# Patient Record
Sex: MALE | Race: BLACK OR AFRICAN AMERICAN | NOT HISPANIC OR LATINO | Employment: FULL TIME | URBAN - METROPOLITAN AREA
[De-identification: names, ages, dates, MRNs, and addresses within clinical notes are randomized per-mention and may not be internally consistent; named-entity substitution may affect disease eponyms.]

---

## 2022-04-14 ENCOUNTER — APPOINTMENT (EMERGENCY)
Dept: RADIOLOGY | Facility: HOSPITAL | Age: 62
End: 2022-04-14
Payer: MEDICAID

## 2022-04-14 ENCOUNTER — HOSPITAL ENCOUNTER (EMERGENCY)
Facility: HOSPITAL | Age: 62
Discharge: HOME | End: 2022-04-14
Attending: EMERGENCY MEDICINE | Admitting: EMERGENCY MEDICINE
Payer: MEDICAID

## 2022-04-14 VITALS
RESPIRATION RATE: 16 BRPM | HEART RATE: 75 BPM | DIASTOLIC BLOOD PRESSURE: 92 MMHG | WEIGHT: 185 LBS | SYSTOLIC BLOOD PRESSURE: 142 MMHG | OXYGEN SATURATION: 97 % | TEMPERATURE: 98.1 F

## 2022-04-14 DIAGNOSIS — J18.9 BILATERAL PNEUMONIA: Primary | ICD-10-CM

## 2022-04-14 DIAGNOSIS — I10 HYPERTENSION: ICD-10-CM

## 2022-04-14 PROCEDURE — 87636 SARSCOV2 & INF A&B AMP PRB: CPT | Performed by: PHYSICIAN ASSISTANT

## 2022-04-14 PROCEDURE — 99284 EMERGENCY DEPT VISIT MOD MDM: CPT | Performed by: PHYSICIAN ASSISTANT

## 2022-04-14 PROCEDURE — 71045 X-RAY EXAM CHEST 1 VIEW: CPT

## 2022-04-14 PROCEDURE — 99283 EMERGENCY DEPT VISIT LOW MDM: CPT

## 2022-04-14 RX ORDER — AMOXICILLIN 500 MG/1
1000 CAPSULE ORAL EVERY 8 HOURS SCHEDULED
Qty: 30 CAPSULE | Refills: 0 | Status: SHIPPED | OUTPATIENT
Start: 2022-04-15 | End: 2022-04-19 | Stop reason: HOSPADM

## 2022-04-14 RX ORDER — NIFEDIPINE 30 MG/1
60 TABLET, EXTENDED RELEASE ORAL DAILY
Status: DISCONTINUED | OUTPATIENT
Start: 2022-04-14 | End: 2022-04-15 | Stop reason: HOSPADM

## 2022-04-14 RX ORDER — BENZONATATE 100 MG/1
100 CAPSULE ORAL EVERY 8 HOURS
Qty: 21 CAPSULE | Refills: 0 | Status: SHIPPED | OUTPATIENT
Start: 2022-04-14 | End: 2022-04-19 | Stop reason: HOSPADM

## 2022-04-14 RX ORDER — NIFEDIPINE 60 MG/1
60 TABLET, EXTENDED RELEASE ORAL DAILY
COMMUNITY
End: 2022-04-19 | Stop reason: HOSPADM

## 2022-04-14 RX ORDER — NIFEDIPINE 60 MG/1
60 TABLET, EXTENDED RELEASE ORAL DAILY
Qty: 10 TABLET | Refills: 0 | Status: SHIPPED | OUTPATIENT
Start: 2022-04-14 | End: 2022-04-19 | Stop reason: HOSPADM

## 2022-04-14 RX ORDER — LABETALOL 200 MG/1
200 TABLET, FILM COATED ORAL 2 TIMES DAILY
COMMUNITY
End: 2022-04-19 | Stop reason: HOSPADM

## 2022-04-14 RX ORDER — AZITHROMYCIN 250 MG/1
TABLET, FILM COATED ORAL
Qty: 6 TABLET | Refills: 0 | Status: SHIPPED | OUTPATIENT
Start: 2022-04-14 | End: 2022-04-19 | Stop reason: HOSPADM

## 2022-04-14 RX ORDER — AMOXICILLIN 250 MG/1
1000 CAPSULE ORAL ONCE
Status: COMPLETED | OUTPATIENT
Start: 2022-04-14 | End: 2022-04-14

## 2022-04-14 RX ADMIN — NIFEDIPINE 60 MG: 30 TABLET, FILM COATED, EXTENDED RELEASE ORAL at 21:05

## 2022-04-14 RX ADMIN — AMOXICILLIN 1000 MG: 250 CAPSULE ORAL at 22:44

## 2022-04-14 NOTE — Clinical Note
Xander Vega was seen and treated in our emergency department on 4/14/2022  Diagnosis:     Janee Mccall    He may return on this date:     Patient must quarantine until informed of COVID-19 test results  If negative, patient must be without symptoms for 24 hours prior to returning to work  If you have any questions or concerns, please don't hesitate to call        Evelyn Wallace PA-C    ______________________________           _______________          _______________  Hospital Representative                              Date                                Time

## 2022-04-15 LAB
FLUAV RNA RESP QL NAA+PROBE: NEGATIVE
FLUBV RNA RESP QL NAA+PROBE: NEGATIVE
SARS-COV-2 RNA RESP QL NAA+PROBE: NEGATIVE

## 2022-04-15 NOTE — ED PROVIDER NOTES
History  Chief Complaint   Patient presents with    Cough     Cough and congestion for 2 days  Patient also notes he has been off of his BP meds for past 3 days  Is unable to get meds refilled until he gets back to [facility] on Monday  Patient is a 60-year-old male with a past medical history of hypertension who presents with congestion, cough, body aches for 3 days  Patient describes dry, nonproductive cough with nasal congestion, rhinorrhea  He also notes associated body aches  Patient thinks he has sick contacts at work  Patient denies any fevers, chills, diaphoresis, headache, dizziness, chest pain, shortness of breath, palpitations, abdominal pain, nausea, vomiting, diarrhea, urinary changes, recent travel  Patient has not tried anything to help alleviate his symptoms  Patient also reports being out of his blood pressure medication over the last 3 days and his PCP is out of the office all next week, so he is unable to get a refill  Patient is from [place]  Patient believes he was vaccinated for COVID, unsure regarding flu  Prior to Admission Medications   Prescriptions Last Dose Informant Patient Reported? Taking? NIFEdipine (PROCARDIA XL) 60 mg 24 hr tablet   Yes Yes   Sig: Take 60 mg by mouth daily   labetalol (NORMODYNE) 200 mg tablet   Yes Yes   Sig: Take 200 mg by mouth 2 (two) times a day      Facility-Administered Medications: None       Past Medical History:   Diagnosis Date    Hypertension        History reviewed  No pertinent surgical history  History reviewed  No pertinent family history  I have reviewed and agree with the history as documented  E-Cigarette/Vaping     E-Cigarette/Vaping Substances     Social History     Tobacco Use    Smoking status: Current Every Day Smoker    Smokeless tobacco: Never Used   Substance Use Topics    Alcohol use: Not on file    Drug use: Not on file       Review of Systems   Constitutional: Negative for chills, diaphoresis and fever  HENT: Positive for congestion and rhinorrhea  Negative for ear pain and sore throat  Eyes: Negative for visual disturbance  Respiratory: Positive for cough  Negative for shortness of breath, wheezing and stridor  Cardiovascular: Negative for chest pain, palpitations and leg swelling  Gastrointestinal: Negative for abdominal pain, diarrhea, nausea and vomiting  Genitourinary: Negative for difficulty urinating, dysuria, frequency, hematuria and urgency  Musculoskeletal: Positive for myalgias  Negative for neck pain and neck stiffness  Skin: Negative for color change, pallor and rash  Neurological: Negative for dizziness, weakness, light-headedness, numbness and headaches  All other systems reviewed and are negative  Physical Exam  Physical Exam  Vitals and nursing note reviewed  Constitutional:       General: He is awake  He is not in acute distress  Appearance: He is well-developed  He is not ill-appearing, toxic-appearing or diaphoretic  HENT:      Head: Normocephalic and atraumatic  Right Ear: External ear normal       Left Ear: External ear normal       Nose: Nose normal    Eyes:      General: No scleral icterus  Conjunctiva/sclera: Conjunctivae normal       Pupils: Pupils are equal, round, and reactive to light  Neck:      Vascular: No JVD  Trachea: No tracheal deviation  Cardiovascular:      Rate and Rhythm: Normal rate and regular rhythm  Pulses: Normal pulses  Heart sounds: Normal heart sounds  Pulmonary:      Effort: Pulmonary effort is normal  No respiratory distress  Breath sounds: Normal breath sounds  No decreased breath sounds, wheezing, rhonchi or rales  Abdominal:      General: There is no distension  Musculoskeletal:         General: No deformity  Cervical back: Normal range of motion  Right lower leg: No edema  Left lower leg: No edema        Comments: Moving all extremities freely, ambulating without issue Skin:     General: Skin is dry  Findings: No rash  Neurological:      Mental Status: He is alert and oriented to person, place, and time  GCS: GCS eye subscore is 4  GCS verbal subscore is 5  GCS motor subscore is 6  Psychiatric:         Behavior: Behavior normal  Behavior is cooperative  Vital Signs  ED Triage Vitals   Temperature Pulse Respirations Blood Pressure SpO2   04/14/22 2014 04/14/22 1954 04/14/22 1954 04/14/22 1954 04/14/22 1954   98 1 °F (36 7 °C) 75 16 (!) 173/96 97 %      Temp Source Heart Rate Source Patient Position - Orthostatic VS BP Location FiO2 (%)   04/14/22 2014 04/14/22 1954 04/14/22 2115 04/14/22 2115 --   Oral Monitor Lying Right arm       Pain Score       04/14/22 1954       No Pain           Vitals:    04/14/22 1954 04/14/22 2115 04/14/22 2145 04/14/22 2244   BP: (!) 173/96 (!) 171/100 (!) 175/102 142/92   Pulse: 75 72 70 75   Patient Position - Orthostatic VS:  Lying Lying Lying         Visual Acuity      ED Medications  Medications   NIFEdipine (PROCARDIA XL) 24 hr tablet 60 mg (60 mg Oral Given 4/14/22 2105)   amoxicillin (AMOXIL) capsule 1,000 mg (1,000 mg Oral Given 4/14/22 2244)       Diagnostic Studies  Results Reviewed     Procedure Component Value Units Date/Time    COVID/FLU - 24 hour TAT [[id number]] Collected: 04/14/22 2028    Lab Status: In process Specimen: Nares from Nose Updated: 04/14/22 2031                 XR chest 1 view portable   ED Interpretation by Jenn Acevedo PA-C (04/14 2224)   Bilateral pneumonia       by Jason Woods MD (04/14 2258)                 Procedures  Procedures         ED Course  ED Course as of 04/14/22 2310   Thu Apr 14, 2022 2209 Ambulatory pulse ox 98%   2227 Was awaiting formal Xray read, but patient wishes to leave  Will give coverage for pneumonia and inform that we will call if radiology notes any significant findings                                 SBIRT 22yo+      Most Recent Value   SBIRT (24 yo +)    In order to provide better care to our patients, we are screening all of our patients for alcohol and drug use  Would it be okay to ask you these screening questions? No Filed at: 04/14/2022 2015                    [facility]  Number of Diagnoses or Management Options  Bilateral pneumonia  Hypertension  Diagnosis management comments: Reviewed all results with patient, answered questions  Informed that we will call if radiology notes any significant findings  Reviewed medication education, treatment at home  Extensive discussion with patient regarding COVID19, flu testing, precautions, treatment at home, education including home self-quarantine instructions  Provided education should results be positive or negative  Recommended follow-up with PCP for further evaluation and monitoring of symptoms as well as regarding refill of medications  The management plan was discussed in detail with the patient at bedside and all questions were answered  Provided both verbal and written instructions  Reviewed red flag symptoms and strict return to ED instructions  Patient notes understanding and agrees to plan  Disposition  Final diagnoses:   Bilateral pneumonia   Hypertension     Time reflects when diagnosis was documented in both MDM as applicable and the Disposition within this note     Time User Action Codes Description Comment    4/14/2022 10:22 PM Velvet Elders Add [J18 9] Bilateral pneumonia     4/14/2022 10:26 PM Rashida, [address] [place] [address] Hypertension       ED Disposition     ED Disposition Condition Date/Time Comment    Discharge Stable Thu Apr 14, 2022 10:24 PM Helder Jackson discharge to home/self care              Follow-up Information     Follow up With Specialties Details Why Contact Info Additional [address] [facility] Emergency Department Emergency Medicine  If symptoms worsen [place] [phone]  [address] [facility], [place], operated by [facility], On license of [facility] [place], [place], [address]    Follow-up with PCP for monitoring of symptoms         [facility]   [address], [address] [facility] [address]  [address] [facility] [address], [address], [address] [place], [place], [address]          Discharge Medication List as of 4/14/2022 10:29 PM      START taking these medications    Details   amoxicillin (AMOXIL) 500 mg capsule Take 2 capsules (1,000 mg total) by mouth every 8 (eight) hours for 5 days, Starting Fri 4/15/2022, Until Wed 4/20/2022, Normal      azithromycin (ZITHROMAX) 250 mg tablet Take 2 tablets today then 1 tablet daily x 4 days, Normal      benzonatate (TESSALON PERLES) 100 mg capsule Take 1 capsule (100 mg total) by mouth every 8 (eight) hours, Starting Thu 4/14/2022, Normal      !! NIFEdipine (PROCARDIA XL) 60 mg 24 hr tablet Take 1 tablet (60 mg total) by mouth daily, Starting Thu 4/14/2022, Normal       !! - Potential duplicate medications found  Please discuss with provider  CONTINUE these medications which have NOT CHANGED    Details   labetalol (NORMODYNE) 200 mg tablet Take 200 mg by mouth 2 (two) times a day, Historical Med      !! NIFEdipine (PROCARDIA XL) 60 mg 24 hr tablet Take 60 mg by mouth daily, Historical Med       !! - Potential duplicate medications found  Please discuss with provider  No discharge procedures on file      PDMP Review     None          ED Provider  Electronically Signed by           Ivone Ventura PA-C  04/14/22 7599

## 2022-04-15 NOTE — DISCHARGE INSTRUCTIONS
Take amoxicillin and azithromycin as prescribed until complete  Continue all previously prescribed medications  Take Tessalon Perles as prescribed as needed for cough  Stay hydrated, drink plenty of fluids  Continue Tylenol at home as needed for pain, fevers  Follow-up with PCP for further evaluation and monitoring of symptoms  Return to ED if symptoms worsen including chest pain, difficulty breathing, dizziness, lightheadedness, fevers that do not resolve with medication, inability to tolerate food or fluid

## 2022-04-18 ENCOUNTER — HOSPITAL ENCOUNTER (OUTPATIENT)
Facility: HOSPITAL | Age: 62
Setting detail: OBSERVATION
Discharge: HOME | End: 2022-04-19
Attending: EMERGENCY MEDICINE | Admitting: INTERNAL MEDICINE
Payer: MEDICAID

## 2022-04-18 ENCOUNTER — APPOINTMENT (EMERGENCY)
Dept: RADIOLOGY | Facility: HOSPITAL | Age: 62
End: 2022-04-18
Payer: MEDICAID

## 2022-04-18 DIAGNOSIS — I50.9 ACUTE CHF (CONGESTIVE HEART FAILURE) (HCC): Primary | ICD-10-CM

## 2022-04-18 DIAGNOSIS — R77.8 ELEVATED TROPONIN: ICD-10-CM

## 2022-04-18 DIAGNOSIS — R94.31 ABNORMAL EKG: ICD-10-CM

## 2022-04-18 DIAGNOSIS — I16.0 HYPERTENSIVE URGENCY: ICD-10-CM

## 2022-04-18 LAB
ALBUMIN SERPL BCP-MCNC: 3 G/DL (ref 3.5–5)
ALP SERPL-CCNC: 71 U/L (ref 46–116)
ALT SERPL W P-5'-P-CCNC: 29 U/L (ref 12–78)
ANION GAP SERPL CALCULATED.3IONS-SCNC: 10 MMOL/L (ref 4–13)
AST SERPL W P-5'-P-CCNC: 20 U/L (ref 5–45)
BASE EX.OXY STD BLDV CALC-SCNC: 83.3 % (ref 60–80)
BASE EXCESS BLDV CALC-SCNC: 1.2 MMOL/L
BASOPHILS # BLD AUTO: 0.03 THOUSANDS/ΜL (ref 0–0.1)
BASOPHILS NFR BLD AUTO: 0 % (ref 0–1)
BILIRUB SERPL-MCNC: 0.89 MG/DL (ref 0.2–1)
BUN SERPL-MCNC: 17 MG/DL (ref 5–25)
CALCIUM ALBUM COR SERPL-MCNC: 9.3 MG/DL (ref 8.3–10.1)
CALCIUM SERPL-MCNC: 8.5 MG/DL (ref 8.3–10.1)
CARDIAC TROPONIN I PNL SERPL HS: 202 NG/L
CHLORIDE SERPL-SCNC: 108 MMOL/L (ref 100–108)
CO2 SERPL-SCNC: 28 MMOL/L (ref 21–32)
CREAT SERPL-MCNC: 1.22 MG/DL (ref 0.6–1.3)
EOSINOPHIL # BLD AUTO: 0.21 THOUSAND/ΜL (ref 0–0.61)
EOSINOPHIL NFR BLD AUTO: 3 % (ref 0–6)
ERYTHROCYTE [DISTWIDTH] IN BLOOD BY AUTOMATED COUNT: 13.3 % (ref 11.6–15.1)
GFR SERPL CREATININE-BSD FRML MDRD: 64 ML/MIN/1.73SQ M
GLUCOSE SERPL-MCNC: 98 MG/DL (ref 65–140)
HCO3 BLDV-SCNC: 26 MMOL/L (ref 24–30)
HCT VFR BLD AUTO: 35.1 % (ref 36.5–49.3)
HGB BLD-MCNC: 11.1 G/DL (ref 12–17)
IMM GRANULOCYTES # BLD AUTO: 0.02 THOUSAND/UL (ref 0–0.2)
IMM GRANULOCYTES NFR BLD AUTO: 0 % (ref 0–2)
LYMPHOCYTES # BLD AUTO: 1.42 THOUSANDS/ΜL (ref 0.6–4.47)
LYMPHOCYTES NFR BLD AUTO: 19 % (ref 14–44)
MCH RBC QN AUTO: 24.2 PG (ref 26.8–34.3)
MCHC RBC AUTO-ENTMCNC: 31.6 G/DL (ref 31.4–37.4)
MCV RBC AUTO: 77 FL (ref 82–98)
MONOCYTES # BLD AUTO: 0.76 THOUSAND/ΜL (ref 0.17–1.22)
MONOCYTES NFR BLD AUTO: 10 % (ref 4–12)
NEUTROPHILS # BLD AUTO: 4.98 THOUSANDS/ΜL (ref 1.85–7.62)
NEUTS SEG NFR BLD AUTO: 68 % (ref 43–75)
NRBC BLD AUTO-RTO: 0 /100 WBCS
NT-PROBNP SERPL-MCNC: 1632 PG/ML
O2 CT BLDV-SCNC: 13.9 ML/DL
PCO2 BLDV: 41.5 MM HG (ref 42–50)
PH BLDV: 7.41 [PH] (ref 7.3–7.4)
PLATELET # BLD AUTO: 210 THOUSANDS/UL (ref 149–390)
PMV BLD AUTO: 11.5 FL (ref 8.9–12.7)
PO2 BLDV: 51.9 MM HG (ref 35–45)
POTASSIUM SERPL-SCNC: 3.4 MMOL/L (ref 3.5–5.3)
PROT SERPL-MCNC: 6.9 G/DL (ref 6.4–8.2)
RBC # BLD AUTO: 4.58 MILLION/UL (ref 3.88–5.62)
SODIUM SERPL-SCNC: 146 MMOL/L (ref 136–145)
WBC # BLD AUTO: 7.42 THOUSAND/UL (ref 4.31–10.16)

## 2022-04-18 PROCEDURE — 85025 COMPLETE CBC W/AUTO DIFF WBC: CPT | Performed by: EMERGENCY MEDICINE

## 2022-04-18 PROCEDURE — 93005 ELECTROCARDIOGRAM TRACING: CPT

## 2022-04-18 PROCEDURE — 82805 BLOOD GASES W/O2 SATURATION: CPT | Performed by: EMERGENCY MEDICINE

## 2022-04-18 PROCEDURE — 80053 COMPREHEN METABOLIC PANEL: CPT | Performed by: EMERGENCY MEDICINE

## 2022-04-18 PROCEDURE — 83880 ASSAY OF NATRIURETIC PEPTIDE: CPT | Performed by: EMERGENCY MEDICINE

## 2022-04-18 PROCEDURE — 99285 EMERGENCY DEPT VISIT HI MDM: CPT

## 2022-04-18 PROCEDURE — 99291 CRITICAL CARE FIRST HOUR: CPT | Performed by: EMERGENCY MEDICINE

## 2022-04-18 PROCEDURE — 36415 COLL VENOUS BLD VENIPUNCTURE: CPT | Performed by: EMERGENCY MEDICINE

## 2022-04-18 PROCEDURE — 84484 ASSAY OF TROPONIN QUANT: CPT | Performed by: EMERGENCY MEDICINE

## 2022-04-18 PROCEDURE — 71045 X-RAY EXAM CHEST 1 VIEW: CPT

## 2022-04-18 RX ORDER — ASPIRIN 81 MG/1
324 TABLET, CHEWABLE ORAL ONCE
Status: COMPLETED | OUTPATIENT
Start: 2022-04-18 | End: 2022-04-18

## 2022-04-18 RX ADMIN — ASPIRIN 81 MG CHEWABLE TABLET 324 MG: 81 TABLET CHEWABLE at 22:11

## 2022-04-19 ENCOUNTER — APPOINTMENT (OUTPATIENT)
Dept: NON INVASIVE DIAGNOSTICS | Facility: HOSPITAL | Age: 62
End: 2022-04-19
Payer: MEDICAID

## 2022-04-19 VITALS
SYSTOLIC BLOOD PRESSURE: 143 MMHG | HEIGHT: 76 IN | HEART RATE: 82 BPM | BODY MASS INDEX: 23.5 KG/M2 | RESPIRATION RATE: 18 BRPM | DIASTOLIC BLOOD PRESSURE: 93 MMHG | TEMPERATURE: 98 F | OXYGEN SATURATION: 99 % | WEIGHT: 193 LBS

## 2022-04-19 PROBLEM — I10 PRIMARY HYPERTENSION: Status: ACTIVE | Noted: 2022-04-19

## 2022-04-19 PROBLEM — R06.09 DYSPNEA ON EXERTION: Status: ACTIVE | Noted: 2022-04-19

## 2022-04-19 PROBLEM — R79.89 ELEVATED TROPONIN: Status: ACTIVE | Noted: 2022-04-19

## 2022-04-19 PROBLEM — I50.31 ACUTE DIASTOLIC HEART FAILURE (HCC): Status: ACTIVE | Noted: 2022-04-19

## 2022-04-19 PROBLEM — R77.8 ELEVATED TROPONIN: Status: ACTIVE | Noted: 2022-04-19

## 2022-04-19 PROBLEM — R06.00 DYSPNEA ON EXERTION: Status: ACTIVE | Noted: 2022-04-19

## 2022-04-19 PROBLEM — E87.6 HYPOKALEMIA: Status: ACTIVE | Noted: 2022-04-19

## 2022-04-19 LAB
2HR DELTA HS TROPONIN: -6 NG/L
4HR DELTA HS TROPONIN: 4 NG/L
ANION GAP SERPL CALCULATED.3IONS-SCNC: 11 MMOL/L (ref 4–13)
AORTIC ROOT: 3.9 CM
APICAL FOUR CHAMBER EJECTION FRACTION: 65 %
ASCENDING AORTA: 3.6 CM (ref 2.11–3.16)
ATRIAL RATE: 78 BPM
ATRIAL RATE: 90 BPM
BUN SERPL-MCNC: 18 MG/DL (ref 5–25)
CALCIUM SERPL-MCNC: 8.5 MG/DL (ref 8.3–10.1)
CARDIAC TROPONIN I PNL SERPL HS: 196 NG/L
CARDIAC TROPONIN I PNL SERPL HS: 206 NG/L
CHLORIDE SERPL-SCNC: 106 MMOL/L (ref 100–108)
CHOLEST SERPL-MCNC: 123 MG/DL
CO2 SERPL-SCNC: 27 MMOL/L (ref 21–32)
CREAT SERPL-MCNC: 1.12 MG/DL (ref 0.6–1.3)
E WAVE DECELERATION TIME: 184 MS
ERYTHROCYTE [DISTWIDTH] IN BLOOD BY AUTOMATED COUNT: 13.2 % (ref 11.6–15.1)
FRACTIONAL SHORTENING: 32 % (ref 28–44)
GFR SERPL CREATININE-BSD FRML MDRD: 71 ML/MIN/1.73SQ M
GLUCOSE P FAST SERPL-MCNC: 95 MG/DL (ref 65–99)
GLUCOSE SERPL-MCNC: 95 MG/DL (ref 65–140)
HCT VFR BLD AUTO: 34.6 % (ref 36.5–49.3)
HDLC SERPL-MCNC: 28 MG/DL
HGB BLD-MCNC: 11 G/DL (ref 12–17)
INTERVENTRICULAR SEPTUM IN DIASTOLE (PARASTERNAL SHORT AXIS VIEW): 1.4 CM
INTERVENTRICULAR SEPTUM: 1.4 CM (ref 0.55–1.03)
LAAS-AP2: 31.6 CM2
LAAS-AP4: 25.4 CM2
LDLC SERPL CALC-MCNC: 83 MG/DL (ref 0–100)
LEFT ATRIUM SIZE: 3.9 CM
LEFT INTERNAL DIMENSION IN SYSTOLE: 3.2 CM (ref 3.55–5.38)
LEFT VENTRICULAR INTERNAL DIMENSION IN DIASTOLE: 4.7 CM (ref 5.88–8.77)
LEFT VENTRICULAR POSTERIOR WALL IN END DIASTOLE: 1.9 CM (ref 0.54–1.02)
LEFT VENTRICULAR STROKE VOLUME: 61 ML
LVSV (TEICH): 61 ML
MAGNESIUM SERPL-MCNC: 1.8 MG/DL (ref 1.6–2.6)
MCH RBC QN AUTO: 24.3 PG (ref 26.8–34.3)
MCHC RBC AUTO-ENTMCNC: 31.8 G/DL (ref 31.4–37.4)
MCV RBC AUTO: 77 FL (ref 82–98)
MV PEAK A VEL: 0.42 M/S
MV PEAK E VEL: 123 CM/S
MV STENOSIS PRESSURE HALF TIME: 53 MS
MV VALVE AREA P 1/2 METHOD: 4.15 CM2
P AXIS: 52 DEGREES
P AXIS: 62 DEGREES
PLATELET # BLD AUTO: 213 THOUSANDS/UL (ref 149–390)
PMV BLD AUTO: 12 FL (ref 8.9–12.7)
POTASSIUM SERPL-SCNC: 3.2 MMOL/L (ref 3.5–5.3)
PR INTERVAL: 342 MS
PROCALCITONIN SERPL-MCNC: 0.08 NG/ML
QRS AXIS: 118 DEGREES
QRS AXIS: 127 DEGREES
QRSD INTERVAL: 96 MS
QRSD INTERVAL: 96 MS
QT INTERVAL: 356 MS
QT INTERVAL: 422 MS
QTC INTERVAL: 364 MS
QTC INTERVAL: 458 MS
RBC # BLD AUTO: 4.52 MILLION/UL (ref 3.88–5.62)
RIGHT ATRIAL 2D VOLUME: 53 ML
RIGHT ATRIUM AREA SYSTOLE A4C: 19.3 CM2
RIGHT VENTRICLE ID DIMENSION: 4.4 CM
SL CV LEFT ATRIUM LENGTH A2C: 6 CM
SL CV PED ECHO LEFT VENTRICLE DIASTOLIC VOLUME (MOD BIPLANE) 2D: 101 ML
SL CV PED ECHO LEFT VENTRICLE SYSTOLIC VOLUME (MOD BIPLANE) 2D: 40 ML
SODIUM SERPL-SCNC: 144 MMOL/L (ref 136–145)
T WAVE AXIS: 74 DEGREES
T WAVE AXIS: 89 DEGREES
TR MAX PG: 34 MMHG
TR PEAK VELOCITY: 2.9 M/S
TRICUSPID VALVE PEAK REGURGITATION VELOCITY: 2.93 M/S
TRIGL SERPL-MCNC: 59 MG/DL
VENTRICULAR RATE: 63 BPM
VENTRICULAR RATE: 71 BPM
WBC # BLD AUTO: 6.7 THOUSAND/UL (ref 4.31–10.16)
Z-SCORE OF ASCENDING AORTA: 3.63
Z-SCORE OF INTERVENTRICULAR SEPTUM IN END DIASTOLE: 4.97
Z-SCORE OF LEFT VENTRICULAR DIMENSION IN END DIASTOLE: -4.24
Z-SCORE OF LEFT VENTRICULAR DIMENSION IN END SYSTOLE: -2.46
Z-SCORE OF LEFT VENTRICULAR POSTERIOR WALL IN END DIASTOLE: 9.15

## 2022-04-19 PROCEDURE — 84145 PROCALCITONIN (PCT): CPT | Performed by: PHYSICIAN ASSISTANT

## 2022-04-19 PROCEDURE — 99245 OFF/OP CONSLTJ NEW/EST HI 55: CPT | Performed by: INTERNAL MEDICINE

## 2022-04-19 PROCEDURE — 93005 ELECTROCARDIOGRAM TRACING: CPT

## 2022-04-19 PROCEDURE — 85027 COMPLETE CBC AUTOMATED: CPT | Performed by: PHYSICIAN ASSISTANT

## 2022-04-19 PROCEDURE — 80061 LIPID PANEL: CPT | Performed by: NURSE PRACTITIONER

## 2022-04-19 PROCEDURE — 84484 ASSAY OF TROPONIN QUANT: CPT | Performed by: EMERGENCY MEDICINE

## 2022-04-19 PROCEDURE — 93010 ELECTROCARDIOGRAM REPORT: CPT | Performed by: INTERNAL MEDICINE

## 2022-04-19 PROCEDURE — 93306 TTE W/DOPPLER COMPLETE: CPT

## 2022-04-19 PROCEDURE — 99219 PR INITIAL OBSERVATION CARE/DAY 50 MINUTES: CPT | Performed by: INTERNAL MEDICINE

## 2022-04-19 PROCEDURE — 80048 BASIC METABOLIC PNL TOTAL CA: CPT | Performed by: PHYSICIAN ASSISTANT

## 2022-04-19 PROCEDURE — 93306 TTE W/DOPPLER COMPLETE: CPT | Performed by: INTERNAL MEDICINE

## 2022-04-19 PROCEDURE — 36415 COLL VENOUS BLD VENIPUNCTURE: CPT | Performed by: EMERGENCY MEDICINE

## 2022-04-19 PROCEDURE — 99217 PR OBSERVATION CARE DISCHARGE MANAGEMENT: CPT | Performed by: INTERNAL MEDICINE

## 2022-04-19 PROCEDURE — 83735 ASSAY OF MAGNESIUM: CPT | Performed by: NURSE PRACTITIONER

## 2022-04-19 PROCEDURE — 96374 THER/PROPH/DIAG INJ IV PUSH: CPT

## 2022-04-19 RX ORDER — NIFEDIPINE 60 MG/1
60 TABLET, EXTENDED RELEASE ORAL DAILY
Status: DISCONTINUED | OUTPATIENT
Start: 2022-04-20 | End: 2022-04-19

## 2022-04-19 RX ORDER — POTASSIUM CHLORIDE 20 MEQ/1
40 TABLET, EXTENDED RELEASE ORAL ONCE
Status: COMPLETED | OUTPATIENT
Start: 2022-04-19 | End: 2022-04-19

## 2022-04-19 RX ORDER — FUROSEMIDE 10 MG/ML
20 INJECTION INTRAMUSCULAR; INTRAVENOUS
Status: DISCONTINUED | OUTPATIENT
Start: 2022-04-19 | End: 2022-04-19 | Stop reason: HOSPADM

## 2022-04-19 RX ORDER — SPIRONOLACTONE 25 MG/1
25 TABLET ORAL DAILY
Status: DISCONTINUED | OUTPATIENT
Start: 2022-04-19 | End: 2022-04-19

## 2022-04-19 RX ORDER — FUROSEMIDE 10 MG/ML
40 INJECTION INTRAMUSCULAR; INTRAVENOUS ONCE
Status: COMPLETED | OUTPATIENT
Start: 2022-04-19 | End: 2022-04-19

## 2022-04-19 RX ORDER — SPIRONOLACTONE 50 MG/1
50 TABLET, FILM COATED ORAL DAILY
Status: DISCONTINUED | OUTPATIENT
Start: 2022-04-19 | End: 2022-04-19

## 2022-04-19 RX ORDER — SPIRONOLACTONE 50 MG/1
50 TABLET, FILM COATED ORAL DAILY
Qty: 30 TABLET | Refills: 0 | Status: SHIPPED | OUTPATIENT
Start: 2022-04-20

## 2022-04-19 RX ORDER — NIFEDIPINE 30 MG/1
90 TABLET, EXTENDED RELEASE ORAL DAILY
Status: DISCONTINUED | OUTPATIENT
Start: 2022-04-19 | End: 2022-04-19 | Stop reason: HOSPADM

## 2022-04-19 RX ORDER — NIFEDIPINE 30 MG/1
60 TABLET, EXTENDED RELEASE ORAL ONCE
Status: COMPLETED | OUTPATIENT
Start: 2022-04-19 | End: 2022-04-19

## 2022-04-19 RX ORDER — NICOTINE 21 MG/24HR
1 PATCH, TRANSDERMAL 24 HOURS TRANSDERMAL DAILY
Status: DISCONTINUED | OUTPATIENT
Start: 2022-04-19 | End: 2022-04-19 | Stop reason: HOSPADM

## 2022-04-19 RX ORDER — SPIRONOLACTONE 50 MG/1
50 TABLET, FILM COATED ORAL DAILY
Status: DISCONTINUED | OUTPATIENT
Start: 2022-04-19 | End: 2022-04-19 | Stop reason: HOSPADM

## 2022-04-19 RX ORDER — MAGNESIUM SULFATE 1 G/100ML
1 INJECTION INTRAVENOUS ONCE
Status: COMPLETED | OUTPATIENT
Start: 2022-04-19 | End: 2022-04-19

## 2022-04-19 RX ORDER — NIFEDIPINE 30 MG/1
90 TABLET, EXTENDED RELEASE ORAL DAILY
Qty: 30 TABLET | Refills: 0 | Status: SHIPPED | OUTPATIENT
Start: 2022-04-20

## 2022-04-19 RX ORDER — CALCIUM CARBONATE 200(500)MG
1000 TABLET,CHEWABLE ORAL DAILY PRN
Status: DISCONTINUED | OUTPATIENT
Start: 2022-04-19 | End: 2022-04-19 | Stop reason: HOSPADM

## 2022-04-19 RX ORDER — ACETAMINOPHEN 325 MG/1
650 TABLET ORAL EVERY 6 HOURS PRN
Status: DISCONTINUED | OUTPATIENT
Start: 2022-04-19 | End: 2022-04-19 | Stop reason: HOSPADM

## 2022-04-19 RX ADMIN — POTASSIUM CHLORIDE 40 MEQ: 20 TABLET, EXTENDED RELEASE ORAL at 08:32

## 2022-04-19 RX ADMIN — FUROSEMIDE 40 MG: 10 INJECTION, SOLUTION INTRAVENOUS at 00:21

## 2022-04-19 RX ADMIN — NIFEDIPINE 90 MG: 30 TABLET, FILM COATED, EXTENDED RELEASE ORAL at 15:28

## 2022-04-19 RX ADMIN — POTASSIUM CHLORIDE 40 MEQ: 20 TABLET, EXTENDED RELEASE ORAL at 11:10

## 2022-04-19 RX ADMIN — FUROSEMIDE 20 MG: 10 INJECTION, SOLUTION INTRAVENOUS at 15:28

## 2022-04-19 RX ADMIN — NIFEDIPINE 60 MG: 30 TABLET, FILM COATED, EXTENDED RELEASE ORAL at 00:33

## 2022-04-19 RX ADMIN — ENOXAPARIN SODIUM 40 MG: 40 INJECTION SUBCUTANEOUS at 08:33

## 2022-04-19 RX ADMIN — POTASSIUM CHLORIDE 40 MEQ: 20 TABLET, EXTENDED RELEASE ORAL at 03:17

## 2022-04-19 RX ADMIN — FUROSEMIDE 20 MG: 10 INJECTION, SOLUTION INTRAVENOUS at 08:32

## 2022-04-19 RX ADMIN — MAGNESIUM SULFATE HEPTAHYDRATE 1 G: 1 INJECTION, SOLUTION INTRAVENOUS at 11:06

## 2022-04-19 RX ADMIN — PERFLUTREN 0.2 ML/MIN: 6.52 INJECTION, SUSPENSION INTRAVENOUS at 10:40

## 2022-04-19 NOTE — UTILIZATION REVIEW
Initial Clinical Review    Admission: Date/Time/Statement:   Admission Orders (From admission, onward)     Ordered        04/19/22 0242  Place in Observation  Once                      Orders Placed This Encounter   Procedures    Place in Observation     Standing Status:   Standing     Number of Occurrences:   1     Order Specific Question:   Level of Care     Answer:   Med Surg [16]     ED Arrival Information     Expected Arrival Acuity    - 4/18/2022 21:24 Urgent         Means of arrival Escorted by Service Admission type    Walk-In Self Hospitalist Urgent         Arrival complaint    cough        Chief Complaint   Patient presents with    Cough     Pt reports cough since tuesday of last week and diagnosed with pneumonia  Pt states he took antibiotics without relief   Shortness of Breath     Initial Presentation: 63 y o  male presents to the ED from home with c/o worsening HERBERT, cough, unable to lie flat, abd bloating, med noncompliance until recently  Pt was seen in ED on 4/14 and d/c on 2 antibiotics for possible CAP  He has not improved  PMH:  HTN  In the ED he was treated with IV Lasix - and note improvement post Lasix, Nifedipine, ASA  Imaging shows bilat infiltrates and increased vascular congestion  He has elevated troponins, no c/o CP, ECG is nonischemic, proBNP 1632  He is admitted to OBSERVATION status with HERBERT - Lasix 20 mg in AM, cardio consult, Echo, resume home Procardia  Hypokalemia - 3 4 - replete with 40 mEq  Elevated troponins - trend  HTN - noncompliant with Labetalol, ran out, monitor with diuresis  4/19 Cardio Consult - CHF, HERBERT, elevated troponin, abnormal ECG with intermittent 2:1 block, HTN, hypokalemia, smoker - continue on IV Lasix 20 mg IV BID for now  Elevated trop d/t FH, uncontrolled HTN, will need Echo, may need ischemic eval, will add Aldactone 50 mg daily  Will give more KCl today for K 3 2 and will replete Mag today with 1 Gm IV        ED Triage Vitals Temperature Pulse Respirations Blood Pressure SpO2   04/18/22 2130 04/18/22 2130 04/18/22 2130 04/18/22 2130 04/18/22 2130   98 9 °F (37 2 °C) 100 20 (!) 189/108 97 %      Temp Source Heart Rate Source Patient Position - Orthostatic VS BP Location FiO2 (%)   04/18/22 2130 04/18/22 2130 04/18/22 2215 04/18/22 2130 --   Oral Monitor Lying Right arm       Pain Score       04/18/22 2215       No Pain          Wt Readings from Last 1 Encounters:   04/19/22 87 5 kg (193 lb)     Additional Vital Signs:   04/19/22 1100 98 °F (36 7 °C) 63 18 152/106 Abnormal  111 98 % None (Room air) Lying   04/19/22 1032 -- 63 -- 152/106 Abnormal  -- -- -- --   04/19/22 0748 98 3 °F (36 8 °C) 81 18 156/92 117 99 % None (Room air) Lying     04/19/22 0311 98 7 °F (37 1 °C) 79 18 165/102 Abnormal  128 96 % -- Lying   04/19/22 0118 97 6 °F (36 4 °C) 68 18 159/110 Abnormal  121 98 % None (Room air) Lying   04/19/22 0015 -- 76 20 184/118 Abnormal   145 99 % None (Room air) Lying   04/18/22 2330 -- 70 20 177/110 Abnormal  138 99 % None (Room air) Lying   04/18/22 2300 -- 72 16 171/101 Abnormal  131 98 % None (Room air) Lying   04/18/22 2230 -- 82 20 168/103 Abnormal  130 98 % None (Room air) Lying   04/18/22 2215 -- 84 16 157/109 Abnormal  128 99 % None (Room air) Lying     Pertinent Labs/Diagnostic Test Results:     4/18 ECG - Sinus rhythm with 2nd degree A-V block (Mobitz I)  Right axis deviation  Poor anterior R wave progression is noted  T wave abnormality, consider lateral ischemia  Abnormal ECG    4/19 ECG - SR with 1st deg block    4/19 Echo - Technically adequate transthoracic echocardiogram study  Definity contrast was used for endocardial border enhancement    1  Severe concentric left ventricular hypertrophy, normal left ventricular systolic function, grade 3 diastolic dysfunction  Ejection fraction is estimated as around 58%  2  Normal right ventricular size and systolic function    3  Moderate left atrial and mild right atrial cavity enlargement  4  Aortic valve sclerosis, no aortic valve stenosis or regurgitation  5  Mild mitral valve leaflet thickening and sclerosis with some focal calcification  Mild approaching moderate mitral valve regurgitation  6  Mild tricuspid valve regurgitation  7  Mild pulmonary hypertension  Estimated RVSP/PASP is 38 mmHg  8  No pericardial effusion  No previous echocardiogram is available for comparison    X-ray chest 1 view portable   ED Interpretation by Shashi Romero DO (04/18 2348)   Bilateral infiltrates again noted  Increased vascular congestion compared to most recent x-ray done 4 days ago  Final Result by Bhavik Hutchison MD (04/19 1601)      Bilateral infiltrates slightly worse on the left  Findings suggest pneumonia although asymmetric edema is possible  Bilateral infiltrates is noted on the ER preliminary result              Workstation performed: [id number]           Results from last 7 days   Lab Units 04/14/22 2028   SARS-COV-2  Negative     Results from last 7 days   Lab Units 04/19/22  0548 04/18/22 2207   WBC Thousand/uL 6 70 7 42   HEMOGLOBIN g/dL 11 0* 11 1*   HEMATOCRIT % 34 6* 35 1*   PLATELETS Thousands/uL 213 210   NEUTROS ABS Thousands/µL  --  4 98         Results from last 7 days   Lab Units 04/19/22  0548 04/18/22 2207   SODIUM mmol/L 144 146*   POTASSIUM mmol/L 3 2* 3 4*   CHLORIDE mmol/L 106 108   CO2 mmol/L 27 28   ANION GAP mmol/L 11 10   BUN mg/dL 18 17   CREATININE mg/dL 1 12 1 22   EGFR ml/min/1 73sq m 71 64   CALCIUM mg/dL 8 5 8 5   MAGNESIUM mg/dL 1 8  --      Results from last 7 days   Lab Units 04/18/22 2207   AST U/L 20   ALT U/L 29   ALK PHOS U/L 71   TOTAL PROTEIN g/dL 6 9   ALBUMIN g/dL 3 0*   TOTAL BILIRUBIN mg/dL 0 89         Results from last 7 days   Lab Units 04/19/22  0548 04/18/22 2207   GLUCOSE RANDOM mg/dL 95 98     Results from last 7 days   Lab Units 04/18/22 2207   PH KIRAN  7 414*   PCO2 KIRAN mm Hg 41 5*   PO2 KIRAN mm Hg 51 9*   HCO3 KIRAN mmol/L 26 0   BASE EXC KIRAN mmol/L 1 2   O2 CONTENT KIRAN ml/dL 13 9   O2 HGB, VENOUS % 83 3*             Results from last 7 days   Lab Units 04/19/22  0223 04/19/22  0012 04/18/22 2207   HS TNI 0HR ng/L  --   --  202*   HS TNI 2HR ng/L  --  196*  --    HSTNI D2 ng/L  --  -6  --    HS TNI 4HR ng/L 206*  --   --    HSTNI D4 ng/L 4  --   --      Results from last 7 days   Lab Units 04/19/22  0548   PROCALCITONIN ng/ml 0 08     Results from last 7 days   Lab Units 04/18/22 2207   NT-PRO BNP pg/mL 1,632*     Results from last 7 days   Lab Units 04/14/22 2028   INFLUENZA A PCR  Negative   INFLUENZA B PCR  Negative         ED Treatment:   Medication Administration from 04/18/2022 2124 to 04/19/2022 0103    Date/Time Order Dose Route Action   04/18/2022 2211 aspirin chewable tablet 324 mg 324 mg Oral Given   04/19/2022 0021 furosemide (LASIX) injection 40 mg 40 mg Intravenous Given   04/19/2022 0033 NIFEdipine (PROCARDIA XL) 24 hr tablet 60 mg 60 mg Oral Given        Past Medical History:   Diagnosis Date    Hypertension        Admitting Diagnosis: Cough [R05 9]  [facility] [I50 9]  Age/Sex: 63 y o  male  Admission Orders:  Scheduled Medications:  enoxaparin, 40 mg, Subcutaneous, Q24H [facility]  furosemide, 20 mg, Intravenous, BID (diuretic)  nicotine, 1 patch, Transdermal, Daily  [START ON 4/20/2022] NIFEdipine, 60 mg, Oral, Daily  spironolactone, 50 mg, Oral, Daily      Continuous IV Infusions:     PRN Meds:  acetaminophen, 650 mg, Oral, Q6H PRN  calcium carbonate, 1,000 mg, Oral, Daily PRN    Tele  Echo  IP CONSULT TO CARDIOLOGY  IP CONSULT TO CASE MANAGEMENT    Network Utilization Review Department  ATTENTION: Please call with any questions or concerns to [phone] and carefully listen to the prompts so that you are directed to the right person   All voicemails are confidential   Chevy [place] all requests for admission clinical reviews, approved or denied determinations and any other requests to dedicated fax number below belonging to the campus where the patient is receiving treatment   List of dedicated fax numbers for the Facilities:  [address] [place] [address] DENIALS (Administrative/Medical Necessity) [phone]   [address]  [facility] (Maternity/NICU/Pediatrics) [phone]   [address]  [address] [facility] [place] [address] [id number] [place] [address] Kayla Ochoa Rebeca [address] [place] [address] [phone]

## 2022-04-19 NOTE — ED PROVIDER NOTES
History  Chief Complaint   Patient presents with    Cough     Pt reports cough since tuesday of last week and diagnosed with pneumonia  Pt states he took antibiotics without relief   Shortness of Breath     Mr Mitul Lovelace is a  64 yo male who presents with a cough and sporadic shortness of breath for 6 days  He was seen in the [facility] ED on April 14th and was diagnosed with bacterial pneumonia and potential CHF  He did start taking azithromycin and amoxicillin that was given at his visit on April 14th  He does not feel his cough has reduced since starting antibiotic treatment  He describes his cough as productive, producing white mucus  The cough wakes him up from sleeping several times per night  He has not required oxygen supplementation for this bout of pneumonia  He currently is prescribed  nifedipine and labetalol for hypertension  He has been having a hard time acquiring these medications recently due to him being from [facility] and that is where his doctor is located  On April 14th he was sent home with a refill of these hypertension medications  He did not take his dose of nifedipine this morning  The potential CHF exacerbation suggestion at the visit on April 14th was due to findings of cardiomegaly on chest X ray  He left before cardiac assessment could take place  He has a history of cardiac evaluation 5-6 years ago at a hospital in [place]  He states that the cardiologist did not believe he was having a heart attack at that time and the catheterization did not show obstruction of coronary arteries  The shortness of breath occurs periodically with activity  The shortness of breath appeared at the same time as the cough 6 days ago  Denies chest pain, current difficulty with breathing, numbness/tingling of extremities, change in vision, edema of extremities  Denies history of asthma, TB, sarcoidosis, myocardial infarctions, stroke, COPD, diabetes, liver disease, kidney disease  History provided by:  Patient   used: No    Cough  Cough characteristics:  Productive  Sputum characteristics:  White  Severity:  Moderate  Onset quality:  Sudden  Duration:  6 days  Timing:  Sporadic  Progression:  Unchanged  Chronicity:  New  Smoker: no    Relieved by:  Nothing  Worsened by: Activity  Ineffective treatments:  Decongestant  Associated symptoms: myalgias and shortness of breath    Associated symptoms: no chest pain, no chills, no diaphoresis, no ear fullness, no ear pain, no eye discharge, no fever, no headaches, no rash, no rhinorrhea, no sinus congestion, no sore throat, no weight loss and no wheezing    Risk factors: recent infection    Shortness of Breath  Associated symptoms: cough    Associated symptoms: no abdominal pain, no chest pain, no diaphoresis, no ear pain, no fever, no headaches, no neck pain, no rash, no sore throat, no vomiting and no wheezing        Prior to Admission Medications   Prescriptions Last Dose Informant Patient Reported? Taking?    NIFEdipine (PROCARDIA XL) 60 mg 24 hr tablet 4/18/2022 at Unknown time  Yes Yes   Sig: Take 60 mg by mouth daily   NIFEdipine (PROCARDIA XL) 60 mg 24 hr tablet   No No   Sig: Take 1 tablet (60 mg total) by mouth daily   amoxicillin (AMOXIL) 500 mg capsule 4/18/2022 at Unknown time  No Yes   Sig: Take 2 capsules (1,000 mg total) by mouth every 8 (eight) hours for 5 days   azithromycin (ZITHROMAX) 250 mg tablet   No No   Sig: Take 2 tablets today then 1 tablet daily x 4 days   benzonatate (TESSALON PERLES) 100 mg capsule Not Taking at Unknown time  No No   Sig: Take 1 capsule (100 mg total) by mouth every 8 (eight) hours   Patient not taking: Reported on 4/19/2022    labetalol (NORMODYNE) 200 mg tablet Not Taking at Unknown time  Yes No   Sig: Take 200 mg by mouth 2 (two) times a day   Patient not taking: Reported on 4/19/2022       Facility-Administered Medications: None       Past Medical History:   Diagnosis Date  Hypertension        History reviewed  No pertinent surgical history  History reviewed  No pertinent family history  I have reviewed and agree with the history as documented  E-Cigarette/Vaping     E-Cigarette/Vaping Substances     Social History     Tobacco Use    Smoking status: Current Every Day Smoker     Packs/day: 0 50     Types: Cigarettes    Smokeless tobacco: Never Used   Substance Use Topics    Alcohol use: Never    Drug use: Never       Review of Systems   Constitutional: Negative for chills, diaphoresis, fever and weight loss  HENT: Negative for ear pain, rhinorrhea and sore throat  Eyes: Negative for discharge and visual disturbance  Respiratory: Positive for cough and shortness of breath  Negative for chest tightness and wheezing  Cardiovascular: Negative for chest pain and leg swelling  Gastrointestinal: Negative for abdominal pain, nausea and vomiting  Musculoskeletal: Positive for myalgias  Negative for back pain and neck pain  Skin: Negative for color change, pallor, rash and wound  Allergic/Immunologic: Negative for immunocompromised state  Neurological: Negative for dizziness, syncope, light-headedness and headaches  All other systems reviewed and are negative  Physical Exam  Physical Exam  Vitals and nursing note reviewed  Constitutional:       General: He is not in acute distress  Appearance: He is well-developed  He is not ill-appearing, toxic-appearing or diaphoretic  HENT:      Head: Normocephalic and atraumatic  Right Ear: External ear normal       Left Ear: External ear normal       Nose: No congestion  Mouth/Throat:      Pharynx: No oropharyngeal exudate or posterior oropharyngeal erythema  Eyes:      General: No scleral icterus  Right eye: No discharge  Left eye: No discharge  Conjunctiva/sclera: Conjunctivae normal       Right eye: Right conjunctiva is not injected        Left eye: Left conjunctiva is not injected  Neck:      Vascular: JVD present  No carotid bruit  Trachea: No tracheal deviation  Cardiovascular:      Rate and Rhythm: Normal rate  Rhythm irregular  Chest Wall: PMI is displaced  Thrill present  Pulses: Normal pulses  Heart sounds: No friction rub  Comments: Regularly irregular rhythm  Displaced PMI  Palpable lift at tricuspid spot  Pulmonary:      Effort: Pulmonary effort is normal  No respiratory distress  Breath sounds: Normal breath sounds  No stridor  No decreased breath sounds, wheezing or rhonchi  Chest:      Chest wall: No tenderness  Abdominal:      General: Bowel sounds are normal  There is distension  There is no abdominal bruit  There are no signs of injury  Palpations: Abdomen is soft  There is no shifting dullness, fluid wave, hepatomegaly, splenomegaly, mass or pulsatile mass  Tenderness: There is no abdominal tenderness  There is no right CVA tenderness, left CVA tenderness, guarding or rebound  Negative signs include Panda's sign, Rovsing's sign and McBurney's sign  Hernia: No hernia is present  Musculoskeletal:         General: No swelling, tenderness, deformity or signs of injury  Cervical back: Normal range of motion  No rigidity or tenderness  Right lower leg: No edema  Left lower leg: No edema  Lymphadenopathy:      Cervical: No cervical adenopathy  Skin:     General: Skin is warm and dry  Capillary Refill: Capillary refill takes less than 2 seconds  Coloration: Skin is not jaundiced or pale  Findings: No bruising, erythema, lesion or rash  Neurological:      Mental Status: He is alert and oriented to person, place, and time  Cranial Nerves: No cranial nerve deficit  Sensory: No sensory deficit  Motor: No weakness or abnormal muscle tone        Coordination: Coordination normal    Psychiatric:         Mood and Affect: Mood normal          Behavior: Behavior normal  Vital Signs  ED Triage Vitals   Temperature Pulse Respirations Blood Pressure SpO2   04/18/22 2130 04/18/22 2130 04/18/22 2130 04/18/22 2130 04/18/22 2130   98 9 °F (37 2 °C) 100 20 (!) 189/108 97 %      Temp Source Heart Rate Source Patient Position - Orthostatic VS BP Location FiO2 (%)   04/18/22 2130 04/18/22 2130 04/18/22 2215 04/18/22 2130 --   Oral Monitor Lying Right arm       Pain Score       04/18/22 2215       No Pain           Vitals:    04/18/22 2330 04/19/22 0015 04/19/22 0118 04/19/22 0311   BP: (!) 177/110 (!) 184/118 (!) 159/110 (!) 165/102   Pulse: 70 76 68 79   Patient Position - Orthostatic VS: Lying Lying Lying Lying         Visual Acuity      ED Medications  Medications   NIFEdipine (PROCARDIA XL) 24 hr tablet 60 mg (has no administration in time range)   furosemide (LASIX) injection 20 mg (has no administration in time range)   acetaminophen (TYLENOL) tablet 650 mg (has no administration in time range)   calcium carbonate (TUMS) chewable tablet 1,000 mg (has no administration in time range)   nicotine (NICODERM CQ) 14 mg/24hr TD 24 hr patch 1 patch (has no administration in time range)   enoxaparin (LOVENOX) subcutaneous injection 40 mg (has no administration in time range)   aspirin chewable tablet 324 mg (324 mg Oral Given 4/18/22 2211)   furosemide (LASIX) injection 40 mg (40 mg Intravenous Given 4/19/22 0021)   NIFEdipine (PROCARDIA XL) 24 hr tablet 60 mg (60 mg Oral Given 4/19/22 0033)   potassium chloride (K-DUR,KLOR-CON) CR tablet 40 mEq (40 mEq Oral Given 4/19/22 0317)       Diagnostic Studies  Results Reviewed     Procedure Component Value Units Date/Time    HS Troponin I 4hr [[id number]]  (Abnormal) Collected: 04/19/22 0223    Lab Status: Final result Specimen: Blood from Arm, Right Updated: 04/19/22 0311     hs TnI 4hr 206 ng/L      Delta 4hr hsTnI 4 ng/L     HS Troponin I 2hr [[id number]]  (Abnormal) Collected: 04/19/22 0012    Lab Status: Final result Specimen: Blood from Arm, Left Updated: 04/19/22 0042     hs TnI 2hr 196 ng/L      Delta 2hr hsTnI -6 ng/L     HS Troponin 0hr (reflex protocol) [[id number]]  (Abnormal) Collected: 04/18/22 2207    Lab Status: Final result Specimen: Blood from Arm, Left Updated: 04/18/22 2237     hs TnI 0hr 202 ng/L     NT-BNP PRO [[id number]]  (Abnormal) Collected: 04/18/22 2207    Lab Status: Final result Specimen: Blood from Arm, Left Updated: 04/18/22 2237     NT-proBNP 1,632 pg/mL     Comprehensive metabolic panel [[id number]]  (Abnormal) Collected: 04/18/22 2207    Lab Status: Final result Specimen: Blood from Arm, Left Updated: 04/18/22 2229     Sodium 146 mmol/L      Potassium 3 4 mmol/L      Chloride 108 mmol/L      CO2 28 mmol/L      ANION GAP 10 mmol/L      BUN 17 mg/dL      Creatinine 1 22 mg/dL      Glucose 98 mg/dL      Calcium 8 5 mg/dL      Corrected Calcium 9 3 mg/dL      AST 20 U/L      ALT 29 U/L      Alkaline Phosphatase 71 U/L      Total Protein 6 9 g/dL      Albumin 3 0 g/dL      Total Bilirubin 0 89 mg/dL      eGFR 64 ml/min/1 73sq m     Narrative:      [place] guidelines for Chronic Kidney Disease (CKD):     Stage 1 with normal or high GFR (GFR > 90 mL/min/1 73 square meters)    Stage 2 Mild CKD (GFR = 60-89 mL/min/1 73 square meters)    Stage 3A Moderate CKD (GFR = 45-59 mL/min/1 73 square meters)    Stage 3B Moderate CKD (GFR = 30-44 mL/min/1 73 square meters)    Stage 4 Severe CKD (GFR = 15-29 mL/min/1 73 square meters)    Stage 5 End Stage CKD (GFR <15 mL/min/1 73 square meters)  Note: GFR calculation is accurate only with a steady state creatinine    Blood gas, venous [[id number]]  (Abnormal) Collected: 04/18/22 2207    Lab Status: Final result Specimen: Blood from Arm, Left Updated: 04/18/22 2216     pH, Miguel 7 414     pCO2, Miguel 41 5 mm Hg      pO2, Miguel 51 9 mm Hg      HCO3, Miguel 26 0 mmol/L      Base Excess, Miguel 1 2 mmol/L      O2 Content, Miguel 13 9 ml/dL      O2 HGB, VENOUS 83 3 %     CBC and differential [[id number]]  (Abnormal) Collected: 04/18/22 2207    Lab Status: Final result Specimen: Blood from Arm, Left Updated: 04/18/22 2213     WBC 7 42 Thousand/uL      RBC 4 58 Million/uL      Hemoglobin 11 1 g/dL      Hematocrit 35 1 %      MCV 77 fL      MCH 24 2 pg      MCHC 31 6 g/dL      RDW 13 3 %      MPV 11 5 fL      Platelets 217 Thousands/uL      nRBC 0 /100 WBCs      Neutrophils Relative 68 %      Immat GRANS % 0 %      Lymphocytes Relative 19 %      Monocytes Relative 10 %      Eosinophils Relative 3 %      Basophils Relative 0 %      Neutrophils Absolute 4 98 Thousands/µL      Immature Grans Absolute 0 02 Thousand/uL      Lymphocytes Absolute 1 42 Thousands/µL      Monocytes Absolute 0 76 Thousand/µL      Eosinophils Absolute 0 21 Thousand/µL      Basophils Absolute 0 03 Thousands/µL                  X-ray chest 1 view portable   ED Interpretation by Rj Walsh DO (04/18 2348)   Bilateral infiltrates again noted  Increased vascular congestion compared to most recent x-ray done 4 days ago                   Procedures  ECG 12 Lead Documentation Only    Date/Time: 4/19/2022 12:14 AM  Performed by: Rj Walsh DO  Authorized by: Rj Walsh DO     Indications / Diagnosis:  Chf  ECG reviewed by me, the ED Provider: yes    Patient location:  ED  Previous ECG:     Previous ECG:  Compared to current    Similarity:  No change  Interpretation:     Interpretation: normal    Rate:     ECG rate:  71    ECG rate assessment: normal    Rhythm:     Rhythm: sinus rhythm and A-V block      Rhythm comment:  1st degree  Ectopy:     Ectopy: none    QRS:     QRS intervals:  Normal  Conduction:     Conduction: normal    ST segments:     ST segments:  Non-specific  T waves:     T waves: non-specific    ECG 12 Lead Documentation Only    Date/Time: 4/19/2022 12:16 AM  Performed by: Rj Walsh DO  Authorized by: Rj Walsh DO     Indications / Diagnosis:  Sob  ECG reviewed by me, the ED Provider: yes    Patient location:  ED  Previous ECG:     Previous ECG:  Unavailable  Interpretation:     Interpretation: abnormal    Rate:     ECG rate:  63    ECG rate assessment: normal    Rhythm:     Rhythm: sinus rhythm and A-V block      Rhythm comment:  2nd degree mobitz 1  Ectopy:     Ectopy: none    QRS:     QRS intervals:  Normal  Conduction:     Conduction: normal    ST segments:     ST segments:  Non-specific  T waves:     T waves: non-specific    CriticalCare Time  Performed by: Emiliano Rodrigez DO  Authorized by: Emiliano Rodrigez DO     Critical care provider statement:     Critical care time (minutes):  35    Critical care time was exclusive of:  Separately billable procedures and treating other patients and teaching time    Critical care was necessary to treat or prevent imminent or life-threatening deterioration of the following conditions:  Cardiac failure    Critical care was time spent personally by me on the following activities:  Blood draw for specimens, obtaining history from patient or surrogate, development of treatment plan with patient or surrogate, discussions with consultants, evaluation of patient's response to treatment, examination of patient, interpretation of cardiac output measurements, ordering and performing treatments and interventions, ordering and review of laboratory studies, ordering and review of radiographic studies, review of old charts and re-evaluation of patient's condition    I assumed direction of critical care for this patient from another provider in my specialty: no               ED Course                               SBIRT 20yo+      Most Recent Value   SBIRT (25 yo +)    In order to provide better care to our patients, we are screening all of our patients for alcohol and drug use  Would it be okay to ask you these screening questions?  No Filed at: 04/18/2022 2149                    [facility]  Number of Diagnoses or Management Options  Abnormal EKG: new and requires workup  Acute CHF (congestive heart failure) ([facility] [address] ): new and requires workup  Elevated troponin: new and requires workup  Hypertensive urgency: new and requires workup  Diagnosis management comments: Patient presents with persistent shortness of breath  Was started on antibiotics for possible pneumonia but also had superimposed CHF the other day  Patient did not stay for further cardiac evaluation but does return today for persistent symptoms  Will obtain this cardiac workup today, repeat his chest x-ray and treatment depending on results  Patient is hypertensive here and did miss his medications this morning  Initial EKG does show a Mobitz 1 heart block  No prior EKG to compare to  Patient states that he had a cardiac catheterization that was negative about 6 years ago  He is unsure what his baseline EKG is     11:58 PM  Rhythm strip shows a 1st degree block  There is no sign of a Mobitz 1 or Mobitz 2 block on this  Will repeat EKG and troponin shortly  Patient showing signs of CHF on lab values and on repeat chest x-ray today  Will continue with CHF treatment  I have a lower suspicion for pneumonia given failure to improve with antibiotics and overall presentation today  This patient shows signs of potential CHF exacerbation  He has shortness of breath with exertion, increased JVD, 1st degree heart block on EKG, abnormal PMI, lift over the tricuspid spot, hypertension, and abdominal distension  His serial troponin and BNP were also elevated  His pneumonia symptoms did not improve with antibiotic treatment  His lung infiltrates also did not improve on x ray with antibiotics  These two findings lower my suspicion that pneumonia is causing his current pulmonary symptoms  He will be admitted to have his heart assessed by cardiology  He agrees with this plan and is willing to be admitted   He was given his regular dose of nifedipine in the ED and also given IV Lasix  These medications should help improve his current condition until he is assessed further by cardiology  Amount and/or Complexity of Data Reviewed  Clinical lab tests: reviewed and ordered  Tests in the radiology section of CPT®: reviewed and ordered  Tests in the medicine section of CPT®: reviewed and ordered  Review and summarize past medical records: yes  Independent visualization of images, tracings, or specimens: yes    Patient Progress  Patient progress: stable      Disposition  Final diagnoses:   Acute CHF (congestive heart failure) (HCC)   Elevated troponin   Abnormal EKG   Hypertensive urgency     Time reflects when diagnosis was documented in both MDM as applicable and the Disposition within this note     Time User Action Codes Description Comment    4/19/2022 12:32 AM Smeriglio, [place] Manna Add [I50 9] Acute CHF (congestive heart failure) ([address] )     4/19/2022  6:23 AM Smeriglio, Jay Manna Add [R77 8] Elevated troponin     4/19/2022  6:23 AM Smeriglio, [place] Manna Add [R94 31] Abnormal EKG     4/19/2022  6:23 AM Smeriglio, Jay Manna Add [I16 0] Hypertensive urgency       ED Disposition     ED Disposition Condition Date/Time Comment    Admit Stable Tue Apr 19, 2022 12:32 AM Case was discussed with ROLO and the patient's admission status was agreed to be Admission Status: inpatient status to the service of Dr Mile Timmons   Follow-up Information    None         Current Discharge Medication List      CONTINUE these medications which have NOT CHANGED    Details   amoxicillin (AMOXIL) 500 mg capsule Take 2 capsules (1,000 mg total) by mouth every 8 (eight) hours for 5 days  Qty: 30 capsule, Refills: 0    Associated Diagnoses: Bilateral pneumonia      !!  NIFEdipine (PROCARDIA XL) 60 mg 24 hr tablet Take 60 mg by mouth daily      benzonatate (TESSALON PERLES) 100 mg capsule Take 1 capsule (100 mg total) by mouth every 8 (eight) hours  Qty: 21 capsule, Refills: 0    Associated Diagnoses: Bilateral pneumonia labetalol (NORMODYNE) 200 mg tablet Take 200 mg by mouth 2 (two) times a day      !! NIFEdipine (PROCARDIA XL) 60 mg 24 hr tablet Take 1 tablet (60 mg total) by mouth daily  Qty: 10 tablet, Refills: 0    Associated Diagnoses: Hypertension       !! - Potential duplicate medications found  Please discuss with provider  STOP taking these medications       azithromycin (ZITHROMAX) 250 mg tablet Comments:   Reason for Stopping:               No discharge procedures on file      PDMP Review     None          ED Provider  Electronically Signed by           Virgie Stevenson DO  04/19/22 7500

## 2022-04-19 NOTE — ASSESSMENT & PLAN NOTE
Patient with hypertensive urgency on admission with SBP greater than 185  Patient was evaluated by Cardiology  Procardia dose has been increased  Spironolactone added  Patient also noted to be in heart failure which improved with IV Lasix    Patient is adamant about leaving this evening and per Cardiology patient is stable for discharge however will need close follow-up with his primary cardiologist   Patient follows with doctors in [place], advised to follow-up in 1 week

## 2022-04-19 NOTE — PLAN OF CARE
Problem: PAIN - ADULT  Goal: Verbalizes/displays adequate comfort level or baseline comfort level  Description: Interventions:  - Encourage patient to monitor pain and request assistance  - Assess pain using appropriate pain scale  - Administer analgesics based on type and severity of pain and evaluate response  - Implement non-pharmacological measures as appropriate and evaluate response  - Consider cultural and social influences on pain and pain management  - Notify physician/advanced practitioner if interventions unsuccessful or patient reports new pain  Outcome: Progressing     Problem: INFECTION - ADULT  Goal: Absence or prevention of progression during hospitalization  Description: INTERVENTIONS:  - Assess and monitor for signs and symptoms of infection  - Monitor lab/diagnostic results  - Monitor all insertion sites, i e  indwelling lines, tubes, and drains  - Monitor endotracheal if appropriate and nasal secretions for changes in amount and color  - [place] appropriate cooling/warming therapies per order  - Administer medications as ordered  - Instruct and encourage patient and family to use good hand hygiene technique  - Identify and instruct in appropriate isolation precautions for identified infection/condition  Outcome: Progressing     Problem: SAFETY ADULT  Goal: Patient will remain free of falls  Description: INTERVENTIONS:  - Educate patient/family on patient safety including physical limitations  - Instruct patient to call for assistance with activity   - Consult OT/PT to assist with strengthening/mobility   - Keep Call bell within reach  - Keep bed low and locked with side rails adjusted as appropriate  - Keep care items and personal belongings within reach  - Initiate and maintain comfort rounds  - Make Fall Risk Sign visible to staff    Outcome: Progressing  Goal: Maintain or return to baseline ADL function  Description: INTERVENTIONS:  -  Assess patient's ability to carry out ADLs; assess patient's baseline for ADL function and identify physical deficits which impact ability to perform ADLs (bathing, care of mouth/teeth, toileting, grooming, dressing, etc )  - Assess/evaluate cause of self-care deficits   - Assess range of motion  - Assess patient's mobility; develop plan if impaired  - Assess patient's need for assistive devices and provide as appropriate  - Encourage maximum independence but intervene and supervise when necessary  - Involve family in performance of ADLs  - Assess for home care needs following discharge   - Consider OT consult to assist with ADL evaluation and planning for discharge  - Provide patient education as appropriate  Outcome: Progressing     Problem: DISCHARGE PLANNING  Goal: Discharge to home or other facility with appropriate resources  Description: INTERVENTIONS:  - Identify barriers to discharge w/patient and caregiver  - Arrange for needed discharge resources and transportation as appropriate  - Identify discharge learning needs (meds, wound care, etc )  - Arrange for interpretive services to assist at discharge as needed  - Refer to Case Management Department for coordinating discharge planning if the patient needs post-hospital services based on physician/advanced practitioner order or complex needs related to functional status, cognitive ability, or social support system  Outcome: Progressing     Problem: Knowledge Deficit  Goal: Patient/family/caregiver demonstrates understanding of disease process, treatment plan, medications, and discharge instructions  Description: Complete learning assessment and assess knowledge base    Interventions:  - Provide teaching at level of understanding  - Provide teaching via preferred learning methods  Outcome: Progressing     Problem: CARDIOVASCULAR - ADULT  Goal: Maintains optimal cardiac output and hemodynamic stability  Description: INTERVENTIONS:  - Monitor I/O, vital signs and rhythm  - Monitor for S/S and trends of decreased cardiac output  - Administer and titrate ordered vasoactive medications to optimize hemodynamic stability  - Assess quality of pulses, skin color and temperature  - Assess for signs of decreased coronary artery perfusion  - Instruct patient to report change in severity of symptoms  Outcome: Progressing  Goal: Absence of cardiac dysrhythmias or at baseline rhythm  Description: INTERVENTIONS:  - Continuous cardiac monitoring, vital signs, obtain 12 lead EKG if ordered  - Administer antiarrhythmic and heart rate control medications as ordered  - Monitor electrolytes and administer replacement therapy as ordered  Outcome: Progressing

## 2022-04-19 NOTE — H&P
[address] [facility]  H&P- Samantha Watts 1960, 63 y o  male MRN: [id number]  Unit/Bed#: E4 -01 Encounter: [id number]  Primary Care Provider: No primary care provider on file  Date and time admitted to hospital: 4/18/2022  9:31 PM    * Dyspnea on exertion  Assessment & Plan  Presents with 1 wk of HERBERT  Seen in ER recently and tx for CAP with abx   proBNP 1632, given IV lasix 40mg in ER - reports sx improvement  Suspect mild vol overload, maybe diastolic dysfunction in the setting of uncontrolled HTN/noncompliance with meds   Check PCT  Check echo   Consult cards   redose lasix 20mg in AM   Resume home procardia     Hypokalemia  Assessment & Plan  Will give 40meq since he received IV lasix and K 3 4    Elevated troponin  Assessment & Plan  Trop 202/197/trend x 3  Denies CP   EKG nonspecific changes     Primary hypertension  Assessment & Plan  Repots only taking procardia 60mg/d, not taking labetalol  Recently ran out of bed, given refill in ER last week  Monitor with diuresis     VTE Pharmacologic Prophylaxis: VTE Score: 2 Moderate Risk (Score 3-4) - Pharmacological DVT Prophylaxis Ordered: enoxaparin (Lovenox)  Code Status: Level 1 - Full Code   Discussion with family: Patient declined call to   Anticipated Length of Stay: Patient will be admitted on an observation basis with an anticipated length of stay of less than 2 midnights secondary to mild CHF   Total Time for Visit, including Counseling / Coordination of Care: 45 minutes Greater than 50% of this total time spent on direct patient counseling and coordination of care  Chief Complaint: SOB w exertion     History of Present Illness:  Samantha Watts is a 63 y o  male with a PMH of HTN who presents with dyspnea upon exertion  Patient was seen in the ER on 04/14 and discharged home with 2 different antibiotics for possible community-acquired pneumonia    Patient reports minimal improvement of symptoms and still with worsening dyspnea  Reports cough at times that wakes him up from his sleep  He reports being able to lay flat without difficulty  He does not weigh himself on a daily basis  Denies lower extremity edema but does report abdominal bloating  He reports prior cardiac workup in [place] maybe 6 years ago, said that he did not have any stents placed  At rest he feels fine without shortness of breath and is currently laying on his side  States that he has had increased urination since Lasix was given  Denies chest pain  Was apparently not taking his blood pressure medications up until recently when he was offered a refill in the ER  Review of Systems:  Review of Systems   Constitutional: Negative for chills and fever  HENT: Negative for congestion  Respiratory: Positive for cough and shortness of breath  Cardiovascular: Negative for chest pain and leg swelling  Gastrointestinal: Positive for abdominal distention  Negative for abdominal pain  Genitourinary: Negative for difficulty urinating  Musculoskeletal: Negative for back pain  Skin: Negative for rash  Neurological: Negative for dizziness and light-headedness  Psychiatric/Behavioral: Negative for confusion  Past Medical and Surgical History:   Past Medical History:   Diagnosis Date    Hypertension        History reviewed  No pertinent surgical history  Meds/Allergies:  Prior to Admission medications    Medication Sig Start Date End Date Taking?  Authorizing Provider   amoxicillin (AMOXIL) 500 mg capsule Take 2 capsules (1,000 mg total) by mouth every 8 (eight) hours for 5 days 4/15/22 4/20/22 Yes Ab Field PA-C   NIFEdipine (PROCARDIA XL) 60 mg 24 hr tablet Take 60 mg by mouth daily   Yes Historical Provider, MD   azithromycin (ZITHROMAX) 250 mg tablet Take 2 tablets today then 1 tablet daily x 4 days 4/14/22 4/18/22  Ab Field PA-C   benzonatate (TESSALON PERLES) 100 mg capsule Take 1 capsule (100 mg total) by mouth every 8 (eight) hours  Patient not taking: Reported on 4/19/2022 4/14/22   Janeth Field PA-C   labetalol (NORMODYNE) 200 mg tablet Take 200 mg by mouth 2 (two) times a day  Patient not taking: Reported on 4/19/2022     Historical Provider, MD   NIFEdipine (PROCARDIA XL) 60 mg 24 hr tablet Take 1 tablet (60 mg total) by mouth daily 4/14/22   Betito Carolina PA-C     I have reviewed home medications with patient personally  Allergies: No Known Allergies    Social History:  Marital Status: Single   Occupation:   Patient Pre-hospital Living Situation: Home  Patient Pre-hospital Level of Mobility: walks  Patient Pre-hospital Diet Restrictions:   Substance Use History:   Social History     Substance and Sexual Activity   Alcohol Use Never     Social History     Tobacco Use   Smoking Status Current Every Day Smoker    Packs/day: 0 50    Types: Cigarettes   Smokeless Tobacco Never Used     Social History     Substance and Sexual Activity   Drug Use Never       Family History:  History reviewed  No pertinent family history  Physical Exam:     Vitals:   Blood Pressure: (!) 159/110 (04/19/22 0118)  Pulse: 68 (04/19/22 0118)  Temperature: 97 6 °F (36 4 °C) (04/19/22 0118)  Temp Source: Temporal (04/19/22 0118)  Respirations: 18 (04/19/22 0118)  Height: 6' 4" (193 cm) (04/19/22 0106)  Weight - Scale: 87 6 kg (193 lb 2 oz) (04/19/22 0106)  SpO2: 98 % (04/19/22 0118)    Physical Exam  Constitutional:       Appearance: Normal appearance  He is not ill-appearing  HENT:      Head: Normocephalic and atraumatic  Mouth/Throat:      Mouth: Mucous membranes are moist    Eyes:      Extraocular Movements: Extraocular movements intact  Neck:      Vascular: JVD present  Cardiovascular:      Rate and Rhythm: Normal rate  Rhythm irregular  Heart sounds: Murmur heard          Comments: Possible murmur  Pulmonary:      Effort: Pulmonary effort is normal       Comments: Fine rales, on room air  Abdominal:      General: Abdomen is flat  There is distension  Musculoskeletal:         General: No swelling  Normal range of motion  Skin:     General: Skin is warm  Neurological:      General: No focal deficit present  Mental Status: He is alert  Psychiatric:         Mood and Affect: Mood normal        Additional Data:     Lab Results:  Results from last 7 days   Lab Units 04/18/22  2207   WBC Thousand/uL 7 42   HEMOGLOBIN g/dL 11 1*   HEMATOCRIT % 35 1*   PLATELETS Thousands/uL 210   NEUTROS PCT % 68   LYMPHS PCT % 19   MONOS PCT % 10   EOS PCT % 3     Results from last 7 days   Lab Units 04/18/22  2207   SODIUM mmol/L 146*   POTASSIUM mmol/L 3 4*   CHLORIDE mmol/L 108   CO2 mmol/L 28   BUN mg/dL 17   CREATININE mg/dL 1 22   ANION GAP mmol/L 10   CALCIUM mg/dL 8 5   ALBUMIN g/dL 3 0*   TOTAL BILIRUBIN mg/dL 0 89   ALK PHOS U/L 71   ALT U/L 29   AST U/L 20   GLUCOSE RANDOM mg/dL 98                       Imaging: Reviewed radiology reports from this admission including: chest xray  X-ray chest 1 view portable   ED Interpretation by Huang Rudolph DO (04/18 2348)   Bilateral infiltrates again noted  Increased vascular congestion compared to most recent x-ray done 4 days ago  EKG and Other Studies Reviewed on Admission:   · EKG: NSR  HR 71     ** Please Note: This note has been constructed using a voice recognition system   **

## 2022-04-19 NOTE — ASSESSMENT & PLAN NOTE
Repots only taking procardia 60mg/d, not taking labetalol  Recently ran out of bed, given refill in ER last week  Monitor with diuresis

## 2022-04-19 NOTE — ASSESSMENT & PLAN NOTE
Likely secondary to hypertensive urgency as well as congestive heart failure  Cardiology input appreciated    She will need outpatient follow-up, no acute intervention needed at this time aside from the medication changes mentioned above

## 2022-04-19 NOTE — CASE MANAGEMENT
Case Management Progress Note    Patient name Xander Jauregui [facility] [address] [place] [address] 229-* MRN [id number]  : 1960 Date 2022       LOS (days): 1  Geometric Mean LOS (GMLOS) (days):   Days to GMLOS:        OBJECTIVE:        Current admission status: Observation  [facility] Pharmacy:   [id number] Yobani Beard, [address] [place] [id number]  Phone: [phone] Fax: [phone]    Primary Care Provider: No primary care provider on file  Primary Insurance:   Secondary Insurance:     PROGRESS NOTE:    Email sent to Hospital financial counselors to make them aware that patient is uninsured

## 2022-04-19 NOTE — ASSESSMENT & PLAN NOTE
· Echo with grade 3 diastolic dysfunction, chest x-ray with bilateral infiltrates, BNP 1600 on admission  · Patient started on IV Lasix, significantly improved  · Cardiology has optimized patient's medications  · Home labetalol discontinued due to first-degree AV block  · Procardia dose increased to 90 mg daily  · Spironolactone added  · Will need outpatient cardiology follow-up  · Blood pressure has improved

## 2022-04-19 NOTE — DISCHARGE SUMMARY
[address]  Discharge- Jesika Oliva 1960, 63 y o  male MRN: [id number]  Unit/Bed#: E4 -01 Encounter: [id number]  Primary Care Provider: No primary care provider on file  Date and time admitted to hospital: 4/18/2022  9:31 PM    * Acute diastolic heart failure (HCC)  Assessment & Plan  · Echo with grade 3 diastolic dysfunction, chest x-ray with bilateral infiltrates, BNP 1600 on admission  · Patient started on IV Lasix, significantly improved  · Cardiology has optimized patient's medications  · Home labetalol discontinued due to first-degree AV block  · Procardia dose increased to 90 mg daily  · Spironolactone added  · Will need outpatient cardiology follow-up  · Blood pressure has improved    Hypokalemia  Assessment & Plan  Replaced, follow-up routine labs with PCP as outpatient    Elevated troponin  Assessment & Plan  Likely secondary to hypertensive urgency as well as congestive heart failure  Cardiology input appreciated  She will need outpatient follow-up, no acute intervention needed at this time aside from the medication changes mentioned above    Primary hypertension  Assessment & Plan  Patient with hypertensive urgency on admission with SBP greater than 185  Patient was evaluated by Cardiology  Procardia dose has been increased  Spironolactone added  Patient also noted to be in heart failure which improved with IV Lasix  Patient is adamant about leaving this evening and per Cardiology patient is stable for discharge however will need close follow-up with his primary cardiologist   Patient follows with doctors in [place], advised to follow-up in 1 week      Discharging Physician / Practitioner: Min Bai MD  PCP: No primary care provider on file    Admission Date:   Admission Orders (From admission, onward)     Ordered        04/19/22 0242  Place in Observation  Once            04/19/22 0035  INPATIENT ADMISSION  Once Discharge Date: 04/19/22    Medical Problems             Resolved Problems  Date Reviewed: 4/19/2022    None                Consultations During Hospital Stay:  · Cardiology    Procedures Performed:   · None    Significant Findings / Test Results:   · Diastolic heart failure    Incidental Findings:   · None     Test Results Pending at Discharge (will require follow up): · None     Outpatient Tests Requested:  · Routine labs with PCP as outpatient    Complications:     None    Reason for Admission:  Congestive heart failure    Hospital Course:     Deepali Cortez is a 63 y o  male patient who originally presented to the hospital on 4/18/2022 due to shortness of breath  Patient found have elevated blood pressure with SBP greater than 185  Chest x-ray with congestion and patient was started on Lasix IV  Cardiology was consulted and echo was ordered  Patient with significant diastolic dysfunction noted on echo  Troponins were elevated, Cardiology optimized patient's medications  No need for acute intervention, continue with medical management  Patient will need outpatient follow-up for further evaluation/treatment  Procardia dose increased, labetalol discontinued  Patient started on Aldactone  Patient follows with doctors in [place] and states that he would like to go home today  Cardiology stated that as long as blood pressure has improved and patient can be discharged home with outpatient follow-up  Blood pressure improved this afternoon  Patient ambulating well with no exertional symptoms  Patient advised return to the ER if he had any worsening symptoms  Patient states that he will follow-up with his primary care provider and cardiologist within 1 week  Please see above list of diagnoses and related plan for additional information  Condition at Discharge: stable     Discharge Day Visit / Exam:     Subjective:  Patient states he is feeling better today  Shortness of breath improved  Denies any chest pain    Vitals: Blood Pressure: 143/93 (04/19/22 1538)  Pulse: 82 (04/19/22 1538)  Temperature: 98 °F (36 7 °C) (04/19/22 1100)  Temp Source: Temporal (04/19/22 0748)  Respirations: 18 (04/19/22 1100)  Height: 6' 4" (193 cm) (04/19/22 1032)  Weight - Scale: 87 5 kg (193 lb) (04/19/22 1032)  SpO2: 99 % (04/19/22 1538)     Exam:   Physical Exam  Constitutional:       General: He is not in acute distress  HENT:      Head: Normocephalic and atraumatic  Nose: Nose normal       Mouth/Throat:      Mouth: Mucous membranes are moist    Eyes:      Extraocular Movements: Extraocular movements intact  Conjunctiva/sclera: Conjunctivae normal    Cardiovascular:      Rate and Rhythm: Normal rate and regular rhythm  Pulmonary:      Effort: Pulmonary effort is normal  No respiratory distress  Abdominal:      Palpations: Abdomen is soft  Tenderness: There is no abdominal tenderness  Musculoskeletal:         General: Normal range of motion  Cervical back: Normal range of motion and neck supple  Skin:     General: Skin is warm and dry  Neurological:      General: No focal deficit present  Mental Status: He is alert  Cranial Nerves: No cranial nerve deficit  Psychiatric:         Mood and Affect: Mood normal          Behavior: Behavior normal          Discharge instructions/Information to patient and family:   See after visit summary for information provided to patient and family  Provisions for Follow-Up Care:  See after visit summary for information related to follow-up care and any pertinent home health orders  Disposition:     Home      Planned Readmission:    no     Discharge Statement:  I spent 35 minutes discharging the patient  This time was spent on the day of discharge  I had direct contact with the patient on the day of discharge   Greater than 50% of the total time was spent examining patient, answering all patient questions, arranging and discussing plan of care with patient as well as directly providing post-discharge instructions  Additional time then spent on discharge activities  Discharge Medications:  See after visit summary for reconciled discharge medications provided to patient and family        ** Please Note: This note has been constructed using a voice recognition system **

## 2022-04-19 NOTE — PLAN OF CARE
Problem: PAIN - ADULT  Goal: Verbalizes/displays adequate comfort level or baseline comfort level  Description: Interventions:  - Encourage patient to monitor pain and request assistance  - Assess pain using appropriate pain scale  - Administer analgesics based on type and severity of pain and evaluate response  - Implement non-pharmacological measures as appropriate and evaluate response  - Consider cultural and social influences on pain and pain management  - Notify physician/advanced practitioner if interventions unsuccessful or patient reports new pain  Outcome: Progressing     Problem: INFECTION - ADULT  Goal: Absence or prevention of progression during hospitalization  Description: INTERVENTIONS:  - Assess and monitor for signs and symptoms of infection  - Monitor lab/diagnostic results  - Monitor all insertion sites, i e  indwelling lines, tubes, and drains  - Monitor endotracheal if appropriate and nasal secretions for changes in amount and color  - [place] appropriate cooling/warming therapies per order  - Administer medications as ordered  - Instruct and encourage patient and family to use good hand hygiene technique  - Identify and instruct in appropriate isolation precautions for identified infection/condition  Outcome: Progressing  Goal: Absence of fever/infection during neutropenic period  Description: INTERVENTIONS:  - Monitor WBC    Outcome: Progressing     Problem: SAFETY ADULT  Goal: Patient will remain free of falls  Description: INTERVENTIONS:  - Educate patient/family on patient safety including physical limitations  - Instruct patient to call for assistance with activity   - Consult OT/PT to assist with strengthening/mobility   - Keep Call bell within reach  - Keep bed low and locked with side rails adjusted as appropriate  - Keep care items and personal belongings within reach  - Initiate and maintain comfort rounds  - Make Fall Risk Sign visible to staff  - Apply yellow socks and bracelet for high fall risk patients  - Consider moving patient to room near nurses station  Outcome: Progressing  Goal: Maintain or return to baseline ADL function  Description: INTERVENTIONS:  -  Assess patient's ability to carry out ADLs; assess patient's baseline for ADL function and identify physical deficits which impact ability to perform ADLs (bathing, care of mouth/teeth, toileting, grooming, dressing, etc )  - Assess/evaluate cause of self-care deficits   - Assess range of motion  - Assess patient's mobility; develop plan if impaired  - Assess patient's need for assistive devices and provide as appropriate  - Encourage maximum independence but intervene and supervise when necessary  - Involve family in performance of ADLs  - Assess for home care needs following discharge   - Consider OT consult to assist with ADL evaluation and planning for discharge  - Provide patient education as appropriate  Outcome: Progressing  Goal: Maintains/Returns to pre admission functional level  Description: INTERVENTIONS:  - Perform BMAT or MOVE assessment daily    - Set and communicate daily mobility goal to care team and patient/family/caregiver  - Collaborate with rehabilitation services on mobility goals if consulted  - Perform Range of Motion 3 times a day  - Reposition patient every 2 hours    - Dangle patient 3 times a day  - Stand patient 3 times a day  - Ambulate patient 3 times a day  - Out of bed to chair 3 times a day   - Out of bed for meals 3 times a day  - Out of bed for toileting  - Record patient progress and toleration of activity level   Outcome: Progressing     Problem: DISCHARGE PLANNING  Goal: Discharge to home or other facility with appropriate resources  Description: INTERVENTIONS:  - Identify barriers to discharge w/patient and caregiver  - Arrange for needed discharge resources and transportation as appropriate  - Identify discharge learning needs (meds, wound care, etc )  - Arrange for interpretive services to assist at discharge as needed  - Refer to Case Management Department for coordinating discharge planning if the patient needs post-hospital services based on physician/advanced practitioner order or complex needs related to functional status, cognitive ability, or social support system  Outcome: Progressing     Problem: Knowledge Deficit  Goal: Patient/family/caregiver demonstrates understanding of disease process, treatment plan, medications, and discharge instructions  Description: Complete learning assessment and assess knowledge base    Interventions:  - Provide teaching at level of understanding  - Provide teaching via preferred learning methods  Outcome: Progressing     Problem: CARDIOVASCULAR - ADULT  Goal: Maintains optimal cardiac output and hemodynamic stability  Description: INTERVENTIONS:  - Monitor I/O, vital signs and rhythm  - Monitor for S/S and trends of decreased cardiac output  - Administer and titrate ordered vasoactive medications to optimize hemodynamic stability  - Assess quality of pulses, skin color and temperature  - Assess for signs of decreased coronary artery perfusion  - Instruct patient to report change in severity of symptoms  Outcome: Progressing  Goal: Absence of cardiac dysrhythmias or at baseline rhythm  Description: INTERVENTIONS:  - Continuous cardiac monitoring, vital signs, obtain 12 lead EKG if ordered  - Administer antiarrhythmic and heart rate control medications as ordered  - Monitor electrolytes and administer replacement therapy as ordered  Outcome: Progressing

## 2022-04-19 NOTE — ASSESSMENT & PLAN NOTE
Presents with 1 wk of HERBERT  Seen in ER recently and tx for CAP with abx   proBNP 1632, given IV lasix 40mg in ER - reports sx improvement  Suspect mild vol overload, maybe diastolic dysfunction in the setting of uncontrolled HTN/noncompliance with meds   Check PCT  Check echo   Consult cards   redose lasix 20mg in AM   Resume home procardia

## 2022-04-19 NOTE — CONSULTS
Consult - Cardiology   Minesh Schulte 63 y o  male MRN: [id number]  Unit/Bed#: E4 -01 Encounter: [id number]        Reason For Consult:  Dyspnea on exertion, acute heart failure             ASSESSMENT:  Acute congestive heart failure  Dyspnea on exertion   Elevated troponin   Abnormal ECG with intermittent 2:1 AV block   Hypertension    - outpatient Rx, Procardia XL 60 mg daily, labetalol 200 mg twice daily (only taking one per patient)  Hypokalemia   Tobacco use     PLAN/ DISCUSSION:     · Chest x-ray revealed bilateral infiltrates slightly worse on the left, suggestive of pneumonia although asymmetric edema possible  Cardiomegaly noted  NT pro BNP 1632  Currently on furosemide 20 mg IV twice daily, will continue same for today and monitor response  Will re-evaluate in the morning to determine if further IV diuresis is warranted  · Elevated troponin with high sensitivity troponin trend of 202, 196, 206; suspect elevated troponin due to mild acute heart failure, uncontrolled hypertension  ECG with ST-T wave abnormalities  · Additionally, please see mobile image with 2:1 AV block; Telemetry reveals intermittent 2:1 AV block, also with first degree AV block  · Echocardiogram ordered to assess cardiac structure and function  Pending echocardiogram, may consider ischemic evaluation, device placement  · Currently on Procardia XL 60 mg daily, IV diuresis as above  Blood pressure improving more recently  Pending echocardiogram, will adjust antihypertensives  Will initiate aldactone 50 mg daily  · Monitor volume status with strict intake/output, daily weight  · Monitor lytes closely; maintain potassium > 4, magnesium > 2  Most recent potassium 3 2, post K-Dur 40 mEq x 1, will provide additional K-Dur 40 mEq now  Magnesium 1 8, will provide magnesium sulfate 1 g IVPB now  · Will attempt to obtain [facility] from [facility]  Patient's PCP: Dr Felipe Catalan  · Will check fasting lipid panel  History Of Present Illness:  71-year-old male presented to [facility] with complaints of dyspnea on exertion  Patient presented to the emergency department on 4/15/22 with complaints of cough, congestion, and body aches for 3 days  Patient had reported a dry, nonproductive cough with nasal congestion  He felt as though he had sick contacts at work  Additionally, patient reported that he had been out of his blood pressure medications for 3 days and his PCP was out of the office all week so he was unable to obtain a refill  Per ED documentation, patient wished to leave thus was provided with coverage for pneumonia  Upon assessment and evaluation, patient resting comfortably in bed  Per patient, he states that his dyspnea on exertion began last week, last Monday thus prompting him to seek evaluation in the emergency department on 4/15/22  He denied chest pain, dizziness, lightheadedness, syncope, pre-syncope, palpitations  He notes that the dyspnea on exertion was a sudden onset for him compared to a progressive onset  Patient states that he returned back to the hospital as his xray revealed findings of heart failure  Patient moved from [place] approximately 1 5 years ago  Per patient, he has seen a cardiologist in the past several years ago  He had two prior cardiac catheterizations, one via radial approach, one via femoral approach without stent placement  He states that his troponin has been chronically elevated and he has been told that his ECG is abnormal  Patient has long-standing uncontrolled hypertension and is a current smoker  He denies alcohol use  Please see significant cardiac history and problems enumerated above  Past Medical History:        Past Medical History:   Diagnosis Date    Hypertension     History reviewed  No pertinent surgical history       Allergy:        No Known Allergies    Medications:       Prior to Admission medications    Medication Sig Start Date End Date Taking? Authorizing Provider   amoxicillin (AMOXIL) 500 mg capsule Take 2 capsules (1,000 mg total) by mouth every 8 (eight) hours for 5 days 4/15/22 4/20/22 Yes Louann Field PA-C   NIFEdipine (PROCARDIA XL) 60 mg 24 hr tablet Take 60 mg by mouth daily   Yes Historical Provider, MD   azithromycin (ZITHROMAX) 250 mg tablet Take 2 tablets today then 1 tablet daily x 4 days 4/14/22 4/18/22  Louann Field PA-C   benzonatate (TESSALON PERLES) 100 mg capsule Take 1 capsule (100 mg total) by mouth every 8 (eight) hours  Patient not taking: Reported on 4/19/2022 4/14/22   Louann Field PA-C   labetalol (NORMODYNE) 200 mg tablet Take 200 mg by mouth 2 (two) times a day  Patient not taking: Reported on 4/19/2022     Historical Provider, MD   NIFEdipine (PROCARDIA XL) 60 mg 24 hr tablet Take 1 tablet (60 mg total) by mouth daily 4/14/22   Iliana Henderson PA-C       Family History:     History reviewed  No pertinent family history  Social History:       Social History     Socioeconomic History    Marital status: Single     Spouse name: None    Number of children: None    Years of education: None    Highest education level: None   Occupational History    None   Tobacco Use    Smoking status: Current Every Day Smoker     Packs/day: 0 50     Types: Cigarettes    Smokeless tobacco: Never Used   Substance and Sexual Activity    Alcohol use: Never    Drug use: Never    Sexual activity: None   Other Topics Concern    None   Social History Narrative    None     Social Determinants of Health     Financial Resource Strain: Not on file   Food Insecurity: Not on file   Transportation Needs: Not on file   Physical Activity: Not on file   Stress: Not on file   Social Connections: Not on file   Intimate Partner Violence: Not on file   Housing Stability: Not on file       ROS:  Negative except otherwise aforementioned above    Remainder review of systems is negative    Exam:  General:  alert, oriented and in no distress, cooperative  Head: Normocephalic, atraumatic  Eyes:  EOMI  Pupils - equal, round, reactive to accomodation  No icterus  Normal Conjunctiva  Oropharynx: moist and normal-appearing mucosa  Neck: supple, symmetrical, trachea midline   Heart: regular rate, regular rhythm, murmur appreciated   Respiratory effort / Chest Inspection: unlabored  Lungs: normal air entry, lungs clear to auscultation and no rales, rhonchi or wheezing   Abdomen: flat, normal findings: bowel sounds normal and soft, non-tender  Lower Limbs:  no pitting edema  Musculoskeletal: ROM grossly normal    DATA:      ECG:         Sinus rhythm with 2nd degree A-V block ([place] I)  Nonspecific ST-t wave changes  Abnormal ECG  When compared with ECG of 18-APR-2022 22:00, (unconfirmed)  QT has lengthened  Confirmed by Steven Tarango ([id number]) on 4/19/2022 5:32:36 AM              Telemetry: NSR with first degree AV block, PVCs              Weights: Wt Readings from Last 3 Encounters:   04/19/22 87 6 kg (193 lb 2 oz)   04/14/22 83 9 kg (185 lb)   , Body mass index is 23 51 kg/m²           Lab Studies:             Results from last 7 days   Lab Units 04/19/22  0548 04/18/22  2207   WBC Thousand/uL 6 70 7 42   HEMOGLOBIN g/dL 11 0* 11 1*   HEMATOCRIT % 34 6* 35 1*   PLATELETS Thousands/uL 213 210   ,   Results from last 7 days   Lab Units 04/19/22  0548 04/18/22  2207   POTASSIUM mmol/L 3 2* 3 4*   CHLORIDE mmol/L 106 108   CO2 mmol/L 27 28   BUN mg/dL 18 17   CREATININE mg/dL 1 12 1 22   CALCIUM mg/dL 8 5 8 5   ALK PHOS U/L  --  71   ALT U/L  --  29   AST U/L  --  20

## 2022-11-29 NOTE — ASSESSMENT & PLAN NOTE
Trop 202/197/trend x 3  Denies CP   EKG nonspecific changes Was A Bandage Applied: Yes Biopsy Type: H and E Electrodesiccation Text: The wound bed was treated with electrodesiccation after the biopsy was performed. X Size Of Lesion In Cm: 0 Bill For Surgical Tray: no Cryotherapy Text: The wound bed was treated with cryotherapy after the biopsy was performed. Wound Care: Petrolatum Type Of Destruction Used: Curettage Curettage Text: The wound bed was treated with curettage after the biopsy was performed. Depth Of Biopsy: dermis Notification Instructions: Patient will be notified of biopsy results. However, patient instructed to call the office if not contacted within 2 weeks. Anesthesia Volume In Cc (Will Not Render If 0): 0.5 Hemostasis: Drysol Detail Level: Detailed Size Of Lesion In Cm: 0.7 Billing Type: Third-Party Bill Biopsy Method: Dermablade Anesthesia Type: 1% lidocaine with epinephrine and a 1:10 solution of 8.4% sodium bicarbonate Silver Nitrate Text: The wound bed was treated with silver nitrate after the biopsy was performed. Consent: Written consent was obtained and risks were reviewed including but not limited to scarring, infection, bleeding, scabbing, incomplete removal, nerve damage and allergy to anesthesia. Information: Selecting Yes will display possible errors in your note based on the variables you have selected. This validation is only offered as a suggestion for you. PLEASE NOTE THAT THE VALIDATION TEXT WILL BE REMOVED WHEN YOU FINALIZE YOUR NOTE. IF YOU WANT TO FAX A PRELIMINARY NOTE YOU WILL NEED TO TOGGLE THIS TO 'NO' IF YOU DO NOT WANT IT IN YOUR FAXED NOTE. Dressing: bandage Electrodesiccation And Curettage Text: The wound bed was treated with electrodesiccation and curettage after the biopsy was performed. Post-Care Instructions: I reviewed with the patient in detail post-care instructions. Patient is to keep the biopsy site dry overnight, and then apply vaseline or aquaphor healing ointment twice daily until healed. Patient may apply hydrogen peroxide soaks to remove any crusting.

## 2025-02-27 ENCOUNTER — APPOINTMENT (EMERGENCY)
Dept: RADIOLOGY | Facility: HOSPITAL | Age: 65
End: 2025-02-27
Payer: COMMERCIAL

## 2025-02-27 ENCOUNTER — HOSPITAL ENCOUNTER (INPATIENT)
Facility: HOSPITAL | Age: 65
LOS: 3 days | Discharge: HOME | End: 2025-03-02
Attending: EMERGENCY MEDICINE | Admitting: INTERNAL MEDICINE
Payer: COMMERCIAL

## 2025-02-27 DIAGNOSIS — E85.81 AL AMYLOIDOSIS (HCC): ICD-10-CM

## 2025-02-27 DIAGNOSIS — D64.9 ANEMIA: ICD-10-CM

## 2025-02-27 DIAGNOSIS — I50.9 CHF EXACERBATION (HCC): Primary | ICD-10-CM

## 2025-02-27 DIAGNOSIS — I10 PRIMARY HYPERTENSION: ICD-10-CM

## 2025-02-27 DIAGNOSIS — M54.32 LEFT SIDED SCIATICA: ICD-10-CM

## 2025-02-27 DIAGNOSIS — R79.89 ELEVATED TROPONIN: ICD-10-CM

## 2025-02-27 PROBLEM — I48.20 CHRONIC ATRIAL FIBRILLATION (HCC): Status: ACTIVE | Noted: 2025-02-27

## 2025-02-27 PROBLEM — N28.0 RENAL INFARCTION (HCC): Chronic | Status: ACTIVE | Noted: 2025-02-27

## 2025-02-27 PROBLEM — I50.33 ACUTE ON CHRONIC HEART FAILURE WITH PRESERVED EJECTION FRACTION (HCC): Status: ACTIVE | Noted: 2022-04-19

## 2025-02-27 PROBLEM — I48.11 LONGSTANDING PERSISTENT ATRIAL FIBRILLATION (HCC): Status: ACTIVE | Noted: 2025-02-27

## 2025-02-27 PROBLEM — I50.32 CHRONIC HEART FAILURE WITH PRESERVED EJECTION FRACTION (HCC): Status: ACTIVE | Noted: 2022-04-19

## 2025-02-27 PROBLEM — N18.32 STAGE 3B CHRONIC KIDNEY DISEASE (HCC): Status: ACTIVE | Noted: 2025-02-27

## 2025-02-27 PROBLEM — N18.4 CKD (CHRONIC KIDNEY DISEASE) STAGE 4, GFR 15-29 ML/MIN (HCC): Status: ACTIVE | Noted: 2025-02-27

## 2025-02-27 PROBLEM — I48.91 ATRIAL FIBRILLATION (HCC): Chronic | Status: ACTIVE | Noted: 2025-02-27

## 2025-02-27 PROBLEM — C90.00 MULTIPLE MYELOMA NOT HAVING ACHIEVED REMISSION (HCC): Chronic | Status: ACTIVE | Noted: 2025-02-27

## 2025-02-27 PROBLEM — I48.91 ATRIAL FIBRILLATION (HCC): Status: ACTIVE | Noted: 2025-02-27

## 2025-02-27 LAB
2HR DELTA HS TROPONIN: 24 NG/L
4HR DELTA HS TROPONIN: 9 NG/L
ALBUMIN SERPL BCG-MCNC: 3.9 G/DL (ref 3.5–5)
ALP SERPL-CCNC: 74 U/L (ref 34–104)
ALT SERPL W P-5'-P-CCNC: 16 U/L (ref 7–52)
ANION GAP SERPL CALCULATED.3IONS-SCNC: 10 MMOL/L (ref 4–13)
APTT PPP: 39 SECONDS (ref 23–34)
AST SERPL W P-5'-P-CCNC: 17 U/L (ref 13–39)
ATRIAL RATE: 441 BPM
BASOPHILS # BLD AUTO: 0.03 THOUSANDS/ÂΜL (ref 0–0.1)
BASOPHILS NFR BLD AUTO: 1 % (ref 0–1)
BILIRUB SERPL-MCNC: 0.85 MG/DL (ref 0.2–1)
BNP SERPL-MCNC: 1136 PG/ML (ref 0–100)
BUN SERPL-MCNC: 60 MG/DL (ref 5–25)
CALCIUM SERPL-MCNC: 9 MG/DL (ref 8.4–10.2)
CARDIAC TROPONIN I PNL SERPL HS: 176 NG/L (ref ?–50)
CARDIAC TROPONIN I PNL SERPL HS: 185 NG/L (ref ?–50)
CARDIAC TROPONIN I PNL SERPL HS: 200 NG/L (ref ?–50)
CHLORIDE SERPL-SCNC: 105 MMOL/L (ref 96–108)
CO2 SERPL-SCNC: 29 MMOL/L (ref 21–32)
CREAT SERPL-MCNC: 2.72 MG/DL (ref 0.6–1.3)
D DIMER PPP FEU-MCNC: <0.27 UG/ML FEU
EOSINOPHIL # BLD AUTO: 0.17 THOUSAND/ÂΜL (ref 0–0.61)
EOSINOPHIL NFR BLD AUTO: 4 % (ref 0–6)
ERYTHROCYTE [DISTWIDTH] IN BLOOD BY AUTOMATED COUNT: 15.5 % (ref 11.6–15.1)
GFR SERPL CREATININE-BSD FRML MDRD: 23 ML/MIN/1.73SQ M
GLUCOSE SERPL-MCNC: 92 MG/DL (ref 65–140)
HCT VFR BLD AUTO: 29.2 % (ref 36.5–49.3)
HGB BLD-MCNC: 9 G/DL (ref 12–17)
IMM GRANULOCYTES # BLD AUTO: 0.01 THOUSAND/UL (ref 0–0.2)
IMM GRANULOCYTES NFR BLD AUTO: 0 % (ref 0–2)
INR PPP: 2.25 (ref 0.85–1.19)
LYMPHOCYTES # BLD AUTO: 1.38 THOUSANDS/ÂΜL (ref 0.6–4.47)
LYMPHOCYTES NFR BLD AUTO: 28 % (ref 14–44)
MAGNESIUM SERPL-MCNC: 1.8 MG/DL (ref 1.9–2.7)
MCH RBC QN AUTO: 22 PG (ref 26.8–34.3)
MCHC RBC AUTO-ENTMCNC: 30.8 G/DL (ref 31.4–37.4)
MCV RBC AUTO: 71 FL (ref 82–98)
MONOCYTES # BLD AUTO: 0.43 THOUSAND/ÂΜL (ref 0.17–1.22)
MONOCYTES NFR BLD AUTO: 9 % (ref 4–12)
NEUTROPHILS # BLD AUTO: 2.9 THOUSANDS/ÂΜL (ref 1.85–7.62)
NEUTS SEG NFR BLD AUTO: 58 % (ref 43–75)
NRBC BLD AUTO-RTO: 0 /100 WBCS
PLATELET # BLD AUTO: 189 THOUSANDS/UL (ref 149–390)
PMV BLD AUTO: 11.3 FL (ref 8.9–12.7)
POTASSIUM SERPL-SCNC: 3.2 MMOL/L (ref 3.5–5.3)
PROT SERPL-MCNC: 6.3 G/DL (ref 6.4–8.4)
PROTHROMBIN TIME: 24.6 SECONDS (ref 12.3–15)
QRS AXIS: 264 DEGREES
QRSD INTERVAL: 144 MS
QT INTERVAL: 556 MS
QTC INTERVAL: 486 MS
RBC # BLD AUTO: 4.1 MILLION/UL (ref 3.88–5.62)
SODIUM SERPL-SCNC: 144 MMOL/L (ref 135–147)
T WAVE AXIS: 77 DEGREES
VENTRICULAR RATE: 46 BPM
WBC # BLD AUTO: 4.92 THOUSAND/UL (ref 4.31–10.16)

## 2025-02-27 PROCEDURE — 85730 THROMBOPLASTIN TIME PARTIAL: CPT

## 2025-02-27 PROCEDURE — 99255 IP/OBS CONSLTJ NEW/EST HI 80: CPT | Performed by: STUDENT IN AN ORGANIZED HEALTH CARE EDUCATION/TRAINING PROGRAM

## 2025-02-27 PROCEDURE — 83735 ASSAY OF MAGNESIUM: CPT | Performed by: NURSE PRACTITIONER

## 2025-02-27 PROCEDURE — 80053 COMPREHEN METABOLIC PANEL: CPT

## 2025-02-27 PROCEDURE — 93010 ELECTROCARDIOGRAM REPORT: CPT

## 2025-02-27 PROCEDURE — 99223 1ST HOSP IP/OBS HIGH 75: CPT | Performed by: INTERNAL MEDICINE

## 2025-02-27 PROCEDURE — 99285 EMERGENCY DEPT VISIT HI MDM: CPT

## 2025-02-27 PROCEDURE — 36415 COLL VENOUS BLD VENIPUNCTURE: CPT

## 2025-02-27 PROCEDURE — 71046 X-RAY EXAM CHEST 2 VIEWS: CPT

## 2025-02-27 PROCEDURE — 83880 ASSAY OF NATRIURETIC PEPTIDE: CPT

## 2025-02-27 PROCEDURE — 85379 FIBRIN DEGRADATION QUANT: CPT

## 2025-02-27 PROCEDURE — 85610 PROTHROMBIN TIME: CPT

## 2025-02-27 PROCEDURE — 85025 COMPLETE CBC W/AUTO DIFF WBC: CPT

## 2025-02-27 PROCEDURE — 93005 ELECTROCARDIOGRAM TRACING: CPT

## 2025-02-27 PROCEDURE — 84484 ASSAY OF TROPONIN QUANT: CPT

## 2025-02-27 RX ORDER — LIDOCAINE 50 MG/G
2 PATCH TOPICAL ONCE
Status: COMPLETED | OUTPATIENT
Start: 2025-02-27 | End: 2025-02-27

## 2025-02-27 RX ORDER — ONDANSETRON 2 MG/ML
4 INJECTION INTRAMUSCULAR; INTRAVENOUS EVERY 6 HOURS PRN
Status: DISCONTINUED | OUTPATIENT
Start: 2025-02-27 | End: 2025-03-02 | Stop reason: HOSPADM

## 2025-02-27 RX ORDER — METHOCARBAMOL 500 MG/1
500 TABLET, FILM COATED ORAL ONCE
Status: COMPLETED | OUTPATIENT
Start: 2025-02-27 | End: 2025-02-27

## 2025-02-27 RX ORDER — TORSEMIDE 20 MG/1
20 TABLET ORAL DAILY
Status: DISCONTINUED | OUTPATIENT
Start: 2025-02-27 | End: 2025-02-27

## 2025-02-27 RX ORDER — MAGNESIUM HYDROXIDE/ALUMINUM HYDROXICE/SIMETHICONE 120; 1200; 1200 MG/30ML; MG/30ML; MG/30ML
30 SUSPENSION ORAL EVERY 6 HOURS PRN
Status: DISCONTINUED | OUTPATIENT
Start: 2025-02-27 | End: 2025-03-02 | Stop reason: HOSPADM

## 2025-02-27 RX ORDER — LOSARTAN POTASSIUM 50 MG/1
50 TABLET ORAL DAILY
Status: DISCONTINUED | OUTPATIENT
Start: 2025-02-27 | End: 2025-03-02 | Stop reason: HOSPADM

## 2025-02-27 RX ORDER — MAGNESIUM SULFATE HEPTAHYDRATE 40 MG/ML
2 INJECTION, SOLUTION INTRAVENOUS ONCE
Status: COMPLETED | OUTPATIENT
Start: 2025-02-27 | End: 2025-02-27

## 2025-02-27 RX ORDER — WARFARIN SODIUM 5 MG/1
5 TABLET ORAL
Status: DISCONTINUED | OUTPATIENT
Start: 2025-02-27 | End: 2025-03-02 | Stop reason: HOSPADM

## 2025-02-27 RX ORDER — LOSARTAN POTASSIUM 50 MG/1
50 TABLET ORAL DAILY
COMMUNITY

## 2025-02-27 RX ORDER — WARFARIN SODIUM 5 MG/1
5 TABLET ORAL
COMMUNITY

## 2025-02-27 RX ORDER — FUROSEMIDE 10 MG/ML
60 INJECTION INTRAMUSCULAR; INTRAVENOUS ONCE
Status: COMPLETED | OUTPATIENT
Start: 2025-02-27 | End: 2025-02-27

## 2025-02-27 RX ORDER — ACETAMINOPHEN 325 MG/1
650 TABLET ORAL ONCE
Status: COMPLETED | OUTPATIENT
Start: 2025-02-27 | End: 2025-02-27

## 2025-02-27 RX ORDER — HYDRALAZINE HYDROCHLORIDE 10 MG/1
10 TABLET, FILM COATED ORAL EVERY 8 HOURS SCHEDULED
Status: DISCONTINUED | OUTPATIENT
Start: 2025-02-27 | End: 2025-02-28

## 2025-02-27 RX ORDER — ACETAMINOPHEN 325 MG/1
975 TABLET ORAL EVERY 6 HOURS PRN
Status: DISCONTINUED | OUTPATIENT
Start: 2025-02-27 | End: 2025-03-02 | Stop reason: HOSPADM

## 2025-02-27 RX ORDER — LIDOCAINE 50 MG/G
2 PATCH TOPICAL ONCE
Status: COMPLETED | OUTPATIENT
Start: 2025-02-27 | End: 2025-02-28

## 2025-02-27 RX ORDER — ISOSORBIDE DINITRATE 10 MG/1
10 TABLET ORAL
Status: DISCONTINUED | OUTPATIENT
Start: 2025-02-27 | End: 2025-02-28

## 2025-02-27 RX ORDER — POTASSIUM CHLORIDE 1500 MG/1
40 TABLET, EXTENDED RELEASE ORAL ONCE
Status: COMPLETED | OUTPATIENT
Start: 2025-02-27 | End: 2025-02-27

## 2025-02-27 RX ORDER — TORSEMIDE 20 MG/1
20 TABLET ORAL DAILY
Status: ON HOLD | COMMUNITY
End: 2025-03-20

## 2025-02-27 RX ORDER — TORSEMIDE 20 MG/1
40 TABLET ORAL DAILY
Status: DISCONTINUED | OUTPATIENT
Start: 2025-02-27 | End: 2025-02-27

## 2025-02-27 RX ADMIN — TORSEMIDE 40 MG: 20 TABLET ORAL at 08:24

## 2025-02-27 RX ADMIN — MELATONIN 3 MG: 3 TAB ORAL at 22:07

## 2025-02-27 RX ADMIN — HYDRALAZINE HYDROCHLORIDE 10 MG: 50 TABLET ORAL at 16:16

## 2025-02-27 RX ADMIN — LOSARTAN POTASSIUM 50 MG: 50 TABLET, FILM COATED ORAL at 08:24

## 2025-02-27 RX ADMIN — ACETAMINOPHEN 975 MG: 325 TABLET, FILM COATED ORAL at 20:34

## 2025-02-27 RX ADMIN — MAGNESIUM SULFATE HEPTAHYDRATE 2 G: 40 INJECTION, SOLUTION INTRAVENOUS at 20:35

## 2025-02-27 RX ADMIN — HYDRALAZINE HYDROCHLORIDE 10 MG: 50 TABLET ORAL at 22:07

## 2025-02-27 RX ADMIN — METHOCARBAMOL 500 MG: 500 TABLET ORAL at 03:56

## 2025-02-27 RX ADMIN — POTASSIUM CHLORIDE 40 MEQ: 1500 TABLET, EXTENDED RELEASE ORAL at 05:13

## 2025-02-27 RX ADMIN — ISOSORBIDE DINITRATE 10 MG: 10 TABLET ORAL at 17:00

## 2025-02-27 RX ADMIN — FUROSEMIDE 60 MG: 10 INJECTION, SOLUTION INTRAVENOUS at 05:14

## 2025-02-27 RX ADMIN — ACETAMINOPHEN 650 MG: 325 TABLET, FILM COATED ORAL at 03:56

## 2025-02-27 RX ADMIN — WARFARIN SODIUM 5 MG: 5 TABLET ORAL at 17:00

## 2025-02-27 RX ADMIN — LIDOCAINE 2 PATCH: 50 PATCH CUTANEOUS at 04:03

## 2025-02-27 RX ADMIN — LIDOCAINE 2 PATCH: 700 PATCH TOPICAL at 22:30

## 2025-02-27 RX ADMIN — FUROSEMIDE 60 MG: 10 INJECTION, SOLUTION INTRAVENOUS at 17:00

## 2025-02-27 NOTE — ASSESSMENT & PLAN NOTE
-BP: 155/105  -Outpatient Rx losartan 50 mg daily   - Blood pressure remains elevated  - Patient on Isordil 10 mg 3 times daily and hydralazine 10 mg 3 times daily

## 2025-02-27 NOTE — ASSESSMENT & PLAN NOTE
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

## 2025-02-27 NOTE — ASSESSMENT & PLAN NOTE
Wt Readings from Last 3 Encounters:   02/27/25 71.7 kg (158 lb)   04/19/22 87.5 kg (193 lb)   04/14/22 83.9 kg (185 lb)      -TTE 10/21/2024 at [facility] showed EF 55 to 60%, severe LA, moderate RA, mild MR, mild AI, mild TR  -BNP: 1,136   -CXR: pulmonary vascular congestion and small B/L pleural effusions   -Troponin: Flat, trending downwards (206, 200, 185)  -Outpatient diuretic Rx torsemide 40 mg daily    -Inpatient diuretic Rx received IV lasix 60 mg x 1    -Continue telemetry   -Monitor I/O's  -Monitor BMP  -Continue cardiac diet with 1800 mL fluid restriction

## 2025-02-27 NOTE — ASSESSMENT & PLAN NOTE
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 On chronic warfarin anticoagulation.

## 2025-02-27 NOTE — H&P
H&P - Hospitalist   Name: Domingo Trevino 65 y.o. male I MRN: [id number]  Unit/Bed#: ED-05 I Date of Admission: 2/27/2025   Date of Service: 2/27/2025 I Hospital Day: 0     Assessment & Plan  Acute on chronic heart failure with preserved ejection fraction (HCC)  Wt Readings from Last 3 Encounters:   02/27/25 73.9 kg (162 lb 14.7 oz)   04/19/22 87.5 kg (193 lb)   04/14/22 83.9 kg (185 lb)     Presents reporting shortness of breath and HERBERT increased from baseline  Recent ED visit at [facility] Feb 22 for CHF.  Given IV Lasix 40mg in ED and Discharged home with increased dose of torsemide from 20mg to 40 mg daily  History of cardiac AL amyloidosis, A-fib SVR and CHF with preserved EF 55 to 60%  Chronically elevated BNP above baseline  Will give 1 dose of IV Lasix 60 mg and monitor response  Continue PTA torsemide 40 mg daily  Monitor BMP   Cardiac diet with 1800 mL fluid restriction   Monitor daily weight I/O    Elevated troponin  Chronically elevated troponin in setting of cardiac amyloidosis.  Denies chest pain  Remote history of myocardial infarction in 2013, 2015, and 2019   EKG A-fib slow ventricular response rate 46  Cycle troponin/ EKG x 3  Monitor on telemetry  Cardiology consult  Primary hypertension  Losartan 50mg qd  Longstanding persistent atrial fibrillation (HCC)  EKG showing A-fib with slow RVR rate 46.  History of A-fib with slow ventricular rate. Previously on carvedilol which was discontinued   Anticoagulated with warfarin 7.5 mg 6 days/week, 5mg on Thursdays  INR therapeutic  Follows with cardiology at [facility]. Seen by EPS and does not wish to proceed with any invasive approach for management of his atrial fibrillation.   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 On chronic warfarin anticoagulation.   Stage 3b chronic kidney disease (HCC)  Lab Results   Component Value Date    EGFR 23 02/27/2025     "EGFR 25 (L) 02/25/2025    EGFR 28 (L) 02/24/2025    CREATININE 2.72 (H) 02/27/2025    CREATININE 2.78 (H) 02/25/2025    CREATININE 2.52 (H) 02/24/2025     CKD at baseline.  CKD thought to be secondary to amyloidosis.  Continue outpatient f/u with nephrology at [facility]  Hypokalemia  K3.2.  Replete.  Repeat a.m. BMP  Check serum Mg  AL amyloidosis (HCC)  Amyloidosis seen on echo.  Followed by hematology at [facility]. S/p biopsy-proven multiple myeloma. High clinical suspicion that he has cardiac AL amyloidosis, patient declined endomyocardial biopsy   Multiple myeloma not having achieved remission (HCC)  Follows with hematology at [facility].  Was seen in October and undecided about treatment for MM.  Was supposed to follow-up after 2 weeks although has not seen them since as he states \"diagnosis not confirmed\"      VTE Pharmacologic Prophylaxis: VTE Score: 6 High Risk (Score >/= 5) - Pharmacological DVT Prophylaxis Ordered: warfarin (Coumadin). Sequential Compression Devices Ordered.  Code Status: Level 1 - Full Code per pt  Discussion with family: Patient declined call to .     Anticipated Length of Stay: Patient will be admitted on an observation basis with an anticipated length of stay of less than 2 midnights secondary to CHF exacerbation, elevated troponin in patient with cardiac amyloidosis.    History of Present Illness   Chief Complaint: \" Difficulty breathing\"    Domingo Trevino is a 65 y.o. male with a PMH of above who presents with c/o shortness of breath.  States he has chronic shortness of breath and HERBERT although symptoms increased from baseline.  Usually dyspnea improves when he takes torsemide and voids.  Compliant with torsemide.  Reports normal urine output.  Was seen in ED at [facility] 2/22 and given IV Lasix, torsemide dose increased from 20 mg to 40 mg.  Denies edema, does not weigh self daily.     Review of Systems   Constitutional: Negative.    Respiratory:  Positive for shortness of breath.  "   Cardiovascular: Negative.         HERBERT   Gastrointestinal: Negative.    Genitourinary: Negative.    Musculoskeletal: Negative.    Neurological: Negative.    Psychiatric/Behavioral:  Negative for confusion.        Historical Information   Past Medical History:   Diagnosis Date    Hypertension      No past surgical history on file.  Social History     Tobacco Use    Smoking status: Every Day     Current packs/day: 0.50     Types: Cigarettes    Smokeless tobacco: Never   Substance and Sexual Activity    Alcohol use: Never    Drug use: Never    Sexual activity: Not on file     E-Cigarette/Vaping     E-Cigarette/Vaping Substances     No family history on file.  Social History:  Marital Status: Single   Occupation: retired  Patient Pre-hospital Living Situation: Resides at home with spouse  Patient Pre-hospital Level of Mobility: walks  Patient Pre-hospital Diet Restrictions:     Meds/Allergies   I have reviewed home medications with patient personally.  Prior to Admission medications    Medication Sig Start Date End Date Taking? Authorizing Provider   losartan (COZAAR) 50 mg tablet Take 50 mg by mouth daily   Yes Historical Provider, MD   melatonin 3 mg Take 3 mg by mouth daily at bedtime as needed (Insomnia)   Yes Historical Provider, MD   torsemide (DEMADEX) 20 mg tablet Take 20 mg by mouth daily   Yes Historical Provider, MD   warfarin (COUMADIN) 5 mg tablet Take 5 mg by mouth daily Alternating 7.5 mg 6 times a week, 5 mg on Thursdays   Yes Historical Provider, MD   NIFEdipine (PROCARDIA XL) 30 mg 24 hr tablet Take 3 tablets (90 mg total) by mouth daily 4/20/22 2/27/25  Agustin Quinonez MD   spironolactone (ALDACTONE) 50 mg tablet Take 1 tablet (50 mg total) by mouth daily 4/20/22 2/27/25  Agustin Quinonez MD     No Known Allergies    Objective :  Temp:  [98 °F (36.7 °C)] 98 °F (36.7 °C)  HR:  [48-63] 55  BP: (141-186)/() 141/105  Resp:  [12-19] 12  SpO2:  [93 %-98 %] 93 %  O2 Device: None (Room  "air)    Physical Exam  Constitutional:       General: He is not in acute distress.     Appearance: Normal appearance. He is not ill-appearing, toxic-appearing or diaphoretic.      Comments: Underweight   HENT:      Head: Normocephalic and atraumatic.      Mouth/Throat:      Mouth: Mucous membranes are moist.   Eyes:      General: No scleral icterus.  Cardiovascular:      Rate and Rhythm: Normal rate. Rhythm irregular.      Heart sounds: Murmur heard.   Pulmonary:      Effort: Pulmonary effort is normal.      Breath sounds: Normal breath sounds.   Abdominal:      General: Bowel sounds are normal.      Palpations: Abdomen is soft.   Musculoskeletal:      Right lower leg: No edema.      Left lower leg: No edema.   Skin:     General: Skin is warm.   Neurological:      Mental Status: He is alert and oriented to person, place, and time.   Psychiatric:         Thought Content: Thought content normal.         Judgment: Judgment normal.      Comments: Flat affect      Lines/Drains:            Lab Results: I have reviewed the following results:  Results from last 7 days   Lab Units 02/27/25  0237   WBC Thousand/uL 4.92   HEMOGLOBIN g/dL 9.0*   HEMATOCRIT % 29.2*   PLATELETS Thousands/uL 189   SEGS PCT % 58   LYMPHO PCT % 28   MONO PCT % 9   EOS PCT % 4     Results from last 7 days   Lab Units 02/27/25  0237   SODIUM mmol/L 144   POTASSIUM mmol/L 3.2*   CHLORIDE mmol/L 105   CO2 mmol/L 29   BUN mg/dL 60*   CREATININE mg/dL 2.72*   ANION GAP mmol/L 10   CALCIUM mg/dL 9.0   ALBUMIN g/dL 3.9   TOTAL BILIRUBIN mg/dL 0.85   ALK PHOS U/L 74   ALT U/L 16   AST U/L 17   GLUCOSE RANDOM mg/dL 92     Results from last 7 days   Lab Units 02/27/25  0237   INR  2.25*         No results found for: \"HGBA1C\"        Imaging Results Review: I reviewed radiology reports from this admission including: chest xray and Echocardiogram.  Other Study Results Review: EKG was reviewed.     Administrative Statements       ** Please Note: This note has been " constructed using a voice recognition system. **

## 2025-02-27 NOTE — ASSESSMENT & PLAN NOTE
-TTE 10/21/24 ([facility]): LVEF 55-60%,  moderate concentric hypertrophy, sparing of apex suggestive of cardiac amyloid  - [facility] scan 10/22/24: negative for TTR amyloid   - cardiac MRI 10/24/24: incomplete study due to claustrophobia, low normal LV function, LVEF 50-55%, RVEF 45%, mild biatrial enlargement, mild MR   - [facility] PYP scan 10/22/24: negative for TTR amyloid  - declined further workup including cardiac biopsy for AL amyloid

## 2025-02-27 NOTE — PLAN OF CARE
Problem: PAIN - ADULT  Goal: Verbalizes/displays adequate comfort level or baseline comfort level  Description: Interventions:  - Encourage patient to monitor pain and request assistance  - Assess pain using appropriate pain scale  - Administer analgesics based on type and severity of pain and evaluate response  - Implement non-pharmacological measures as appropriate and evaluate response  - Consider cultural and social influences on pain and pain management  - Notify physician/advanced practitioner if interventions unsuccessful or patient reports new pain  Outcome: Progressing     Problem: INFECTION - ADULT  Goal: Absence or prevention of progression during hospitalization  Description: INTERVENTIONS:  - Assess and monitor for signs and symptoms of infection  - Monitor lab/diagnostic results  - Monitor all insertion sites, i.e. indwelling lines, tubes, and drains  - Monitor endotracheal if appropriate and nasal secretions for changes in amount and color  - [place] appropriate cooling/warming therapies per order  - Administer medications as ordered  - Instruct and encourage patient and family to use good hand hygiene technique  - Identify and instruct in appropriate isolation precautions for identified infection/condition  Outcome: Progressing     Problem: SAFETY ADULT  Goal: Patient will remain free of falls  Description: INTERVENTIONS:  - Educate patient/family on patient safety including physical limitations  - Instruct patient to call for assistance with activity   - Consult OT/PT to assist with strengthening/mobility   - Keep Call bell within reach  - Keep bed low and locked with side rails adjusted as appropriate  - Keep care items and personal belongings within reach  - Initiate and maintain comfort rounds  - Make Fall Risk Sign visible to staff  - Offer Toileting every  Hours, in advance of need  - Initiate/Maintain alarm  - Obtain necessary fall risk management equipment:   - Apply yellow socks and  bracelet for high fall risk patients  - Consider moving patient to room near nurses station  Outcome: Progressing  Goal: Maintain or return to baseline ADL function  Description: INTERVENTIONS:  -  Assess patient's ability to carry out ADLs; assess patient's baseline for ADL function and identify physical deficits which impact ability to perform ADLs (bathing, care of mouth/teeth, toileting, grooming, dressing, etc.)  - Assess/evaluate cause of self-care deficits   - Assess range of motion  - Assess patient's mobility; develop plan if impaired  - Assess patient's need for assistive devices and provide as appropriate  - Encourage maximum independence but intervene and supervise when necessary  - Involve family in performance of ADLs  - Assess for home care needs following discharge   - Consider OT consult to assist with ADL evaluation and planning for discharge  - Provide patient education as appropriate  Outcome: Progressing  Goal: Maintains/Returns to pre admission functional level  Description: INTERVENTIONS:  - Perform AM-PAC 6 Click Basic Mobility/ Daily Activity assessment daily.  - Set and communicate daily mobility goal to care team and patient/family/caregiver.   - Collaborate with rehabilitation services on mobility goals if consulted  - Perform Range of Motion times a day.  - Reposition patient every  hours.  - Dangle patient  times a day  - Stand patient  times a day  - Ambulate patient  times a day  - Out of bed to chair times a day   - Out of bed for meals times a day  - Out of bed for toileting  - Record patient progress and toleration of activity level   Outcome: Progressing     Problem: DISCHARGE PLANNING  Goal: Discharge to home or other facility with appropriate resources  Description: INTERVENTIONS:  - Identify barriers to discharge w/patient and caregiver  - Arrange for needed discharge resources and transportation as appropriate  - Identify discharge learning needs (meds, wound care, etc.)  -  Arrange for interpretive services to assist at discharge as needed  - Refer to Case Management Department for coordinating discharge planning if the patient needs post-hospital services based on physician/advanced practitioner order or complex needs related to functional status, cognitive ability, or social support system  Outcome: Progressing     Problem: Knowledge Deficit  Goal: Patient/family/caregiver demonstrates understanding of disease process, treatment plan, medications, and discharge instructions  Description: Complete learning assessment and assess knowledge base.  Interventions:  - Provide teaching at level of understanding  - Provide teaching via preferred learning methods  Outcome: Progressing

## 2025-02-27 NOTE — ED PROVIDER NOTES
Time reflects when diagnosis was documented in both MDM as applicable and the Disposition within this note       Time User Action Codes Description Comment    2/27/2025  4:45 AM Yanni Tsang Add [R79.89] Elevated troponin     2/27/2025  5:04 AM Melanie Strickland Add [E85.81] AL amyloidosis (HCC)           ED Disposition       ED Disposition   Admit    Condition   Stable    Date/Time   Thu Feb 27, 2025  4:45 AM    Comment   Case was discussed with ROLO and the patient's admission status was agreed to be OBS to the service of Dr. Montiel  .               Assessment & Plan       Medical Decision Making  Will obtain EKG, troponin to evaluate for ACS.  Will obtain chest x-ray to evaluate for cardiopulmonary abnormality including pneumonia, pneumothorax.  Will obtain CBC to evaluate for leukocytosis, anemia.  Will obtain CMP to evaluate kidney function, for electrolyte disturbance.  Will obtain coags.  Will obtain COVID/flu testing. Will obtain D Dimer    Creatinine elevated, though comparable to 2/25.  Hemoglobin 9, patient's baseline appears to be between 9-10.  BNP elevated.  Troponin elevated.  D-dimer within normal limits.  Patient's leg pain did improve with symptomatic treatment in ED. EKG reveals atrial flutter with slow ventricular response.  Will admit    At the time of admission, the patient is in no acute distress. I discussed with the patient the diagnosis, treatment plan, and plan for admission; they were given the opportunity to ask questions and verbalized understanding. They agree with plan.    Amount and/or Complexity of Data Reviewed  Labs: ordered. Decision-making details documented in ED Course.  Radiology: ordered and independent interpretation performed.    Risk  OTC drugs.  Prescription drug management.  Decision regarding hospitalization.      EKG: A flutter with slow ventricular response at 46 BPM, Qtc 486, no ST elevation or depression as interpreted by me     ED Course as of 02/27/25 0647    Thu Feb 27, 2025 0254 Hemoglobin(!): 9.0  Was 9.7 on 2/25. Baseline 9-10   0259 Creatinine(!): 2.72  2.78 on 2/25, 2.52 on 2/24 0318 D-Dimer, Quant: <0.27   0318 BNP(!): 1,136   0433 hs TnI 0hr(!): 176       Medications   lidocaine (LIDODERM) 5 % patch 2 patch (2 patches Topical Medication Applied 2/27/25 0403)   losartan (COZAAR) tablet 50 mg (has no administration in time range)   melatonin tablet 3 mg (has no administration in time range)   warfarin (COUMADIN) tablet 7.5 mg (has no administration in time range)     And   warfarin (COUMADIN) tablet 5 mg (has no administration in time range)   aluminum-magnesium hydroxide-simethicone (MAALOX) oral suspension 30 mL (has no administration in time range)   ondansetron (ZOFRAN) injection 4 mg (has no administration in time range)   acetaminophen (TYLENOL) tablet 975 mg (has no administration in time range)   torsemide (DEMADEX) tablet 40 mg (has no administration in time range)   methocarbamol (ROBAXIN) tablet 500 mg (500 mg Oral Given 2/27/25 0356)   acetaminophen (TYLENOL) tablet 650 mg (650 mg Oral Given 2/27/25 0356)   potassium chloride (Klor-Con M20) CR tablet 40 mEq (40 mEq Oral Given 2/27/25 0513)   furosemide (LASIX) injection 60 mg (60 mg Intravenous Given 2/27/25 0514)       ED Risk Strat Scores                    History of Present Illness       Chief Complaint   Patient presents with    Shortness of Breath     Shortness of breath for the past month, and pain in left leg       Past Medical History:   Diagnosis Date    CHF (congestive heart failure) (HCC)     Hypertension       History reviewed. No pertinent surgical history.   History reviewed. No pertinent family history.   Social History     Tobacco Use    Smoking status: Former     Current packs/day: 0.50     Types: Cigarettes    Smokeless tobacco: Never   Vaping Use    Vaping status: Never Used   Substance Use Topics    Alcohol use: Never    Drug use: Never      E-Cigarette/Vaping    E-Cigarette  Use Never User       E-Cigarette/Vaping Substances      I have reviewed and agree with the history as documented.     The patient is a 65 y.o. male with a PMH of CHF, HTN, cardiac amyloidosis, CAD, renal occlusion, A-fib on Coumadin, CKD who presents to the ED for evaluation of shortness of breath.  He reports difficulty lying flat tonight, which prompted him to come to the ED.  Of note, he was seen in ED at [facility] 2/22 and given IV Lasix, torsemide dose increased from 20 mg to 40 mg. He reports this has not helped. He also notes dyspnea with exertion.  Lastly, he reports left leg pain that radiates from his buttock to his lateral leg, stopping at his knee.  This began in the beginning of the month, improved with treatment however he has not been taking anything at home.  He otherwise denies fever, chills, cough, hemoptysis, leg swelling, calf pain, chest pain, syncope, recent travel, recent surgery, history of DVT/PE,  saddle anesthesia, urinary retention, urinary incontinence, constipation, fecal incontinence, trauma, dysuria, hematuria.          Review of Systems   Constitutional:  Negative for chills and fever.   HENT:  Negative for congestion and rhinorrhea.    Respiratory:  Positive for shortness of breath. Negative for cough.    Cardiovascular:  Negative for chest pain and leg swelling.   Gastrointestinal:  Negative for abdominal pain, constipation, diarrhea, nausea and vomiting.   Genitourinary:  Negative for dysuria and flank pain.   Musculoskeletal:  Positive for arthralgias. Negative for myalgias.   Skin:  Negative for rash and wound.   Neurological:  Negative for syncope and weakness.           Objective       ED Triage Vitals   Temperature Pulse Blood Pressure Respirations SpO2 Patient Position - Orthostatic VS   02/27/25 0141 02/27/25 0141 02/27/25 0141 02/27/25 0141 02/27/25 0141 02/27/25 0141   98 °F (36.7 °C) 63 (!) 180/84 19 95 % Sitting      Temp Source Heart Rate Source BP Location FiO2 (%) Pain  Score    02/27/25 0543 02/27/25 0141 02/27/25 0141 -- 02/27/25 0356    Oral Monitor Left arm  8      Vitals      Date and Time Temp Pulse SpO2 Resp BP Pain Score FACES Pain Rating User   02/27/25 0614 -- -- -- -- -- 2 -- TP   02/27/25 0543 -- -- -- 18 -- -- -- TS   02/27/25 0543 -- -- -- -- -- 2 -- TP   02/27/25 0543 97.6 °F (36.4 °C) 67 92 % -- 168/118 -- -- DII   02/27/25 0445 -- 55 93 % -- 141/105 -- -- MLR   02/27/25 0356 -- -- -- -- -- 8 -- MLR   02/27/25 0330 -- 48 96 % 12 186/101 -- -- MLR   02/27/25 0300 -- 59 98 % 16 150/100 -- -- [facility]   02/27/25 0141 98 °F (36.7 °C) 63 95 % 19 180/84 -- -- [facility]            Physical Exam  Vitals and nursing note reviewed.   Constitutional:       General: He is not in acute distress.     Appearance: He is well-developed. He is ill-appearing (chronically).   HENT:      Head: Normocephalic and atraumatic.   Eyes:      Conjunctiva/sclera: Conjunctivae normal.   Cardiovascular:      Rate and Rhythm: Bradycardia present. Rhythm irregular.      Heart sounds: No murmur heard.  Pulmonary:      Effort: Pulmonary effort is normal. No respiratory distress.      Breath sounds: No wheezing, rhonchi or rales.   Abdominal:      Palpations: Abdomen is soft.      Tenderness: There is no abdominal tenderness.   Musculoskeletal:         General: No swelling.      Cervical back: Neck supple.      Lumbar back: No bony tenderness. Negative right straight leg raise test and negative left straight leg raise test.      Right upper leg: Tenderness present.      Right lower leg: No tenderness. No edema.      Left lower leg: No tenderness. No edema.        Legs:       Comments: TTP to left buttock and lateral leg. No overlying erythema, crepitus, warmth, rash. 5 out of 5 strength with bilateral hip flexion/extension, abduction/adduction, knee flexion/extension, dorsiflexion, and plantarflexion.  Distal pulses 2+ bilaterally.  Sensation grossly intact.  Gait intact.     Skin:     General: Skin is warm and  dry.      Capillary Refill: Capillary refill takes less than 2 seconds.   Neurological:      Mental Status: He is alert.   Psychiatric:         Mood and Affect: Mood normal.         Results Reviewed       Procedure Component Value Units Date/Time    HS Troponin I 2hr [[id number]]  (Abnormal) Collected: 02/27/25 0555    Lab Status: Final result Specimen: Blood from Arm, Right Updated: 02/27/25 0627     hs TnI 2hr 200 ng/L      Delta 2hr hsTnI 24 ng/L     Magnesium [[id number]]  (Abnormal) Collected: 02/27/25 0402    Lab Status: Final result Specimen: Blood from Arm, Right Updated: 02/27/25 0524     Magnesium 1.8 mg/dL     HS Troponin 0hr (reflex protocol) [[id number]]  (Abnormal) Collected: 02/27/25 0402    Lab Status: Final result Specimen: Blood from Arm, Right Updated: 02/27/25 0430     hs TnI 0hr 176 ng/L     HS Troponin I 4hr [[id number]]     Lab Status: No result Specimen: Blood     D-Dimer [[id number]]  (Normal) Collected: 02/27/25 0237    Lab Status: Final result Specimen: Blood from Arm, Right Updated: 02/27/25 0311     D-Dimer, Quant <0.27 ug/ml FEU     Narrative:      In the evaluation for possible pulmonary embolism, in the appropriate (Well's Score of 4 or less) patient, the age adjusted d-dimer cutoff for this patient can be calculated as:    Age x 0.01 (in ug/mL) for Age-adjusted D-dimer exclusion threshold for a patient over 50 years.    B-Type Natriuretic Peptide(BNP) [[id number]]  (Abnormal) Collected: 02/27/25 0237    Lab Status: Final result Specimen: Blood from Arm, Right Updated: 02/27/25 0303     BNP 1,136 pg/mL     Protime-INR [[id number]]  (Abnormal) Collected: 02/27/25 0237    Lab Status: Final result Specimen: Blood from Arm, Right Updated: 02/27/25 0258     Protime 24.6 seconds      INR 2.25    Narrative:      INR Therapeutic Range    Indication                                             INR Range      Atrial Fibrillation                                               2.0-3.0  Hypercoagulable  State                                    2.0.2.3  Left Ventricular Asist Device                            2.0-3.0  Mechanical Heart Valve                                  -    Aortic(with afib, MI, embolism, HF, LA enlargement,    and/or coagulopathy)                                     2.0-3.0 (2.5-3.5)     Mitral                                                             2.5-3.5  Prosthetic/Bioprosthetic Heart Valve               2.0-3.0  Venous thromboembolism (VTE: VT, PE        2.0-3.0    APTT [[id number]]  (Abnormal) Collected: 02/27/25 0237    Lab Status: Final result Specimen: Blood from Arm, Right Updated: 02/27/25 0258     PTT 39 seconds     Comprehensive metabolic panel [[id number]]  (Abnormal) Collected: 02/27/25 0237    Lab Status: Final result Specimen: Blood from Arm, Right Updated: 02/27/25 0257     Sodium 144 mmol/L      Potassium 3.2 mmol/L      Chloride 105 mmol/L      CO2 29 mmol/L      ANION GAP 10 mmol/L      BUN 60 mg/dL      Creatinine 2.72 mg/dL      Glucose 92 mg/dL      Calcium 9.0 mg/dL      AST 17 U/L      ALT 16 U/L      Alkaline Phosphatase 74 U/L      Total Protein 6.3 g/dL      Albumin 3.9 g/dL      Total Bilirubin 0.85 mg/dL      eGFR 23 ml/min/1.73sq m     Narrative:      [employer] Kid[employer] guidelines for Chronic Kidney Disease (CKD):     Stage 1 with normal or high GFR (GFR > 90 mL/min/1.73 square meters)    Stage 2 Mild CKD (GFR = 60-89 mL/min/1.73 square meters)    Stage 3A Moderate CKD (GFR = 45-59 mL/min/1.73 square meters)    Stage 3B Moderate CKD (GFR = 30-44 mL/min/1.73 square meters)    Stage 4 Severe CKD (GFR = 15-29 mL/min/1.73 square meters)    Stage 5 End Stage CKD (GFR <15 mL/min/1.73 square meters)  Note: GFR calculation is accurate only with a steady state creatinine    CBC and differential [[id number]]  (Abnormal) Collected: 02/27/25 0237    Lab Status: Final result Specimen: Blood from Arm, Right Updated: 02/27/25 0244     WBC 4.92 Thousand/uL       RBC 4.10 Million/uL      Hemoglobin 9.0 g/dL      Hematocrit 29.2 %      MCV 71 fL      MCH 22.0 pg      MCHC 30.8 g/dL      RDW 15.5 %      MPV 11.3 fL      Platelets 189 Thousands/uL      nRBC 0 /100 WBCs      Segmented % 58 %      Immature Grans % 0 %      Lymphocytes % 28 %      Monocytes % 9 %      Eosinophils Relative 4 %      Basophils Relative 1 %      Absolute Neutrophils 2.90 Thousands/µL      Absolute Immature Grans 0.01 Thousand/uL      Absolute Lymphocytes 1.38 Thousands/µL      Absolute Monocytes 0.43 Thousand/µL      Eosinophils Absolute 0.17 Thousand/µL      Basophils Absolute 0.03 Thousands/µL     FLU/RSV/COVID - if FLU/RSV clinically relevant (2hr TAT) [[id number]]     Lab Status: No result Specimen: Nares from Nose             XR chest 2 views   ED Interpretation by Yanni Tsang PA-C (02/27 0650)   Pulmonary edema, No evidence of infiltrate, pneumothorax, or other acute cardiopulmonary disease as interpreted by me          Procedures    ED Medication and Procedure Management   Prior to Admission Medications   Prescriptions Last Dose Informant Patient Reported? Taking?   losartan (COZAAR) 50 mg tablet 2/26/2025  Yes Yes   Sig: Take 50 mg by mouth daily   melatonin 3 mg Past Week  Yes Yes   Sig: Take 3 mg by mouth daily at bedtime as needed (Insomnia)   torsemide (DEMADEX) 20 mg tablet 2/26/2025  Yes Yes   Sig: Take 20 mg by mouth daily   warfarin (COUMADIN) 5 mg tablet 2/26/2025  Yes Yes   Sig: Take 5 mg by mouth daily Alternating 7.5 mg 6 times a week, 5 mg on Thursdays      Facility-Administered Medications: None     Current Discharge Medication List        CONTINUE these medications which have NOT CHANGED    Details   losartan (COZAAR) 50 mg tablet Take 50 mg by mouth daily      melatonin 3 mg Take 3 mg by mouth daily at bedtime as needed (Insomnia)      torsemide (DEMADEX) 20 mg tablet Take 20 mg by mouth daily      warfarin (COUMADIN) 5 mg tablet Take 5 mg by mouth daily  Alternating 7.5 mg 6 times a week, 5 mg on Thursdays           No discharge procedures on file.  ED SEPSIS DOCUMENTATION   Time reflects when diagnosis was documented in both MDM as applicable and the Disposition within this note       Time User Action Codes Description Comment    2/27/2025  4:45 AM Yanni Tsang Add [R79.89] Elevated troponin     2/27/2025  5:04 AM Melanie Strickland Add [E85.81] AL amyloidosis (HCC)                  Yanni Tsang PA-C  02/27/25 0653

## 2025-02-27 NOTE — ASSESSMENT & PLAN NOTE
Lab Results   Component Value Date    EGFR 23 02/27/2025    EGFR 25 (L) 02/25/2025    EGFR 28 (L) 02/24/2025    CREATININE 2.72 (H) 02/27/2025    CREATININE 2.78 (H) 02/25/2025    CREATININE 2.52 (H) 02/24/2025

## 2025-02-27 NOTE — Clinical Note
Case was discussed with ROLO and the patient's admission status was agreed to be  to the service of Dr. Helms

## 2025-02-27 NOTE — ASSESSMENT & PLAN NOTE
Chronically elevated troponin in setting of cardiac amyloidosis.  Denies chest pain  Remote history of myocardial infarction in 2013, 2015, and 2019   EKG A-fib slow ventricular response rate 46  Cycle troponin/ EKG x 3  Monitor on telemetry  Cardiology consult

## 2025-02-27 NOTE — ASSESSMENT & PLAN NOTE
Amyloidosis seen on echo.  Followed by hematology at [facility]. S/p biopsy-proven multiple myeloma. 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

## 2025-02-27 NOTE — ASSESSMENT & PLAN NOTE
"Follows with hematology at [facility].  Was seen in October and undecided about treatment for MM.  Was supposed to follow-up after 2 weeks although has not seen them since as he states \"diagnosis not confirmed\"  "

## 2025-02-27 NOTE — CONSULTS
Consultation - Cardiology   Name: Domingo Trevino 65 y.o. male I MRN: [id number]  Unit/Bed#: [place] [address] -01 I Date of Admission: 2/27/2025   Date of Service: 2/27/2025 I Hospital Day: 0   Inpatient consult to Cardiology  Consult performed by: Gelacio Mckeon MD  Consult ordered by: VEENA Kim          Physician Requesting Evaluation: Chelsi Jain MD   Reason for Evaluation / Principal Problem: chf     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  Wt Readings from Last 3 Encounters:   02/27/25 71.7 kg (158 lb)   04/19/22 87.5 kg (193 lb)   04/14/22 83.9 kg (185 lb)      -TTE 10/21/2024 at [facility] showed EF 55 to 60%, severe LA, moderate RA, mild MR, mild AI, mild TR  -BNP: 1,136   -CXR: pulmonary vascular congestion and small B/L pleural effusions   -Troponin: Flat, trending downwards (206, 200, 185)  -Outpatient diuretic Rx torsemide 40 mg daily    -Inpatient diuretic Rx received IV lasix 60 mg x 1    -Continue telemetry   -Monitor I/O's  -Monitor BMP  -Continue cardiac diet with 1800 mL fluid restriction     AL amyloidosis (HCC)  -TTE 10/21/24 ([facility]): LVEF 55-60%,  moderate concentric hypertrophy, sparing of apex suggestive of cardiac amyloid  - NM PYP scan 10/22/24: negative for TTR amyloid   - cardiac MRI 10/24/24: incomplete study due to claustrophobia, low normal LV function, LVEF 50-55%, RVEF 45%, mild biatrial enlargement, mild MR   - NM PYP scan 10/22/24: negative for TTR amyloid  - declined further workup including cardiac biopsy for AL amyloid  Multiple myeloma not having achieved remission (HCC)  - elevated kappa and lamda free light chains noted on 10/22/4  - [facility] hematology 10/30/24: confirmed diagnosis of multiple myeloma with presumed AL cardiac amyloid, declined cardiac biopsy and further treatment for his MM   Chronic atrial fibrillation (HCC)  -EKG showing A-fib with slow RVR rate 46  -Previously on carvedilol which was discontinued due to bradycardia   -Anticoagulated  with warfarin 7.5 mg 6 days/week, 5mg on Thursdays  -PT/INR: 24.6, 2.25 (around baseline)   -EP discussed cardioversion with patient OP, but patient declined (November 2024)    -Continue anticoagulation therapy  -Monitor PT/INR     Primary hypertension  -BP: 155/105  -Outpatient Rx losartan 50 mg daily   - Blood pressure remains elevated  - Patient on Isordil 10 mg 3 times daily and hydralazine 10 mg 3 times daily    Hypokalemia  -K: 3.2  -Given 50 mg Kcl    -Continue to trend lytes  -Replete as needed     Renal infarction (HCC)  -Hx of thromboembolism to left renal artery in June 2023  - Diagnosed with atrial fibrillation after thromboembolism  -On chronic warfarin anticoagulation     Stage 3b chronic kidney disease (HCC)  Lab Results   Component Value Date    EGFR 23 02/27/2025    EGFR 25 (L) 02/25/2025    EGFR 28 (L) 02/24/2025    CREATININE 2.72 (H) 02/27/2025    CREATININE 2.78 (H) 02/25/2025    CREATININE 2.52 (H) 02/24/2025       Summary:  -Patient given furosemide 60 mg IV x 1 in the ED at about 5:30 AM and then he received his torsemide 40 mg p.o. at around 8 AM  - He still some crackles and JVD as well as other signs of volume overloaded on exam in addition to feeling short of breath at this time  - Will give an additional furosemide 60 mg IV x 1 this afternoon and reassess symptoms and labs tomorrow morning before giving further diuretics  - Blood pressure remains elevated which may also be contributing to her shortness of breath, so we will start hydralazine 10 mg 3 times daily and Isordil 10 mg 3 times daily for afterload reduction  - Check daily standing weights  - Monitor creatinine and electrolytes closely  - Hopefully be able to resume his oral diuretics in 24 to 48 hours  - Try to have goals of care conversation explain his underlying diagnosis, but still appears to be some poor medical literacy contributing to his hesitancy and receiving further treatment    History Of Present Illness:  Domingo  Uriel is a 65 year old with PMH of CHF, HTN, 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, remote history of MI in 2013, 2015, and 2019  with unknown intervention details, renal occlusion secondary to thromboembolism from atrial fibrillation in June 2023, atrial fibrillation on coumadin (diagnosed 2023), and CKD stage 3b who presented to [facility] on 2/27 complaining of shortness of breath and dyspnea on exertion increased from baseline. Notably, he was seen on 2/22 at [facility] for SOB, which he said began about 2 months ago but had been worsening for up to 3 days prior to his admit. They found small b/l pleural effusions on CXR. They changed his torsemide from 20 mg to 40 mg daily, to which he states he has been compliant with.  Denies any improvement in his breathing on the higher dose of torsemide.     He was seen at bedside today. He reports that, even with the IV dose of lasix and his torsemide today, he is still feeling SOB. He tells us that he was seen by cardiology and EP outpatient for his presumed amyloid.  EP was discussing possible cardioversion for his atrial fibrillation, but he declined that.  His PYP scan was negative for TTR amyloid, but given his positive SPEP and UPEP there is concern for AL amyloid.  He was referred to hematology for treatment of MGUS which was confirmed by bone marrow biopsy as a cause of his AL amyloid, but patient declined treatment.  There was also discussion of doing a cardiac biopsy to confirm AL amyloid, but patient declined this as well.    We discussed how his amyloid can be contributing to his SOB and how it would be necessary to treat this underlying cause so his symptoms do not persist. He did not appear to want to pursue any immediate interventions at this time outside of the diuresing. We suspect there is some level of poor medical literacy causing some of his hesitancy receiving treatment of his underlying conditions.      Rhythm History: Atrial fibrillation with slow ventricular  response    Primary Cardiologist: Dr. Delvis Yancey MD ([facility]); Dr. Juni Martinez MD ([facility] EP)    ROS:  Review of Systems   Constitutional:  Positive for unexpected weight change (States he lost 20 lbs since last year). Negative for chills, diaphoresis, fatigue and fever.   HENT:  Negative for congestion, ear pain and sore throat.    Eyes:  Negative for photophobia, pain and visual disturbance.   Respiratory:  Positive for shortness of breath (Ongoing for the last 3 months, worsened in the last week). Negative for cough and chest tightness.    Cardiovascular:  Negative for chest pain, palpitations and leg swelling.   Gastrointestinal:  Negative for abdominal distention, abdominal pain, diarrhea, nausea and vomiting.   Genitourinary:  Negative for difficulty urinating, dysuria and hematuria.   Musculoskeletal:  Negative for arthralgias, back pain, gait problem and joint swelling.   Skin:  Negative for color change, pallor and rash.   Neurological:  Positive for weakness (Ocassionally with exertion). Negative for dizziness, seizures, syncope, numbness and headaches.   Psychiatric/Behavioral:  Negative for agitation, behavioral problems and confusion.    All other systems reviewed and are negative.       Past Medical History:        Past Medical History:   Diagnosis Date    CHF (congestive heart failure) (HCC)     Hypertension     History reviewed. No pertinent surgical history.    Family History:     History reviewed. No pertinent family history.     Social History:       Social History     Socioeconomic History    Marital status: Single     Spouse name: None    Number of children: None    Years of education: None    Highest education level: None   Occupational History    None   Tobacco Use    Smoking status: Former     Current packs/day: 0.50     Types: Cigarettes    Smokeless tobacco: Never   Vaping Use    Vaping status: Never Used   Substance and Sexual Activity    Alcohol use: Never    Drug use: Never     Sexual activity: None   Other Topics Concern    None   Social History Narrative    None     Social Drivers of Health     Financial Resource Strain: Not At Risk (2025)    Received from [facility]    Financial Insecurity     In the last 12 months did you skip medications to save money?: No     In the last 12 months was there a time when you needed to see a doctor but could not because of cost?: No   Food Insecurity: Patient Declined (2025)    Nursing - Inadequate Food Risk Classification     Worried About Running Out of Food in the Last Year: Not on file     Ran Out of Food in the Last Year: Not on file     Ran Out of Food in the Last Year: Patient declined   Transportation Needs: No Transportation Needs (2025)    Nursing - Transportation Risk Classification     Lack of Transportation: Not on file     Lack of Transportation: No   Physical Activity: Not on file   Stress: Not on file   Social Connections: Patient Unable To Answer (2025)    Received from [facility]    Social Connection     Do you often feel lonely?: Unable to Assess   Intimate Partner Violence: Patient Declined (10/20/2024)    Received from [facility]    Humiliation, Afraid, Rape, and Kick questionnaire     Fear of Current or Ex-Partner: Patient declined     Emotionally Abused: Patient declined     Physically Abused: Patient declined     Sexually Abused: Patient declined   Housing Stability: Unknown (2025)    Nursing: Inadequate Housing Risk Classification     Has Housing: Not on file     Worried About Losing Housing: Not on file     Unable to Get Utilities: Not on file     Unable to Pay for Housing in the Last Year: No     Has Housin        Allergy:        No Known Allergies    Medications:       Prior to Admission medications    Medication Sig Start Date End Date Taking? Authorizing Provider   losartan (COZAAR) 50 mg tablet Take 50 mg by mouth daily   Yes Historical  Provider, MD   melatonin 3 mg Take 3 mg by mouth daily at bedtime as needed (Insomnia)   Yes Historical Provider, MD   torsemide (DEMADEX) 20 mg tablet Take 20 mg by mouth daily   Yes Historical Provider, MD   warfarin (COUMADIN) 5 mg tablet Take 5 mg by mouth daily Alternating 7.5 mg 6 times a week, 5 mg on Thursdays   Yes Historical Provider, MD   NIFEdipine (PROCARDIA XL) 30 mg 24 hr tablet Take 3 tablets (90 mg total) by mouth daily 4/20/22 2/27/25  Agustin Quinonez MD   spironolactone (ALDACTONE) 50 mg tablet Take 1 tablet (50 mg total) by mouth daily 4/20/22 2/27/25  Agustin Quinonez MD       Vitals:    02/27/25 0824 02/27/25 0826 02/27/25 1322 02/27/25 1448   BP: (!) 157/107 (!) 155/105 (!) 154/104 154/99   BP Location:       Pulse: (!) 53 56 71 60   Resp:    18   Temp: 97.7 °F (36.5 °C) 97.7 °F (36.5 °C)  98.2 °F (36.8 °C)   TempSrc:       SpO2: 91% 97% 98% 94%   Weight:       Height:           Exam:  Physical Exam  Constitutional:       General: He is not in acute distress.     Appearance: He is well-developed and normal weight. He is not ill-appearing or diaphoretic.   HENT:      Head: Normocephalic and atraumatic.      Mouth/Throat:      Mouth: Mucous membranes are moist.      Pharynx: Oropharynx is clear.   Eyes:      Extraocular Movements: Extraocular movements intact.      Conjunctiva/sclera: Conjunctivae normal.      Pupils: Pupils are equal, round, and reactive to light.   Neck:      Vascular: JVD present.   Cardiovascular:      Rate and Rhythm: Bradycardia present. Rhythm irregularly irregular.      Pulses: Normal pulses.      Heart sounds: Normal heart sounds. No murmur heard.  Pulmonary:      Breath sounds: No wheezing.      Comments: Bilateral crackles at bases bilaterally  Chest:      Chest wall: No tenderness or edema. There is no dullness to percussion.   Abdominal:      General: Abdomen is flat. Bowel sounds are normal. There is no distension.      Palpations: Abdomen is soft.       Tenderness: There is no abdominal tenderness. There is no guarding.   Musculoskeletal:         General: Normal range of motion.      Cervical back: Normal range of motion and neck supple.      Right lower leg: No edema.      Left lower leg: No edema.   Skin:     General: Skin is warm and dry.      Capillary Refill: Capillary refill takes less than 2 seconds.   Neurological:      General: No focal deficit present.      Mental Status: He is alert and oriented to person, place, and time.   Psychiatric:         Mood and Affect: Mood normal.         Behavior: Behavior normal.          DATA:        EC/27 revealed:  Atrial fibrillation with slow ventricular response  Anterior infarct , age undetermined  Inferior infarct , age undetermined  Non-specific intra-ventricular conduction block    Holter:    Telemetry:   Atrial fibrillation, Atrial flutter. HR 71          Echocardiogram:   Performed in 2024, revelead EF: 55-60% and mild regurgitation in mitral, tricuspid, and aortic valves with minor age-related calcification of the aorta       Weights:    Wt Readings from Last 10 Encounters:   25 71.7 kg (158 lb)   22 87.5 kg (193 lb)   22 83.9 kg (185 lb)            Lab Studies:               Results from last 7 days   Lab Units 25  0237   WBC Thousand/uL 4.92   HEMOGLOBIN g/dL 9.0*   HEMATOCRIT % 29.2*   PLATELETS Thousands/uL 189     Results from last 7 days   Lab Units 25  0237 25  0623 25  2040 25  0235   POTASSIUM mmol/L 3.2* 3.5 3.4* 3.7   CHLORIDE mmol/L 105 109 105 106   CO2 mmol/L 29 26 29 27   BUN mg/dL 60* 55* 55* 48*   CREATININE mg/dL 2.72* 2.78* 2.52* 2.64*   CALCIUM mg/dL 9.0 9 9.4 9.3   ALK PHOS U/L 74  --  62 65   ALT U/L 16  --  14 14   AST U/L 17  --  13 18       Prepared with the assistance of Tracie Das MS 3.

## 2025-02-27 NOTE — ASSESSMENT & PLAN NOTE
- elevated kappa and lamda free light chains noted on 10/22/4  - [facility] hematology 10/30/24: confirmed diagnosis of multiple myeloma with presumed AL cardiac amyloid, declined cardiac biopsy and further treatment for his MM

## 2025-02-27 NOTE — ASSESSMENT & PLAN NOTE
Wt Readings from Last 3 Encounters:   02/27/25 73.9 kg (162 lb 14.7 oz)   04/19/22 87.5 kg (193 lb)   04/14/22 83.9 kg (185 lb)     Presents reporting shortness of breath and HERBERT increased from baseline  Recent ED visit at [facility] Feb 22 for CHF.  Given IV Lasix 40mg in ED and Discharged home with increased dose of torsemide from 20mg to 40 mg daily  History of cardiac AL amyloidosis, A-fib SVR and CHF with preserved EF 55 to 60%  Chronically elevated BNP above baseline  Will give 1 dose of IV Lasix 60 mg and monitor response  Continue PTA torsemide 40 mg daily  Monitor BMP   Cardiac diet with 1800 mL fluid restriction   Monitor daily weight I/O

## 2025-02-27 NOTE — ASSESSMENT & PLAN NOTE
EKG showing A-fib with slow RVR rate 46.  History of A-fib with slow ventricular rate. Previously on carvedilol which was discontinued   Anticoagulated with warfarin 7.5 mg 6 days/week, 5mg on Thursdays  INR therapeutic  Follows with cardiology at [facility]. Seen by EPS and does not wish to proceed with any invasive approach for management of his atrial fibrillation.

## 2025-02-27 NOTE — ASSESSMENT & PLAN NOTE
Lab Results   Component Value Date    EGFR 23 02/27/2025    EGFR 25 (L) 02/25/2025    EGFR 28 (L) 02/24/2025    CREATININE 2.72 (H) 02/27/2025    CREATININE 2.78 (H) 02/25/2025    CREATININE 2.52 (H) 02/24/2025     CKD at baseline.  CKD thought to be secondary to amyloidosis.  Continue outpatient f/u with nephrology at [facility]

## 2025-02-27 NOTE — ASSESSMENT & PLAN NOTE
-EKG showing A-fib with slow RVR rate 46  -Previously on carvedilol which was discontinued due to bradycardia   -Anticoagulated with warfarin 7.5 mg 6 days/week, 5mg on Thursdays  -PT/INR: 24.6, 2.25 (around baseline)   -EP discussed cardioversion with patient OP, but patient declined (November 2024)    -Continue anticoagulation therapy  -Monitor PT/INR

## 2025-02-28 LAB
ANION GAP SERPL CALCULATED.3IONS-SCNC: 12 MMOL/L (ref 4–13)
BASOPHILS # BLD AUTO: 0.01 THOUSANDS/ÂΜL (ref 0–0.1)
BASOPHILS NFR BLD AUTO: 0 % (ref 0–1)
BUN SERPL-MCNC: 55 MG/DL (ref 5–25)
CALCIUM SERPL-MCNC: 9.3 MG/DL (ref 8.4–10.2)
CHLORIDE SERPL-SCNC: 102 MMOL/L (ref 96–108)
CO2 SERPL-SCNC: 29 MMOL/L (ref 21–32)
CREAT SERPL-MCNC: 2.21 MG/DL (ref 0.6–1.3)
EOSINOPHIL # BLD AUTO: 0.15 THOUSAND/ÂΜL (ref 0–0.61)
EOSINOPHIL NFR BLD AUTO: 3 % (ref 0–6)
ERYTHROCYTE [DISTWIDTH] IN BLOOD BY AUTOMATED COUNT: 15.5 % (ref 11.6–15.1)
GFR SERPL CREATININE-BSD FRML MDRD: 30 ML/MIN/1.73SQ M
GLUCOSE SERPL-MCNC: 98 MG/DL (ref 65–140)
HCT VFR BLD AUTO: 29.8 % (ref 36.5–49.3)
HGB BLD-MCNC: 9.3 G/DL (ref 12–17)
IMM GRANULOCYTES # BLD AUTO: 0.01 THOUSAND/UL (ref 0–0.2)
IMM GRANULOCYTES NFR BLD AUTO: 0 % (ref 0–2)
INR PPP: 2.08 (ref 0.85–1.19)
LYMPHOCYTES # BLD AUTO: 1.28 THOUSANDS/ÂΜL (ref 0.6–4.47)
LYMPHOCYTES NFR BLD AUTO: 21 % (ref 14–44)
MAGNESIUM SERPL-MCNC: 2.1 MG/DL (ref 1.9–2.7)
MCH RBC QN AUTO: 22.1 PG (ref 26.8–34.3)
MCHC RBC AUTO-ENTMCNC: 31.2 G/DL (ref 31.4–37.4)
MCV RBC AUTO: 71 FL (ref 82–98)
MONOCYTES # BLD AUTO: 0.32 THOUSAND/ÂΜL (ref 0.17–1.22)
MONOCYTES NFR BLD AUTO: 5 % (ref 4–12)
NEUTROPHILS # BLD AUTO: 4.3 THOUSANDS/ÂΜL (ref 1.85–7.62)
NEUTS SEG NFR BLD AUTO: 71 % (ref 43–75)
NRBC BLD AUTO-RTO: 0 /100 WBCS
PLATELET # BLD AUTO: 191 THOUSANDS/UL (ref 149–390)
POTASSIUM SERPL-SCNC: 3.2 MMOL/L (ref 3.5–5.3)
PROTHROMBIN TIME: 23.2 SECONDS (ref 12.3–15)
RBC # BLD AUTO: 4.21 MILLION/UL (ref 3.88–5.62)
SODIUM SERPL-SCNC: 143 MMOL/L (ref 135–147)
WBC # BLD AUTO: 6.07 THOUSAND/UL (ref 4.31–10.16)

## 2025-02-28 PROCEDURE — 85610 PROTHROMBIN TIME: CPT | Performed by: NURSE PRACTITIONER

## 2025-02-28 PROCEDURE — 99232 SBSQ HOSP IP/OBS MODERATE 35: CPT | Performed by: STUDENT IN AN ORGANIZED HEALTH CARE EDUCATION/TRAINING PROGRAM

## 2025-02-28 PROCEDURE — 99232 SBSQ HOSP IP/OBS MODERATE 35: CPT | Performed by: INTERNAL MEDICINE

## 2025-02-28 PROCEDURE — 85025 COMPLETE CBC W/AUTO DIFF WBC: CPT | Performed by: NURSE PRACTITIONER

## 2025-02-28 PROCEDURE — 80048 BASIC METABOLIC PNL TOTAL CA: CPT | Performed by: NURSE PRACTITIONER

## 2025-02-28 PROCEDURE — 83735 ASSAY OF MAGNESIUM: CPT | Performed by: INTERNAL MEDICINE

## 2025-02-28 RX ORDER — HYDRALAZINE HYDROCHLORIDE 25 MG/1
25 TABLET, FILM COATED ORAL EVERY 8 HOURS SCHEDULED
Status: DISCONTINUED | OUTPATIENT
Start: 2025-02-28 | End: 2025-03-02 | Stop reason: HOSPADM

## 2025-02-28 RX ORDER — POTASSIUM CHLORIDE 1500 MG/1
40 TABLET, EXTENDED RELEASE ORAL ONCE
Status: COMPLETED | OUTPATIENT
Start: 2025-02-28 | End: 2025-02-28

## 2025-02-28 RX ORDER — ISOSORBIDE DINITRATE 10 MG/1
20 TABLET ORAL
Status: DISCONTINUED | OUTPATIENT
Start: 2025-02-28 | End: 2025-03-01

## 2025-02-28 RX ORDER — FUROSEMIDE 10 MG/ML
80 INJECTION INTRAMUSCULAR; INTRAVENOUS ONCE
Status: COMPLETED | OUTPATIENT
Start: 2025-02-28 | End: 2025-02-28

## 2025-02-28 RX ORDER — LORAZEPAM 0.5 MG/1
0.5 TABLET ORAL
Status: DISCONTINUED | OUTPATIENT
Start: 2025-02-28 | End: 2025-03-02 | Stop reason: HOSPADM

## 2025-02-28 RX ORDER — FUROSEMIDE 10 MG/ML
80 INJECTION INTRAMUSCULAR; INTRAVENOUS
Status: DISPENSED | OUTPATIENT
Start: 2025-02-28 | End: 2025-03-01

## 2025-02-28 RX ADMIN — HYDRALAZINE HYDROCHLORIDE 25 MG: 25 TABLET ORAL at 13:35

## 2025-02-28 RX ADMIN — FUROSEMIDE 80 MG: 10 INJECTION, SOLUTION INTRAVENOUS at 10:57

## 2025-02-28 RX ADMIN — LORAZEPAM 0.5 MG: 0.5 TABLET ORAL at 00:42

## 2025-02-28 RX ADMIN — FUROSEMIDE 80 MG: 10 INJECTION, SOLUTION INTRAVENOUS at 16:26

## 2025-02-28 RX ADMIN — WARFARIN SODIUM 7.5 MG: 5 TABLET ORAL at 17:51

## 2025-02-28 RX ADMIN — LORAZEPAM 0.5 MG: 0.5 TABLET ORAL at 22:45

## 2025-02-28 RX ADMIN — HYDRALAZINE HYDROCHLORIDE 10 MG: 50 TABLET ORAL at 06:25

## 2025-02-28 RX ADMIN — ISOSORBIDE DINITRATE 20 MG: 10 TABLET ORAL at 17:51

## 2025-02-28 RX ADMIN — POTASSIUM CHLORIDE 40 MEQ: 1500 TABLET, EXTENDED RELEASE ORAL at 11:56

## 2025-02-28 RX ADMIN — MELATONIN 3 MG: 3 TAB ORAL at 22:45

## 2025-02-28 RX ADMIN — ISOSORBIDE DINITRATE 20 MG: 10 TABLET ORAL at 13:35

## 2025-02-28 RX ADMIN — HYDRALAZINE HYDROCHLORIDE 25 MG: 25 TABLET ORAL at 22:45

## 2025-02-28 NOTE — PROGRESS NOTES
Progress Note - Cardiology   Name: Domingo Trevino 65 y.o. male I MRN: [id number]  Unit/Bed#: [place] [address] -01 I Date of Admission: 2/27/2025   Date of Service: 2/28/2025 I Hospital Day: 1          TODAY (02/28/25):   Patient still feels short of breath today and has some volume on exam  Renal function improved on a.m. labs  Will continue with furosemide 80 mg IV twice daily  Hopefully transition back to oral diuretics in 24 to 48 hours  Patient declined Isordil and losartan this morning, discussed importance of antihypertensives to improve his breathing as well, he appears agreeable to take his blood pressure medicines now  Increase Isordil to 20 mg 3 times daily and hydralazine to 25 mg 3 times daily  Check daily standing weights  Monitor creatinine and electrolytes closely  Will need close follow-up with [facility] cardiology for further management         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  Wt Readings from Last 3 Encounters:   02/28/25 71.4 kg (157 lb 6.5 oz)   04/19/22 87.5 kg (193 lb)   04/14/22 83.9 kg (185 lb)      -TTE 10/21/2024 at [facility] showed EF 55 to 60%, severe LA, moderate RA, mild MR, mild AI, mild TR  -BNP: 1,136   -CXR: pulmonary vascular congestion and small B/L pleural effusions   -Troponin: Flat, downwards trend (206, 200, 185), no longer trending   -Outpatient diuretic Rx torsemide 40 mg daily    -Inpatient diuretic Rx received IV lasix 60 mg x 1 (2/27/25) then received 80 mg x 1 (2/28/25), plan to continue with Lasix 80 mg IV twice daily  -I/O's daily check (2/27): -2000    -Monitor I/O's  -Monitor BMP  -Continue cardiac diet with 1800 mL fluid restriction     AL amyloidosis (HCC)  -TTE 10/21/24 ([facility]): LVEF 55-60%,  moderate concentric hypertrophy, sparing of apex suggestive of cardiac amyloid  - NM PYP scan 10/22/24: negative for TTR amyloid   - cardiac MRI 10/24/24: incomplete study due to claustrophobia, low normal LV function, LVEF 50-55%, RVEF 45%, mild biatrial  enlargement, mild MR   - NM PYP scan 10/22/24: negative for TTR amyloid  - previously declined further workup including cardiac biopsy for amyloid workup  Multiple myeloma not having achieved remission (HCC)  - elevated kappa and lamda free light chains noted on 10/22/4  - [facility] hematology 10/30/24: confirmed diagnosis of multiple myeloma with presumed AL cardiac amyloid, declined cardiac biopsy and further treatment for his MM   Chronic atrial fibrillation (HCC)  -EKG showing A-fib with slow RVR rate 46  -Previously on carvedilol which was discontinued due to bradycardia   -Anticoagulated with warfarin 7.5 mg 6 days/week, 5mg on Thursdays  -PT/INR: 23.2, 2.08 (2/28/25) < 24.6, 2.25 (2/27/25) (around baseline)   -EP discussed cardioversion with patient OP, but patient declined (November 2024)    -Continue anticoagulation therapy  -Monitor PT/INR     Primary hypertension  -BP: 167/104 (2/28/25) < 155/105 (2/27/25)  -Outpatient Rx losartan 50 mg daily   - Blood pressure remains elevated  - Patient on Isordil 10 mg 3 times daily and hydralazine 10 mg 3 times daily  - Increase Isordil to 20 mg 3 times daily and hydralazine to 25 mg 3 times daily today    Hypokalemia  -K: 3.2  -Given 50 mg Kcl    -Continue to trend lytes  -Replete as needed     Renal infarction (HCC)  -Hx of thromboembolism to left renal artery in June 2023  - Diagnosed with atrial fibrillation after thromboembolism  -On chronic warfarin anticoagulation     Stage 3b chronic kidney disease (HCC)  Lab Results   Component Value Date    EGFR 30 02/28/2025    EGFR 23 02/27/2025    EGFR 25 (L) 02/25/2025    CREATININE 2.21 (H) 02/28/2025    CREATININE 2.72 (H) 02/27/2025    CREATININE 2.78 (H) 02/25/2025           Subjective   Chief Complaint: Shortness of breath     Overnight, the patient reported to the nursing staff he was having increased difficulty with his breathing. They placed him on 2L of O2, even though his O2 saturations never dropped below 95%. Upon  "presentation this morning, he was still wearing the O2 but said he has intermittently been taking it on and off, claiming \"it isn't doing anything to help.\" The nurse also reported that he refused his morning meds, which may be contributing to the recent spike in BP (167/104). He said he is unclear with his plan and doesn't understand why he needs more medications. However, he did say he is more amenable to getting a cardiac biopsy done, as last night's experience was very scary for him and he does not want to feel this way any longer.   Still reports some shortness of breath.  Denies any chest pain, palpitations, lower extreme edema, fever, chills, abdominal pain, nausea, vomiting, or other concerning cardiac symptoms at this time.    Objective :  Temp:  [97.3 °F (36.3 °C)-98.2 °F (36.8 °C)] 98.2 °F (36.8 °C)  HR:  [53-81] 76  BP: (154-171)/() 167/104  Resp:  [18-20] 19  SpO2:  [92 %-98 %] 97 %  O2 Device: Nasal cannula  Nasal Cannula O2 Flow Rate (L/min):  [1.5 L/min] 1.5 L/min  Orthostatic Blood Pressures      Flowsheet Row Most Recent Value   Blood Pressure 167/104 filed at 02/28/2025 0746   Patient Position - Orthostatic VS Lying filed at 02/28/2025 0545          First Weight: Weight - Scale: 73.9 kg (162 lb 14.7 oz) (02/27/25 0141)  Vitals:    02/27/25 0543 02/28/25 0531   Weight: 71.7 kg (158 lb) 71.4 kg (157 lb 6.5 oz)     Physical Exam  Vitals and nursing note reviewed.   Constitutional:       General: He is not in acute distress.     Appearance: He is well-developed.   HENT:      Head: Normocephalic and atraumatic.   Eyes:      Conjunctiva/sclera: Conjunctivae normal.   Cardiovascular:      Rate and Rhythm: Bradycardia present. Rhythm irregularly irregular.      Heart sounds: No murmur heard.  Pulmonary:      Effort: Pulmonary effort is normal.      Breath sounds: Examination of the right-lower field reveals rales. Examination of the left-lower field reveals rales. Rales present.   Chest:      Chest " "wall: No tenderness or edema. There is no dullness to percussion.   Abdominal:      Palpations: Abdomen is soft.      Tenderness: There is no abdominal tenderness.   Musculoskeletal:         General: No swelling.      Cervical back: Neck supple.   Skin:     General: Skin is warm and dry.      Capillary Refill: Capillary refill takes less than 2 seconds.   Neurological:      General: No focal deficit present.      Mental Status: He is alert and oriented to person, place, and time.   Psychiatric:         Behavior: Behavior normal.           Lab Results: I have reviewed the following results:CBC/BMP:   .     02/28/25  0430   WBC 6.07   HGB 9.3*   HCT 29.8*      SODIUM 143   K 3.2*      CO2 29   BUN 55*   CREATININE 2.21*   GLUC 98   MG 2.1    , Creatinine Clearance: Estimated Creatinine Clearance: 34.6 mL/min (A) (by C-G formula based on SCr of 2.21 mg/dL (H)).  Results from last 7 days   Lab Units 02/28/25 0430 02/27/25  0237   WBC Thousand/uL 6.07 4.92   HEMOGLOBIN g/dL 9.3* 9.0*   HEMATOCRIT % 29.8* 29.2*   PLATELETS Thousands/uL 191 189     Results from last 7 days   Lab Units 02/28/25  0430 02/27/25  0237 02/25/25  0623   POTASSIUM mmol/L 3.2* 3.2* 3.5   CHLORIDE mmol/L 102 105 109   CO2 mmol/L 29 29 26   BUN mg/dL 55* 60* 55*   CREATININE mg/dL 2.21* 2.72* 2.78*   CALCIUM mg/dL 9.3 9.0 9     Results from last 7 days   Lab Units 02/28/25  0430 02/27/25  0237 02/25/25  0623   INR  2.08* 2.25* 3.8   PTT seconds  --  39*  --      No results found for: \"HGBA1C\"  Lab Results   Component Value Date    TROPONINI 1.01 () 06/17/2021       Imaging Results Review: No pertinent imaging studies reviewed.  Other Study Results Review: EKG was reviewed.     VTE Pharmacologic Prophylaxis: Warfarin (Coumadin)  VTE Mechanical Prophylaxis: sequential compression device    "

## 2025-02-28 NOTE — UTILIZATION REVIEW
Initial Clinical Review    Admission: Date/Time/Statement:   Admission Orders (From admission, onward)       Ordered        02/27/25 1335  INPATIENT ADMISSION  Once            02/27/25 0446  Place in Observation  Once                          Orders Placed This Encounter   Procedures    Place in Observation     Standing Status:   Standing     Number of Occurrences:   1     Level of Care:   Med Surg [16]    INPATIENT ADMISSION     Standing Status:   Standing     Number of Occurrences:   1     Level of Care:   Med Surg [16]     Estimated length of stay:   More than 2 Midnights     Certification:   I certify that inpatient services are medically necessary for this patient for a duration of greater than two midnights. See H&P and MD Progress Notes for additional information about the patient's course of treatment.     ED Arrival Information       Expected   -    Arrival   2/27/2025 01:35    Acuity   Urgent              Means of arrival   Walk-In    Escorted by   Self    Service   Hospitalist    Admission type   Emergency              Arrival complaint   sob             Chief Complaint   Patient presents with    Shortness of Breath     Shortness of breath for the past month, and pain in left leg       Initial Presentation: 65 y.o. male to ED presents for Shortness of breath. States he has chronic shortness of breath and dyspnea on exertion although symptoms increased from baseline. Usually dyspnea improves when he takes torsemide and voids. Compliant with torsemide. Seen in [facility] ED on 2/22 and given IV Lasix, torsemide dose increased from 20 mg to 40 mg.  PMH for HTN, Atrial Fib, Renal infarction, CKD Stage 3b, AL amyloidosis, MS not having achieved remission, Remote history of MI in 2013, 2015, 2019  Admit to Observation to Inpatient Dx; Acute on chronic heart failure with preserved ejection fraction, Elevated troponin, Hypokalemia K 3.2  Plan; Give Iv Lasix 60 mg x1. Repeat BMP in am. PTA torsemide 40 mg daily. Fluid  restriction 1800 ml.   Trend Troponin/ EKG x3. Tele monitoring. Cardiology consult. Replete potassium. Check serum Mg.     2/27  Cardiology cons; HFpEF, BNP: 1,136   Troponin: Flat, trending downwards (206, 200, 185). Given Iv Lasix 60 mg x1.  Continue tele monitoring. Monitor BMP. 1800 ml fluid restriction. Given 50 mg Kcl.   On exam; JVD. Bilateral crackles at bases bilaterally. Bradycardia  CXR: pulmonary vascular congestion and small B/L pleural effusions     Date: 2/28   Day 2:   Progress notes; Elevated BP. Give additional Iv Lasix 80 mg with close monitoring of his electrolytes and creat.  Repeat BMP in am. Isosorbide and hydralazine doses increased. Continue losartan for now. If creat worsens on next BMP, hold losartan.  INR is therapeutic on warfarin   Pt continues to have shortness of breath on minimal exertion with bilateral rales  Pt feels short of breath even with minimal exertion such as easing up from bed and sitting and walking to the bathroom.  Feeling better today compared to yesterday  Certification Statement: The patient will continue to require additional inpatient hospital stay due to shortness of breath     ED Treatment-Medication Administration from 02/27/2025 0135 to 02/27/2025 0532         Date/Time Order Dose Route Action     02/27/2025 0356 methocarbamol (ROBAXIN) tablet 500 mg 500 mg Oral Given     02/27/2025 0403 lidocaine (LIDODERM) 5 % patch 2 patch 2 patch Topical Medication Applied     02/27/2025 0356 acetaminophen (TYLENOL) tablet 650 mg 650 mg Oral Given     02/27/2025 0513 potassium chloride (Klor-Con M20) CR tablet 40 mEq 40 mEq Oral Given     02/27/2025 0514 furosemide (LASIX) injection 60 mg 60 mg Intravenous Given            Scheduled Medications:  hydrALAZINE, 25 mg, Oral, Q8H LOTTIE  isosorbide dinitrate, 20 mg, Oral, TID after meals  losartan, 50 mg, Oral, Daily  warfarin, 7.5 mg, Oral, Once per day on Sunday Monday Tuesday Wednesday Friday Saturday   And  warfarin, 5 mg,  Oral, Every Thursday    furosemide (LASIX) injection 60 mg  Dose: 60 mg  Freq: Once Route: IV x1 given  Start: 02/27/25 1645 End: 02/27/25 1700    furosemide (LASIX) injection 80 mg  Dose: 80 mg  Freq: Once Route: IV x1 given  Start: 02/28/25 1030 End: 02/28/25 1057    Continuous IV Infusions: None     PRN Meds:  acetaminophen, 975 mg, Oral, Q6H PRN  aluminum-magnesium hydroxide-simethicone, 30 mL, Oral, Q6H PRN  LORazepam, 0.5 mg, Oral, HS PRN  melatonin, 3 mg, Oral, HS PRN  ondansetron, 4 mg, Intravenous, Q6H PRN      ED Triage Vitals   Temperature Pulse Respirations Blood Pressure SpO2 Pain Score   02/27/25 0141 02/27/25 0141 02/27/25 0141 02/27/25 0141 02/27/25 0141 02/27/25 0356   98 °F (36.7 °C) 63 19 (!) 180/84 95 % 8     Weight (last 2 days)       Date/Time Weight    02/28/25 0531 71.4 (157.41)    02/27/25 05:43:18 71.7 (158)    02/27/25 0507 71.6 (157.85)    02/27/25 0141 73.9 (162.92)            Vital Signs (last 3 days)       Date/Time Temp Pulse Resp BP MAP (mmHg) SpO2 Calculated FIO2 (%) - Nasal Cannula Nasal Cannula O2 Flow Rate (L/min) O2 Device Patient Position - Orthostatic VS Pain    02/28/25 11:00:38 98.1 °F (36.7 °C) 62 -- 155/102 120 97 % -- -- -- -- --    02/28/25 10:57:02 -- 77 -- 155/102 120 95 % -- -- -- -- --    02/28/25 07:46:07 98.2 °F (36.8 °C) 76 19 167/104 125 97 % -- -- -- -- --    02/28/25 0657 -- -- -- -- -- -- 26 1.5 L/min Nasal cannula -- --    02/28/25 06:28:55 -- 53 -- 154/102 119 94 % -- -- -- -- --    02/28/25 0545 -- 81 -- 161/100 -- 94 % -- -- -- Lying --    02/28/25 0400 -- 77 -- -- -- 93 % -- -- -- -- --    02/28/25 02:25:04 97.3 °F (36.3 °C) 66 18 171/100 124 95 % -- -- -- -- --    02/27/25 22:06:34 -- 57 -- 169/108 128 92 % -- -- -- -- --    02/27/25 2100 97.7 °F (36.5 °C) -- 20 155/82 -- -- -- -- -- -- --    02/27/25 2034 -- -- -- -- -- -- -- -- -- -- 5    02/27/25 19:22:26 98.2 °F (36.8 °C) 56 18 157/86 110 93 % -- -- -- -- --    02/27/25 14:48:37 98.2 °F (36.8 °C) 60  18 154/99 117 94 % -- -- -- -- --    02/27/25 13:22:50 -- 71 -- 154/104 121 98 % -- -- -- -- --    02/27/25 08:26:05 97.7 °F (36.5 °C) 56 -- 155/105 122 97 % -- -- -- -- --    02/27/25 08:24:51 97.7 °F (36.5 °C) 53 -- 157/107 124 91 % -- -- -- -- --    02/27/25 0731 -- -- -- -- -- -- -- -- -- -- 2    02/27/25 0614 -- -- -- -- -- -- -- -- -- -- 2    02/27/25 05:43:18 97.6 °F (36.4 °C) 67 18 168/118 135 92 % -- -- None (Room air) -- 2    02/27/25 0445 -- 55 -- 141/105 120 93 % -- -- None (Room air) Lying --    02/27/25 0356 -- -- -- -- -- -- -- -- -- -- 8    02/27/25 0330 -- 48 12 186/101 135 96 % -- -- None (Room air) Lying --    02/27/25 0300 -- 59 16 150/100 120 98 % -- -- None (Room air) Lying --    02/27/25 0254 -- -- -- -- -- -- -- -- None (Room air) -- --    02/27/25 0141 98 °F (36.7 °C) 63 19 180/84 -- 95 % -- -- None (Room air) Sitting --            Pertinent Labs/Diagnostic Test Results:   Radiology:  XR chest 2 views   ED Interpretation by Yanni Tsang PA-C (02/27 0650)   Pulmonary edema, No evidence of infiltrate, pneumothorax, or other acute cardiopulmonary disease as interpreted by me      Final Interpretation by Hugo Pate MD (02/27 0906)      Pulmonary vascular congestion and small bilateral pleural effusions.            Resident: Frank Hoffman I, the attending radiologist, have reviewed the images and agree with the final report above.      Workstation performed: [id number]           Cardiology:  ECG 12 lead   Final Result by Néstor Wright DO (02/27 0647)   Atrial fibrillation with slow ventricular response   Anterior infarct , age undetermined   Inferior infarct , age undetermined   Non-specific intra-ventricular conduction block   Abnormal ECG   Confirmed by Néstor Wright ([id number]) on 2/27/2025 6:46:58 AM        GI:  No orders to display           Results from last 7 days   Lab Units 02/28/25  0430 02/27/25  0237   WBC Thousand/uL 6.07 4.92   HEMOGLOBIN g/dL 9.3* 9.0*    HEMATOCRIT % 29.8* 29.2*   PLATELETS Thousands/uL 191 189   TOTAL NEUT ABS Thousands/µL 4.30 2.90         Results from last 7 days   Lab Units 02/28/25  0430 02/27/25  0402 02/27/25  0237   SODIUM mmol/L 143  --  144   POTASSIUM mmol/L 3.2*  --  3.2*   CHLORIDE mmol/L 102  --  105   CO2 mmol/L 29  --  29   ANION GAP mmol/L 12  --  10   BUN mg/dL 55*  --  60*   CREATININE mg/dL 2.21*  --  2.72*   EGFR ml/min/1.73sq m 30  --  23   CALCIUM mg/dL 9.3  --  9.0   MAGNESIUM mg/dL 2.1 1.8*  --      Results from last 7 days   Lab Units 02/27/25  0237   AST U/L 17   ALT U/L 16   ALK PHOS U/L 74   TOTAL PROTEIN g/dL 6.3*   ALBUMIN g/dL 3.9   TOTAL BILIRUBIN mg/dL 0.85         Results from last 7 days   Lab Units 02/28/25  0430 02/27/25  0237   GLUCOSE RANDOM mg/dL 98 92       Results from last 7 days   Lab Units 02/27/25  0920 02/27/25  0555 02/27/25  0402   HS TNI 0HR ng/L  --   --  176*   HS TNI 2HR ng/L  --  200*  --    HSTNI D2 ng/L  --  24*  --    HS TNI 4HR ng/L 185*  --   --    HSTNI D4 ng/L 9  --   --      Results from last 7 days   Lab Units 02/27/25  0237   D-DIMER QUANTITATIVE ug/ml FEU <0.27     Results from last 7 days   Lab Units 02/28/25  0430 02/27/25  0237   PROTIME seconds 23.2* 24.6*   INR  2.08* 2.25*   PTT seconds  --  39*       Results from last 7 days   Lab Units 02/27/25  0237   BNP pg/mL 1,136*         Past Medical History:   Diagnosis Date    CHF (congestive heart failure) (HCC)     Hypertension      Present on Admission:   Multiple myeloma not having achieved remission (HCC)   AL amyloidosis (HCC)   HFpEF, 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   Elevated troponin   Longstanding persistent atrial fibrillation (HCC)   Primary hypertension   Renal infarction (HCC)   Hypokalemia   Stage 3b chronic kidney disease (HCC)   Chronic atrial fibrillation (HCC)      Admitting Diagnosis: Shortness of breath [R06.02]  AL amyloidosis (HCC) [E85.81]  Elevated troponin [R79.89]  Age/Sex: 65 y.o. male    Network Utilization  Review Department  ATTENTION: Please call with any questions or concerns to [phone] and carefully listen to the prompts so that you are directed to the right person. All voicemails are confidential.   For Discharge needs, contact Care Management DC Support Team at [phone] opt. 2  Send all requests for admission clinical reviews, approved or denied determinations and any other requests to dedicated fax number below belonging to the campus where the patient is receiving treatment. List of dedicated fax numbers for the Facilities:  FACILITY NAME UR FAX NUMBER   ADMISSION DENIALS (Administrative/Medical Necessity) [phone]   DISCHARGE SUPPORT TEAM (NETWORK) [phone]   PARENT CHILD HEALTH (Maternity/NICU/Pediatrics) [phone]   [facility] [phone]   [facility] [phone]   [facility] [phone]   [facility] [phone]   [facility] [phone]   [facility] [phone]   [facility] [phone]   [facility] [phone]   [facility] [phone]   [facility] [phone]   [facility] [phone]   [facility] [phone]

## 2025-02-28 NOTE — PLAN OF CARE
Problem: PAIN - ADULT  Goal: Verbalizes/displays adequate comfort level or baseline comfort level  Description: Interventions:  - Encourage patient to monitor pain and request assistance  - Assess pain using appropriate pain scale  - Administer analgesics based on type and severity of pain and evaluate response  - Implement non-pharmacological measures as appropriate and evaluate response  - Consider cultural and social influences on pain and pain management  - Notify physician/advanced practitioner if interventions unsuccessful or patient reports new pain  Outcome: Progressing     Problem: INFECTION - ADULT  Goal: Absence or prevention of progression during hospitalization  Description: INTERVENTIONS:  - Assess and monitor for signs and symptoms of infection  - Monitor lab/diagnostic results  - Monitor all insertion sites, i.e. indwelling lines, tubes, and drains  - Monitor endotracheal if appropriate and nasal secretions for changes in amount and color  - [place] appropriate cooling/warming therapies per order  - Administer medications as ordered  - Instruct and encourage patient and family to use good hand hygiene technique  - Identify and instruct in appropriate isolation precautions for identified infection/condition  Outcome: Progressing     Problem: SAFETY ADULT  Goal: Patient will remain free of falls  Description: INTERVENTIONS:  - Educate patient/family on patient safety including physical limitations  - Instruct patient to call for assistance with activity   - Consult OT/PT to assist with strengthening/mobility   - Keep Call bell within reach  - Keep bed low and locked with side rails adjusted as appropriate  - Keep care items and personal belongings within reach  - Initiate and maintain comfort rounds  - Make Fall Risk Sign visible to staff  - Offer Toileting every  Hours, in advance of need  - Initiate/Maintain alarm  - Obtain necessary fall risk management equipment:   - Apply yellow socks and  bracelet for high fall risk patients  - Consider moving patient to room near nurses station  Outcome: Progressing  Goal: Maintain or return to baseline ADL function  Description: INTERVENTIONS:  -  Assess patient's ability to carry out ADLs; assess patient's baseline for ADL function and identify physical deficits which impact ability to perform ADLs (bathing, care of mouth/teeth, toileting, grooming, dressing, etc.)  - Assess/evaluate cause of self-care deficits   - Assess range of motion  - Assess patient's mobility; develop plan if impaired  - Assess patient's need for assistive devices and provide as appropriate  - Encourage maximum independence but intervene and supervise when necessary  - Involve family in performance of ADLs  - Assess for home care needs following discharge   - Consider OT consult to assist with ADL evaluation and planning for discharge  - Provide patient education as appropriate  Outcome: Progressing  Goal: Maintains/Returns to pre admission functional level  Description: INTERVENTIONS:  - Perform AM-PAC 6 Click Basic Mobility/ Daily Activity assessment daily.  - Set and communicate daily mobility goal to care team and patient/family/caregiver.   - Collaborate with rehabilitation services on mobility goals if consulted  - Perform Range of Motion  times a day.  - Reposition patient every  hours.  - Dangle patient  times a day  - Stand patient  times a day  - Ambulate patient  times a day  - Out of bed to chair  times a day   - Out of bed for meal times a day  - Out of bed for toileting  - Record patient progress and toleration of activity level   Outcome: Progressing     Problem: DISCHARGE PLANNING  Goal: Discharge to home or other facility with appropriate resources  Description: INTERVENTIONS:  - Identify barriers to discharge w/patient and caregiver  - Arrange for needed discharge resources and transportation as appropriate  - Identify discharge learning needs (meds, wound care, etc.)  -  Arrange for interpretive services to assist at discharge as needed  - Refer to Case Management Department for coordinating discharge planning if the patient needs post-hospital services based on physician/advanced practitioner order or complex needs related to functional status, cognitive ability, or social support system  Outcome: Progressing     Problem: Knowledge Deficit  Goal: Patient/family/caregiver demonstrates understanding of disease process, treatment plan, medications, and discharge instructions  Description: Complete learning assessment and assess knowledge base.  Interventions:  - Provide teaching at level of understanding  - Provide teaching via preferred learning methods  Outcome: Progressing

## 2025-02-28 NOTE — PLAN OF CARE
Problem: PAIN - ADULT  Goal: Verbalizes/displays adequate comfort level or baseline comfort level  Description: Interventions:  - Encourage patient to monitor pain and request assistance  - Assess pain using appropriate pain scale  - Administer analgesics based on type and severity of pain and evaluate response  - Implement non-pharmacological measures as appropriate and evaluate response  - Consider cultural and social influences on pain and pain management  - Notify physician/advanced practitioner if interventions unsuccessful or patient reports new pain  Outcome: Progressing     Problem: INFECTION - ADULT  Goal: Absence or prevention of progression during hospitalization  Description: INTERVENTIONS:  - Assess and monitor for signs and symptoms of infection  - Monitor lab/diagnostic results  - Monitor all insertion sites, i.e. indwelling lines, tubes, and drains  - Monitor endotracheal if appropriate and nasal secretions for changes in amount and color  - [place] appropriate cooling/warming therapies per order  - Administer medications as ordered  - Instruct and encourage patient and family to use good hand hygiene technique  - Identify and instruct in appropriate isolation precautions for identified infection/condition  Outcome: Progressing     Problem: SAFETY ADULT  Goal: Patient will remain free of falls  Description: INTERVENTIONS:  - Educate patient/family on patient safety including physical limitations  - Instruct patient to call for assistance with activity   - Consult OT/PT to assist with strengthening/mobility   - Keep Call bell within reach  - Keep bed low and locked with side rails adjusted as appropriate  - Keep care items and personal belongings within reach  - Initiate and maintain comfort rounds  - Make Fall Risk Sign visible to staff  - Offer Toileting every 2 Hours in advance of need  - Initiate/Maintain BED alarm  - Obtain necessary fall risk management equipment: YELLOW SOCKS  - Apply  yellow socks and bracelet for high fall risk patients  - Consider moving patient to room near nurses station  Outcome: Progressing  Goal: Maintain or return to baseline ADL function  Description: INTERVENTIONS:  -  Assess patient's ability to carry out ADLs; assess patient's baseline for ADL function and identify physical deficits which impact ability to perform ADLs (bathing, care of mouth/teeth, toileting, grooming, dressing, etc.)  - Assess/evaluate cause of self-care deficits   - Assess range of motion  - Assess patient's mobility; develop plan if impaired  - Assess patient's need for assistive devices and provide as appropriate  - Encourage maximum independence but intervene and supervise when necessary  - Involve family in performance of ADLs  - Assess for home care needs following discharge   - Consider OT consult to assist with ADL evaluation and planning for discharge  - Provide patient education as appropriate  Outcome: Progressing  Goal: Maintains/Returns to pre admission functional level  Description: INTERVENTIONS:  - Perform AM-PAC 6 Click Basic Mobility/ Daily Activity assessment daily.  - Set and communicate daily mobility goal to care team and patient/family/caregiver.   - Collaborate with rehabilitation services on mobility goals if consulted  - Perform Range of Motion 3 times a day.  - Reposition patient every 2 hours.  - Dangle patient 3 times a day  - Stand patient 3 times a day  - Ambulate patient 3 times a day  - Out of bed to chair 3 times a day   - Out of bed for meals 3 times a day  - Out of bed for toileting  - Record patient progress and toleration of activity level   Outcome: Progressing     Problem: DISCHARGE PLANNING  Goal: Discharge to home or other facility with appropriate resources  Description: INTERVENTIONS:  - Identify barriers to discharge w/patient and caregiver  - Arrange for needed discharge resources and transportation as appropriate  - Identify discharge learning needs  (meds, wound care, etc.)  - Arrange for interpretive services to assist at discharge as needed  - Refer to Case Management Department for coordinating discharge planning if the patient needs post-hospital services based on physician/advanced practitioner order or complex needs related to functional status, cognitive ability, or social support system  Outcome: Progressing     Problem: Knowledge Deficit  Goal: Patient/family/caregiver demonstrates understanding of disease process, treatment plan, medications, and discharge instructions  Description: Complete learning assessment and assess knowledge base.  Interventions:  - Provide teaching at level of understanding  - Provide teaching via preferred learning methods  Outcome: Progressing

## 2025-02-28 NOTE — ASSESSMENT & PLAN NOTE
"Follows with hematology at [facility].  Was seen in October and undecided about treatment for MM.  Was supposed to follow-up after 2 weeks although has not seen them since as he states \"diagnosis not confirmed\"   follow-up at [facility] hematology for further treatment  "

## 2025-02-28 NOTE — ASSESSMENT & PLAN NOTE
Lab Results   Component Value Date    EGFR 30 02/28/2025    EGFR 23 02/27/2025    EGFR 25 (L) 02/25/2025    CREATININE 2.21 (H) 02/28/2025    CREATININE 2.72 (H) 02/27/2025    CREATININE 2.78 (H) 02/25/2025

## 2025-02-28 NOTE — ASSESSMENT & PLAN NOTE
-BP: 167/104 (2/28/25) < 155/105 (2/27/25)  -Outpatient Rx losartan 50 mg daily   - Blood pressure remains elevated  - Patient on Isordil 10 mg 3 times daily and hydralazine 10 mg 3 times daily  - Increase Isordil to 20 mg 3 times daily and hydralazine to 25 mg 3 times daily today

## 2025-02-28 NOTE — PROGRESS NOTES
Progress Note - Hospitalist   Name: Domingo Trevino 65 y.o. male I MRN: [id number]  Unit/Bed#: [place] [address] -01 I Date of Admission: 2/27/2025   Date of Service: 2/28/2025 I Hospital Day: 1    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  Wt Readings from Last 3 Encounters:   02/28/25 71.4 kg (157 lb 6.5 oz)   04/19/22 87.5 kg (193 lb)   04/14/22 83.9 kg (185 lb)   Acute on chronic  He continues to have shortness of breath on minimal exertion with bilateral rales on exam.  Discussed with Dr. Mckeon  Will give additional Lasix 80 mg IV today with close monitoring of his electrolytes and creatinine  Repeat BMP in AM.  Presents reporting shortness of breath and HERBERT increased from baseline  Elevated troponin  Secondary to acute on chronic diastolic congestive heart failure  Treat primary problem  Primary hypertension   Blood pressure is elevated.  Isosorbide and hydralazine doses increased  Longstanding persistent atrial fibrillation (HCC)  History of A-fib with slow ventricular rate. Previously on carvedilol which was discontinued   INR is therapeutic on warfarin  Anticoagulated with warfarin 7.5 mg 6 days/week, 5mg on Thursdays  Follows with cardiology at [facility]. Seen by EPS and does not wish to proceed with any invasive approach for management of his atrial fibrillation.   Stage 3b chronic kidney disease (HCC)  Lab Results   Component Value Date    EGFR 30 02/28/2025    EGFR 23 02/27/2025    EGFR 25 (L) 02/25/2025    CREATININE 2.21 (H) 02/28/2025    CREATININE 2.72 (H) 02/27/2025    CREATININE 2.78 (H) 02/25/2025     CKD at baseline.  CKD thought to be secondary to amyloidosis.  Continue outpatient f/u with nephrology at [facility]  Improved with IV Lasix.  Monitor  Continue losartan for now.  If creatinine worsensvon next BMP, hold losartan  Repeat BMP in a.m.  Hypokalemia  Replete  AL amyloidosis (HCC)  Amyloidosis seen on echo.  Followed by hematology at [facility]. S/p biopsy-proven multiple myeloma. High clinical  "suspicion that he has cardiac AL amyloidosis, patient declined endomyocardial biopsy   Multiple myeloma not having achieved remission (HCC)  Follows with hematology at [facility].  Was seen in October and undecided about treatment for MM.  Was supposed to follow-up after 2 weeks although has not seen them since as he states \"diagnosis not confirmed\"   follow-up at [facility] hematology for further treatment    VTE Pharmacologic Prophylaxis: VTE Score: 6 High Risk (Score >/= 5) - Pharmacological DVT Prophylaxis Ordered: warfarin (Coumadin). Sequential Compression Devices Ordered.       Discussions with Specialists or Other Care Team Provider: Discussed with Dr. Mckeon  Current Length of Stay: 1 day(s)  Current Patient Status: Inpatient   Certification Statement: The patient will continue to require additional inpatient hospital stay due to shortness of breath  Discharge Plan: Anticipate discharge in 24-48 hrs to home.    Code Status: Level 1 - Full Code    Subjective   Reports feeling better today compared to yesterday  However he feels short of breath even with minimal exertion such as easing up from bed and sitting and walking to the bathroom  Also denies using oxygen at home    Objective :  Temp:  [97.3 °F (36.3 °C)-98.2 °F (36.8 °C)] 98.1 °F (36.7 °C)  HR:  [53-81] 62  BP: (154-171)/() 155/102  Resp:  [18-20] 19  SpO2:  [92 %-98 %] 97 %  O2 Device: Nasal cannula  Nasal Cannula O2 Flow Rate (L/min):  [1.5 L/min] 1.5 L/min    Body mass index is 20.21 kg/m².     Input and Output Summary (last 24 hours):     Intake/Output Summary (Last 24 hours) at 2/28/2025 1111  Last data filed at 2/28/2025 1105  Gross per 24 hour   Intake 1750 ml   Output 3550 ml   Net -1800 ml       Physical Exam  Vitals reviewed.   Constitutional:       Appearance: He is not ill-appearing.   HENT:      Head: Normocephalic and atraumatic.      Nose: No congestion or rhinorrhea.   Eyes:      General: No scleral icterus.  Cardiovascular:      Rate and " Rhythm: Normal rate. Rhythm irregular.   Pulmonary:      Comments: + Bilateral crackles at the bases  Abdominal:      Palpations: Abdomen is soft.      Tenderness: There is no abdominal tenderness. There is no guarding.   Musculoskeletal:      Right lower leg: No edema.      Left lower leg: No edema.   Skin:     General: Skin is warm and dry.   Neurological:      Mental Status: He is oriented to person, place, and time.   Psychiatric:         Mood and Affect: Mood normal.         Behavior: Behavior normal.                  Lab Results: I have reviewed the following results:   Results from last 7 days   Lab Units 02/28/25  0430   WBC Thousand/uL 6.07   HEMOGLOBIN g/dL 9.3*   HEMATOCRIT % 29.8*   PLATELETS Thousands/uL 191   SEGS PCT % 71   LYMPHO PCT % 21   MONO PCT % 5   EOS PCT % 3     Results from last 7 days   Lab Units 02/28/25  0430 02/27/25  0237   SODIUM mmol/L 143 144   POTASSIUM mmol/L 3.2* 3.2*   CHLORIDE mmol/L 102 105   CO2 mmol/L 29 29   BUN mg/dL 55* 60*   CREATININE mg/dL 2.21* 2.72*   ANION GAP mmol/L 12 10   CALCIUM mg/dL 9.3 9.0   ALBUMIN g/dL  --  3.9   TOTAL BILIRUBIN mg/dL  --  0.85   ALK PHOS U/L  --  74   ALT U/L  --  16   AST U/L  --  17   GLUCOSE RANDOM mg/dL 98 92     Results from last 7 days   Lab Units 02/28/25  0430   INR  2.08*                   Recent Cultures (last 7 days):         Imaging Results Review: I reviewed radiology reports from this admission including: chest xray.      Last 24 Hours Medication List:     Current Facility-Administered Medications:     acetaminophen (TYLENOL) tablet 975 mg, Q6H PRN    aluminum-magnesium hydroxide-simethicone (MAALOX) oral suspension 30 mL, Q6H PRN    hydrALAZINE (APRESOLINE) tablet 25 mg, Q8H LOTTIE    isosorbide dinitrate (ISORDIL) tablet 20 mg, TID after meals    LORazepam (ATIVAN) tablet 0.5 mg, HS PRN    losartan (COZAAR) tablet 50 mg, Daily    melatonin tablet 3 mg, HS PRN    ondansetron (ZOFRAN) injection 4 mg, Q6H PRN    warfarin  (COUMADIN) tablet 7.5 mg, Once per day on Sunday Monday Tuesday Wednesday Friday Saturday **AND** warfarin (COUMADIN) tablet 5 mg, Every Thursday    Administrative Statements   Today, Patient Was Seen By: Jean Marie Schmitz MD      **Please Note: This note may have been constructed using a voice recognition system.**

## 2025-02-28 NOTE — ASSESSMENT & PLAN NOTE
Wt Readings from Last 3 Encounters:   02/28/25 71.4 kg (157 lb 6.5 oz)   04/19/22 87.5 kg (193 lb)   04/14/22 83.9 kg (185 lb)   Acute on chronic  He continues to have shortness of breath on minimal exertion with bilateral rales on exam.  Discussed with Dr. Mckeon  Will give additional Lasix 80 mg IV today with close monitoring of his electrolytes and creatinine  Repeat BMP in AM.  Presents reporting shortness of breath and HERBERT increased from baseline

## 2025-02-28 NOTE — ASSESSMENT & PLAN NOTE
-EKG showing A-fib with slow RVR rate 46  -Previously on carvedilol which was discontinued due to bradycardia   -Anticoagulated with warfarin 7.5 mg 6 days/week, 5mg on Thursdays  -PT/INR: 23.2, 2.08 (2/28/25) < 24.6, 2.25 (2/27/25) (around baseline)   -EP discussed cardioversion with patient OP, but patient declined (November 2024)    -Continue anticoagulation therapy  -Monitor PT/INR

## 2025-02-28 NOTE — CASE MANAGEMENT
Case Management Assessment & Discharge Planning Note    Patient name Domingo Trevino  Location South 2 /South 2 M* MRN [id number]  : 1960 Date 2025       Current Admission Date: 2025  Current Admission Diagnosis:HFpEF, 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   Patient Active Problem List    Diagnosis Date Noted Date Diagnosed    Longstanding persistent atrial fibrillation (HCC) 2025     Renal infarction (HCC) 2025     Stage 3b chronic kidney disease (HCC) 2025     AL amyloidosis (HCC) 2025     Multiple myeloma not having achieved remission (HCC) 2025     Chronic atrial fibrillation (HCC) 2025     HFpEF, 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 2022     Primary hypertension 2022     Elevated troponin 2022     Hypokalemia 2022       LOS (days): 1  Geometric Mean LOS (GMLOS) (days): 3.9  Days to GMLOS:2.8     OBJECTIVE:    Risk of Unplanned Readmission Score: 19.18         Current admission status: Inpatient       [facility] Pharmacy:   [id number] [place] - [address] [address]  Phone: [phone] Fax: [phone]    Primary Care Provider: Maricruz Peres    Primary Insurance: [place] [employer]LEX[employer] PENDING  Secondary Insurance:     ASSESSMENT:  Active Health Care Proxies    There are no active Health Care Proxies on file.       Advance Directives  Does patient have a Health Care POA?: No  Was patient offered paperwork?: No  Does patient currently have a Health Care decision maker?: No  Does patient have Advance Directives?: No  Was patient offered paperwork?: No  Primary Contact: Pt's Daughter- phone number not provided         Readmission Root Cause  30 Day Readmission: No    Patient Information  Admitted from:: Home  Mental Status: Alert  During Assessment patient was accompanied by: Daughter  Assessment information provided by:: Patient  Primary Caregiver: Self  Support Systems: Children, Family members,  Spouse/significant other  County of Residence: [place]  What city do you live in?: [place]  Home entry access options. Select all that apply.: Stairs  Number of steps to enter home.: 2  Do the steps have railings?: Yes  Type of Current Residence: [facility]  Living Arrangements: Lives w/ Spouse/significant other  Is patient a ?: No    Activities of Daily Living Prior to Admission  Functional Status: Independent  Completes ADLs independently?: Yes  Ambulates independently?: Yes  Does patient use assisted devices?: No  Does patient currently own DME?: No  Does patient have a history of Outpatient Therapy (PT/OT)?: No  Does the patient have a history of Short-Term Rehab?: No  Does patient have a history of HHC?: No  Does patient currently have HHC?: No         Patient Information Continued  Income Source: SSI/SSD (Pt currently appealing his Medical Assistance status)  Does patient have prescription coverage?: No  Does patient have a history of substance abuse?: No  Does patient have a history of Mental Health Diagnosis?: No         Means of Transportation  Means of Transport to Appts:: Drives Self          DISCHARGE DETAILS:    Discharge planning discussed with:: pt        CM contacted family/caregiver?: No- see comments (Daughter in room during assessment)  Were Treatment Team discharge recommendations reviewed with patient/caregiver?: Yes  Did patient/caregiver verbalize understanding of patient care needs?: Yes       Contacts  Patient Contacts: None provided    Requested Home Health Care         Is the patient interested in HHC at discharge?: No    DME Referral Provided  Referral made for DME?: No         Would you like to participate in our [facility] service program?  : Yes (CM to assist with DC Meds)    Treatment Team Recommendation: Home  Discharge Destination Plan:: Home  Transport at Discharge : Auto with designated , Family        Transported by (Company and Unit #): Family

## 2025-02-28 NOTE — ASSESSMENT & PLAN NOTE
-TTE 10/21/24 ([facility]): LVEF 55-60%,  moderate concentric hypertrophy, sparing of apex suggestive of cardiac amyloid  - [facility] PYP scan 10/22/24: negative for TTR amyloid   - cardiac MRI 10/24/24: incomplete study due to claustrophobia, low normal LV function, LVEF 50-55%, RVEF 45%, mild biatrial enlargement, mild MR   - NM PYP scan 10/22/24: negative for TTR amyloid  - previously declined further workup including cardiac biopsy for amyloid workup

## 2025-02-28 NOTE — ASSESSMENT & PLAN NOTE
History of A-fib with slow ventricular rate. Previously on carvedilol which was discontinued   INR is therapeutic on warfarin  Anticoagulated with warfarin 7.5 mg 6 days/week, 5mg on Thursdays  Follows with cardiology at [facility]. Seen by EPS and does not wish to proceed with any invasive approach for management of his atrial fibrillation.

## 2025-02-28 NOTE — ASSESSMENT & PLAN NOTE
Lab Results   Component Value Date    EGFR 30 02/28/2025    EGFR 23 02/27/2025    EGFR 25 (L) 02/25/2025    CREATININE 2.21 (H) 02/28/2025    CREATININE 2.72 (H) 02/27/2025    CREATININE 2.78 (H) 02/25/2025     CKD at baseline.  CKD thought to be secondary to amyloidosis.  Continue outpatient f/u with nephrology at [facility]  Improved with IV Lasix.  Monitor  Continue losartan for now.  If creatinine worsensvon next BMP, hold losartan  Repeat BMP in a.m.

## 2025-02-28 NOTE — CASE MANAGEMENT
Case Management Discharge Planning Note    Patient name Domingo Trevino  Location South 2 /South 2 M* MRN [id number]  : 1960 Date 2025       Current Admission Date: 2025  Current Admission Diagnosis:HFpEF, 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   Patient Active Problem List    Diagnosis Date Noted Date Diagnosed    Longstanding persistent atrial fibrillation (HCC) 2025     Renal infarction (HCC) 2025     Stage 3b chronic kidney disease (HCC) 2025     AL amyloidosis (HCC) 2025     Multiple myeloma not having achieved remission (HCC) 2025     Chronic atrial fibrillation (HCC) 2025     HFpEF, 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 2022     Primary hypertension 2022     Elevated troponin 2022     Hypokalemia 2022       LOS (days): 1  Geometric Mean LOS (GMLOS) (days):   Days to GMLOS:     OBJECTIVE:  Risk of Unplanned Readmission Score: 19.24         Current admission status: Inpatient   [facility] Pharmacy:   [id number] [place] [facility] [address] [facility]  [address] [facility] [address]  Phone: [phone] Fax: [phone]    Primary Care Provider: Maricruz Peres    Primary Insurance:   Secondary Insurance:     DISCHARGE DETAILS:                                          Other Referral/Resources/Interventions Provided:  Financial Resources Provided: Financial Counselor, Medicaid (referred for PATHS and Search Josefina should pt need asst w/ meds on d/c)

## 2025-02-28 NOTE — ASSESSMENT & PLAN NOTE
Wt Readings from Last 3 Encounters:   02/28/25 71.4 kg (157 lb 6.5 oz)   04/19/22 87.5 kg (193 lb)   04/14/22 83.9 kg (185 lb)      -TTE 10/21/2024 at [facility] showed EF 55 to 60%, severe LA, moderate RA, mild MR, mild AI, mild TR  -BNP: 1,136   -CXR: pulmonary vascular congestion and small B/L pleural effusions   -Troponin: Flat, downwards trend (206, 200, 185), no longer trending   -Outpatient diuretic Rx torsemide 40 mg daily    -Inpatient diuretic Rx received IV lasix 60 mg x 1 (2/27/25) then received 80 mg x 1 (2/28/25), plan to continue with Lasix 80 mg IV twice daily  -I/O's daily check (2/27): -2000    -Monitor I/O's  -Monitor BMP  -Continue cardiac diet with 1800 mL fluid restriction

## 2025-02-28 NOTE — CASE MANAGEMENT
Case Management Discharge Planning Note    Patient name Domingo Trevino  Location South 2 /South 2 M* MRN [id number]  : 1960 Date 2025       Current Admission Date: 2025  Current Admission Diagnosis:HFpEF, 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   Patient Active Problem List    Diagnosis Date Noted Date Diagnosed    Longstanding persistent atrial fibrillation (HCC) 2025     Renal infarction (HCC) 2025     Stage 3b chronic kidney disease (HCC) 2025     AL amyloidosis (HCC) 2025     Multiple myeloma not having achieved remission (HCC) 2025     Chronic atrial fibrillation (HCC) 2025     HFpEF, 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 2022     Primary hypertension 2022     Elevated troponin 2022     Hypokalemia 2022       LOS (days): 1  Geometric Mean LOS (GMLOS) (days): 3.9  Days to GMLOS:3     OBJECTIVE:  Risk of Unplanned Readmission Score: 18.9         Current admission status: Inpatient   [facility] Pharmacy:   [id number] [place] - [address] [facility]  [address] [facility]  [place] [address]  Phone: [phone] Fax: [phone]    Primary Care Provider: Maricruz Peres    Primary Insurance: LEON[employer] ALEXIS PENDING  Secondary Insurance:     DISCHARGE DETAILS:                                          Other Referral/Resources/Interventions Provided:  Financial Resources Provided: Financial Counselor, Indigent Medication (Per Search Josefina, pt is 83% eligible for financial asst if needed on dc)

## 2025-02-28 NOTE — ASSESSMENT & PLAN NOTE
Amyloidosis seen on echo.  Followed by hematology at [facility]. S/p biopsy-proven multiple myeloma. 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

## 2025-03-01 LAB
ANION GAP SERPL CALCULATED.3IONS-SCNC: 8 MMOL/L (ref 4–13)
BASOPHILS # BLD AUTO: 0.02 THOUSANDS/ÂΜL (ref 0–0.1)
BASOPHILS NFR BLD AUTO: 0 % (ref 0–1)
BUN SERPL-MCNC: 50 MG/DL (ref 5–25)
CALCIUM SERPL-MCNC: 9 MG/DL (ref 8.4–10.2)
CHLORIDE SERPL-SCNC: 103 MMOL/L (ref 96–108)
CO2 SERPL-SCNC: 31 MMOL/L (ref 21–32)
CREAT SERPL-MCNC: 2.27 MG/DL (ref 0.6–1.3)
EOSINOPHIL # BLD AUTO: 0.18 THOUSAND/ÂΜL (ref 0–0.61)
EOSINOPHIL NFR BLD AUTO: 3 % (ref 0–6)
ERYTHROCYTE [DISTWIDTH] IN BLOOD BY AUTOMATED COUNT: 15.9 % (ref 11.6–15.1)
GFR SERPL CREATININE-BSD FRML MDRD: 29 ML/MIN/1.73SQ M
GLUCOSE SERPL-MCNC: 119 MG/DL (ref 65–140)
HCT VFR BLD AUTO: 30.4 % (ref 36.5–49.3)
HGB BLD-MCNC: 9.5 G/DL (ref 12–17)
IMM GRANULOCYTES # BLD AUTO: 0.01 THOUSAND/UL (ref 0–0.2)
IMM GRANULOCYTES NFR BLD AUTO: 0 % (ref 0–2)
INR PPP: 2.41 (ref 0.85–1.19)
LYMPHOCYTES # BLD AUTO: 1.05 THOUSANDS/ÂΜL (ref 0.6–4.47)
LYMPHOCYTES NFR BLD AUTO: 19 % (ref 14–44)
MCH RBC QN AUTO: 21.9 PG (ref 26.8–34.3)
MCHC RBC AUTO-ENTMCNC: 31.3 G/DL (ref 31.4–37.4)
MCV RBC AUTO: 70 FL (ref 82–98)
MONOCYTES # BLD AUTO: 0.44 THOUSAND/ÂΜL (ref 0.17–1.22)
MONOCYTES NFR BLD AUTO: 8 % (ref 4–12)
NEUTROPHILS # BLD AUTO: 3.82 THOUSANDS/ÂΜL (ref 1.85–7.62)
NEUTS SEG NFR BLD AUTO: 70 % (ref 43–75)
NRBC BLD AUTO-RTO: 0 /100 WBCS
PLATELET # BLD AUTO: 206 THOUSANDS/UL (ref 149–390)
PMV BLD AUTO: 10.9 FL (ref 8.9–12.7)
POTASSIUM SERPL-SCNC: 3.4 MMOL/L (ref 3.5–5.3)
PROTHROMBIN TIME: 26 SECONDS (ref 12.3–15)
RBC # BLD AUTO: 4.33 MILLION/UL (ref 3.88–5.62)
SODIUM SERPL-SCNC: 142 MMOL/L (ref 135–147)
WBC # BLD AUTO: 5.52 THOUSAND/UL (ref 4.31–10.16)

## 2025-03-01 PROCEDURE — 99232 SBSQ HOSP IP/OBS MODERATE 35: CPT

## 2025-03-01 PROCEDURE — 85025 COMPLETE CBC W/AUTO DIFF WBC: CPT | Performed by: INTERNAL MEDICINE

## 2025-03-01 PROCEDURE — 99232 SBSQ HOSP IP/OBS MODERATE 35: CPT | Performed by: INTERNAL MEDICINE

## 2025-03-01 PROCEDURE — 85610 PROTHROMBIN TIME: CPT | Performed by: INTERNAL MEDICINE

## 2025-03-01 PROCEDURE — 80048 BASIC METABOLIC PNL TOTAL CA: CPT | Performed by: INTERNAL MEDICINE

## 2025-03-01 RX ORDER — POTASSIUM CHLORIDE 1500 MG/1
60 TABLET, EXTENDED RELEASE ORAL ONCE
Status: COMPLETED | OUTPATIENT
Start: 2025-03-01 | End: 2025-03-01

## 2025-03-01 RX ORDER — TORSEMIDE 20 MG/1
20 TABLET ORAL DAILY
Status: DISCONTINUED | OUTPATIENT
Start: 2025-03-02 | End: 2025-03-02 | Stop reason: HOSPADM

## 2025-03-01 RX ORDER — ISOSORBIDE DINITRATE 10 MG/1
30 TABLET ORAL
Status: DISCONTINUED | OUTPATIENT
Start: 2025-03-01 | End: 2025-03-02 | Stop reason: HOSPADM

## 2025-03-01 RX ADMIN — ISOSORBIDE DINITRATE 30 MG: 10 TABLET ORAL at 17:51

## 2025-03-01 RX ADMIN — HYDRALAZINE HYDROCHLORIDE 25 MG: 25 TABLET ORAL at 22:33

## 2025-03-01 RX ADMIN — LORAZEPAM 0.5 MG: 0.5 TABLET ORAL at 22:29

## 2025-03-01 RX ADMIN — HYDRALAZINE HYDROCHLORIDE 25 MG: 25 TABLET ORAL at 13:21

## 2025-03-01 RX ADMIN — ISOSORBIDE DINITRATE 30 MG: 10 TABLET ORAL at 13:21

## 2025-03-01 RX ADMIN — MELATONIN 3 MG: 3 TAB ORAL at 22:29

## 2025-03-01 RX ADMIN — POTASSIUM CHLORIDE 60 MEQ: 1500 TABLET, EXTENDED RELEASE ORAL at 13:20

## 2025-03-01 RX ADMIN — HYDRALAZINE HYDROCHLORIDE 25 MG: 25 TABLET ORAL at 05:59

## 2025-03-01 RX ADMIN — FUROSEMIDE 80 MG: 10 INJECTION, SOLUTION INTRAVENOUS at 07:44

## 2025-03-01 RX ADMIN — WARFARIN SODIUM 7.5 MG: 5 TABLET ORAL at 17:51

## 2025-03-01 NOTE — ASSESSMENT & PLAN NOTE
-EKG showing A-fib with slow RVR rate 46  -Previously on carvedilol which was discontinued due to bradycardia   -Anticoagulated with warfarin 7.5 mg 6 days/week, 5mg on Thursdays  -PT/INR: 23.2, 2.08 (2/28/25) < 24.6, 2.25 (2/27/25) (around baseline)   -EP discussed cardioversion with patient OP, but patient declined (November 2024)

## 2025-03-01 NOTE — ASSESSMENT & PLAN NOTE
Wt Readings from Last 3 Encounters:   03/01/25 67.7 kg (149 lb 4 oz)   04/19/22 87.5 kg (193 lb)   04/14/22 83.9 kg (185 lb)   Acute on chronic  He continues to have shortness of breath on minimal exertion with bilateral rales on exam.  Cardiology following -Will continue additional Lasix 80 mg IV today and transition to p.o. diuretics tomorrow with close monitoring of his electrolytes and creatinine  Repeat BMP in AM.  Net output approximately -3400 mL  Presents reporting shortness of breath and HERBERT increased from baseline  Will ultimately need close follow-up with [facility] cardiac provider

## 2025-03-01 NOTE — ASSESSMENT & PLAN NOTE
Blood pressure is elevated.  Isosorbide and hydralazine doses increased, Isordil increased to 30 mg 3 times daily and hydralazine 25 mg 3 times daily

## 2025-03-01 NOTE — PROGRESS NOTES
Progress Note - Cardiology   Name: Domingo Trevino 65 y.o. male I MRN: [id number]  Unit/Bed#: [place] [address] -01 I Date of Admission: 2/27/2025   Date of Service: 3/1/2025 I Hospital Day: 2        TODAY (03/01/25):   BP improving today. Will increase his isordil to 30 mg TID. Continue losartan 50 mg daily and hydralazine 25 mg TID.  Will finish out his IV diuretics today and  transition him to torsemide 20 mg daily tomorrow.   K low at 3.4 this AM. Will give 60 mEq of K now. Monitor morning labs tomorrow for electrolytes and kidney function.   Continue warfarin fro stroke prevention. INR 2.41 this AM.  Needs close follow up with [facility] hematology and cardiology at discharge.      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  -TTE 10/21/2024 at [facility] showed EF 55 to 60%, severe LA, moderate RA, mild MR, mild AI, mild TR  -BNP: 1,136   -CXR: pulmonary vascular congestion and small B/L pleural effusions   -Troponin: Flat, downwards trend (206, 200, 185), no longer trending   -Outpatient diuretic Rx torsemide 40 mg daily    -Inpatient diuretic Rx received IV lasix 60 mg x 1 (2/27/25) then received 80 mg x 1 (2/28/25), plan to continue with Lasix 80 mg IV twice daily  -I/O's daily check (2/27): -2000  AL amyloidosis (HCC)  -TTE 10/21/24 ([facility]): LVEF 55-60%,  moderate concentric hypertrophy, sparing of apex suggestive of cardiac amyloid  - NM PYP scan 10/22/24: negative for TTR amyloid   - cardiac MRI 10/24/24: incomplete study due to claustrophobia, low normal LV function, LVEF 50-55%, RVEF 45%, mild biatrial enlargement, mild MR   - NM PYP scan 10/22/24: negative for TTR amyloid  - previously declined further workup including cardiac biopsy for amyloid workup  Multiple myeloma not having achieved remission ([facility], [facility])  - elevated kappa and lamda free light chains noted on 10/22/4  - [facility] hematology 10/30/24: confirmed diagnosis of multiple myeloma with presumed AL cardiac amyloid, declined cardiac biopsy and further  treatment for his MM   Chronic atrial fibrillation (HCC)  -EKG showing A-fib with slow RVR rate 46  -Previously on carvedilol which was discontinued due to bradycardia   -Anticoagulated with warfarin 7.5 mg 6 days/week, 5mg on Thursdays  -PT/INR: 23.2, 2.08 (2/28/25) < 24.6, 2.25 (2/27/25) (around baseline)   -EP discussed cardioversion with patient OP, but patient declined (November 2024)  Primary hypertension  -BP: 167/104 (2/28/25) < 155/105 (2/27/25)  -Outpatient Rx losartan 50 mg daily   - Blood pressure remains elevated  - Patient on Isordil 10 mg 3 times daily and hydralazine 10 mg 3 times daily  - Increase Isordil to 20 mg 3 times daily and hydralazine to 25 mg 3 times daily today    Hypokalemia  -K: 3.2    Renal infarction (HCC)  -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 Does not keep eye contact during conversation. Complains mainly of muscle cramps today likely from his hypokalemia. SOB much improved. Denies chest pain, palpitations, flutters in his chest.     Vitals:  Vitals:    02/28/25 0531 03/01/25 0600   Weight: 71.4 kg (157 lb 6.5 oz) 67.7 kg (149 lb 4 oz)   ,  Vitals:    03/01/25 0558 03/01/25 0600 03/01/25 0744 03/01/25 1111   BP: 145/98  137/97 140/75   BP Location:    Left arm   Pulse: 70  79 67   Resp:   18 19   Temp:   98.3 °F (36.8 °C) 98.4 °F (36.9 °C)   TempSrc:    Oral   SpO2: 91%  93% 90%   Weight:  67.7 kg (149 lb 4 oz)     Height:           Exam:  Physical Exam  Vitals and nursing note reviewed.   Constitutional:       General: He is not in acute distress.     Appearance: Normal  appearance. He is not ill-appearing.   HENT:      Head: Normocephalic and atraumatic.      Nose: Nose normal.      Mouth/Throat:      Mouth: Mucous membranes are moist.   Eyes:      General: No scleral icterus.  Neck:      Vascular: No JVD.   Cardiovascular:      Rate and Rhythm: Normal rate. Rhythm irregularly irregular.      Pulses:           Radial pulses are 2+ on the right side and 2+ on the left side.      Heart sounds: No murmur heard.     Gallop present.   Pulmonary:      Effort: Pulmonary effort is normal. No respiratory distress.      Breath sounds: No stridor. Rales (trace rales at bases) present. No wheezing or rhonchi.   Abdominal:      General: Abdomen is flat.   Musculoskeletal:         General: No swelling. Normal range of motion.      Cervical back: Normal range of motion.      Right lower leg: No edema.      Left lower leg: No edema.   Skin:     General: Skin is warm and dry.      Capillary Refill: Capillary refill takes less than 2 seconds.   Neurological:      Mental Status: He is alert. Mental status is at baseline.   Psychiatric:         Thought Content: Thought content normal.        Telemetry:       Atrial fibrillation with occasional ventricular ectopy, Heart Rate 68    Medications:    Current Facility-Administered Medications:     acetaminophen (TYLENOL) tablet 975 mg, 975 mg, Oral, Q6H PRN, VEENA Kim, 975 mg at 02/27/25 2034    aluminum-magnesium hydroxide-simethicone (MAALOX) oral suspension 30 mL, 30 mL, Oral, Q6H PRN, VEENA Kim    furosemide (LASIX) injection 80 mg, 80 mg, Intravenous, BID (diuretic), Gelacio Mckeon MD, 80 mg at 03/01/25 0744    hydrALAZINE (APRESOLINE) tablet 25 mg, 25 mg, Oral, Q8H LOTTIE, Gelacio Mckeon MD, 25 mg at 03/01/25 0559    isosorbide dinitrate (ISORDIL) tablet 20 mg, 20 mg, Oral, TID after meals, Gelacio Mckeon MD, 20 mg at 02/28/25 1751    LORazepam (ATIVAN) tablet 0.5 mg, 0.5 mg, Oral, HS PRN, Melanie  VEENA Strickland, 0.5 mg at 02/28/25 2245    losartan (COZAAR) tablet 50 mg, 50 mg, Oral, Daily, VEENA Kim, 50 mg at 02/27/25 0824    melatonin tablet 3 mg, 3 mg, Oral, HS PRN, VEENA Kim, 3 mg at 02/28/25 2245    ondansetron (ZOFRAN) injection 4 mg, 4 mg, Intravenous, Q6H PRN, VEENA Kim    warfarin (COUMADIN) tablet 7.5 mg, 7.5 mg, Oral, Once per day on Sunday Monday Tuesday Wednesday Friday Saturday, 7.5 mg at 02/28/25 1751 **AND** warfarin (COUMADIN) tablet 5 mg, 5 mg, Oral, Every Thursday, VEENA Kim, 5 mg at 02/27/25 1700    Labs/Data:        Results from last 7 days   Lab Units 03/01/25  0637 02/28/25  0430 02/27/25  0237   WBC Thousand/uL 5.52 6.07 4.92   HEMOGLOBIN g/dL 9.5* 9.3* 9.0*   HEMATOCRIT % 30.4* 29.8* 29.2*   PLATELETS Thousands/uL 206 191 189     Results from last 7 days   Lab Units 03/01/25  0637 02/28/25  0430 02/27/25  0237   POTASSIUM mmol/L 3.4* 3.2* 3.2*   CHLORIDE mmol/L 103 102 105   CO2 mmol/L 31 29 29   BUN mg/dL 50* 55* 60*   CREATININE mg/dL 2.27* 2.21* 2.72*     Niurka Gonzáles PA-C

## 2025-03-01 NOTE — ASSESSMENT & PLAN NOTE
History of A-fib with slow ventricular rate. Previously on carvedilol which was discontinued   INR is therapeutic on warfarin  Anticoagulated with warfarin 7.5 mg 6 days/week, 5mg on Thursdays  Follows with cardiology at [facility]. Seen by EPS and does not wish to proceed with any invasive approach for management of his atrial fibrillation.   INR today 2.41 and therapeutic

## 2025-03-01 NOTE — ASSESSMENT & PLAN NOTE
-TTE 10/21/2024 at [facility] showed EF 55 to 60%, severe LA, moderate RA, mild MR, mild AI, mild TR  -BNP: 1,136   -CXR: pulmonary vascular congestion and small B/L pleural effusions   -Troponin: Flat, downwards trend (206, 200, 185), no longer trending   -Outpatient diuretic Rx torsemide 40 mg daily    -Inpatient diuretic Rx received IV lasix 60 mg x 1 (2/27/25) then received 80 mg x 1 (2/28/25), plan to continue with Lasix 80 mg IV twice daily  -I/O's daily check (2/27): -2000

## 2025-03-01 NOTE — ASSESSMENT & PLAN NOTE
Amyloidosis seen on echo.  Followed by hematology at [facility]. S/p biopsy-proven multiple myeloma. 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

## 2025-03-01 NOTE — ASSESSMENT & PLAN NOTE
Lab Results   Component Value Date    EGFR 29 03/01/2025    EGFR 30 02/28/2025    EGFR 23 02/27/2025    CREATININE 2.27 (H) 03/01/2025    CREATININE 2.21 (H) 02/28/2025    CREATININE 2.72 (H) 02/27/2025     CKD at baseline.  CKD thought to be secondary to amyloidosis.  Continue outpatient f/u with nephrology at [facility]  Improved with IV Lasix.  Monitor  Continue losartan for now.  If creatinine worsensvon next BMP, hold losartan  Repeat BMP in a.m.

## 2025-03-01 NOTE — ASSESSMENT & PLAN NOTE
"Follows with hematology at [facility].  Was seen in October and undecided about treatment for MM.  Was supposed to follow-up after 2 weeks although has not seen them since as he states \"diagnosis not confirmed\"   follow-up at [facility] hematology for further treatment  "

## 2025-03-01 NOTE — PLAN OF CARE
Problem: PAIN - ADULT  Goal: Verbalizes/displays adequate comfort level or baseline comfort level  Description: Interventions:  - Encourage patient to monitor pain and request assistance  - Assess pain using appropriate pain scale  - Administer analgesics based on type and severity of pain and evaluate response  - Implement non-pharmacological measures as appropriate and evaluate response  - Consider cultural and social influences on pain and pain management  - Notify physician/advanced practitioner if interventions unsuccessful or patient reports new pain  Outcome: Progressing     Problem: INFECTION - ADULT  Goal: Absence or prevention of progression during hospitalization  Description: INTERVENTIONS:  - Assess and monitor for signs and symptoms of infection  - Monitor lab/diagnostic results  - Monitor all insertion sites, i.e. indwelling lines, tubes, and drains  - Monitor endotracheal if appropriate and nasal secretions for changes in amount and color  - [place] appropriate cooling/warming therapies per order  - Administer medications as ordered  - Instruct and encourage patient and family to use good hand hygiene technique  - Identify and instruct in appropriate isolation precautions for identified infection/condition  Outcome: Progressing     Problem: SAFETY ADULT  Goal: Patient will remain free of falls  Description: INTERVENTIONS:  - Educate patient/family on patient safety including physical limitations  - Instruct patient to call for assistance with activity   - Consult OT/PT to assist with strengthening/mobility   - Keep Call bell within reach  - Keep bed low and locked with side rails adjusted as appropriate  - Keep care items and personal belongings within reach  - Initiate and maintain comfort rounds  - Make Fall Risk Sign visible to staff  - Offer Toileting every 2 Hours, in advance of need  - Initiate/Maintain bed alarm  - Obtain necessary fall risk management equipment: yellow socks  - Apply  yellow socks and bracelet for high fall risk patients  - Consider moving patient to room near nurses station  Outcome: Progressing  Goal: Maintain or return to baseline ADL function  Description: INTERVENTIONS:  -  Assess patient's ability to carry out ADLs; assess patient's baseline for ADL function and identify physical deficits which impact ability to perform ADLs (bathing, care of mouth/teeth, toileting, grooming, dressing, etc.)  - Assess/evaluate cause of self-care deficits   - Assess range of motion  - Assess patient's mobility; develop plan if impaired  - Assess patient's need for assistive devices and provide as appropriate  - Encourage maximum independence but intervene and supervise when necessary  - Involve family in performance of ADLs  - Assess for home care needs following discharge   - Consider OT consult to assist with ADL evaluation and planning for discharge  - Provide patient education as appropriate  Outcome: Progressing  Goal: Maintains/Returns to pre admission functional level  Description: INTERVENTIONS:  - Perform AM-PAC 6 Click Basic Mobility/ Daily Activity assessment daily.  - Set and communicate daily mobility goal to care team and patient/family/caregiver.   - Collaborate with rehabilitation services on mobility goals if consulted  - Perform Range of Motion 6 times a day.  - Reposition patient every 2 hours.  - Dangle patient 6 times a day  - Stand patient 3 times a day  - Ambulate patient 2 times a day  - Out of bed to chair 3 times a day   - Out of bed for meals 3 times a day  - Out of bed for toileting  - Record patient progress and toleration of activity level   Outcome: Progressing     Problem: DISCHARGE PLANNING  Goal: Discharge to home or other facility with appropriate resources  Description: INTERVENTIONS:  - Identify barriers to discharge w/patient and caregiver  - Arrange for needed discharge resources and transportation as appropriate  - Identify discharge learning needs  (meds, wound care, etc.)  - Arrange for interpretive services to assist at discharge as needed  - Refer to Case Management Department for coordinating discharge planning if the patient needs post-hospital services based on physician/advanced practitioner order or complex needs related to functional status, cognitive ability, or social support system  Outcome: Progressing     Problem: Knowledge Deficit  Goal: Patient/family/caregiver demonstrates understanding of disease process, treatment plan, medications, and discharge instructions  Description: Complete learning assessment and assess knowledge base.  Interventions:  - Provide teaching at level of understanding  - Provide teaching via preferred learning methods  Outcome: Progressing

## 2025-03-01 NOTE — PLAN OF CARE
Problem: PAIN - ADULT  Goal: Verbalizes/displays adequate comfort level or baseline comfort level  Description: Interventions:  - Encourage patient to monitor pain and request assistance  - Assess pain using appropriate pain scale  - Administer analgesics based on type and severity of pain and evaluate response  - Implement non-pharmacological measures as appropriate and evaluate response  - Consider cultural and social influences on pain and pain management  - Notify physician/advanced practitioner if interventions unsuccessful or patient reports new pain  Outcome: Progressing     Problem: INFECTION - ADULT  Goal: Absence or prevention of progression during hospitalization  Description: INTERVENTIONS:  - Assess and monitor for signs and symptoms of infection  - Monitor lab/diagnostic results  - Monitor all insertion sites, i.e. indwelling lines, tubes, and drains  - Monitor endotracheal if appropriate and nasal secretions for changes in amount and color  - [place] appropriate cooling/warming therapies per order  - Administer medications as ordered  - Instruct and encourage patient and family to use good hand hygiene technique  - Identify and instruct in appropriate isolation precautions for identified infection/condition  Outcome: Progressing     Problem: SAFETY ADULT  Goal: Patient will remain free of falls  Description: INTERVENTIONS:  - Educate patient/family on patient safety including physical limitations  - Instruct patient to call for assistance with activity   - Consult OT/PT to assist with strengthening/mobility   - Keep Call bell within reach  - Keep bed low and locked with side rails adjusted as appropriate  - Keep care items and personal belongings within reach  - Initiate and maintain comfort rounds  - Make Fall Risk Sign visible to staff  - Apply yellow socks and bracelet for high fall risk patients  - Consider moving patient to room near nurses station  Outcome: Progressing  Goal: Maintain or  return to baseline ADL function  Description: INTERVENTIONS:  -  Assess patient's ability to carry out ADLs; assess patient's baseline for ADL function and identify physical deficits which impact ability to perform ADLs (bathing, care of mouth/teeth, toileting, grooming, dressing, etc.)  - Assess/evaluate cause of self-care deficits   - Assess range of motion  - Assess patient's mobility; develop plan if impaired  - Assess patient's need for assistive devices and provide as appropriate  - Encourage maximum independence but intervene and supervise when necessary  - Involve family in performance of ADLs  - Assess for home care needs following discharge   - Consider OT consult to assist with ADL evaluation and planning for discharge  - Provide patient education as appropriate  Outcome: Progressing  Goal: Maintains/Returns to pre admission functional level  Description: INTERVENTIONS:  - Perform AM-PAC 6 Click Basic Mobility/ Daily Activity assessment daily.  - Set and communicate daily mobility goal to care team and patient/family/caregiver.   - Collaborate with rehabilitation services on mobility goals if consulted  - Perform Range of Motion 3 times a day.  - Reposition patient every 2 hours.  - Dangle patient 3 times a day  - Stand patient 3 times a day  - Ambulate patient 3 times a day  - Out of bed to chair 3 times a day   - Out of bed for meals 3 times a day  - Out of bed for toileting  - Record patient progress and toleration of activity level   Outcome: Progressing     Problem: DISCHARGE PLANNING  Goal: Discharge to home or other facility with appropriate resources  Description: INTERVENTIONS:  - Identify barriers to discharge w/patient and caregiver  - Arrange for needed discharge resources and transportation as appropriate  - Identify discharge learning needs (meds, wound care, etc.)  - Arrange for interpretive services to assist at discharge as needed  - Refer to Case Management Department for coordinating  discharge planning if the patient needs post-hospital services based on physician/advanced practitioner order or complex needs related to functional status, cognitive ability, or social support system  Outcome: Progressing     Problem: Knowledge Deficit  Goal: Patient/family/caregiver demonstrates understanding of disease process, treatment plan, medications, and discharge instructions  Description: Complete learning assessment and assess knowledge base.  Interventions:  - Provide teaching at level of understanding  - Provide teaching via preferred learning methods  Outcome: Progressing

## 2025-03-01 NOTE — PROGRESS NOTES
Progress Note - Hospitalist   Name: Domingo Trevino 65 y.o. male I MRN: [id number]  Unit/Bed#: [place] [address] -01 I Date of Admission: 2/27/2025   Date of Service: 3/1/2025 I Hospital Day: 2    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  Wt Readings from Last 3 Encounters:   03/01/25 67.7 kg (149 lb 4 oz)   04/19/22 87.5 kg (193 lb)   04/14/22 83.9 kg (185 lb)   Acute on chronic  He continues to have shortness of breath on minimal exertion with bilateral rales on exam.  Cardiology following -Will continue additional Lasix 80 mg IV today and transition to p.o. diuretics tomorrow with close monitoring of his electrolytes and creatinine  Repeat BMP in AM.  Net output approximately -3400 mL  Presents reporting shortness of breath and HERBERT increased from baseline  Will ultimately need close follow-up with [facility] cardiac provider  Elevated troponin  Secondary to acute on chronic diastolic congestive heart failure  Treat primary problem  Primary hypertension   Blood pressure is elevated.  Isosorbide and hydralazine doses increased, Isordil increased to 30 mg 3 times daily and hydralazine 25 mg 3 times daily  Longstanding persistent atrial fibrillation (HCC)  History of A-fib with slow ventricular rate. Previously on carvedilol which was discontinued   INR is therapeutic on warfarin  Anticoagulated with warfarin 7.5 mg 6 days/week, 5mg on Thursdays  Follows with cardiology at [facility]. Seen by EPS and does not wish to proceed with any invasive approach for management of his atrial fibrillation.   INR today 2.41 and therapeutic  Stage 3b chronic kidney disease (HCC)  Lab Results   Component Value Date    EGFR 29 03/01/2025    EGFR 30 02/28/2025    EGFR 23 02/27/2025    CREATININE 2.27 (H) 03/01/2025    CREATININE 2.21 (H) 02/28/2025    CREATININE 2.72 (H) 02/27/2025     CKD at baseline.  CKD thought to be secondary to amyloidosis.  Continue outpatient f/u with nephrology at [facility]  Improved with IV Lasix.  Monitor  Continue  "losartan for now.  If creatinine worsensvon next BMP, hold losartan  Repeat BMP in a.m.  Hypokalemia  Replete  AL amyloidosis (HCC)  Amyloidosis seen on echo.  Followed by hematology at [facility]. S/p biopsy-proven multiple myeloma. High clinical suspicion that he has cardiac AL amyloidosis, patient declined endomyocardial biopsy   Multiple myeloma not having achieved remission (HCC)  Follows with hematology at [facility].  Was seen in October and undecided about treatment for MM.  Was supposed to follow-up after 2 weeks although has not seen them since as he states \"diagnosis not confirmed\"   follow-up at [facility] hematology for further treatment    VTE Pharmacologic Prophylaxis: VTE Score: 6 High Risk (Score >/= 5) - Pharmacological DVT Prophylaxis Ordered: warfarin (Coumadin). Sequential Compression Devices Ordered.    Mobility:   Basic Mobility Inpatient Raw Score: 24  JH-HLM Goal: 8: Walk 250 feet or more  JH-HLM Achieved: 7: Walk 25 feet or more  JH-HLM Goal achieved. Continue to encourage appropriate mobility.    Patient Centered Rounds: I performed bedside rounds with nursing staff today.   Discussions with Specialists or Other Care Team Provider: CM, cardiology    Education and Discussions with Family / Patient: Patient declined call to .     Current Length of Stay: 2 day(s)  Current Patient Status: Inpatient   Certification Statement: The patient will continue to require additional inpatient hospital stay due to additional day of IV diuretics in setting of CHF exacerbation  Discharge Plan: Anticipate discharge tomorrow to home.    Code Status: Level 1 - Full Code    Subjective   Patient reports to be feeling significantly improved and like to be discharged home today.  He understands need for additional diuretics.  He currently denies any chest pain/pressure, palpitations, lightheadedness, nausea, shortness of breath, or chills.    Objective :  Temp:  [97.8 °F (36.6 °C)-98.7 °F (37.1 °C)] 98.4 °F (36.9 " °C)  HR:  [62-79] 67  BP: (133-154)/(75-98) 140/75  Resp:  [16-19] 19  SpO2:  [90 %-95 %] 90 %    Body mass index is 19.16 kg/m².     Input and Output Summary (last 24 hours):     Intake/Output Summary (Last 24 hours) at 3/1/2025 1229  Last data filed at 3/1/2025 1031  Gross per 24 hour   Intake 1088 ml   Output 3450 ml   Net -2362 ml       Physical Exam  Vitals and nursing note reviewed.   Constitutional:       General: He is not in acute distress.     Appearance: He is normal weight. He is not ill-appearing, toxic-appearing or diaphoretic.   HENT:      Head: Normocephalic.      Nose: Nose normal.      Mouth/Throat:      Mouth: Mucous membranes are moist.      Pharynx: Oropharynx is clear.   Eyes:      General: No scleral icterus.     Conjunctiva/sclera: Conjunctivae normal.      Pupils: Pupils are equal, round, and reactive to light.   Cardiovascular:      Rate and Rhythm: Normal rate. Rhythm irregular.      Heart sounds: No murmur heard.     No friction rub. No gallop.   Pulmonary:      Effort: Pulmonary effort is normal. No respiratory distress.      Breath sounds: Normal breath sounds. No stridor. No wheezing, rhonchi or rales.   Abdominal:      General: Abdomen is flat.      Palpations: Abdomen is soft.   Musculoskeletal:         General: Normal range of motion.      Cervical back: Normal range of motion and neck supple.      Right lower leg: No edema.      Left lower leg: No edema.   Lymphadenopathy:      Cervical: No cervical adenopathy.   Skin:     General: Skin is warm.      Coloration: Skin is not jaundiced or pale.      Findings: No bruising, erythema or lesion.   Neurological:      General: No focal deficit present.      Mental Status: He is alert and oriented to person, place, and time. Mental status is at baseline.      Cranial Nerves: No cranial nerve deficit.      Motor: No weakness.   Psychiatric:         Mood and Affect: Mood normal.         Behavior: Behavior normal.         Thought Content:  Thought content normal.           Lines/Drains:        Telemetry:  Telemetry Orders (From admission, onward)               24 Hour Telemetry Monitoring  Continuous x 24 Hours (Telem)        Comments: Elevated troponin   Expiring   Question:  Reason for 24 Hour Telemetry  Answer:  PCI/EP study (including pacer and ICD implementation), Cardiac surgery, MI, abnormal cardiac cath, and chest pain- rule out MI                     Telemetry Reviewed: Atrial fibrillation. HR averaging 60s to 80s  Indication for Continued Telemetry Use: Acute CHF on >200 mg lasix/day or equivalent dose or with new reduced EF.                Lab Results: I have reviewed the following results:   Results from last 7 days   Lab Units 03/01/25  0637   WBC Thousand/uL 5.52   HEMOGLOBIN g/dL 9.5*   HEMATOCRIT % 30.4*   PLATELETS Thousands/uL 206   SEGS PCT % 70   LYMPHO PCT % 19   MONO PCT % 8   EOS PCT % 3     Results from last 7 days   Lab Units 03/01/25  0637 02/28/25  0430 02/27/25  0237   SODIUM mmol/L 142   < > 144   POTASSIUM mmol/L 3.4*   < > 3.2*   CHLORIDE mmol/L 103   < > 105   CO2 mmol/L 31   < > 29   BUN mg/dL 50*   < > 60*   CREATININE mg/dL 2.27*   < > 2.72*   ANION GAP mmol/L 8   < > 10   CALCIUM mg/dL 9.0   < > 9.0   ALBUMIN g/dL  --   --  3.9   TOTAL BILIRUBIN mg/dL  --   --  0.85   ALK PHOS U/L  --   --  74   ALT U/L  --   --  16   AST U/L  --   --  17   GLUCOSE RANDOM mg/dL 119   < > 92    < > = values in this interval not displayed.     Results from last 7 days   Lab Units 03/01/25  0637   INR  2.41*                   Recent Cultures (last 7 days):         Imaging Results Review: No pertinent imaging studies reviewed.  Other Study Results Review: No additional pertinent studies reviewed.    Last 24 Hours Medication List:     Current Facility-Administered Medications:     acetaminophen (TYLENOL) tablet 975 mg, Q6H PRN    aluminum-magnesium hydroxide-simethicone (MAALOX) oral suspension 30 mL, Q6H PRN    furosemide (LASIX)  injection 80 mg, BID (diuretic)    hydrALAZINE (APRESOLINE) tablet 25 mg, Q8H LOTTIE    isosorbide dinitrate (ISORDIL) tablet 20 mg, TID after meals    LORazepam (ATIVAN) tablet 0.5 mg, HS PRN    losartan (COZAAR) tablet 50 mg, Daily    melatonin tablet 3 mg, HS PRN    ondansetron (ZOFRAN) injection 4 mg, Q6H PRN    potassium chloride (Klor-Con M20) CR tablet 60 mEq, Once    warfarin (COUMADIN) tablet 7.5 mg, Once per day on Sunday Monday Tuesday Wednesday Friday Saturday **AND** warfarin (COUMADIN) tablet 5 mg, Every Thursday    Administrative Statements   Today, Patient Was Seen By: Mello Tsang PA-C  I have spent a total time of 35 minutes in caring for this patient on the day of the visit/encounter including Documenting in the medical record, Reviewing/placing orders in the medical record (including tests, medications, and/or procedures), Obtaining or reviewing history  , and Communicating with other healthcare professionals .    **Please Note: This note may have been constructed using a voice recognition system.**

## 2025-03-01 NOTE — ASSESSMENT & PLAN NOTE
Lab Results   Component Value Date    EGFR 29 03/01/2025    EGFR 30 02/28/2025    EGFR 23 02/27/2025    CREATININE 2.27 (H) 03/01/2025    CREATININE 2.21 (H) 02/28/2025    CREATININE 2.72 (H) 02/27/2025

## 2025-03-02 VITALS
RESPIRATION RATE: 16 BRPM | SYSTOLIC BLOOD PRESSURE: 134 MMHG | OXYGEN SATURATION: 96 % | BODY MASS INDEX: 19.1 KG/M2 | HEART RATE: 70 BPM | TEMPERATURE: 97.1 F | DIASTOLIC BLOOD PRESSURE: 86 MMHG | HEIGHT: 74 IN | WEIGHT: 148.81 LBS

## 2025-03-02 LAB
ANION GAP SERPL CALCULATED.3IONS-SCNC: 7 MMOL/L (ref 4–13)
BUN SERPL-MCNC: 46 MG/DL (ref 5–25)
CALCIUM SERPL-MCNC: 8.9 MG/DL (ref 8.4–10.2)
CHLORIDE SERPL-SCNC: 105 MMOL/L (ref 96–108)
CO2 SERPL-SCNC: 29 MMOL/L (ref 21–32)
CREAT SERPL-MCNC: 2.25 MG/DL (ref 0.6–1.3)
ERYTHROCYTE [DISTWIDTH] IN BLOOD BY AUTOMATED COUNT: 15.6 % (ref 11.6–15.1)
GFR SERPL CREATININE-BSD FRML MDRD: 29 ML/MIN/1.73SQ M
GLUCOSE SERPL-MCNC: 89 MG/DL (ref 65–140)
HCT VFR BLD AUTO: 31 % (ref 36.5–49.3)
HCV AB SER QL: NORMAL
HGB BLD-MCNC: 9.8 G/DL (ref 12–17)
HIV 1+2 AB+HIV1 P24 AG SERPL QL IA: NORMAL
MAGNESIUM SERPL-MCNC: 2 MG/DL (ref 1.9–2.7)
MCH RBC QN AUTO: 22.1 PG (ref 26.8–34.3)
MCHC RBC AUTO-ENTMCNC: 31.6 G/DL (ref 31.4–37.4)
MCV RBC AUTO: 70 FL (ref 82–98)
PLATELET # BLD AUTO: 230 THOUSANDS/UL (ref 149–390)
PMV BLD AUTO: 11.5 FL (ref 8.9–12.7)
POTASSIUM SERPL-SCNC: 3.5 MMOL/L (ref 3.5–5.3)
RBC # BLD AUTO: 4.43 MILLION/UL (ref 3.88–5.62)
SODIUM SERPL-SCNC: 141 MMOL/L (ref 135–147)
WBC # BLD AUTO: 5.81 THOUSAND/UL (ref 4.31–10.16)

## 2025-03-02 PROCEDURE — 99239 HOSP IP/OBS DSCHRG MGMT >30: CPT | Performed by: PHYSICIAN ASSISTANT

## 2025-03-02 PROCEDURE — 80048 BASIC METABOLIC PNL TOTAL CA: CPT | Performed by: INTERNAL MEDICINE

## 2025-03-02 PROCEDURE — 87389 HIV-1 AG W/HIV-1&-2 AB AG IA: CPT | Performed by: INTERNAL MEDICINE

## 2025-03-02 PROCEDURE — 83735 ASSAY OF MAGNESIUM: CPT

## 2025-03-02 PROCEDURE — 85027 COMPLETE CBC AUTOMATED: CPT

## 2025-03-02 PROCEDURE — 86803 HEPATITIS C AB TEST: CPT | Performed by: INTERNAL MEDICINE

## 2025-03-02 RX ORDER — ISOSORBIDE DINITRATE 30 MG/1
30 TABLET ORAL
Qty: 90 TABLET | Refills: 0 | Status: SHIPPED | OUTPATIENT
Start: 2025-03-02 | End: 2025-03-20

## 2025-03-02 RX ORDER — HYDRALAZINE HYDROCHLORIDE 25 MG/1
25 TABLET, FILM COATED ORAL EVERY 8 HOURS SCHEDULED
Qty: 90 TABLET | Refills: 0 | Status: SHIPPED | OUTPATIENT
Start: 2025-03-02

## 2025-03-02 RX ORDER — POTASSIUM CHLORIDE 1500 MG/1
40 TABLET, EXTENDED RELEASE ORAL DAILY
Qty: 30 TABLET | Refills: 0 | Status: ON HOLD | OUTPATIENT
Start: 2025-03-02 | End: 2025-03-20

## 2025-03-02 RX ADMIN — ISOSORBIDE DINITRATE 30 MG: 10 TABLET ORAL at 08:16

## 2025-03-02 RX ADMIN — ISOSORBIDE DINITRATE 30 MG: 10 TABLET ORAL at 12:20

## 2025-03-02 RX ADMIN — HYDRALAZINE HYDROCHLORIDE 25 MG: 25 TABLET ORAL at 05:59

## 2025-03-02 NOTE — ASSESSMENT & PLAN NOTE
Wt Readings from Last 3 Encounters:   03/02/25 67.5 kg (148 lb 13 oz)   04/19/22 87.5 kg (193 lb)   04/14/22 83.9 kg (185 lb)   Acute on chronic  He was admitted with  shortness of breath on minimal exertion with bilateral rales on exam.  Cardiology following -Was on Lasix 80 mg IV, transitioned to p.o. diuretics  Creat down to 2.25 today  Patient feeling much better today. Denies SOB. He is requesting a shower and he wants to be discharged  He was cleared by cardiology for discharge  Will need close follow-up with [facility] cardiac provider

## 2025-03-02 NOTE — ASSESSMENT & PLAN NOTE
Blood pressure had been elevated.  Isosorbide and hydralazine doses increased, Isordil increased to 30 mg 3 times daily and hydralazine 25 mg 3 times daily  Scripts for meds with increased doses sent to his pharmacy

## 2025-03-02 NOTE — ASSESSMENT & PLAN NOTE
Lab Results   Component Value Date    EGFR 29 03/02/2025    EGFR 29 03/01/2025    EGFR 30 02/28/2025    CREATININE 2.25 (H) 03/02/2025    CREATININE 2.27 (H) 03/01/2025    CREATININE 2.21 (H) 02/28/2025     CKD at baseline.  CKD thought to be secondary to amyloidosis.  Continue outpatient f/u with nephrology at [facility]  Improved with IV Lasix.  Continue losartan, torsemide  Follow up outpatient with his nephrology at [facility]

## 2025-03-02 NOTE — NURSING NOTE
Patient left and agreeable to  his meds. Pt agreeable to f/u with [facility] docs. CHF zone sheet given.

## 2025-03-02 NOTE — ASSESSMENT & PLAN NOTE
History of A-fib with slow ventricular rate. Previously on carvedilol which was discontinued   INR is therapeutic on warfarin  Anticoagulated with warfarin 7.5 mg 6 days/week, 5mg on Thursdays  Follows with cardiology at [facility]. Seen by EPS and does not wish to proceed with any invasive approach for management of his atrial fibrillation.   INR yesterday 2.41 and therapeutic

## 2025-03-02 NOTE — CASE MANAGEMENT
Case Management Discharge Planning Note    Patient name Domingo Trevino  Location [facility] 2 /South 2 M* MRN [id number]  : 1960 Date 3/2/2025       Current Admission Date: 2025  Current Admission Diagnosis:HFpEF, 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   Patient Active Problem List    Diagnosis Date Noted Date Diagnosed    Longstanding persistent atrial fibrillation (HCC) 2025     Renal infarction (HCC) 2025     Stage 3b chronic kidney disease (HCC) 2025     AL amyloidosis (HCC) 2025     Multiple myeloma not having achieved remission (HCC) 2025     Chronic atrial fibrillation (HCC) 2025     HFpEF, 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 2022     Primary hypertension 2022     Elevated troponin 2022     Hypokalemia 2022       LOS (days): 3  Geometric Mean LOS (GMLOS) (days): 3.9  Days to GMLOS:0.9     OBJECTIVE:  Risk of Unplanned Readmission Score: 19.72         Current admission status: Inpatient   [facility] Pharmacy:   [id number] - [place] - [address]  [address] [address]  Phone: [phone] Fax: [phone]    Primary Care Provider: Maricruz Peres    Primary Insurance: [place] PENDING  Secondary Insurance:     DISCHARGE DETAILS:                                                                                                 Additional Comments: Pt discharged with medications being sent to [facility] pharmacy. CM will continue to provide support until d/c.

## 2025-03-02 NOTE — DISCHARGE SUMMARY
Discharge Summary - Hospitalist   Name: Domingo Trevino 65 y.o. male I MRN: [id number]  Unit/Bed#: [place] [address] -01 I Date of Admission: 2/27/2025   Date of Service: 3/2/2025 I Hospital Day: 3     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  Wt Readings from Last 3 Encounters:   03/02/25 67.5 kg (148 lb 13 oz)   04/19/22 87.5 kg (193 lb)   04/14/22 83.9 kg (185 lb)   Acute on chronic  He was admitted with  shortness of breath on minimal exertion with bilateral rales on exam.  Cardiology following -Was on Lasix 80 mg IV, transitioned to p.o. diuretics  Creat down to 2.25 today  Patient feeling much better today. Denies SOB. He is requesting a shower and he wants to be discharged  He was cleared by cardiology for discharge  Will need close follow-up with [facility] cardiac provider  Elevated troponin  Secondary to acute on chronic diastolic congestive heart failure  Denies chest pain  Treat primary problem  Primary hypertension   Blood pressure had been elevated.  Isosorbide and hydralazine doses increased, Isordil increased to 30 mg 3 times daily and hydralazine 25 mg 3 times daily  Scripts for meds with increased doses sent to his pharmacy  Longstanding persistent atrial fibrillation (HCC)  History of A-fib with slow ventricular rate. Previously on carvedilol which was discontinued   INR is therapeutic on warfarin  Anticoagulated with warfarin 7.5 mg 6 days/week, 5mg on Thursdays  Follows with cardiology at [facility]. Seen by EPS and does not wish to proceed with any invasive approach for management of his atrial fibrillation.   INR yesterday 2.41 and therapeutic  Stage 3b chronic kidney disease (HCC)  Lab Results   Component Value Date    EGFR 29 03/02/2025    EGFR 29 03/01/2025    EGFR 30 02/28/2025    CREATININE 2.25 (H) 03/02/2025    CREATININE 2.27 (H) 03/01/2025    CREATININE 2.21 (H) 02/28/2025     CKD at baseline.  CKD thought to be secondary to amyloidosis.  Continue outpatient f/u with nephrology at  "[facility]  Improved with IV Lasix.  Continue losartan, torsemide  Follow up outpatient with his nephrology at [facility]  Hypokalemia  Continue PO supplementation  AL amyloidosis (HCC)  Amyloidosis seen on echo.  Followed by hematology at [facility]. S/p biopsy-proven multiple myeloma. High clinical suspicion that he has cardiac AL amyloidosis, patient declined endomyocardial biopsy   Multiple myeloma not having achieved remission (HCC)  Follows with hematology at [facility].  Was seen in October and undecided about treatment for MM.  Was supposed to follow-up after 2 weeks although has not seen them since as he states \"diagnosis not confirmed\"   follow-up at [facility] hematology for further treatment     Medical Problems       Resolved Problems  Date Reviewed: 2/27/2025   None       Discharging Physician / Practitioner: Raegan Lara PA-C  PCP: Maricruz Peres  Admission Date:   Admission Orders (From admission, onward)       Ordered        02/27/25 1335  INPATIENT ADMISSION  Once            02/27/25 0446  Place in Observation  Once                          Discharge Date: 03/02/25    Consultations During Hospital Stay:  cardiology    Procedures Performed:   none    Significant Findings / Test Results:   CXR- Pulmonary vascular congestion and small bilateral pleural effusions     Incidental Findings:   none     Test Results Pending at Discharge (will require follow up):   HIV and hep C antibody done but pending       Complications:  none    Reason for Admission: SOB    Hospital Course:   Domingo Trevino is a 65 y.o. male patient who originally presented to the hospital on 2/27/2025 due to SOB. Has known hx of CHF, amyloidosis and CKD. Follows with cardiology, nephrology and hematology at [facility].   CXR on admit showed vascular congestion. He was seen by cardiology and was given IV diuretics. He diuresed well and is down 9 lbs. On 3/2 he denies SOB. He is asking for a shower and to be discharged. He was cleared by cardiology for discharge. He " should follow up closely with his specialists at [facility]          Please see above list of diagnoses and related plan for additional information.     Condition at Discharge: stable    Discharge Day Visit / Exam:   Physical Exam  Vitals reviewed.   Constitutional:       General: He is not in acute distress.     Appearance: He is not ill-appearing or diaphoretic.   HENT:      Head: Normocephalic and atraumatic.      Nose: Nose normal.      Mouth/Throat:      Pharynx: Oropharynx is clear.   Cardiovascular:      Rate and Rhythm: Normal rate.   Pulmonary:      Effort: Pulmonary effort is normal. No respiratory distress.   Abdominal:      General: There is no distension.      Palpations: Abdomen is soft.      Tenderness: There is no abdominal tenderness.   Musculoskeletal:         General: No deformity or signs of injury.      Right lower leg: No edema.      Left lower leg: No edema.   Skin:     General: Skin is warm and dry.   Neurological:      Mental Status: He is alert and oriented to person, place, and time.          Discussion with Family: Patient declined call to .     Discharge instructions/Information to patient and family:   See after visit summary for information provided to patient and family.      Provisions for Follow-Up Care:  See after visit summary for information related to follow-up care and any pertinent home health orders.      Mobility at time of Discharge:   Basic Mobility Inpatient Raw Score: 24  JH-HLM Goal: 8: Walk 250 feet or more  JH-HLM Achieved: 7: Walk 25 feet or more       Disposition:   Home    Planned Readmission: no    Discharge Medications:  See after visit summary for reconciled discharge medications provided to patient and/or family.      Administrative Statements   Discharge Statement:  I have spent a total time of 45 minutes in caring for this patient on the day of the visit/encounter. >30 minutes of time was spent on: Diagnostic results, Impressions, Counseling /  Coordination of care, Documenting in the medical record, Reviewing / ordering tests, medicine, procedures  , and Communicating with other healthcare professionals .    **Please Note: This note may have been constructed using a voice recognition system**

## 2025-03-02 NOTE — PLAN OF CARE
Problem: PAIN - ADULT  Goal: Verbalizes/displays adequate comfort level or baseline comfort level  Description: Interventions:  - Encourage patient to monitor pain and request assistance  - Assess pain using appropriate pain scale  - Administer analgesics based on type and severity of pain and evaluate response  - Implement non-pharmacological measures as appropriate and evaluate response  - Consider cultural and social influences on pain and pain management  - Notify physician/advanced practitioner if interventions unsuccessful or patient reports new pain  Outcome: Progressing     Problem: INFECTION - ADULT  Goal: Absence or prevention of progression during hospitalization  Description: INTERVENTIONS:  - Assess and monitor for signs and symptoms of infection  - Monitor lab/diagnostic results  - Monitor all insertion sites, i.e. indwelling lines, tubes, and drains  - Monitor endotracheal if appropriate and nasal secretions for changes in amount and color  - Kellogg appropriate cooling/warming therapies per order  - Administer medications as ordered  - Instruct and encourage patient and family to use good hand hygiene technique  - Identify and instruct in appropriate isolation precautions for identified infection/condition  Outcome: Progressing     Problem: SAFETY ADULT  Goal: Patient will remain free of falls  Description: INTERVENTIONS:  - Educate patient/family on patient safety including physical limitations  - Instruct patient to call for assistance with activity   - Consult OT/PT to assist with strengthening/mobility   - Keep Call bell within reach  - Keep bed low and locked with side rails adjusted as appropriate  - Keep care items and personal belongings within reach  - Initiate and maintain comfort rounds  - Make Fall Risk Sign visible to staff  - Offer Toileting every 2 Hours, in advance of need  - Initiate/Maintain bed  alarm  - Obtain necessary fall risk management equipment: yellow socks  - Apply  yellow socks and bracelet for high fall risk patients  - Consider moving patient to room near nurses station  Outcome: Progressing  Goal: Maintain or return to baseline ADL function  Description: INTERVENTIONS:  -  Assess patient's ability to carry out ADLs; assess patient's baseline for ADL function and identify physical deficits which impact ability to perform ADLs (bathing, care of mouth/teeth, toileting, grooming, dressing, etc.)  - Assess/evaluate cause of self-care deficits   - Assess range of motion  - Assess patient's mobility; develop plan if impaired  - Assess patient's need for assistive devices and provide as appropriate  - Encourage maximum independence but intervene and supervise when necessary  - Involve family in performance of ADLs  - Assess for home care needs following discharge   - Consider OT consult to assist with ADL evaluation and planning for discharge  - Provide patient education as appropriate  Outcome: Progressing  Goal: Maintains/Returns to pre admission functional level  Description: INTERVENTIONS:  - Perform AM-PAC 6 Click Basic Mobility/ Daily Activity assessment daily.  - Set and communicate daily mobility goal to care team and patient/family/caregiver.   - Collaborate with rehabilitation services on mobility goals if consulted  - Perform Range of Motion 5 times a day.  - Reposition patient every 2 hours.  - Dangle patient 4 times a day  - Stand patient 3 times a day  - Ambulate patient 3 times a day  - Out of bed to chair 3 times a day   - Out of bed for meals 2 times a day  - Out of bed for toileting  - Record patient progress and toleration of activity level   Outcome: Progressing     Problem: DISCHARGE PLANNING  Goal: Discharge to home or other facility with appropriate resources  Description: INTERVENTIONS:  - Identify barriers to discharge w/patient and caregiver  - Arrange for needed discharge resources and transportation as appropriate  - Identify discharge learning needs  (meds, wound care, etc.)  - Arrange for interpretive services to assist at discharge as needed  - Refer to Case Management Department for coordinating discharge planning if the patient needs post-hospital services based on physician/advanced practitioner order or complex needs related to functional status, cognitive ability, or social support system  Outcome: Progressing     Problem: Knowledge Deficit  Goal: Patient/family/caregiver demonstrates understanding of disease process, treatment plan, medications, and discharge instructions  Description: Complete learning assessment and assess knowledge base.  Interventions:  - Provide teaching at level of understanding  - Provide teaching via preferred learning methods  Outcome: Progressing

## 2025-03-02 NOTE — DISCHARGE INSTR - AVS FIRST PAGE
You were hospitalized for Congestive Heart Failure and you were seen by Cardiology  There were changes made to your home medication regimen and doses so please check the discharge medication list carefully  New prescriptions were sent to your pharmacy  You will need close follow up with your cardiologist, nephrologist and hematologist at [facility]- please call to make appropriate follow up appointments with those offices  You did have additional blood work that was drawn this morning that is not resulted yet. Your PCP or hematologist will be able to review those test results with you as an outpatient.

## 2025-03-02 NOTE — ASSESSMENT & PLAN NOTE
"Follows with hematology at [facility].  Was seen in October and undecided about treatment for MM.  Was supposed to follow-up after 2 weeks although has not seen them since as he states \"diagnosis not confirmed\"   follow-up at [facility] hematology for further treatment  "

## 2025-03-02 NOTE — ASSESSMENT & PLAN NOTE
Amyloidosis seen on echo.  Followed by hematology at [facility]. S/p biopsy-proven multiple myeloma. 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

## 2025-03-02 NOTE — ASSESSMENT & PLAN NOTE
Secondary to acute on chronic diastolic congestive heart failure  Denies chest pain  Treat primary problem

## 2025-03-03 ENCOUNTER — TELEPHONE (OUTPATIENT)
Dept: CARDIOLOGY CLINIC | Facility: CLINIC | Age: 65
End: 2025-03-03

## 2025-03-03 ENCOUNTER — PATIENT OUTREACH (OUTPATIENT)
Dept: CASE MANAGEMENT | Facility: OTHER | Age: 65
End: 2025-03-03

## 2025-03-03 NOTE — TELEPHONE ENCOUNTER
Initial hospital follow up:  Spoke with patient, introduced self and reason for call.       Self-management/education and teach back:  Primary learner: self  Following low sodium diet: states he is  Following fluid restriction: states he is  Hospital discharge weight: 148 lbs 13 oz  Weighing daily: no, states he does not have a scale, will have to get one. Advised importance of daily weights and to call office if weight increases 3 lbs overnight or 5 lbs in 1 week.             Recording: NA  1st home weight  NA       Weight today: NA  Monitoring symptoms: yes  Any current symptoms: no  Knows when to call provider: reviewed  Medication reviewed and taking all as prescribed (bring medications to follow up visits): hydralazine 25 mg every 8 hours, isosorbide 30 mg three times daily, losartan 50 mg daily, potassium 40 mEq daily, torsemide 20 mg daily, warfarin  ~patient states he does not have hydralazine, isosorbide or potassium, states he will pick them up at the pharmacy tomorrow. Advised importance of taking medications and he should pick them up today if possible.  Knows name of diuretic: yes  Any labs/studies needed for follow up: NA  Escalation plan: reviewed  HF education reviewed/reinforced including low sodium diet, 64 oz fluid restriction, activity, symptoms of decompensation and when and who to call.     Care Coordination:  Aware of cardiology follow up appointment: scheduled with Dr Perez 3/5/25 at 1420  Patient does have follow up with LV cardiology scheduled 4/2/25  Aware of PCP follow up appointment:  Transportation: self  Home health care: no  Health literacy: fair  Engagement: poor  Additional comments:

## 2025-03-05 ENCOUNTER — OFFICE VISIT (OUTPATIENT)
Dept: CARDIOLOGY CLINIC | Facility: CLINIC | Age: 65
End: 2025-03-05

## 2025-03-05 VITALS
SYSTOLIC BLOOD PRESSURE: 140 MMHG | WEIGHT: 154 LBS | BODY MASS INDEX: 19.77 KG/M2 | DIASTOLIC BLOOD PRESSURE: 80 MMHG | HEART RATE: 64 BPM

## 2025-03-05 DIAGNOSIS — I48.20 CHRONIC ATRIAL FIBRILLATION (HCC): ICD-10-CM

## 2025-03-05 DIAGNOSIS — I48.11 LONGSTANDING PERSISTENT ATRIAL FIBRILLATION (HCC): Primary | ICD-10-CM

## 2025-03-05 DIAGNOSIS — N18.4 CHRONIC KIDNEY DISEASE (CKD), STAGE IV (SEVERE) (HCC): ICD-10-CM

## 2025-03-05 DIAGNOSIS — I10 PRIMARY HYPERTENSION: ICD-10-CM

## 2025-03-05 DIAGNOSIS — N28.0 RENAL INFARCTION (HCC): Chronic | ICD-10-CM

## 2025-03-05 DIAGNOSIS — I50.33 ACUTE ON CHRONIC HEART FAILURE WITH PRESERVED EJECTION FRACTION (HCC): ICD-10-CM

## 2025-03-05 PROCEDURE — 99214 OFFICE O/P EST MOD 30 MIN: CPT | Performed by: INTERNAL MEDICINE

## 2025-03-05 RX ORDER — SILDENAFIL CITRATE 20 MG/1
20 TABLET ORAL
COMMUNITY
Start: 2024-09-06

## 2025-03-05 NOTE — ASSESSMENT & PLAN NOTE
Wt Readings from Last 3 Encounters:   03/05/25 69.9 kg (154 lb)   03/02/25 67.5 kg (148 lb 13 oz)   04/19/22 87.5 kg (193 lb)     Appears to be euvolemic and well compensated currently.  Current heart failure medications include torsemide 20 mg daily + losartan 50 mg daily + hydralazine 25 mg every 8 hours + potassium chloride 40 mg daily.  Was also supposed to be on isosorbide dinitrate but does not want to take it.  Has not been on beta-blocker therapy.    Has stage IV chronic kidney disease possibly secondary to Amyloidosis.  Denies seeing a nephrologist in the past.  Does follow regularly with heme oncologist at [facility].    Advising him to continue current medication.  He is advised to restart taking potassium as it is important to maintain levels secondary to his hypokalemia and him being on torsemide.  Advising to follow-up with his hematologist and follow regularly with cardiologist.  Referring him to nephrology to establish care as he may likely have renal dysfunction secondary to Amyloidosis.  Advised to continue to follow heart failure precautions and monitor his weight regularly.  Some tips are provided below.  Advising him to follow-up with his primary cardiologist Dr. Connell at [facility] as scheduled beginning of April.   Dietary and medical compliance are reinforced.  He is advised  to report any concerning symptoms such as chest pain, shortness of breath, decline in exercise tolerance or presyncope/syncope.    The following routine measures are being advised to prevent exacerbation of congestive heart failure:     - Daily or atleast weekly checking of weight.    Notify your clinicians if greater than 2 lb is gained in 1 day or greater than 5 lb is gained in 1 wk.    - Checking for lower extremity swelling.    Examine legs for swelling (new or increase in existing swelling) and describe if swelling reaches ankle, shin, or knee.    - Monitoring for decrease in exercise tolerance.    Check your self for unusual  shortness of breath at rest, or with mild exertion, moderate exertion, or none at all.    - Monitoring for worsening shortness of breath on lying down.    Check for increase in number of pillows used at night and for increase in waking at night with shortness of breath.    - Salt/sodium restriction to less than 2 g a day.    Calculate total sodium intake in a day from nutrition labels and food charts. Understand hidden sources of salt intake.  Eliminate the salt shaker. (Remember, One teaspoon of table salt = 2,300 mg of sodium)   Avoid using garlic salt, onion salt, MSG, meat tenderizers, broth mixes, Chinese food, soy sauce, teriyaki sauce, barbeque sauce, sauerkraut, olives, pickles, pickle relish, wright bits, and croutons.   Use fresh ingredients and/or foods with no added salt.  Try orange, lemon, lime, pineapple juice, or vinegar as a base for meat marinades or to add tart flavor.  Avoid convenience foods such as canned soups, entrees, vegetables, pasta and rice mixes, frozen dinners, instant cereal and puddings, and gravy sauce mixes.   Select frozen meals that contain around 600 mg sodium or less.  Use fresh, frozen, no-added-salt canned vegetables, low-sodium soups, and low-sodium lunchmeats.  Look for seasoning or spice blends with no salt, or try fresh herbs, onions, or garlic.    You are advised to inform us for any concerns regarding above measures.        Orders:    Ambulatory Referral to Nephrology; Future    Basic metabolic panel; Future

## 2025-03-05 NOTE — ASSESSMENT & PLAN NOTE
Lab Results   Component Value Date    EGFR 29 03/02/2025    EGFR 29 03/01/2025    EGFR 30 02/28/2025    CREATININE 2.25 (H) 03/02/2025    CREATININE 2.27 (H) 03/01/2025    CREATININE 2.21 (H) 02/28/2025   Needs follow-up blood work including a basic metabolic panel which I am ordering.  Advised to follow-up with nephrology and primary care physician as soon as possible.    Orders:    Ambulatory Referral to Nephrology; Future

## 2025-03-05 NOTE — ASSESSMENT & PLAN NOTE
Has chronic atrial fibrillation.  Heart rate is overall controlled.  Is not on any beta-blocker therapy.  Has had no syncopal events.  Is on chronic anticoagulation and INR is in therapeutic range.  Advising to continue current medications.

## 2025-03-05 NOTE — ASSESSMENT & PLAN NOTE
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  Likely related to atrial fibrillation.  Is on chronic anticoagulation.  Is being referred to nephrologist for evaluation.

## 2025-03-05 NOTE — PROGRESS NOTES
Name: Domingo Trevino      : 1960      MRN: [id number]  Encounter Provider: Sylvester Perez MD  Encounter Date: 3/5/2025   Encounter department: [facility] [place]    Diagnoses:  Chronic heart failure, heart failure with preserved ejection fraction  AL Amyloidosis with cardiac involvement.  Multiple myeloma  Chronic atrial fibrillation  Hypokalemia  Hypertension  Stage IIIb chronic kidney disease  History of renal infarction  Mild approaching moderate mitral valve regurgitation, mild tricuspid valve regurgitation  Pulmonary hypertension  History of tobacco dependence    Echocardiogram 10/21/2024: Moderate concentric LVH, decreased global noted, -17% with sparing of apex suggestive of cardiac Amyloidosis -- normal RV size and function -- severe left atrial and moderate right atrial enlargement -- aortic valve sclerosis with mild aortic valve regurgitation.  Thickened mitral valve with dilated annulus with mild mitral valve regurgitation.  Tricuspid annular dilatation, mild tricuspid valve regurgitation.       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  Wt Readings from Last 3 Encounters:   25 69.9 kg (154 lb)   25 67.5 kg (148 lb 13 oz)   22 87.5 kg (193 lb)     Appears to be euvolemic and well compensated currently.  Current heart failure medications include torsemide 20 mg daily + losartan 50 mg daily + hydralazine 25 mg every 8 hours + potassium chloride 40 mg daily.  Was also supposed to be on isosorbide dinitrate but does not want to take it.  Has not been on beta-blocker therapy.    Has stage IV chronic kidney disease possibly secondary to Amyloidosis.  Denies seeing a nephrologist in the past.  Does follow regularly with heme oncologist at [facility].    Advising him to continue current medication.  He is advised to restart taking potassium as it is important to maintain levels secondary to his hypokalemia and him being on torsemide.  Advising to follow-up with his  hematologist and follow regularly with cardiologist.  Referring him to nephrology to establish care as he may likely have renal dysfunction secondary to Amyloidosis.  Advised to continue to follow heart failure precautions and monitor his weight regularly.  Some tips are provided below.  Advising him to follow-up with his primary cardiologist Dr. Connell at [facility] as scheduled beginning of April.   Dietary and medical compliance are reinforced.  He is advised  to report any concerning symptoms such as chest pain, shortness of breath, decline in exercise tolerance or presyncope/syncope.    The following routine measures are being advised to prevent exacerbation of congestive heart failure:     - Daily or atleast weekly checking of weight.    Notify your clinicians if greater than 2 lb is gained in 1 day or greater than 5 lb is gained in 1 wk.    - Checking for lower extremity swelling.    Examine legs for swelling (new or increase in existing swelling) and describe if swelling reaches ankle, shin, or knee.    - Monitoring for decrease in exercise tolerance.    Check your self for unusual shortness of breath at rest, or with mild exertion, moderate exertion, or none at all.    - Monitoring for worsening shortness of breath on lying down.    Check for increase in number of pillows used at night and for increase in waking at night with shortness of breath.    - Salt/sodium restriction to less than 2 g a day.    Calculate total sodium intake in a day from nutrition labels and food charts. Understand hidden sources of salt intake.  Eliminate the salt shaker. (Remember, One teaspoon of table salt = 2,300 mg of sodium)   Avoid using garlic salt, onion salt, MSG, meat tenderizers, broth mixes, Chinese food, soy sauce, teriyaki sauce, barbeque sauce, sauerkraut, olives, pickles, pickle relish, wright bits, and croutons.   Use fresh ingredients and/or foods with no added salt.  Try orange, lemon, lime, pineapple juice, or vinegar  as a base for meat marinades or to add tart flavor.  Avoid convenience foods such as canned soups, entrees, vegetables, pasta and rice mixes, frozen dinners, instant cereal and puddings, and gravy sauce mixes.   Select frozen meals that contain around 600 mg sodium or less.  Use fresh, frozen, no-added-salt canned vegetables, low-sodium soups, and low-sodium lunchmeats.  Look for seasoning or spice blends with no salt, or try fresh herbs, onions, or garlic.    You are advised to inform us for any concerns regarding above measures.        Orders:    Ambulatory Referral to Nephrology; Future    Basic metabolic panel; Future    Chronic atrial fibrillation (HCC)  Has chronic atrial fibrillation.  Heart rate is overall controlled.  Is not on any beta-blocker therapy.  Has had no syncopal events.  Is on chronic anticoagulation and INR is in therapeutic range.  Advising to continue current medications.       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  Likely related to atrial fibrillation.  Is on chronic anticoagulation.  Is being referred to nephrologist for evaluation.       Primary hypertension  Blood pressure noted to be slightly on the higher side.  If systolic blood pressure is consistently greater than 140 mmHg then we may have to adjust some of the medications.  Advised to closely follow-up with primary care physician.       Chronic kidney disease (CKD), stage IV (severe) ([facility])  Lab Results   Component Value Date    EGFR 29 03/02/2025    EGFR 29 03/01/2025    EGFR 30 02/28/2025    CREATININE 2.25 (H) 03/02/2025    CREATININE 2.27 (H) 03/01/2025    CREATININE 2.21 (H) 02/28/2025   Needs follow-up blood work including a basic metabolic panel which I am ordering.  Advised to follow-up with nephrology and primary care physician as soon as possible.    Orders:    Ambulatory Referral to Nephrology; Future        History of Present Illness   HPI  Domingo Trevino is a 65 y.o. male who presents for  posthospitalization visit following recent hospitalization between 2/27/2025 and 3/2/2025 for decompensated heart failure.  His prior medical history significant for chronic heart failure with preserved ejection fraction, AL myeloid with cardiac involvement, multiple myeloma, chronic atrial fibrillation and other comorbidities.  During hospitalization he was diuresed with IV furosemide and transition to oral diuretic therapy.  He    History obtained from: patient    Today he says that since coming home he has been overall all right.  He did have some shortness of breath earlier today but this resolved after he went to the bathroom and urinated.  Reports that he has been compliant with medications.  Denies any orthopnea or PND.  Denies any pedal edema.  Denies any presyncope/syncope events.    Current cardiac medications: Hydralazine 25 mg every 8 hours, he was prescribed isosorbide dinitrate 30 mg 3 times daily but he says he is not taking this medication as it interferes with his sildenafil which he takes for his erectile dysfunction.  He is taking torsemide 20 mg daily losartan 50 mg daily warfarin and potassium supplementation.    Blood work during hospitalization on 3/2/2025 was significant for sodium 141 potassium 3.5 chloride 105 bicarb 29 BUN 46 creatinine 2.25 GFR 29 magnesium 2.0  Hemoglobin 9.8 hematocrit 31.0 platelet count 230  BNP was 1136 on 2/27/2025.  INR 2.41 on 3/1/2025    Chest x-ray on 227 showed mild pulmonary vascular congestion and small bilateral pleural effusions.    Reports that he is currently not smoking.  Reports that he stopped smoking about 2 weeks back before that he was smoking about 3 cigarettes/day since January but before that was smoking a pack of cigarettes a day.  Denies any alcohol or any other illicit drug use.    Family history of premature unspecified death in both parents.    .amckd         Objective   /80 (BP Location: Right arm, Patient Position: Sitting, Cuff  Size: Adult)   Pulse 64   Wt 69.9 kg (154 lb)   BMI 19.77 kg/m²      Physical Exam  Constitutional:       Appearance: He is well-developed and normal weight.   HENT:      Mouth/Throat:      Mouth: Mucous membranes are moist.   Cardiovascular:      Rate and Rhythm: Normal rate. Rhythm irregular.      Comments: Positive thrill.  Pulmonary:      Effort: Pulmonary effort is normal.      Breath sounds: Normal breath sounds. No wheezing, rhonchi or rales.   Chest:      Chest wall: No edema.   Musculoskeletal:      Right lower leg: No edema.      Left lower leg: Edema present.   Skin:     General: Skin is warm and dry.   Neurological:      Mental Status: He is alert and oriented to person, place, and time.   Psychiatric:         Mood and Affect: Mood normal.         Behavior: Behavior normal.

## 2025-03-05 NOTE — ASSESSMENT & PLAN NOTE
Blood pressure noted to be slightly on the higher side.  If systolic blood pressure is consistently greater than 140 mmHg then we may have to adjust some of the medications.  Advised to closely follow-up with primary care physician.

## 2025-03-05 NOTE — PATIENT INSTRUCTIONS
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  Wt Readings from Last 3 Encounters:   03/05/25 69.9 kg (154 lb)   03/02/25 67.5 kg (148 lb 13 oz)   04/19/22 87.5 kg (193 lb)     Appears to be euvolemic and well compensated currently.  Current heart failure medications include torsemide 20 mg daily + losartan 50 mg daily + hydralazine 25 mg every 8 hours + potassium chloride 40 mg daily.  Was also supposed to be on isosorbide dinitrate but does not want to take it.  Has not been on beta-blocker therapy.    Has stage IV chronic kidney disease possibly secondary to Amyloidosis.  Denies seeing a nephrologist in the past.  Does follow regularly with heme oncologist at [facility].    Advising him to continue current medication.  He is advised to restart taking potassium as it is important to maintain levels secondary to his hypokalemia and him being on torsemide.  Advising to follow-up with his hematologist and follow regularly with cardiologist.  Referring him to nephrology to establish care as he may likely have renal dysfunction secondary to Amyloidosis.  Advised to continue to follow heart failure precautions and monitor his weight regularly.  Some tips are provided below.  Advising him to follow-up with his primary cardiologist Dr. Connell at [facility] as scheduled beginning of April.   Dietary and medical compliance are reinforced.  He is advised  to report any concerning symptoms such as chest pain, shortness of breath, decline in exercise tolerance or presyncope/syncope.    The following routine measures are being advised to prevent exacerbation of congestive heart failure:     - Daily or atleast weekly checking of weight.    Notify your clinicians if greater than 2 lb is gained in 1 day or greater than 5 lb is gained in 1 wk.    - Checking for lower extremity swelling.    Examine legs for swelling (new or increase in existing swelling) and describe if swelling reaches ankle, shin, or knee.    - Monitoring for decrease  in exercise tolerance.    Check your self for unusual shortness of breath at rest, or with mild exertion, moderate exertion, or none at all.    - Monitoring for worsening shortness of breath on lying down.    Check for increase in number of pillows used at night and for increase in waking at night with shortness of breath.    - Salt/sodium restriction to less than 2 g a day.    Calculate total sodium intake in a day from nutrition labels and food charts. Understand hidden sources of salt intake.  Eliminate the salt shaker. (Remember, One teaspoon of table salt = 2,300 mg of sodium)   Avoid using garlic salt, onion salt, MSG, meat tenderizers, broth mixes, Chinese food, soy sauce, teriyaki sauce, barbeque sauce, sauerkraut, olives, pickles, pickle relish, wright bits, and croutons.   Use fresh ingredients and/or foods with no added salt.  Try orange, lemon, lime, pineapple juice, or vinegar as a base for meat marinades or to add tart flavor.  Avoid convenience foods such as canned soups, entrees, vegetables, pasta and rice mixes, frozen dinners, instant cereal and puddings, and gravy sauce mixes.   Select frozen meals that contain around 600 mg sodium or less.  Use fresh, frozen, no-added-salt canned vegetables, low-sodium soups, and low-sodium lunchmeats.  Look for seasoning or spice blends with no salt, or try fresh herbs, onions, or garlic.    You are advised to inform us for any concerns regarding above measures.        Orders:    Ambulatory Referral to Nephrology; Future    Basic metabolic panel; Future    Chronic atrial fibrillation (HCC)  Has chronic atrial fibrillation.  Heart rate is overall controlled.  Is not on any beta-blocker therapy.  Has had no syncopal events.  Is on chronic anticoagulation and INR is in therapeutic range.  Advising to continue current medications.       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  Likely related to atrial fibrillation.  Is on chronic  anticoagulation.  Is being referred to nephrologist for evaluation.       Primary hypertension  Blood pressure noted to be slightly on the higher side.  If systolic blood pressure is consistently greater than 140 mmHg then we may have to adjust some of the medications.  Advised to closely follow-up with primary care physician.       Chronic kidney disease (CKD), stage IV (severe) ([facility])  Lab Results   Component Value Date    EGFR 29 03/02/2025    EGFR 29 03/01/2025    EGFR 30 02/28/2025    CREATININE 2.25 (H) 03/02/2025    CREATININE 2.27 (H) 03/01/2025    CREATININE 2.21 (H) 02/28/2025   Needs follow-up blood work including a basic metabolic panel which I am ordering.  Advised to follow-up with nephrology and primary care physician as soon as possible.    Orders:    Ambulatory Referral to Nephrology; Future

## 2025-03-06 ENCOUNTER — PATIENT OUTREACH (OUTPATIENT)
Dept: CASE MANAGEMENT | Facility: OTHER | Age: 65
End: 2025-03-06

## 2025-03-06 NOTE — PROGRESS NOTES
"Outpatient Care Manager Note: Chart notes reviewed and call made to patient. He saw Dr. Perez with cardiologyu yesterday and examined \"euvolemic and compensated\". He had no medication changes. Patient reports he is feeling ok and denies worsening SOB or edema. He does not have a ascale, but states is can and will get one. Reviewed importance, how to weigh and record and when to notify cardiology. He states he was able to get all his medication, but has not been taking the isordil as make him feel dizzy. He does check his BP and it was 135/80 today. I encouraged him to try again to take and see if he can tolerate better. Also recommended he update cardiology. He states he is seeing a LV cardiologist today. We discussed him choosing one to commit to.   He did state that he does not qualify for MA due to his household income being too high. I did text him a link to [employer] website to check prices there.  I will continue to follow.  "

## 2025-03-11 NOTE — TELEPHONE ENCOUNTER
1 week follow up:  Spoke with patient, states he is doing ok.  Does not have a scale, advised importance of daily weights. States he will get one. Advised to call either LV cardiology or us if he has a 3 lb weight gain overnight or 5 lbs in 1 week.   OV weight 154 lbs  DC weight 148 lbs  No SOB, CP or edema.  Patient is scheduled with LV cardiology tomorrow at 11/15, advised patient importance of following up frequently to prevent rehospitalization.

## 2025-03-12 ENCOUNTER — TELEPHONE (OUTPATIENT)
Age: 65
End: 2025-03-12

## 2025-03-12 NOTE — TELEPHONE ENCOUNTER
Patient referred to Nephrology for Stage 4 CKD. Per decision tree appointment is to be scheduled within next 30 days. Unable to find an appointment within the appropriate amount of time.     Please reach out to patient to schedule consult within timeframe

## 2025-03-13 ENCOUNTER — PATIENT OUTREACH (OUTPATIENT)
Dept: CASE MANAGEMENT | Facility: HOSPITAL | Age: 65
End: 2025-03-13

## 2025-03-13 NOTE — TELEPHONE ENCOUNTER
Called and spoke with Domingo and was able to schedule consult with Dr. Reyes Thursday 5/1 8:30 am.

## 2025-03-13 NOTE — PROGRESS NOTES
Outpatient Care Manager Note: Chart notes reviewed and call made to patient. Patient saw his [facility] cardiologist this week. Coreg was added and plans for JASON/CV and cardiac biopsy. He states he is feeling well- denies SOB, edema or abdominal fullness, but weight is up 5#. He reports adherence to his medication and  low sodium diet. Unsure of his fluid intake and reinforced staying hydrated within 64 oz restriction. Encouraged him to call his cardiologist and see about a PRN diuretic. Will follow up next week.

## 2025-03-18 ENCOUNTER — APPOINTMENT (EMERGENCY)
Dept: RADIOLOGY | Facility: HOSPITAL | Age: 65
End: 2025-03-18
Payer: COMMERCIAL

## 2025-03-18 ENCOUNTER — HOSPITAL ENCOUNTER (INPATIENT)
Facility: HOSPITAL | Age: 65
LOS: 1 days | Discharge: HOME | End: 2025-03-20
Admitting: FAMILY MEDICINE
Payer: COMMERCIAL

## 2025-03-18 DIAGNOSIS — I10 PRIMARY HYPERTENSION: ICD-10-CM

## 2025-03-18 DIAGNOSIS — I48.20 CHRONIC ATRIAL FIBRILLATION (HCC): ICD-10-CM

## 2025-03-18 DIAGNOSIS — F41.9 ANXIETY: ICD-10-CM

## 2025-03-18 DIAGNOSIS — I50.9 ACUTE EXACERBATION OF CHF (CONGESTIVE HEART FAILURE) (HCC): Primary | ICD-10-CM

## 2025-03-18 LAB
2HR DELTA HS TROPONIN: 23 NG/L
4HR DELTA HS TROPONIN: 25 NG/L
ALBUMIN SERPL BCG-MCNC: 4 G/DL (ref 3.5–5)
ALP SERPL-CCNC: 62 U/L (ref 34–104)
ALT SERPL W P-5'-P-CCNC: 15 U/L (ref 7–52)
ANION GAP SERPL CALCULATED.3IONS-SCNC: 9 MMOL/L (ref 4–13)
AST SERPL W P-5'-P-CCNC: 18 U/L (ref 13–39)
ATRIAL RATE: 357 BPM
BASOPHILS # BLD AUTO: 0.02 THOUSANDS/ÂΜL (ref 0–0.1)
BASOPHILS NFR BLD AUTO: 0 % (ref 0–1)
BILIRUB SERPL-MCNC: 0.68 MG/DL (ref 0.2–1)
BNP SERPL-MCNC: 1144 PG/ML (ref 0–100)
BUN SERPL-MCNC: 56 MG/DL (ref 5–25)
CALCIUM SERPL-MCNC: 8.8 MG/DL (ref 8.4–10.2)
CARDIAC TROPONIN I PNL SERPL HS: 169 NG/L (ref ?–50)
CARDIAC TROPONIN I PNL SERPL HS: 192 NG/L (ref ?–50)
CARDIAC TROPONIN I PNL SERPL HS: 194 NG/L (ref ?–50)
CHLORIDE SERPL-SCNC: 106 MMOL/L (ref 96–108)
CO2 SERPL-SCNC: 27 MMOL/L (ref 21–32)
CREAT SERPL-MCNC: 2.7 MG/DL (ref 0.6–1.3)
EOSINOPHIL # BLD AUTO: 0.17 THOUSAND/ÂΜL (ref 0–0.61)
EOSINOPHIL NFR BLD AUTO: 3 % (ref 0–6)
ERYTHROCYTE [DISTWIDTH] IN BLOOD BY AUTOMATED COUNT: 16.2 % (ref 11.6–15.1)
ERYTHROCYTE [DISTWIDTH] IN BLOOD BY AUTOMATED COUNT: 16.4 % (ref 11.6–15.1)
GFR SERPL CREATININE-BSD FRML MDRD: 23 ML/MIN/1.73SQ M
GLUCOSE SERPL-MCNC: 95 MG/DL (ref 65–140)
HCT VFR BLD AUTO: 28 % (ref 36.5–49.3)
HCT VFR BLD AUTO: 28.2 % (ref 36.5–49.3)
HGB BLD-MCNC: 8.5 G/DL (ref 12–17)
HGB BLD-MCNC: 8.7 G/DL (ref 12–17)
IMM GRANULOCYTES # BLD AUTO: 0.01 THOUSAND/UL (ref 0–0.2)
IMM GRANULOCYTES NFR BLD AUTO: 0 % (ref 0–2)
INR PPP: 2.88 (ref 0.85–1.19)
INR PPP: 2.93 (ref 0.85–1.19)
LYMPHOCYTES # BLD AUTO: 1.24 THOUSANDS/ÂΜL (ref 0.6–4.47)
LYMPHOCYTES NFR BLD AUTO: 24 % (ref 14–44)
MCH RBC QN AUTO: 21.6 PG (ref 26.8–34.3)
MCH RBC QN AUTO: 21.9 PG (ref 26.8–34.3)
MCHC RBC AUTO-ENTMCNC: 30.4 G/DL (ref 31.4–37.4)
MCHC RBC AUTO-ENTMCNC: 30.9 G/DL (ref 31.4–37.4)
MCV RBC AUTO: 71 FL (ref 82–98)
MCV RBC AUTO: 71 FL (ref 82–98)
MONOCYTES # BLD AUTO: 0.42 THOUSAND/ÂΜL (ref 0.17–1.22)
MONOCYTES NFR BLD AUTO: 8 % (ref 4–12)
NEUTROPHILS # BLD AUTO: 3.36 THOUSANDS/ÂΜL (ref 1.85–7.62)
NEUTS SEG NFR BLD AUTO: 65 % (ref 43–75)
NRBC BLD AUTO-RTO: 0 /100 WBCS
PLATELET # BLD AUTO: 226 THOUSANDS/UL (ref 149–390)
PLATELET # BLD AUTO: 231 THOUSANDS/UL (ref 149–390)
PMV BLD AUTO: 11.7 FL (ref 8.9–12.7)
PMV BLD AUTO: 12.1 FL (ref 8.9–12.7)
POTASSIUM SERPL-SCNC: 4 MMOL/L (ref 3.5–5.3)
PROT SERPL-MCNC: 6.7 G/DL (ref 6.4–8.4)
PROTHROMBIN TIME: 29.7 SECONDS (ref 12.3–15)
PROTHROMBIN TIME: 30.1 SECONDS (ref 12.3–15)
QRS AXIS: -71 DEGREES
QRS AXIS: -74 DEGREES
QRS AXIS: -87 DEGREES
QRS AXIS: 263 DEGREES
QRSD INTERVAL: 94 MS
QRSD INTERVAL: 96 MS
QRSD INTERVAL: 96 MS
QRSD INTERVAL: 98 MS
QT INTERVAL: 420 MS
QT INTERVAL: 446 MS
QT INTERVAL: 482 MS
QT INTERVAL: 486 MS
QTC INTERVAL: 415 MS
QTC INTERVAL: 452 MS
QTC INTERVAL: 461 MS
QTC INTERVAL: 464 MS
RBC # BLD AUTO: 3.94 MILLION/UL (ref 3.88–5.62)
RBC # BLD AUTO: 3.98 MILLION/UL (ref 3.88–5.62)
SODIUM SERPL-SCNC: 142 MMOL/L (ref 135–147)
T WAVE AXIS: 127 DEGREES
T WAVE AXIS: 152 DEGREES
T WAVE AXIS: 35 DEGREES
T WAVE AXIS: 99 DEGREES
VENTRICULAR RATE: 55 BPM
VENTRICULAR RATE: 55 BPM
VENTRICULAR RATE: 59 BPM
VENTRICULAR RATE: 62 BPM
WBC # BLD AUTO: 5.13 THOUSAND/UL (ref 4.31–10.16)
WBC # BLD AUTO: 5.22 THOUSAND/UL (ref 4.31–10.16)

## 2025-03-18 PROCEDURE — 99244 OFF/OP CNSLTJ NEW/EST MOD 40: CPT | Performed by: INTERNAL MEDICINE

## 2025-03-18 PROCEDURE — 85610 PROTHROMBIN TIME: CPT

## 2025-03-18 PROCEDURE — 99285 EMERGENCY DEPT VISIT HI MDM: CPT

## 2025-03-18 PROCEDURE — 83880 ASSAY OF NATRIURETIC PEPTIDE: CPT

## 2025-03-18 PROCEDURE — 71046 X-RAY EXAM CHEST 2 VIEWS: CPT

## 2025-03-18 PROCEDURE — 93010 ELECTROCARDIOGRAM REPORT: CPT | Performed by: INTERNAL MEDICINE

## 2025-03-18 PROCEDURE — 84484 ASSAY OF TROPONIN QUANT: CPT

## 2025-03-18 PROCEDURE — 93005 ELECTROCARDIOGRAM TRACING: CPT

## 2025-03-18 PROCEDURE — 99223 1ST HOSP IP/OBS HIGH 75: CPT | Performed by: INTERNAL MEDICINE

## 2025-03-18 PROCEDURE — 85025 COMPLETE CBC W/AUTO DIFF WBC: CPT

## 2025-03-18 PROCEDURE — 85027 COMPLETE CBC AUTOMATED: CPT

## 2025-03-18 PROCEDURE — 80053 COMPREHEN METABOLIC PANEL: CPT

## 2025-03-18 PROCEDURE — 36415 COLL VENOUS BLD VENIPUNCTURE: CPT

## 2025-03-18 RX ORDER — WARFARIN SODIUM 5 MG/1
5 TABLET ORAL WEEKLY
Status: DISCONTINUED | OUTPATIENT
Start: 2025-03-20 | End: 2025-03-20 | Stop reason: HOSPADM

## 2025-03-18 RX ORDER — MAGNESIUM HYDROXIDE/ALUMINUM HYDROXICE/SIMETHICONE 120; 1200; 1200 MG/30ML; MG/30ML; MG/30ML
30 SUSPENSION ORAL EVERY 6 HOURS PRN
Status: DISCONTINUED | OUTPATIENT
Start: 2025-03-18 | End: 2025-03-20 | Stop reason: HOSPADM

## 2025-03-18 RX ORDER — LOSARTAN POTASSIUM 50 MG/1
50 TABLET ORAL DAILY
Status: DISCONTINUED | OUTPATIENT
Start: 2025-03-18 | End: 2025-03-20 | Stop reason: HOSPADM

## 2025-03-18 RX ORDER — FUROSEMIDE 10 MG/ML
80 INJECTION INTRAMUSCULAR; INTRAVENOUS 2 TIMES DAILY
Status: DISCONTINUED | OUTPATIENT
Start: 2025-03-18 | End: 2025-03-20

## 2025-03-18 RX ORDER — ONDANSETRON 2 MG/ML
4 INJECTION INTRAMUSCULAR; INTRAVENOUS EVERY 6 HOURS PRN
Status: DISCONTINUED | OUTPATIENT
Start: 2025-03-18 | End: 2025-03-20 | Stop reason: HOSPADM

## 2025-03-18 RX ORDER — LORAZEPAM 0.5 MG/1
0.5 TABLET ORAL
Status: DISCONTINUED | OUTPATIENT
Start: 2025-03-18 | End: 2025-03-20 | Stop reason: HOSPADM

## 2025-03-18 RX ORDER — CARVEDILOL 3.12 MG/1
3.12 TABLET ORAL 2 TIMES DAILY WITH MEALS
Status: ON HOLD | COMMUNITY

## 2025-03-18 RX ORDER — ACETAMINOPHEN 325 MG/1
975 TABLET ORAL EVERY 8 HOURS PRN
Status: DISCONTINUED | OUTPATIENT
Start: 2025-03-18 | End: 2025-03-20 | Stop reason: HOSPADM

## 2025-03-18 RX ORDER — HYDRALAZINE HYDROCHLORIDE 25 MG/1
25 TABLET, FILM COATED ORAL EVERY 8 HOURS SCHEDULED
Status: DISCONTINUED | OUTPATIENT
Start: 2025-03-18 | End: 2025-03-20 | Stop reason: HOSPADM

## 2025-03-18 RX ORDER — FUROSEMIDE 10 MG/ML
50 INJECTION INTRAMUSCULAR; INTRAVENOUS ONCE
Status: COMPLETED | OUTPATIENT
Start: 2025-03-18 | End: 2025-03-18

## 2025-03-18 RX ORDER — FUROSEMIDE 10 MG/ML
50 INJECTION INTRAMUSCULAR; INTRAVENOUS 2 TIMES DAILY
Status: DISCONTINUED | OUTPATIENT
Start: 2025-03-18 | End: 2025-03-18

## 2025-03-18 RX ORDER — POLYETHYLENE GLYCOL 3350 17 G/17G
17 POWDER, FOR SOLUTION ORAL DAILY PRN
Status: DISCONTINUED | OUTPATIENT
Start: 2025-03-18 | End: 2025-03-20 | Stop reason: HOSPADM

## 2025-03-18 RX ADMIN — HYDRALAZINE HYDROCHLORIDE 25 MG: 25 TABLET ORAL at 21:22

## 2025-03-18 RX ADMIN — HYDRALAZINE HYDROCHLORIDE 25 MG: 25 TABLET ORAL at 05:25

## 2025-03-18 RX ADMIN — HYDRALAZINE HYDROCHLORIDE 25 MG: 25 TABLET ORAL at 15:29

## 2025-03-18 RX ADMIN — LOSARTAN POTASSIUM 50 MG: 50 TABLET, FILM COATED ORAL at 08:45

## 2025-03-18 RX ADMIN — LORAZEPAM 0.5 MG: 0.5 TABLET ORAL at 23:12

## 2025-03-18 RX ADMIN — FUROSEMIDE 80 MG: 10 INJECTION, SOLUTION INTRAMUSCULAR; INTRAVENOUS at 17:29

## 2025-03-18 RX ADMIN — FUROSEMIDE 50 MG: 10 INJECTION, SOLUTION INTRAVENOUS at 02:59

## 2025-03-18 NOTE — ED PROVIDER NOTES
Time reflects when diagnosis was documented in both MDM as applicable and the Disposition within this note       Time User Action Codes Description Comment    3/18/2025  2:54 AM Russell Younger Add [I50.9] Acute exacerbation of CHF (congestive heart failure) (HCC)           ED Disposition       ED Disposition   Admit    Condition   Stable    Date/Time   Tue Mar 18, 2025  2:54 AM    Comment   Case was discussed with [facility] and the patient's admission status was agreed to be Admission Status: inpatient status to the service of Dr. Busch.               Assessment & Plan       Medical Decision Making  Patient with history as below presented with shortness of breath. History obtained from patient.    Differential diagnosis includes: CHF with acute exacerbation, volume overload, renal dysfunction, arrhythmia, anemia, electrolyte disturbance, ACS, anxiety    Plan: CBC, CMP, troponin, ECG, chest x-ray    I considered the patient's chronic medical conditions including their CHF in forming my differential diagnosis, plan, and my medical decision making. I also reviewed their external records including admission 2/27/2025.    ED summary: ECG independently interpreted by myself as below. Labs reviewed and with elevated BNP, elevated troponin at baseline. Independently reviewed imaging with pulmonary vascular congestion.  Because of this Lasix given.  Suspect mild acute CHF exacerbation.  Because of second visit in 2 days with persistence of symptoms will admit for further IV diuresis.  Discussed patient's management with medicine who agreed to admit patient under their service.    Amount and/or Complexity of Data Reviewed  Labs: ordered. Decision-making details documented in ED Course.  Radiology: ordered and independent interpretation performed.    Risk  Prescription drug management.  Decision regarding hospitalization.        ED Course as of 03/18/25 0432   Tue Mar 18, 2025   0205 Procedure Note: EKG  Date/Time: 03/18/25 2:05  "AM   Interpreted by: Russell Younger   Indications / Diagnosis: SOB  ECG reviewed by me, the ED Provider: yes   The EKG demonstrates:  Rhythm:atrial fibrillation  Axis: normal axis  QRS/Blocks: normal QRS  ST Changes: Nonspecific ST changes   0214 hs TnI 0hr(!): 169  Similar to baseline       Medications   furosemide (LASIX) injection 50 mg (50 mg Intravenous Given 3/18/25 0259)       ED Risk Strat Scores                            SBIRT 22yo+      Flowsheet Row Most Recent Value   Initial Alcohol Screen: US AUDIT-C     1. How often do you have a drink containing alcohol? 0 Filed at: 03/18/2025 0312   2. How many drinks containing alcohol do you have on a typical day you are drinking?  0 Filed at: 03/18/2025 0312   3a. Male UNDER 65: How often do you have five or more drinks on one occasion? 0 Filed at: 03/18/2025 0312   3b. FEMALE Any Age, or MALE 65+: How often do you have 4 or more drinks on one occassion? 0 Filed at: 03/18/2025 0312   Audit-C Score 0 Filed at: 03/18/2025 0312   ALLEGRA: How many times in the past year have you...    Used an illegal drug or used a prescription medication for non-medical reasons? Never Filed at: 03/18/2025 0312                            History of Present Illness       Chief Complaint   Patient presents with    Shortness of Breath     Pt reports SOB since last year. Seen yesterday for same CC. States  \"I went to the hospital they did blood work, told me I'm fine and I went home\". -CP, -dizziness       Past Medical History:   Diagnosis Date    CHF (congestive heart failure) (HCC)     Hypertension       History reviewed. No pertinent surgical history.   History reviewed. No pertinent family history.   Social History     Tobacco Use    Smoking status: Former     Current packs/day: 0.50     Types: Cigarettes    Smokeless tobacco: Never   Vaping Use    Vaping status: Never Used   Substance Use Topics    Alcohol use: Never    Drug use: Never      E-Cigarette/Vaping    E-Cigarette Use " Never User       E-Cigarette/Vaping Substances      I have reviewed and agree with the history as documented.     Patient is a 65-year-old male with a significant past medical history of atrial fibrillation, anticoagulated on warfarin, CHF, hypertension, presenting for evaluation of shortness of breath.  Patient reports that he has had some difficulty catching his breath that has been a longstanding issue, for at least a year now.  He said admission secondary to CHF exacerbation as recently as approximately 3 weeks ago.  Patient was seen and evaluated with similar complaint at an outside emergency department yesterday where he had lab workup as well as chest x-ray and was ultimately discharged to follow-up with his cardiologist.  Patient reports continued shortness of breath that seems to be worsened with exertion.  He has no pain with breathing.  He denies any coughing or fevers.  He denies any chest pain.  He is unsure with regards to weight gain.  He denies any lower extremity swelling.  He has been compliant with his torsemide as well as his warfarin.  He is otherwise without acute complaint.        Review of Systems   Constitutional:  Negative for fever.   Respiratory:  Positive for shortness of breath. Negative for cough.    Cardiovascular:  Negative for chest pain and leg swelling.   Gastrointestinal:  Negative for abdominal pain.           Objective       ED Triage Vitals   Temperature Pulse Blood Pressure Respirations SpO2 Patient Position - Orthostatic VS   03/18/25 0028 03/18/25 0028 03/18/25 0028 03/18/25 0028 03/18/25 0028 03/18/25 0028   98.1 °F (36.7 °C) 60 (S) (!) 184/97 18 98 % Sitting      Temp Source Heart Rate Source BP Location FiO2 (%) Pain Score    03/18/25 0028 03/18/25 0100 03/18/25 0028 -- 03/18/25 0028    Oral Monitor Right arm  No Pain      Vitals      Date and Time Temp Pulse SpO2 Resp BP Pain Score FACES Pain Rating User   03/18/25 0400 -- 58 96 % 20 179/95 -- -- EG   03/18/25 0330 --  70 95 % 17 169/100 -- -- EG   03/18/25 0256 -- 60 96 % 18 159/96 -- -- EG   03/18/25 0230 -- 56 97 % 20 172/104 No Pain -- EG   03/18/25 0100 -- 80 98 % -- 158/101 -- --    03/18/25 0028 98.1 °F (36.7 °C) 60 98 % 18  184/97 No Pain -- DF            Physical Exam  Vitals and nursing note reviewed.   Constitutional:       Appearance: Normal appearance. He is ill-appearing (chronically).   HENT:      Head: Normocephalic and atraumatic.      Right Ear: External ear normal.      Left Ear: External ear normal.      Nose: Nose normal.   Eyes:      General: No scleral icterus.        Right eye: No discharge.         Left eye: No discharge.      Extraocular Movements: Extraocular movements intact.      Conjunctiva/sclera: Conjunctivae normal.   Cardiovascular:      Rate and Rhythm: Normal rate.      Heart sounds: Normal heart sounds. No murmur heard.     No friction rub. No gallop.   Pulmonary:      Effort: Pulmonary effort is normal. No respiratory distress.      Breath sounds: Rales present.   Abdominal:      General: Abdomen is flat. There is no distension.      Palpations: Abdomen is soft. There is no mass.      Tenderness: There is no abdominal tenderness.   Genitourinary:     Comments: Deferred  Musculoskeletal:      Right lower leg: No tenderness. No edema.      Left lower leg: No tenderness. No edema.   Skin:     General: Skin is warm and dry.   Neurological:      General: No focal deficit present.      Mental Status: He is alert.         Results Reviewed       Procedure Component Value Units Date/Time    HS Troponin I 2hr [[id number]]  (Abnormal) Collected: 03/18/25 0337    Lab Status: Final result Specimen: Blood from Arm, Right Updated: 03/18/25 0403     hs TnI 2hr 192 ng/L      Delta 2hr hsTnI 23 ng/L     HS Troponin I 4hr [[id number]]     Lab Status: No result Specimen: Blood     HS Troponin 0hr (reflex protocol) [[id number]]  (Abnormal) Collected: 03/18/25 0136    Lab Status: Final result Specimen: Blood from  Arm, Right Updated: 03/18/25 0205     hs TnI 0hr 169 ng/L     B-Type Natriuretic Peptide(BNP) [[id number]]  (Abnormal) Collected: 03/18/25 0136    Lab Status: Final result Specimen: Blood from Arm, Right Updated: 03/18/25 0204     BNP 1,144 pg/mL     Comprehensive metabolic panel [[id number]]  (Abnormal) Collected: 03/18/25 0136    Lab Status: Final result Specimen: Blood from Arm, Right Updated: 03/18/25 0158     Sodium 142 mmol/L      Potassium 4.0 mmol/L      Chloride 106 mmol/L      CO2 27 mmol/L      ANION GAP 9 mmol/L      BUN 56 mg/dL      Creatinine 2.70 mg/dL      Glucose 95 mg/dL      Calcium 8.8 mg/dL      AST 18 U/L      ALT 15 U/L      Alkaline Phosphatase 62 U/L      Total Protein 6.7 g/dL      Albumin 4.0 g/dL      Total Bilirubin 0.68 mg/dL      eGFR 23 ml/min/1.73sq m     Narrative:      [employer] guidelines for Chronic Kidney Disease (CKD):     Stage 1 with normal or high GFR (GFR > 90 mL/min/1.73 square meters)    Stage 2 Mild CKD (GFR = 60-89 mL/min/1.73 square meters)    Stage 3A Moderate CKD (GFR = 45-59 mL/min/1.73 square meters)    Stage 3B Moderate CKD (GFR = 30-44 mL/min/1.73 square meters)    Stage 4 Severe CKD (GFR = 15-29 mL/min/1.73 square meters)    Stage 5 End Stage CKD (GFR <15 mL/min/1.73 square meters)  Note: GFR calculation is accurate only with a steady state creatinine    Protime-INR [[id number]]  (Abnormal) Collected: 03/18/25 0136    Lab Status: Final result Specimen: Blood from Arm, Right Updated: 03/18/25 0157     Protime 29.7 seconds      INR 2.88    Narrative:      INR Therapeutic Range    Indication                                             INR Range      Atrial Fibrillation                                               2.0-3.0  Hypercoagulable State                                    2.0.2.3  Left Ventricular Asist Device                            2.0-3.0  Mechanical Heart Valve                                  -    Aortic(with afib, MI,  embolism, HF, LA enlargement,    and/or coagulopathy)                                     2.0-3.0 (2.5-3.5)     Mitral                                                             2.5-3.5  Prosthetic/Bioprosthetic Heart Valve               2.0-3.0  Venous thromboembolism (VTE: VT, PE        2.0-3.0    CBC and differential [[id number]]  (Abnormal) Collected: 03/18/25 0136    Lab Status: Final result Specimen: Blood from Arm, Right Updated: 03/18/25 0144     WBC 5.22 Thousand/uL      RBC 3.98 Million/uL      Hemoglobin 8.7 g/dL      Hematocrit 28.2 %      MCV 71 fL      MCH 21.9 pg      MCHC 30.9 g/dL      RDW 16.4 %      MPV 12.1 fL      Platelets 231 Thousands/uL      nRBC 0 /100 WBCs      Segmented % 65 %      Immature Grans % 0 %      Lymphocytes % 24 %      Monocytes % 8 %      Eosinophils Relative 3 %      Basophils Relative 0 %      Absolute Neutrophils 3.36 Thousands/µL      Absolute Immature Grans 0.01 Thousand/uL      Absolute Lymphocytes 1.24 Thousands/µL      Absolute Monocytes 0.42 Thousand/µL      Eosinophils Absolute 0.17 Thousand/µL      Basophils Absolute 0.02 Thousands/µL             XR chest 2 views   ED Interpretation by Russell Younger DO (03/18 0204)   Abnormal   Pulmonary vascular congestion          Procedures    ED Medication and Procedure Management   Prior to Admission Medications   Prescriptions Last Dose Informant Patient Reported? Taking?   hydrALAZINE (APRESOLINE) 25 mg tablet   No No   Sig: Take 1 tablet (25 mg total) by mouth every 8 (eight) hours   isosorbide dinitrate (ISORDIL) 30 mg tablet   No No   Sig: Take 1 tablet (30 mg total) by mouth 3 (three) times daily after meals   Patient not taking: Reported on 3/5/2025   losartan (COZAAR) 50 mg tablet   Yes No   Sig: Take 50 mg by mouth daily   melatonin 3 mg   Yes No   Sig: Take 3 mg by mouth daily at bedtime as needed (Insomnia)   potassium chloride (Klor-Con M20) 20 mEq tablet   No No   Sig: Take 2 tablets (40 mEq total) by  mouth daily   sildenafil (REVATIO) 20 mg tablet   Yes No   Sig: Take 20 mg by mouth   torsemide (DEMADEX) 20 mg tablet   Yes No   Sig: Take 20 mg by mouth daily   warfarin (COUMADIN) 5 mg tablet   Yes No   Sig: Take 5 mg by mouth daily Alternating 7.5 mg 6 times a week, 5 mg on Thursdays      Facility-Administered Medications: None     Patient's Medications   Discharge Prescriptions    No medications on file     No discharge procedures on file.  ED SEPSIS DOCUMENTATION   Time reflects when diagnosis was documented in both MDM as applicable and the Disposition within this note       Time User Action Codes Description Comment    3/18/2025  2:54 AM Russell Younger Add [I50.9] Acute exacerbation of CHF (congestive heart failure) ([facility])                  Russell Younger DO  03/18/25 0433

## 2025-03-18 NOTE — PLAN OF CARE
Problem: PAIN - ADULT  Goal: Verbalizes/displays adequate comfort level or baseline comfort level  Description: Interventions:  - Encourage patient to monitor pain and request assistance  - Assess pain using appropriate pain scale  - Administer analgesics based on type and severity of pain and evaluate response  - Implement non-pharmacological measures as appropriate and evaluate response  - Consider cultural and social influences on pain and pain management  - Notify physician/advanced practitioner if interventions unsuccessful or patient reports new pain  Outcome: Progressing     Problem: INFECTION - ADULT  Goal: Absence or prevention of progression during hospitalization  Description: INTERVENTIONS:  - Assess and monitor for signs and symptoms of infection  - Monitor lab/diagnostic results  - Monitor all insertion sites, i.e. indwelling lines, tubes, and drains  - Monitor endotracheal if appropriate and nasal secretions for changes in amount and color  - [place] appropriate cooling/warming therapies per order  - Administer medications as ordered  - Instruct and encourage patient and family to use good hand hygiene technique  - Identify and instruct in appropriate isolation precautions for identified infection/condition  Outcome: Progressing  Goal: Absence of fever/infection during neutropenic period  Description: INTERVENTIONS:  - Monitor WBC    Outcome: Progressing     Problem: SAFETY ADULT  Goal: Patient will remain free of falls  Description: INTERVENTIONS:  - Educate patient/family on patient safety including physical limitations  - Instruct patient to call for assistance with activity   - Consult OT/PT to assist with strengthening/mobility   - Keep Call bell within reach  - Keep bed low and locked with side rails adjusted as appropriate  - Keep care items and personal belongings within reach  - Initiate and maintain comfort rounds  - Make Fall Risk Sign visible to staff- Apply yellow socks and bracelet  for high fall risk patients  - Consider moving patient to room near nurses station  Outcome: Progressing  Goal: Maintain or return to baseline ADL function  Description: INTERVENTIONS:  -  Assess patient's ability to carry out ADLs; assess patient's baseline for ADL function and identify physical deficits which impact ability to perform ADLs (bathing, care of mouth/teeth, toileting, grooming, dressing, etc.)  - Assess/evaluate cause of self-care deficits   - Assess range of motion  - Assess patient's mobility; develop plan if impaired  - Assess patient's need for assistive devices and provide as appropriate  - Encourage maximum independence but intervene and supervise when necessary  - Involve family in performance of ADLs  - Assess for home care needs following discharge   - Consider OT consult to assist with ADL evaluation and planning for discharge  - Provide patient education as appropriate  Outcome: Progressing  Goal: Maintains/Returns to pre admission functional level  Description: INTERVENTIONS:  - Perform AM-PAC 6 Click Basic Mobility/ Daily Activity assessment daily.  - Set and communicate daily mobility goal to care team and patient/family/caregiver.   - Collaborate with rehabilitation services on mobility goals if consulted  - Out of bed for toileting  - Record patient progress and toleration of activity level   Outcome: Progressing     Problem: DISCHARGE PLANNING  Goal: Discharge to home or other facility with appropriate resources  Description: INTERVENTIONS:  - Identify barriers to discharge w/patient and caregiver  - Arrange for needed discharge resources and transportation as appropriate  - Identify discharge learning needs (meds, wound care, etc.)  - Arrange for interpretive services to assist at discharge as needed  - Refer to Case Management Department for coordinating discharge planning if the patient needs post-hospital services based on physician/advanced practitioner order or complex needs  related to functional status, cognitive ability, or social support system  Outcome: Progressing     Problem: Knowledge Deficit  Goal: Patient/family/caregiver demonstrates understanding of disease process, treatment plan, medications, and discharge instructions  Description: Complete learning assessment and assess knowledge base.  Interventions:  - Provide teaching at level of understanding  - Provide teaching via preferred learning methods  Outcome: Progressing

## 2025-03-18 NOTE — ASSESSMENT & PLAN NOTE
Rates controlled    Plan:  Rate/rhythm control: none  Anticoagulation: warfarin 7.5mg 6 days/week, 5mg on Thursday   Trend INR

## 2025-03-18 NOTE — H&P
H&P - Hospitalist   Name: Domingo Trevino 65 y.o. 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 Decompensated heart failure  Labs & Imaging independently reviewed: Cr 2.7, Trop 169>192. BNP 1144. CXR mild vascular congestion, cardiomegaly   Outpatient regimen: torsemide 20mg daily, losartan 50mg daily, hydralazine 25mg q8h    Plan:  Diuresis: IV lasix 50mg twice daily   Continue outpatient regimen  Cardiac diet, daily weights, I&O, monitor and replenish electrolytes   Elevated troponin  No chest pain but elevated in setting of CHF, see plan above  Chronic kidney disease (CKD), stage IV (severe) (HCC)  Lab Results   Component Value Date    EGFR 23 03/18/2025    EGFR 27 (L) 03/16/2025    EGFR 26 (L) 03/11/2025    CREATININE 2.70 (H) 03/18/2025    CREATININE 2.6 (H) 03/16/2025    CREATININE 2.65 (H) 03/11/2025     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  Code Status: Prior   Discussion with family:   Anticipated Length of Stay: Patient will be admitted on an observation basis with an anticipated length of stay of less than 2 midnights secondary to chf.    History of Present Illness   Chief Complaint:   Chief Complaint   Patient presents with    Shortness of  "Breath     Pt reports SOB since last year. Seen yesterday for same CC. States  \"I went to the hospital they did blood work, told me I'm fine and I went home\". -CP, -dizziness     Domingo Trevino is a 65 y.o. male with a PMH of HTN, HFpEF, AF, CKD, amyloidosis who presents with shortness of breath.   History obtained from patient, chart review and discussion with ED attending. He presents tonight for worsening shortness of breath. No inciting event or trauma. He has been dealing with this for a while now but it is worse with walking. Nothing seems to make it better. Was recently hospitalized for the same. No recent illnesses, fever or sick contacts. Reports compliance with current medication regimen and dietary restrictions. Does not weight himself at home. Denies tobacco or chronic alcohol use.     Review of Systems   Constitutional:  Negative for chills and fever.   Respiratory:  Positive for shortness of breath.    Cardiovascular:  Negative for chest pain, palpitations and leg swelling.   Gastrointestinal:  Negative for abdominal pain, diarrhea, nausea and vomiting.   Neurological:  Negative for syncope.   All other systems reviewed and are negative.      Historical Information   Past Medical History:   Diagnosis Date    CHF (congestive heart failure) (HCC)     Hypertension      History reviewed. No pertinent surgical history.  Social History     Tobacco Use    Smoking status: Former     Current packs/day: 0.50     Types: Cigarettes    Smokeless tobacco: Never   Vaping Use    Vaping status: Never Used   Substance and Sexual Activity    Alcohol use: Never    Drug use: Never    Sexual activity: Not on file     E-Cigarette/Vaping    E-Cigarette Use Never User      E-Cigarette/Vaping Substances     History reviewed. No pertinent family history.  Social History:  Marital Status: Single   Occupation:   Patient Pre-hospital Living Situation: Home  Patient Pre-hospital Level of Mobility: walks  Patient Pre-hospital Diet " Restrictions:     Meds/Allergies   I have reviewed home medications with patient personally.  Prior to Admission medications    Medication Sig Start Date End Date Taking? Authorizing Provider   hydrALAZINE (APRESOLINE) 25 mg tablet Take 1 tablet (25 mg total) by mouth every 8 (eight) hours 3/2/25   Raegan Lara PA-C   isosorbide dinitrate (ISORDIL) 30 mg tablet Take 1 tablet (30 mg total) by mouth 3 (three) times daily after meals  Patient not taking: Reported on 3/5/2025 3/2/25   Raegan Lara PA-C   losartan (COZAAR) 50 mg tablet Take 50 mg by mouth daily    Historical Provider, MD   melatonin 3 mg Take 3 mg by mouth daily at bedtime as needed (Insomnia)    Historical Provider, MD   potassium chloride (Klor-Con M20) 20 mEq tablet Take 2 tablets (40 mEq total) by mouth daily 3/2/25   Raegan Lara PA-C   sildenafil (REVATIO) 20 mg tablet Take 20 mg by mouth 9/6/24   Historical Provider, MD   torsemide (DEMADEX) 20 mg tablet Take 20 mg by mouth daily    Historical Provider, MD   warfarin (COUMADIN) 5 mg tablet Take 5 mg by mouth daily Alternating 7.5 mg 6 times a week, 5 mg on Thursdays    Historical Provider, MD     No Known Allergies    Objective :  Temp:  [98.1 °F (36.7 °C)] 98.1 °F (36.7 °C)  HR:  [56-80] 60  BP: (158-184)/() 159/96  Resp:  [18-20] 18  SpO2:  [96 %-98 %] 96 %  O2 Device: None (Room air)    Physical Exam  Vitals and nursing note reviewed.   Constitutional:       General: He is not in acute distress.     Appearance: He is well-developed.   HENT:      Head: Normocephalic and atraumatic.   Eyes:      Conjunctiva/sclera: Conjunctivae normal.   Cardiovascular:      Rate and Rhythm: Normal rate and regular rhythm.      Heart sounds: No murmur heard.  Pulmonary:      Effort: Pulmonary effort is normal. No respiratory distress.      Breath sounds: Rales present.   Abdominal:      Palpations: Abdomen is soft.      Tenderness: There is no abdominal tenderness.   Musculoskeletal:          "General: No swelling.      Cervical back: Neck supple.   Skin:     General: Skin is warm and dry.      Capillary Refill: Capillary refill takes less than 2 seconds.   Neurological:      Mental Status: He is alert.   Psychiatric:         Mood and Affect: Mood normal.          Lines/Drains:            Lab Results: I have reviewed the following results:  Results from last 7 days   Lab Units 03/18/25  0136   WBC Thousand/uL 5.22   HEMOGLOBIN g/dL 8.7*   HEMATOCRIT % 28.2*   PLATELETS Thousands/uL 231   SEGS PCT % 65   LYMPHO PCT % 24   MONO PCT % 8   EOS PCT % 3     Results from last 7 days   Lab Units 03/18/25  0136   SODIUM mmol/L 142   POTASSIUM mmol/L 4.0   CHLORIDE mmol/L 106   CO2 mmol/L 27   BUN mg/dL 56*   CREATININE mg/dL 2.70*   ANION GAP mmol/L 9   CALCIUM mg/dL 8.8   ALBUMIN g/dL 4.0   TOTAL BILIRUBIN mg/dL 0.68   ALK PHOS U/L 62   ALT U/L 15   AST U/L 18   GLUCOSE RANDOM mg/dL 95     Results from last 7 days   Lab Units 03/18/25  0136   INR  2.88*         No results found for: \"HGBA1C\"        Imaging Results Review: I personally reviewed the following image studies in PACS and associated radiology reports: chest xray. My interpretation of the radiology images/reports is: mild vascular congestion, cardiomegaly .  Other Study Results Review: EKG was personally reviewed and my interpretation is: NSR. HR 62 ..    Administrative Statements     ** Please Note: This note has been constructed using a voice recognition system. **    "

## 2025-03-18 NOTE — PLAN OF CARE
Problem: PAIN - ADULT  Goal: Verbalizes/displays adequate comfort level or baseline comfort level  Description: Interventions:  - Encourage patient to monitor pain and request assistance  - Assess pain using appropriate pain scale  - Administer analgesics based on type and severity of pain and evaluate response  - Implement non-pharmacological measures as appropriate and evaluate response  - Consider cultural and social influences on pain and pain management  - Notify physician/advanced practitioner if interventions unsuccessful or patient reports new pain  Outcome: Progressing     Problem: INFECTION - ADULT  Goal: Absence or prevention of progression during hospitalization  Description: INTERVENTIONS:  - Assess and monitor for signs and symptoms of infection  - Monitor lab/diagnostic results  - Monitor all insertion sites, i.e. indwelling lines, tubes, and drains  - Monitor endotracheal if appropriate and nasal secretions for changes in amount and color  - [place] appropriate cooling/warming therapies per order  - Administer medications as ordered  - Instruct and encourage patient and family to use good hand hygiene technique  - Identify and instruct in appropriate isolation precautions for identified infection/condition  Outcome: Progressing     Problem: SAFETY ADULT  Goal: Patient will remain free of falls  Description: INTERVENTIONS:  - Educate patient/family on patient safety including physical limitations  - Instruct patient to call for assistance with activity   - Consult OT/PT to assist with strengthening/mobility   - Keep Call bell within reach  - Keep bed low and locked with side rails adjusted as appropriate  - Keep care items and personal belongings within reach  - Initiate and maintain comfort rounds  - Make Fall Risk Sign visible to staff- Apply yellow socks and bracelet for high fall risk patients  - Consider moving patient to room near nurses station  Outcome: Progressing  Goal: Maintain or  return to baseline ADL function  Description: INTERVENTIONS:  -  Assess patient's ability to carry out ADLs; assess patient's baseline for ADL function and identify physical deficits which impact ability to perform ADLs (bathing, care of mouth/teeth, toileting, grooming, dressing, etc.)  - Assess/evaluate cause of self-care deficits   - Assess range of motion  - Assess patient's mobility; develop plan if impaired  - Assess patient's need for assistive devices and provide as appropriate  - Encourage maximum independence but intervene and supervise when necessary  - Involve family in performance of ADLs  - Assess for home care needs following discharge   - Consider OT consult to assist with ADL evaluation and planning for discharge  - Provide patient education as appropriate  Outcome: Progressing  Goal: Maintains/Returns to pre admission functional level  Description: INTERVENTIONS:  - Perform AM-PAC 6 Click Basic Mobility/ Daily Activity assessment daily.  - Set and communicate daily mobility goal to care team and patient/family/caregiver.   - Collaborate with rehabilitation services on mobility goals if consulted  - Out of bed for toileting  - Record patient progress and toleration of activity level   Outcome: Progressing     Problem: DISCHARGE PLANNING  Goal: Discharge to home or other facility with appropriate resources  Description: INTERVENTIONS:  - Identify barriers to discharge w/patient and caregiver  - Arrange for needed discharge resources and transportation as appropriate  - Identify discharge learning needs (meds, wound care, etc.)  - Arrange for interpretive services to assist at discharge as needed  - Refer to Case Management Department for coordinating discharge planning if the patient needs post-hospital services based on physician/advanced practitioner order or complex needs related to functional status, cognitive ability, or social support system  Outcome: Progressing     Problem: Knowledge  Deficit  Goal: Patient/family/caregiver demonstrates understanding of disease process, treatment plan, medications, and discharge instructions  Description: Complete learning assessment and assess knowledge base.  Interventions:  - Provide teaching at level of understanding  - Provide teaching via preferred learning methods  Outcome: Progressing     Problem: CARDIOVASCULAR - ADULT  Goal: Maintains optimal cardiac output and hemodynamic stability  Description: INTERVENTIONS:  - Monitor I/O, vital signs and rhythm  - Monitor for S/S and trends of decreased cardiac output  - Administer and titrate ordered vasoactive medications to optimize hemodynamic stability  - Assess quality of pulses, skin color and temperature  - Assess for signs of decreased coronary artery perfusion  - Instruct patient to report change in severity of symptoms  Outcome: Progressing     Problem: RESPIRATORY - ADULT  Goal: Achieves optimal ventilation and oxygenation  Description: INTERVENTIONS:  - Assess for changes in respiratory status  - Assess for changes in mentation and behavior  - Position to facilitate oxygenation and minimize respiratory effort  - Oxygen administered by appropriate delivery if ordered  - Initiate smoking cessation education as indicated  - Encourage broncho-pulmonary hygiene including cough, deep breathe, Incentive Spirometry  - Assess the need for suctioning and aspirate as needed  - Assess and instruct to report SOB or any respiratory difficulty  - Respiratory Therapy support as indicated  Outcome: Progressing

## 2025-03-18 NOTE — ASSESSMENT & PLAN NOTE
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

## 2025-03-18 NOTE — CONSULTS
"Consult - Cardiology   Domingo Trevino 65 y.o. male MRN: [id number]  Unit/Bed#: E5 -01 Encounter: [id number]    Reason For Consult:  Acute CHF, A-Fib            Assessment:  Acute on chronic heart failure, heart failure with preserved ejection fraction, LVEF 55-60%, NYHA Class II, AHA Stage D, etiology is amyloidosis  AL Amyloidosis with cardiac involvement.  Multiple myeloma, Dx on BMB Oct 2024  Chronic atrial fibrillation, 2023  Hypokalemia  Hypertension  Stage IV chronic kidney disease  History of renal infarction  Mild approaching moderate mitral valve regurgitation, mild tricuspid valve regurgitation, TTE Oct 2024  Pulmonary hypertension (PASP 56 mmHg), TTE Oct 2024  History of tobacco dependence  Troponin elevation    Plan:  Prior dry weight was approx 150#  He did not get a scale at home for daily weights  He most likely has cardiac amyloidosis from multiple myeloma which   in general has very poor prognosis.   Patient was not convinced that he truly has MM after Hematology consult in Oct 2024. Patient now contemplating cardiac biopsy. Either way that is OP procedure.  Patient has to get his medical insurance figured out before he can try to get a second opinion from Hematology in [place].  Palliative care consult is not out of the question however I did not yet discuss this with him.  Hopefully we can get him feeling well enough again in order to get to his upcoming appts and follow through with treatment.  He is in rate controlled A-fib. Plan was for OP JASON/CV + amiodarone + eventually PVI Ablation (+/- Watchman) to be scheduled with \"PATHS\" program at [facility]. This can be arranged as OP.  [facility]iology increased his home Torsemide from 20 mg QD to 40 mg QD on 3/12/25  Diurese with IV Lasix 80 mg BID + potassium 40 mEq QD  Telemetry monitoring  Troponin elevation is most likely due to myocardial injury from CHF. No need for ACS treatment at this time. Treat the CHF as above.  Daily BMP with IV " "diuresis.  Consider Nephrology consult while IP    History Of Present Illness:  Domingo Trevino is a 65 year old with PMH as below who presented to [facility] ED this morning at 1 AM complaining of HERBERT and orthopnea worsening for about 1 week.  Acute on chronic.  Patient reports initially after discharge from [facility] on 3/2 he had a period of time where he was feeling slightly better, improved energy, improved work of breathing and no orthopnea but slowly his HERBERT and orthopnea has come back.  Patient reports he was taking p.o. torsemide at home as prescribed and having adequate urine output.  He was not weighing himself at home.  Patient thinks some of his antihypertensive medication cause nasal congestion which contributes to his shortness of breath.  Also according to 3/12/20[facility] cardiology note the TID dosing of hydralazine was causing lightheadedness so patient was only taking it BID.  Patient reports compliance with all other medications.  Patient denies chest pain, pressure or tightness.    In review: He was hospitalized at [facility] from 2/27-3/2/25 for HERBERT. Treated for CHF exacerbation and HTN.      He saw [facility] Cardiology on March 12th and Torsemide was increased to 40 mg QD. It was advised that he resume prior Coreg 3.125 mg BID however that was not accomplished (needed refills) before he was in [facility] ED on March 16th for \"Chronic difficulty breathing.\"     He had labs, CXR and EKG there and was dc'd to home with PCP follow up. No med changes.     [facility] cardiology nurse called to check in on him yesterday and he was still complaining of malaise & SOB. For these reasons he presented again to [facility] ED. At this point his weight is 161 lbs.     Rhythm History: A-fib, A-flutter, slow ventricular response (HR 46 bpm Feb 2025), abherency, Sinus rhythm with 2nd degree A-V block (2022)    Primary Cardiologist: Dr. Casey at [facility]     ROS:  Review of Systems   Constitutional:  Positive for fatigue. Negative for " activity change, appetite change, chills, diaphoresis and fever.   HENT:  Positive for congestion. Negative for rhinorrhea.    Respiratory:  Positive for cough and shortness of breath. Negative for chest tightness.         + HERBERT, orthopnea   Cardiovascular:  Positive for leg swelling. Negative for chest pain and palpitations.   Gastrointestinal:  Negative for abdominal pain.   Genitourinary:  Negative for difficulty urinating.        - oliguria   Neurological:  Negative for dizziness, syncope, weakness and light-headedness.      I reviewed his PMH, surgical history, FH and allergy list.    Medications:       Prior to Admission medications    Medication Sig Start Date End Date Taking? Authorizing Provider   carvedilol (COREG) 3.125 mg tablet Take 3.125 mg by mouth 2 (two) times a day with meals   Yes Historical Provider, MD   hydrALAZINE (APRESOLINE) 25 mg tablet Take 1 tablet (25 mg total) by mouth every 8 (eight) hours 3/2/25  Yes Raegan Lara PA-C   losartan (COZAAR) 50 mg tablet Take 50 mg by mouth daily   Yes Historical Provider, MD   melatonin 3 mg Take 3 mg by mouth daily at bedtime as needed (Insomnia)   Yes Historical Provider, MD   potassium chloride (Klor-Con M20) 20 mEq tablet Take 2 tablets (40 mEq total) by mouth daily 3/2/25  Yes Raegan Lara PA-C   torsemide (DEMADEX) 20 mg tablet Take 20 mg by mouth daily   Yes Historical Provider, MD   warfarin (COUMADIN) 5 mg tablet Take 5 mg by mouth daily Alternating 7.5 mg 6 times a week, 5 mg on Thursdays   Yes Historical Provider, MD   isosorbide dinitrate (ISORDIL) 30 mg tablet Take 1 tablet (30 mg total) by mouth 3 (three) times daily after meals  Patient not taking: Reported on 3/18/2025 3/2/25   Raegan Lara PA-C   sildenafil (REVATIO) 20 mg tablet Take 20 mg by mouth 9/6/24   Historical Provider, MD     Objective:  Vitals:    03/18/25 0500 03/18/25 0600 03/18/25 0647 03/18/25 0803   BP: 166/91 150/76 (!) 161/108 154/96   BP Location: Left arm  "Left arm     Pulse: 70 76 60 68   Resp: 15 20  16   Temp:    (!) 97.2 °F (36.2 °C)   TempSrc:       SpO2: 97% 100% 98% 96%   Weight:         Exam:  Physical Exam  Vitals and nursing note reviewed.   Constitutional:       General: He is not in acute distress.     Appearance: He is not ill-appearing or toxic-appearing.      Comments: He has dyspnea with assc resp muscle use sometimes payam when laying in bed and with exertion even moving around in bed makes him have anguiano   HENT:      Head: Normocephalic and atraumatic.      Nose: No rhinorrhea.      Mouth/Throat:      Mouth: Mucous membranes are moist.   Eyes:      Conjunctiva/sclera: Conjunctivae normal.   Neck:      Comments: + JVD  Cardiovascular:      Rate and Rhythm: Normal rate. Rhythm irregularly irregular.      Heart sounds: S1 normal and S2 normal. No murmur heard.  Pulmonary:      Breath sounds: Wheezing and rales present.   Abdominal:      General: Abdomen is flat.   Musculoskeletal:         General: Swelling present.   Skin:     General: Skin is warm and dry.   Neurological:      General: No focal deficit present.      Mental Status: He is alert.   Psychiatric:         Behavior: Behavior normal.     DATA:      Labs - 3/16 [facility] ED visit lab results reviewed. Labs from this morning also reviewed Trops 169-192-194, BNP 1,144  Imaging- 3/16  CXR radiology report: \"Minimal bibasilar atelectasis and small pleural effusions, decreased from 2/22/25 study. \"  3/18 CXR my personal review: Cardiomegaly, pulmonary vascular congestion with cephalization, hilar fullness.  (Worse congestion when compared to CXR from 2/27 hospitalization for same)  NM PYP scan 10/22/24: negative for TTR amyloid   Cardiac MRI 10/24/24: incomplete study due to claustrophobia, low normal LV function, LVEF 50-55%, RVEF 45%, mild biatrial enlargement, mild MR   Other studies- TTE 10/21/2024: Moderate concentric LVH, decreased global noted, -17% with sparing of apex suggestive of cardiac " Amyloidosis -- normal RV size and function -- severe left atrial and moderate right atrial enlargement -- aortic valve sclerosis with mild aortic valve regurgitation.  Thickened mitral valve with dilated annulus with mild mitral valve regurgitation.  Tricuspid annular dilatation, mild tricuspid valve regurgitation.   EKG- from 3/16 [facility] ED Afib with controlled ventricular response HR 66bpm, low voltage QRS. PRWP no acute ischemia.  From admission this morning 3/18/25 Afib with slow ventricular response HR 59 bpm, some abherrently conducted beats. Left axis deviation, LAFB, chronic, PRWP chronic, no acute ischemia. Low QRS voltage.    Effie Colorado PA-C

## 2025-03-18 NOTE — ED NOTES
EKG read by ED Dr Russell Younger at 0349.    EKG read by attending now.     Tyler Beck RN  03/18/25 0763

## 2025-03-18 NOTE — ASSESSMENT & PLAN NOTE
Wt Readings from Last 3 Encounters:   03/18/25 73.2 kg (161 lb 6 oz)   03/05/25 69.9 kg (154 lb)   03/02/25 67.5 kg (148 lb 13 oz)     Presents with worsening dyspnea, increasing weight. Decompensated heart failure  Labs & Imaging independently reviewed: Cr 2.7, Trop 169>192. BNP 1144. CXR mild vascular congestion, cardiomegaly   Outpatient regimen: torsemide 20mg daily, losartan 50mg daily, hydralazine 25mg q8h    Plan:  Diuresis: IV lasix 50mg twice daily   Continue outpatient regimen  Cardiac diet, daily weights, I&O, monitor and replenish electrolytes

## 2025-03-18 NOTE — Clinical Note
Case was discussed with ROLO and the patient's admission status was agreed to be Admission Status: inpatient status to the service of Dr. Helms

## 2025-03-19 LAB
ANION GAP SERPL CALCULATED.3IONS-SCNC: 9 MMOL/L (ref 4–13)
BUN SERPL-MCNC: 51 MG/DL (ref 5–25)
CALCIUM SERPL-MCNC: 8.9 MG/DL (ref 8.4–10.2)
CHLORIDE SERPL-SCNC: 106 MMOL/L (ref 96–108)
CO2 SERPL-SCNC: 27 MMOL/L (ref 21–32)
CREAT SERPL-MCNC: 2.57 MG/DL (ref 0.6–1.3)
ERYTHROCYTE [DISTWIDTH] IN BLOOD BY AUTOMATED COUNT: 16.2 % (ref 11.6–15.1)
GFR SERPL CREATININE-BSD FRML MDRD: 25 ML/MIN/1.73SQ M
GLUCOSE SERPL-MCNC: 79 MG/DL (ref 65–140)
HCT VFR BLD AUTO: 28.7 % (ref 36.5–49.3)
HGB BLD-MCNC: 8.9 G/DL (ref 12–17)
INR PPP: 2.96 (ref 0.85–1.19)
MAGNESIUM SERPL-MCNC: 2 MG/DL (ref 1.9–2.7)
MCH RBC QN AUTO: 21.8 PG (ref 26.8–34.3)
MCHC RBC AUTO-ENTMCNC: 31 G/DL (ref 31.4–37.4)
MCV RBC AUTO: 70 FL (ref 82–98)
PLATELET # BLD AUTO: 232 THOUSANDS/UL (ref 149–390)
PMV BLD AUTO: 11.8 FL (ref 8.9–12.7)
POTASSIUM SERPL-SCNC: 5.2 MMOL/L (ref 3.5–5.3)
PROTHROMBIN TIME: 30.3 SECONDS (ref 12.3–15)
RBC # BLD AUTO: 4.08 MILLION/UL (ref 3.88–5.62)
SODIUM SERPL-SCNC: 142 MMOL/L (ref 135–147)
WBC # BLD AUTO: 5.68 THOUSAND/UL (ref 4.31–10.16)

## 2025-03-19 PROCEDURE — 80048 BASIC METABOLIC PNL TOTAL CA: CPT

## 2025-03-19 PROCEDURE — 85610 PROTHROMBIN TIME: CPT | Performed by: INTERNAL MEDICINE

## 2025-03-19 PROCEDURE — 99233 SBSQ HOSP IP/OBS HIGH 50: CPT | Performed by: FAMILY MEDICINE

## 2025-03-19 PROCEDURE — 99232 SBSQ HOSP IP/OBS MODERATE 35: CPT | Performed by: INTERNAL MEDICINE

## 2025-03-19 PROCEDURE — 83735 ASSAY OF MAGNESIUM: CPT

## 2025-03-19 PROCEDURE — 85027 COMPLETE CBC AUTOMATED: CPT | Performed by: INTERNAL MEDICINE

## 2025-03-19 RX ADMIN — LOSARTAN POTASSIUM 50 MG: 50 TABLET, FILM COATED ORAL at 08:37

## 2025-03-19 RX ADMIN — FUROSEMIDE 80 MG: 10 INJECTION, SOLUTION INTRAMUSCULAR; INTRAVENOUS at 08:37

## 2025-03-19 RX ADMIN — MELATONIN 3 MG: 3 TAB ORAL at 23:12

## 2025-03-19 RX ADMIN — FUROSEMIDE 80 MG: 10 INJECTION, SOLUTION INTRAMUSCULAR; INTRAVENOUS at 17:23

## 2025-03-19 RX ADMIN — HYDRALAZINE HYDROCHLORIDE 25 MG: 25 TABLET ORAL at 14:22

## 2025-03-19 RX ADMIN — HYDRALAZINE HYDROCHLORIDE 25 MG: 25 TABLET ORAL at 05:29

## 2025-03-19 RX ADMIN — LORAZEPAM 0.5 MG: 0.5 TABLET ORAL at 23:12

## 2025-03-19 RX ADMIN — MELATONIN 3 MG: 3 TAB ORAL at 02:22

## 2025-03-19 RX ADMIN — HYDRALAZINE HYDROCHLORIDE 25 MG: 25 TABLET ORAL at 21:07

## 2025-03-19 NOTE — UTILIZATION REVIEW
"Initial Clinical Review    OBSERVATION   3/18  CHANGED TO IP ADMISSION  3/19 @  1612    Admission: Date/Time/Statement:   Admission Orders (From admission, onward)       Ordered        03/18/25 0255  Place in Observation  Once                          3/19/25 1613  INPATIENT ADMISSION  Once        Transfer Service: Hospitalist   Question Answer Comment   Level of Care Med Surg    Estimated length of stay More than 2 Midnights    Certification I certify that inpatient services are medically necessary for this patient for a duration of greater than two midnights. See H&P and MD Progress Notes for additional information about the patient's course of treatment.        03/19/25 1612       ED Arrival Information       Expected   -    Arrival   3/18/2025 00:22    Acuity   Urgent              Means of arrival   Walk-In    Escorted by   Self    Service   Hospitalist    Admission type   Emergency              Arrival complaint   sob             Chief Complaint   Patient presents with    Shortness of Breath     Pt reports SOB since last year. Seen yesterday for same CC. States  \"I went to the hospital they did blood work, told me I'm fine and I went home\". -CP, -dizziness       Initial Presentation: 65 y.o. male presents to ED from home with worsening shortness of breath, no inciting event.   Has  been dealing with symptoms for awhile, now worse with walking.  Recently admitted for same problem. PMH  is  HTN,  HFpEF, AF,  CKD  nicotine dependence, and  amyloidosis, compliant with all home meds.  CXR shows  mild vascular congestion,  CM.  Labs  reveal creatinine  2.7,  troponin  169>192, BNP  1144.  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    Cardiology  consult  BNP significantly elevated with evidence  of pulmonary edema, B/L peripheral edema.  Clinically appears in heart " failure.  Need strict  I & O, daily weight and  IV lasix.  Replace electrolytes.  Monitor on tele.    3/19  IP ADMISSION  Remains on  IV  lasix.    Urinating briskly.   States orthopnea  and HERBERT improved.   Continue daily weight  and current meds.  O2  sats  94  % RA.    ED Treatment-Medication Administration from 03/18/2025 0022 to 03/18/2025 0622         Date/Time Order Dose Route Action     03/18/2025 0259 furosemide (LASIX) injection 50 mg 50 mg Intravenous Given     03/18/2025 0525 hydrALAZINE (APRESOLINE) tablet 25 mg 25 mg Oral Given            Scheduled Medications:  furosemide, 80 mg, Intravenous, BID  hydrALAZINE, 25 mg, Oral, Q8H LOTTIE  losartan, 50 mg, Oral, Daily  [Held by provider] warfarin, 7.5 mg, Oral, Once per day on Sunday Monday Tuesday Wednesday Friday Saturday   And  [START ON 3/20/2025] warfarin, 5 mg, Oral, Weekly      Continuous IV Infusions:     PRN Meds:  acetaminophen, 975 mg, Oral, Q8H PRN  aluminum-magnesium hydroxide-simethicone, 30 mL, Oral, Q6H PRN  LORazepam, 0.5 mg, Oral, HS PRN  melatonin, 3 mg, Oral, HS PRN  ondansetron, 4 mg, Intravenous, Q6H PRN  polyethylene glycol, 17 g, Oral, Daily PRN      ED Triage Vitals [03/18/25 0028]   Temperature Pulse Respirations Blood Pressure SpO2 Pain Score   98.1 °F (36.7 °C) 60 18 (S) (!) 184/97 98 % No Pain     Weight (last 2 days)       Date/Time Weight    03/19/25 0534 67.5 (148.81)    03/19/25 05:29:17 67.5 (148.81)    03/18/25 0028 73.2 (161.38)            Vital Signs (last 3 days)       Date/Time Temp Pulse Resp BP MAP (mmHg) SpO2 O2 Device Patient Position - Orthostatic VS Madisyn Coma Scale Score Pain    03/19/25 07:35:27 97.8 °F (36.6 °C) 64 18 149/96 114 97 % None (Room air) Lying -- --    03/19/25 0723 -- -- -- -- -- -- -- -- -- No Pain    03/19/25 05:29:17 -- 61 -- 146/97 113 99 % -- -- -- --    03/19/25 0529 -- -- -- 146/97 -- -- -- -- -- --    03/18/25 23:10:38 98 °F (36.7 °C) 63 18 166/98 121 97 % None (Room air) Lying -- --     03/18/25 21:21:54 -- 66 -- 160/92 115 97 % -- -- 15 No Pain    03/18/25 17:23:29 -- 62 18 164/95 118 95 % -- -- -- --    03/18/25 15:27:37 97.6 °F (36.4 °C) 63 18 143/105 118 98 % -- -- -- --    03/18/25 12:24:16 -- 56 -- 156/99 118 95 % -- -- -- --    03/18/25 0900 -- -- -- -- -- -- -- -- -- No Pain    03/18/25 08:03:07 97.2 °F (36.2 °C) 68 16 154/96 115 96 % -- -- -- --    03/18/25 06:47:01 -- 60 -- 161/108 126 98 % -- -- -- --    03/18/25 0636 -- -- -- -- -- -- -- -- -- No Pain    03/18/25 0600 -- 76 20 150/76 106 100 % None (Room air) Lying -- --    03/18/25 0530 -- -- -- -- -- -- -- -- 15 --    03/18/25 0500 -- 70 15 166/91 122 97 % None (Room air) Lying -- --    03/18/25 0430 -- 65 12 156/87 110 96 % None (Room air) Lying -- --    03/18/25 0400 -- 58 20 179/95 126 96 % None (Room air) Lying -- --    03/18/25 0330 -- 70 17 169/100 128 95 % None (Room air) Sitting -- --    03/18/25 0310 -- -- -- -- -- -- -- -- 15 --    03/18/25 0256 -- 60 18 159/96 118 96 % None (Room air) Lying -- --    03/18/25 0230 -- 56 20 172/104 130 97 % None (Room air) Sitting -- No Pain    03/18/25 0100 -- 80 -- 158/101 124 98 % None (Room air) Sitting -- --    03/18/25 0028 98.1 °F (36.7 °C) 60 18 184/97  -- 98 % None (Room air) Sitting -- No Pain              Pertinent Labs/Diagnostic Test Results:   Radiology:  XR chest 2 views   ED Interpretation by Russell Younger DO (03/18 0204)   Abnormal   Pulmonary vascular congestion      Final Interpretation by Lillie Gamez MD (03/18 0749)      Mild pulmonary edema with small pleural effusions.            Workstation performed: [id number]           Cardiology:        Results from last 7 days   Lab Units 03/19/25  0528 03/18/25  0536 03/18/25  0136   WBC Thousand/uL 5.68 5.13 5.22   HEMOGLOBIN g/dL 8.9* 8.5* 8.7*   HEMATOCRIT % 28.7* 28.0* 28.2*   PLATELETS Thousands/uL 232 226 231   TOTAL NEUT ABS Thousands/µL  --   --  3.36         Results from last 7 days   Lab Units  03/19/25  0532 03/18/25  0136 03/16/25  1253   SODIUM mmol/L 142 142 141   POTASSIUM mmol/L 5.2 4.0 4.1   CHLORIDE mmol/L 106 106 106   CO2 mmol/L 27 27 26   ANION GAP mmol/L 9 9 9   BUN mg/dL 51* 56* 51*   CREATININE mg/dL 2.57* 2.70* 2.6*   EGFR ml/min/1.73sq m 25 23 27*   CALCIUM mg/dL 8.9 8.8 9.3   MAGNESIUM mg/dL 2.0  --   --      Results from last 7 days   Lab Units 03/18/25  0136 03/16/25  1253   AST U/L 18 14   ALT U/L 15 13   ALK PHOS U/L 62 62   TOTAL PROTEIN g/dL 6.7 6.8   ALBUMIN g/dL 4.0 4   TOTAL BILIRUBIN mg/dL 0.68 0.8         Results from last 7 days   Lab Units 03/19/25  0532 03/18/25  0136 03/16/25  1253   GLUCOSE RANDOM mg/dL 79 95 88               Results from last 7 days   Lab Units 03/18/25  0536 03/18/25  0337 03/18/25  0136   HS TNI 0HR ng/L  --   --  169*   HS TNI 2HR ng/L  --  192*  --    HSTNI D2 ng/L  --  23*  --    HS TNI 4HR ng/L 194*  --   --    HSTNI D4 ng/L 25*  --   --          Results from last 7 days   Lab Units 03/19/25  0528 03/18/25  0536 03/18/25  0136   PROTIME seconds 30.3* 30.1* 29.7*   INR  2.96* 2.93* 2.88*                         Results from last 7 days   Lab Units 03/18/25  0136   BNP pg/mL 1,144*               Present on Admission:   Chronic atrial fibrillation (HCC)   HFpEF, 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 male    Network Utilization Review Department  ATTENTION: Please call with any questions or concerns to [phone] and carefully listen to the prompts so that you are directed to the right person. All voicemails are confidential.   For Discharge needs, contact Care Management DC Support Team at [phone] opt. [phone]  Send all requests for admission clinical reviews, approved or denied determinations and any other requests to dedicated fax number  below belonging to the campus where the patient is receiving treatment. List of dedicated fax numbers for the Facilities:  FACILITY NAME UR FAX NUMBER   ADMISSION DENIALS (Administrative/Medical Necessity) [phone]   DISCHARGE SUPPORT TEAM (NETWORK) [phone]   PARENT CHILD HEALTH (Maternity/NICU/Pediatrics) [phone]   [facility] [phone]   [facility] [phone]   [facility] [phone]   [facility] [phone]   [facility] [phone]   [facility] [phone]   [facility] [phone]   [facility] [phone]   [facility] [phone]   [facility] [phone]   [facility] [phone]   [facility] [phone]

## 2025-03-19 NOTE — PLAN OF CARE
Problem: PAIN - ADULT  Goal: Verbalizes/displays adequate comfort level or baseline comfort level  Description: Interventions:  - Encourage patient to monitor pain and request assistance  - Assess pain using appropriate pain scale  - Administer analgesics based on type and severity of pain and evaluate response  - Implement non-pharmacological measures as appropriate and evaluate response  - Consider cultural and social influences on pain and pain management  - Notify physician/advanced practitioner if interventions unsuccessful or patient reports new pain  Outcome: Progressing     Problem: INFECTION - ADULT  Goal: Absence or prevention of progression during hospitalization  Description: INTERVENTIONS:  - Assess and monitor for signs and symptoms of infection  - Monitor lab/diagnostic results  - Monitor all insertion sites, i.e. indwelling lines, tubes, and drains  - Monitor endotracheal if appropriate and nasal secretions for changes in amount and color  - [place] appropriate cooling/warming therapies per order  - Administer medications as ordered  - Instruct and encourage patient and family to use good hand hygiene technique  - Identify and instruct in appropriate isolation precautions for identified infection/condition  Outcome: Progressing     Problem: SAFETY ADULT  Goal: Patient will remain free of falls  Description: INTERVENTIONS:  - Educate patient/family on patient safety including physical limitations  - Instruct patient to call for assistance with activity   - Consult OT/PT to assist with strengthening/mobility   - Keep Call bell within reach  - Keep bed low and locked with side rails adjusted as appropriate  - Keep care items and personal belongings within reach  - Initiate and maintain comfort rounds  - Make Fall Risk Sign visible to staff- Apply yellow socks and bracelet for high fall risk patients  - Consider moving patient to room near nurses station  Outcome: Progressing  Goal: Maintain or  return to baseline ADL function  Description: INTERVENTIONS:  -  Assess patient's ability to carry out ADLs; assess patient's baseline for ADL function and identify physical deficits which impact ability to perform ADLs (bathing, care of mouth/teeth, toileting, grooming, dressing, etc.)  - Assess/evaluate cause of self-care deficits   - Assess range of motion  - Assess patient's mobility; develop plan if impaired  - Assess patient's need for assistive devices and provide as appropriate  - Encourage maximum independence but intervene and supervise when necessary  - Involve family in performance of ADLs  - Assess for home care needs following discharge   - Consider OT consult to assist with ADL evaluation and planning for discharge  - Provide patient education as appropriate  Outcome: Progressing  Goal: Maintains/Returns to pre admission functional level  Description: INTERVENTIONS:  - Perform AM-PAC 6 Click Basic Mobility/ Daily Activity assessment daily.  - Set and communicate daily mobility goal to care team and patient/family/caregiver.   - Collaborate with rehabilitation services on mobility goals if consulted  - Out of bed for toileting  - Record patient progress and toleration of activity level   Outcome: Progressing

## 2025-03-19 NOTE — PLAN OF CARE
Problem: PAIN - ADULT  Goal: Verbalizes/displays adequate comfort level or baseline comfort level  Description: Interventions:  - Encourage patient to monitor pain and request assistance  - Assess pain using appropriate pain scale  - Administer analgesics based on type and severity of pain and evaluate response  - Implement non-pharmacological measures as appropriate and evaluate response  - Consider cultural and social influences on pain and pain management  - Notify physician/advanced practitioner if interventions unsuccessful or patient reports new pain  Outcome: Progressing     Problem: INFECTION - ADULT  Goal: Absence or prevention of progression during hospitalization  Description: INTERVENTIONS:  - Assess and monitor for signs and symptoms of infection  - Monitor lab/diagnostic results  - Monitor all insertion sites, i.e. indwelling lines, tubes, and drains  - Monitor endotracheal if appropriate and nasal secretions for changes in amount and color  - [place] appropriate cooling/warming therapies per order  - Administer medications as ordered  - Instruct and encourage patient and family to use good hand hygiene technique  - Identify and instruct in appropriate isolation precautions for identified infection/condition  Outcome: Progressing     Problem: SAFETY ADULT  Goal: Patient will remain free of falls  Description: INTERVENTIONS:  - Educate patient/family on patient safety including physical limitations  - Instruct patient to call for assistance with activity   - Consult OT/PT to assist with strengthening/mobility   - Keep Call bell within reach  - Keep bed low and locked with side rails adjusted as appropriate  - Keep care items and personal belongings within reach  - Initiate and maintain comfort rounds  - Make Fall Risk Sign visible to staff- Apply yellow socks and bracelet for high fall risk patients  - Consider moving patient to room near nurses station  Outcome: Progressing  Goal: Maintain or  return to baseline ADL function  Description: INTERVENTIONS:  -  Assess patient's ability to carry out ADLs; assess patient's baseline for ADL function and identify physical deficits which impact ability to perform ADLs (bathing, care of mouth/teeth, toileting, grooming, dressing, etc.)  - Assess/evaluate cause of self-care deficits   - Assess range of motion  - Assess patient's mobility; develop plan if impaired  - Assess patient's need for assistive devices and provide as appropriate  - Encourage maximum independence but intervene and supervise when necessary  - Involve family in performance of ADLs  - Assess for home care needs following discharge   - Consider OT consult to assist with ADL evaluation and planning for discharge  - Provide patient education as appropriate  Outcome: Progressing  Goal: Maintains/Returns to pre admission functional level  Description: INTERVENTIONS:  - Perform AM-PAC 6 Click Basic Mobility/ Daily Activity assessment daily.  - Set and communicate daily mobility goal to care team and patient/family/caregiver.   - Collaborate with rehabilitation services on mobility goals if consulted  - Out of bed for toileting  - Record patient progress and toleration of activity level   Outcome: Progressing     Problem: DISCHARGE PLANNING  Goal: Discharge to home or other facility with appropriate resources  Description: INTERVENTIONS:  - Identify barriers to discharge w/patient and caregiver  - Arrange for needed discharge resources and transportation as appropriate  - Identify discharge learning needs (meds, wound care, etc.)  - Arrange for interpretive services to assist at discharge as needed  - Refer to Case Management Department for coordinating discharge planning if the patient needs post-hospital services based on physician/advanced practitioner order or complex needs related to functional status, cognitive ability, or social support system  Outcome: Progressing     Problem: Knowledge  Deficit  Goal: Patient/family/caregiver demonstrates understanding of disease process, treatment plan, medications, and discharge instructions  Description: Complete learning assessment and assess knowledge base.  Interventions:  - Provide teaching at level of understanding  - Provide teaching via preferred learning methods  Outcome: Progressing     Problem: CARDIOVASCULAR - ADULT  Goal: Maintains optimal cardiac output and hemodynamic stability  Description: INTERVENTIONS:  - Monitor I/O, vital signs and rhythm  - Monitor for S/S and trends of decreased cardiac output  - Administer and titrate ordered vasoactive medications to optimize hemodynamic stability  - Assess quality of pulses, skin color and temperature  - Assess for signs of decreased coronary artery perfusion  - Instruct patient to report change in severity of symptoms  Outcome: Progressing     Problem: RESPIRATORY - ADULT  Goal: Achieves optimal ventilation and oxygenation  Description: INTERVENTIONS:  - Assess for changes in respiratory status  - Assess for changes in mentation and behavior  - Position to facilitate oxygenation and minimize respiratory effort  - Oxygen administered by appropriate delivery if ordered  - Initiate smoking cessation education as indicated  - Encourage broncho-pulmonary hygiene including cough, deep breathe, Incentive Spirometry  - Assess the need for suctioning and aspirate as needed  - Assess and instruct to report SOB or any respiratory difficulty  - Respiratory Therapy support as indicated  Outcome: Progressing

## 2025-03-19 NOTE — ASSESSMENT & PLAN NOTE
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

## 2025-03-19 NOTE — PROGRESS NOTES
Progress Note - Hospitalist   Name: Domingo Trevino 65 y.o. 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 Decompensated heart failure  On admission: Cr 2.7, Trop 169>192. BNP 1144. CXR mild vascular congestion, cardiomegaly   Outpatient regimen: torsemide 20mg daily, losartan 50mg daily, hydralazine 25mg q8h    Plan:  Diuresis: IV lasix 50mg twice daily - increased to 80 mg IV BID with good response, continue  Appreciate cardiology recommendations  Cardiac diet, daily weights, I&O, monitor and replenish electrolytes   Elevated troponin  No chest pain but elevated in setting of CHF, see plan above  Chronic kidney disease (CKD), stage IV (severe) (HCC)  Lab Results   Component Value Date    EGFR 25 03/19/2025    EGFR 23 03/18/2025    EGFR 27 (L) 03/16/2025    CREATININE 2.57 (H) 03/19/2025    CREATININE 2.70 (H) 03/18/2025    CREATININE 2.6 (H) 03/16/2025     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 Sequential Compression Devices Ordered.    Mobility:   Basic Mobility Inpatient Raw Score: 24  JH-HLM Goal: 8: Walk 250 feet or more  JH-HLM Achieved: 7: Walk 25 feet or more  JH-HLM Goal achieved. Continue to encourage appropriate  mobility.    Patient Centered Rounds: I performed bedside rounds with nursing staff today.   Discussions with Specialists or Other Care Team Provider: JUAN, CECILIA    Education and Discussions with Family / Patient: patient    Current Length of Stay: 0 day(s)  Current Patient Status: Inpatient   Certification Statement: The patient, admitted on an observation basis, will now require > 2 midnight hospital stay due to IV furosemide  Discharge Plan: Anticipate discharge in 24-48 hrs to home.    Code Status: Level 1 - Full Code    Subjective   Patient reports improvement and increased urination while on Lasix.  No acute complaints.    Objective :  Temp:  [97.8 °F (36.6 °C)-98.4 °F (36.9 °C)] 98.4 °F (36.9 °C)  HR:  [57-73] 57  BP: (138-166)/(89-98) 138/94  Resp:  [18-20] 20  SpO2:  [94 %-99 %] 94 %  O2 Device: None (Room air)    Body mass index is 19.11 kg/m².     Input and Output Summary (last 24 hours):     Intake/Output Summary (Last 24 hours) at 3/19/2025 1639  Last data filed at 3/19/2025 1255  Gross per 24 hour   Intake 1680 ml   Output 3195 ml   Net -1515 ml       Physical Exam  Constitutional:       General: He is not in acute distress.  HENT:      Head: Normocephalic and atraumatic.      Mouth/Throat:      Mouth: Mucous membranes are moist.   Eyes:      Conjunctiva/sclera: Conjunctivae normal.   Cardiovascular:      Rate and Rhythm: Normal rate. Rhythm irregular.   Pulmonary:      Effort: No respiratory distress.      Breath sounds: Rales present. No wheezing.   Abdominal:      General: There is no distension.      Tenderness: There is no abdominal tenderness. There is no guarding.   Musculoskeletal:      Right lower leg: No edema.      Left lower leg: No edema.   Skin:     General: Skin is warm and dry.   Neurological:      Mental Status: He is oriented to person, place, and time.   Psychiatric:         Mood and Affect: Mood normal.         Lines/Drains:              Lab Results: I have reviewed the following  results:   Results from last 7 days   Lab Units 03/19/25  0528 03/18/25  0536 03/18/25  0136   WBC Thousand/uL 5.68   < > 5.22   HEMOGLOBIN g/dL 8.9*   < > 8.7*   HEMATOCRIT % 28.7*   < > 28.2*   PLATELETS Thousands/uL 232   < > 231   SEGS PCT %  --   --  65   LYMPHO PCT %  --   --  24   MONO PCT %  --   --  8   EOS PCT %  --   --  3    < > = values in this interval not displayed.     Results from last 7 days   Lab Units 03/19/25  0532 03/18/25  0136   SODIUM mmol/L 142 142   POTASSIUM mmol/L 5.2 4.0   CHLORIDE mmol/L 106 106   CO2 mmol/L 27 27   BUN mg/dL 51* 56*   CREATININE mg/dL 2.57* 2.70*   ANION GAP mmol/L 9 9   CALCIUM mg/dL 8.9 8.8   ALBUMIN g/dL  --  4.0   TOTAL BILIRUBIN mg/dL  --  0.68   ALK PHOS U/L  --  62   ALT U/L  --  15   AST U/L  --  18   GLUCOSE RANDOM mg/dL 79 95     Results from last 7 days   Lab Units 03/19/25  0528   INR  2.96*                   Recent Cultures (last 7 days):         Imaging Results Review: I reviewed radiology reports from this admission including: chest xray.      Last 24 Hours Medication List:     Current Facility-Administered Medications:     acetaminophen (TYLENOL) tablet 975 mg, Q8H PRN    aluminum-magnesium hydroxide-simethicone (MAALOX) oral suspension 30 mL, Q6H PRN    furosemide (LASIX) injection 80 mg, BID    hydrALAZINE (APRESOLINE) tablet 25 mg, Q8H LOTTIE    LORazepam (ATIVAN) tablet 0.5 mg, HS PRN    losartan (COZAAR) tablet 50 mg, Daily    melatonin tablet 3 mg, HS PRN    ondansetron (ZOFRAN) injection 4 mg, Q6H PRN    polyethylene glycol (MIRALAX) packet 17 g, Daily PRN    [Held by provider] warfarin (COUMADIN) tablet 7.5 mg, Once per day on Sunday Monday Tuesday Wednesday Friday Saturday **AND** [START ON 3/20/2025] warfarin (COUMADIN) tablet 5 mg, Weekly    Administrative Statements   Today, Patient Was Seen By: Abby Mata MD  I have spent a total time of 56 minutes in caring for this patient on the day of the visit/encounter including Diagnostic  results, Reviewing/placing orders in the medical record (including tests, medications, and/or procedures), Obtaining or reviewing history  , and Communicating with other healthcare professionals .    **Please Note: This note may have been constructed using a voice recognition system.**

## 2025-03-19 NOTE — ASSESSMENT & PLAN NOTE
Wt Readings from Last 3 Encounters:   03/19/25 67.5 kg (148 lb 13 oz)   03/05/25 69.9 kg (154 lb)   03/02/25 67.5 kg (148 lb 13 oz)     Presents with worsening dyspnea, increasing weight. Decompensated heart failure  On admission: Cr 2.7, Trop 169>192. BNP 1144. CXR mild vascular congestion, cardiomegaly   Outpatient regimen: torsemide 20mg daily, losartan 50mg daily, hydralazine 25mg q8h    Plan:  Diuresis: IV lasix 50mg twice daily - increased to 80 mg IV BID with good response, continue  Appreciate cardiology recommendations  Cardiac diet, daily weights, I&O, monitor and replenish electrolytes

## 2025-03-19 NOTE — PROGRESS NOTES
Progress Note - Cardiology   Name: Domingo Trevino 65 y.o. male I MRN: [id number]  Unit/Bed#: E5 -01 I Date of Admission: 3/18/2025   Date of Service: 3/19/2025 I Hospital Day: 0     Assessment/Plan:  Acute on chronic HFpEF  LVEF 55-60%, moderate LVH, GLS -17%, severe LA and moderate RA dilatation, mild AR/MR/TR with PASP 56 mmHg, October 2024  AL amyloid with cardiac involvement  Nonischemic troponin elevation secondary to heart failure  Multiple myeloma with bone marrow biopsy, October 2024  Persistent atrial fibrillation on warfarin since 2023  Hypokalemia - resolved  Hypertension  CKD stage IV  History of renal infarct  Mild to moderate mitral regurgitation  Pulmonary hypertension with PASP 56 mmHg, October 2024  History of tobacco use    Improving on IV Lasix, continue.    Daily weight    Daily BMP    Continue home hydralazine and Losartan. Resume home Coreg 3.125 mg BID.     [facility] Cardiologist was planning for OP JASON/CV. I will call her to see if she has a date for that coming up.    I discussed that patients who have heart failure and AL amyloid have median survival rate of 6 months. This is without treatment (chemo). Prompt initiation of treatment is critical for improving outcome. With treatment, the overall survival rate is 2 years.     I told him getting JASON/CV for A-fib is not as important at this time as figuring out amyloid treatment.    Subjective: Pt reports improved orthopnea and HERBERT. Still urinating briskly on IV Lasix. No coughing.    Objective:  Vitals:    03/19/25 1021   BP: 144/94   Pulse: 66   Resp:    Temp:    SpO2: 96%     Standing weight today: 148#    Physical Exam  Vitals and nursing note reviewed.   Constitutional:       General: He is not in acute distress.     Appearance: He is not ill-appearing or toxic-appearing.   HENT:      Head: Normocephalic and atraumatic.      Nose: No congestion.      Mouth/Throat:      Mouth: Mucous membranes are moist.   Eyes:      Conjunctiva/sclera:  Conjunctivae normal.   Neck:      Comments: Still mild JVD  Cardiovascular:      Rate and Rhythm: Normal rate and regular rhythm.      Heart sounds: S1 normal and S2 normal.   Pulmonary:      Effort: Pulmonary effort is normal.   Abdominal:      General: Abdomen is flat.   Musculoskeletal:         General: No swelling.   Skin:     General: Skin is warm and dry.   Neurological:      General: No focal deficit present.      Mental Status: He is alert.   Psychiatric:         Behavior: Behavior normal.     Data: 3/19/25 lab results reviewed    Effie Colorado PA-C

## 2025-03-20 VITALS
RESPIRATION RATE: 18 BRPM | OXYGEN SATURATION: 96 % | HEART RATE: 75 BPM | SYSTOLIC BLOOD PRESSURE: 144 MMHG | DIASTOLIC BLOOD PRESSURE: 84 MMHG | HEIGHT: 74 IN | TEMPERATURE: 97.9 F | BODY MASS INDEX: 19.07 KG/M2 | WEIGHT: 148.59 LBS

## 2025-03-20 LAB
ANION GAP SERPL CALCULATED.3IONS-SCNC: 12 MMOL/L (ref 4–13)
BUN SERPL-MCNC: 53 MG/DL (ref 5–25)
CALCIUM SERPL-MCNC: 9.1 MG/DL (ref 8.4–10.2)
CHLORIDE SERPL-SCNC: 102 MMOL/L (ref 96–108)
CO2 SERPL-SCNC: 26 MMOL/L (ref 21–32)
CREAT SERPL-MCNC: 2.64 MG/DL (ref 0.6–1.3)
ERYTHROCYTE [DISTWIDTH] IN BLOOD BY AUTOMATED COUNT: 15.9 % (ref 11.6–15.1)
GFR SERPL CREATININE-BSD FRML MDRD: 24 ML/MIN/1.73SQ M
GLUCOSE SERPL-MCNC: 144 MG/DL (ref 65–140)
HCT VFR BLD AUTO: 29.9 % (ref 36.5–49.3)
HGB BLD-MCNC: 9.4 G/DL (ref 12–17)
INR PPP: 2.32 (ref 0.85–1.19)
MCH RBC QN AUTO: 21.9 PG (ref 26.8–34.3)
MCHC RBC AUTO-ENTMCNC: 31.4 G/DL (ref 31.4–37.4)
MCV RBC AUTO: 70 FL (ref 82–98)
PLATELET # BLD AUTO: 252 THOUSANDS/UL (ref 149–390)
PMV BLD AUTO: 11.5 FL (ref 8.9–12.7)
POTASSIUM SERPL-SCNC: 3.3 MMOL/L (ref 3.5–5.3)
PROTHROMBIN TIME: 25.2 SECONDS (ref 12.3–15)
RBC # BLD AUTO: 4.29 MILLION/UL (ref 3.88–5.62)
SODIUM SERPL-SCNC: 140 MMOL/L (ref 135–147)
WBC # BLD AUTO: 4.86 THOUSAND/UL (ref 4.31–10.16)

## 2025-03-20 PROCEDURE — 85610 PROTHROMBIN TIME: CPT | Performed by: INTERNAL MEDICINE

## 2025-03-20 PROCEDURE — 80048 BASIC METABOLIC PNL TOTAL CA: CPT | Performed by: FAMILY MEDICINE

## 2025-03-20 PROCEDURE — 85027 COMPLETE CBC AUTOMATED: CPT | Performed by: INTERNAL MEDICINE

## 2025-03-20 PROCEDURE — 99239 HOSP IP/OBS DSCHRG MGMT >30: CPT | Performed by: FAMILY MEDICINE

## 2025-03-20 RX ORDER — POTASSIUM CHLORIDE 1500 MG/1
40 TABLET, EXTENDED RELEASE ORAL ONCE
Status: COMPLETED | OUTPATIENT
Start: 2025-03-20 | End: 2025-03-20

## 2025-03-20 RX ORDER — POTASSIUM CHLORIDE 1500 MG/1
20 TABLET, EXTENDED RELEASE ORAL DAILY
Qty: 30 TABLET | Refills: 0 | Status: ON HOLD | OUTPATIENT
Start: 2025-03-20

## 2025-03-20 RX ORDER — LORAZEPAM 0.5 MG/1
0.5 TABLET ORAL
Qty: 7 TABLET | Refills: 0 | Status: ON HOLD | OUTPATIENT
Start: 2025-03-20 | End: 2025-04-13

## 2025-03-20 RX ORDER — TORSEMIDE 20 MG/1
40 TABLET ORAL DAILY
Qty: 60 TABLET | Refills: 0 | Status: ON HOLD | OUTPATIENT
Start: 2025-03-20

## 2025-03-20 RX ORDER — TORSEMIDE 20 MG/1
40 TABLET ORAL DAILY
Status: DISCONTINUED | OUTPATIENT
Start: 2025-03-21 | End: 2025-03-20 | Stop reason: HOSPADM

## 2025-03-20 RX ORDER — CARVEDILOL 3.12 MG/1
3.12 TABLET ORAL 2 TIMES DAILY WITH MEALS
Status: DISCONTINUED | OUTPATIENT
Start: 2025-03-20 | End: 2025-03-20 | Stop reason: HOSPADM

## 2025-03-20 RX ADMIN — HYDRALAZINE HYDROCHLORIDE 25 MG: 25 TABLET ORAL at 05:52

## 2025-03-20 RX ADMIN — LOSARTAN POTASSIUM 50 MG: 50 TABLET, FILM COATED ORAL at 08:08

## 2025-03-20 RX ADMIN — ACETAMINOPHEN 975 MG: 325 TABLET, FILM COATED ORAL at 03:44

## 2025-03-20 RX ADMIN — FUROSEMIDE 80 MG: 10 INJECTION, SOLUTION INTRAMUSCULAR; INTRAVENOUS at 08:08

## 2025-03-20 RX ADMIN — HYDRALAZINE HYDROCHLORIDE 25 MG: 25 TABLET ORAL at 13:11

## 2025-03-20 RX ADMIN — POTASSIUM CHLORIDE 40 MEQ: 1500 TABLET, EXTENDED RELEASE ORAL at 13:11

## 2025-03-20 NOTE — DISCHARGE SUMMARY
Discharge Summary - Hospitalist   Name: Domingo Trevino 65 y.o. male I MRN: [id number]  Unit/Bed#: E5 -01 I Date of Admission: 3/18/2025   Date of Service: 3/20/2025 I Hospital Day: 1     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  Wt Readings from Last 3 Encounters:   03/20/25 67.4 kg (148 lb 9.4 oz)   03/05/25 69.9 kg (154 lb)   03/02/25 67.5 kg (148 lb 13 oz)     Presents with worsening dyspnea, increasing weight. Decompensated heart failure  On admission: Cr 2.7, Trop 169>192. BNP 1144. CXR mild vascular congestion, cardiomegaly   Outpatient regimen: torsemide 20mg daily, losartan 50mg daily, hydralazine 25mg q8h    Underwent IV diuresis, and discharged on torsemide 40 mg daily  Patient had episodes of hyperkalemia, discharged on 20 mEq of KCl daily  Follow-up with patient's medical care for discharge soon as possible  Elevated troponin  No chest pain but elevated in setting of CHF, see plan above  Chronic kidney disease (CKD), stage IV (severe) (HCC)  Lab Results   Component Value Date    EGFR 24 03/20/2025    EGFR 25 03/19/2025    EGFR 23 03/18/2025    CREATININE 2.64 (H) 03/20/2025    CREATININE 2.57 (H) 03/19/2025    CREATININE 2.70 (H) 03/18/2025     CKD in setting of amyloidosis  Baseline Cr: 2.3-2.6  Admit Cr: 2.7, remains at baseline at 2.6 at time of discharge  Outpatient f/u with patient's nephrologist is advised    Chronic atrial fibrillation (HCC)  Rate-controlled  Continue warfarin, follow INR outpatient  Hypokalemia  Resume potassium replacement  Multiple myeloma not having achieved remission (HCC)  Resume outpatient follow-up with hematology upon discharge     Medical Problems       Resolved Problems  Date Reviewed: 2/27/2025   None       Discharging Physician / Practitioner: Abby Mata MD  PCP: Maricruz Peres  Admission Date:   Admission Orders (From admission, onward)       Ordered        03/19/25 1612  INPATIENT ADMISSION  Once            03/18/25 0255  Place in  Observation  Once                          Discharge Date: 03/20/25    Consultations During Hospital Stay:  Cardiology    Procedures Performed:   XR chest 2 views   ED Interpretation by Russell Younger DO (03/18 0204)   Abnormal   Pulmonary vascular congestion      Final Result by Lillie Gamez MD (03/18 0749)      Mild pulmonary edema with small pleural effusions.            Workstation performed: [id number]               Significant Findings / Test Results:   Results from last 7 days   Lab Units 03/20/25  0441   WBC Thousand/uL 4.86   HEMOGLOBIN g/dL 9.4*   HEMATOCRIT % 29.9*   PLATELETS Thousands/uL 252     Results from last 7 days   Lab Units 03/20/25  0441   SODIUM mmol/L 140   POTASSIUM mmol/L 3.3*   CHLORIDE mmol/L 102   CO2 mmol/L 26   BUN mg/dL 53*   CREATININE mg/dL 2.64*   CALCIUM mg/dL 9.1         Test Results Pending at Discharge (will require follow up):   None     Outpatient Tests Requested:  BMP    Complications:  None    Reason for Admission: SOB    Hospital Course:   Domingo Trevino is a 65 y.o. male patient with history of CHF, atrial fibrillation on anticoagulation with warfarin, CKD, amyloidosis, multiple myeloma who originally presented to the hospital on 3/18/2025 due to shortness of breath.  The patient was diagnosed with CHF exacerbation and underwent treatment with IV diuresis with favorable response.  The patient was discharged on increased home dose of his torsemide from 20 to 40 mg daily.  The patient was instructed to follow-up with his primary care provider as well as with his cardiologist, nephrologist and hematologist.  The patient is also instructed to check a BMP next week.  Return precautions discussed.    Please see above list of diagnoses and related plan for additional information.     Condition at Discharge: good    Discharge Day Visit / Exam:     Subjective: Patient reports feeling improved and would like to be discharged home.  He understands that he is to  "follow-up with his outpatient medical providers.    Vitals: Blood Pressure: 144/84 (03/20/25 1310)  Pulse: 75 (03/20/25 1310)  Temperature: 97.9 °F (36.6 °C) (03/20/25 0733)  Temp Source: Oral (03/20/25 0733)  Respirations: 18 (03/20/25 0733)  Height: 6' 2\" (188 cm) (03/19/25 1531)  Weight - Scale: 67.4 kg (148 lb 9.4 oz) (03/20/25 0555)  SpO2: 96 % (03/20/25 1310)  Physical Exam  Constitutional:       General: He is not in acute distress.  HENT:      Head: Normocephalic and atraumatic.   Cardiovascular:      Rate and Rhythm: Normal rate and regular rhythm.   Pulmonary:      Effort: No respiratory distress.      Breath sounds: No wheezing or rales.   Abdominal:      General: There is no distension.      Tenderness: There is no abdominal tenderness. There is no guarding.   Musculoskeletal:      Right lower leg: No edema.      Left lower leg: No edema.   Skin:     General: Skin is warm and dry.   Neurological:      Mental Status: He is oriented to person, place, and time.   Psychiatric:         Mood and Affect: Mood normal.          Discharge instructions/Information to patient and family:   See after visit summary for information provided to patient and family.      Provisions for Follow-Up Care:  See after visit summary for information related to follow-up care and any pertinent home health orders.      Mobility at time of Discharge:   Basic Mobility Inpatient Raw Score: 24  JH-HLM Goal: 8: Walk 250 feet or more  JH-HLM Achieved: 7: Walk 25 feet or more  HLM Goal achieved. Continue to encourage appropriate mobility.     Disposition:   Home    Planned Readmission: No    Discharge Medications:  See after visit summary for reconciled discharge medications provided to patient and/or family.      Administrative Statements   Discharge Statement:  I have spent a total time of 37 minutes in caring for this patient on the day of the visit/encounter. >30 minutes of time was spent on: Diagnostic results, Counseling / " Coordination of care, Documenting in the medical record, Reviewing / ordering tests, medicine, procedures  , and Communicating with other healthcare professionals .    **Please Note: This note may have been constructed using a voice recognition system**

## 2025-03-20 NOTE — PLAN OF CARE
Problem: PAIN - ADULT  Goal: Verbalizes/displays adequate comfort level or baseline comfort level  Description: Interventions:  - Encourage patient to monitor pain and request assistance  - Assess pain using appropriate pain scale  - Administer analgesics based on type and severity of pain and evaluate response  - Implement non-pharmacological measures as appropriate and evaluate response  - Consider cultural and social influences on pain and pain management  - Notify physician/advanced practitioner if interventions unsuccessful or patient reports new pain  Outcome: Progressing     Problem: INFECTION - ADULT  Goal: Absence or prevention of progression during hospitalization  Description: INTERVENTIONS:  - Assess and monitor for signs and symptoms of infection  - Monitor lab/diagnostic results  - Monitor all insertion sites, i.e. indwelling lines, tubes, and drains  - Monitor endotracheal if appropriate and nasal secretions for changes in amount and color  - [place] appropriate cooling/warming therapies per order  - Administer medications as ordered  - Instruct and encourage patient and family to use good hand hygiene technique  - Identify and instruct in appropriate isolation precautions for identified infection/condition  Outcome: Progressing     Problem: DISCHARGE PLANNING  Goal: Discharge to home or other facility with appropriate resources  Description: INTERVENTIONS:  - Identify barriers to discharge w/patient and caregiver  - Arrange for needed discharge resources and transportation as appropriate  - Identify discharge learning needs (meds, wound care, etc.)  - Arrange for interpretive services to assist at discharge as needed  - Refer to Case Management Department for coordinating discharge planning if the patient needs post-hospital services based on physician/advanced practitioner order or complex needs related to functional status, cognitive ability, or social support system  Outcome: Progressing      Problem: Knowledge Deficit  Goal: Patient/family/caregiver demonstrates understanding of disease process, treatment plan, medications, and discharge instructions  Description: Complete learning assessment and assess knowledge base.  Interventions:  - Provide teaching at level of understanding  - Provide teaching via preferred learning methods  Outcome: Progressing     Problem: CARDIOVASCULAR - ADULT  Goal: Maintains optimal cardiac output and hemodynamic stability  Description: INTERVENTIONS:  - Monitor I/O, vital signs and rhythm  - Monitor for S/S and trends of decreased cardiac output  - Administer and titrate ordered vasoactive medications to optimize hemodynamic stability  - Assess quality of pulses, skin color and temperature  - Assess for signs of decreased coronary artery perfusion  - Instruct patient to report change in severity of symptoms  Outcome: Progressing     Problem: RESPIRATORY - ADULT  Goal: Achieves optimal ventilation and oxygenation  Description: INTERVENTIONS:  - Assess for changes in respiratory status  - Assess for changes in mentation and behavior  - Position to facilitate oxygenation and minimize respiratory effort  - Oxygen administered by appropriate delivery if ordered  - Initiate smoking cessation education as indicated  - Encourage broncho-pulmonary hygiene including cough, deep breathe, Incentive Spirometry  - Assess the need for suctioning and aspirate as needed  - Assess and instruct to report SOB or any respiratory difficulty  - Respiratory Therapy support as indicated  Outcome: Progressing

## 2025-03-20 NOTE — CASE MANAGEMENT
Case Management Assessment & Discharge Planning Note    Patient name Domingo Trevino  Location [facility] /E5 -* MRN [id number]  : 1960 Date 3/20/2025       Current Admission Date: 3/18/2025  Current Admission Diagnosis:HFpEF, 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   Patient Active Problem List    Diagnosis Date Noted Date Diagnosed    Renal infarction (HCC) 2025     Chronic kidney disease (CKD), stage IV (severe) (HCC) 2025     AL amyloidosis (HCC) 2025     Multiple myeloma not having achieved remission (HCC) 2025     Chronic atrial fibrillation (HCC) 2025     HFpEF, 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 2022     Primary hypertension 2022     Elevated troponin 2022     Hypokalemia 2022       LOS (days): 1  Geometric Mean LOS (GMLOS) (days):   Days to GMLOS:     OBJECTIVE:  PATIENT READMITTED TO HOSPITAL  Risk of Unplanned Readmission Score: 22.27      Current admission status: Inpatient    [facility] Pharmacy:   [id number] [place] [facility] [address] [facility]  [address] [place] [id number]  Phone: [phone] Fax: [phone]    Primary Care Provider: Maricruz Peres    Primary Insurance: [employer]  Secondary Insurance:     ASSESSMENT:    Readmission [facility]  30 Day Readmission: Yes  During your hospital stay, did someone (provider, nurse, ) explain your care to you in a way you could understand?: Yes  Did you feel medically stable to leave the hospital?: Yes  Were you able to pay for your medication at the pharmacy?: Yes  Did you have reliable transportation to take you to your appointments?: Yes  During previous admission, was a post-acute recommendation made?: Yes    Patient Information  Admitted from:: Home  Mental Status: Alert  During Assessment patient was accompanied by: Not accompanied during assessment  Assessment information provided by:: Patient  Primary Caregiver: Self  Support Systems: Children, Family  members, Spouse/significant other  County of Residence: [place]  What [facility] do you live in?: [place]  Type of Current Residence: [facility]  Living Arrangements: Lives w/ Spouse/significant other  Is patient a ?: No    Activities of Daily Living Prior to Admission  Functional Status: Independent  Completes ADLs independently?: Yes  Ambulates independently?: Yes  Does patient use assisted devices?: No  Does patient currently own DME?: No  Does patient have a history of Outpatient Therapy (PT/OT)?: No  Does the patient have a history of Short-Term Rehab?: No  Does patient have a history of HHC?: No  Does patient currently have HHC?: No    Patient Information Continued  Does patient have prescription coverage?: Yes  Can the patient afford their medications and any related supplies (such as glucometers or test strips)?: No  Does patient receive dialysis treatments?: No  Does patient have a history of substance abuse?: No  Does patient have a history of Mental Health Diagnosis?: No    Means of Transportation  Means of Transport to [facility]:: Family transport    DISCHARGE DETAILS:    Discharge planning discussed with:: pt     CM contacted family/caregiver?: Yes  Were Treatment Team discharge recommendations reviewed with patient/caregiver?: Yes  Did patient/caregiver verbalize understanding of patient care needs?: Yes  Were patient/caregiver advised of the risks associated with not following Treatment Team discharge recommendations?: Yes    Contacts  Patient Contacts: Sunshine Trevino  Relationship to Patient:: Family  Contact Method: Phone  Phone Number: [phone]  Reason/Outcome: Continuity of Care, Emergency Contact, Discharge Planning    Requested Home Health Care         Is the patient interested in HHC at discharge?: No    DME Referral Provided  Referral made for DME?: No    Treatment Team Recommendation: Home  Discharge Destination Plan:: Home  Transport at Discharge : Family     Additional Comments: Cm spoke with pt  via phone and reviewed cm role. He lives in a ranch home with his SO, Sunshine. He is ind at baseline. He has no outpt PT, STR, HHC. MH or D/A tx. He has no insurance and said he was ineligible for MA due to too high of income but outpt cm had given him resources for [employer] for affordable insurance. He does have trouble affording medications at this time. [facility].

## 2025-03-20 NOTE — ASSESSMENT & PLAN NOTE
Lab Results   Component Value Date    EGFR 24 03/20/2025    EGFR 25 03/19/2025    EGFR 23 03/18/2025    CREATININE 2.64 (H) 03/20/2025    CREATININE 2.57 (H) 03/19/2025    CREATININE 2.70 (H) 03/18/2025     CKD in setting of amyloidosis  Baseline Cr: 2.3-2.6  Admit Cr: 2.7, remains at baseline at 2.6 at time of discharge  Outpatient f/u with patient's nephrologist is advised

## 2025-03-20 NOTE — DISCHARGE INSTR - AVS FIRST PAGE
Dear Mr. Trevino,    It was a pleasure to have you under our care.    Please take torsemide 40 mg daily, potassium 20 mEq daily and check your blood work (BMP, basic metabolic panel) next week.    Please follow-up with your primary care provider, hematology and cardiology as soon as possible, preferably in the next 2 weeks.    Please do not hesitate to call our office with any questions    [phone]    Best wishes,    Abby Mata MD

## 2025-03-20 NOTE — PLAN OF CARE
Problem: PAIN - ADULT  Goal: Verbalizes/displays adequate comfort level or baseline comfort level  Description: Interventions:  - Encourage patient to monitor pain and request assistance  - Assess pain using appropriate pain scale  - Administer analgesics based on type and severity of pain and evaluate response  - Implement non-pharmacological measures as appropriate and evaluate response  - Consider cultural and social influences on pain and pain management  - Notify physician/advanced practitioner if interventions unsuccessful or patient reports new pain  3/19/2025 2001 by Catarina Gonzalez RN  Outcome: Progressing  3/19/2025 2000 by Catarina Gonzalez RN  Outcome: Progressing     Problem: INFECTION - ADULT  Goal: Absence or prevention of progression during hospitalization  Description: INTERVENTIONS:  - Assess and monitor for signs and symptoms of infection  - Monitor lab/diagnostic results  - Monitor all insertion sites, i.e. indwelling lines, tubes, and drains  - Monitor endotracheal if appropriate and nasal secretions for changes in amount and color  - [place] appropriate cooling/warming therapies per order  - Administer medications as ordered  - Instruct and encourage patient and family to use good hand hygiene technique  - Identify and instruct in appropriate isolation precautions for identified infection/condition  3/19/2025 2001 by Catarina Gonzalez RN  Outcome: Progressing  3/19/2025 2000 by Catarina Gonzalez RN  Outcome: Progressing     Problem: DISCHARGE PLANNING  Goal: Discharge to home or other facility with appropriate resources  Description: INTERVENTIONS:  - Identify barriers to discharge w/patient and caregiver  - Arrange for needed discharge resources and transportation as appropriate  - Identify discharge learning needs (meds, wound care, etc.)  - Arrange for interpretive services to assist at discharge as needed  - Refer to Case Management Department for coordinating discharge planning if  the patient needs post-hospital services based on physician/advanced practitioner order or complex needs related to functional status, cognitive ability, or social support system  3/19/2025 2001 by Catarina Gonzalez RN  Outcome: Progressing  3/19/2025 2000 by Catarina Gonzalez RN  Outcome: Progressing     Problem: Knowledge Deficit  Goal: Patient/family/caregiver demonstrates understanding of disease process, treatment plan, medications, and discharge instructions  Description: Complete learning assessment and assess knowledge base.  Interventions:  - Provide teaching at level of understanding  - Provide teaching via preferred learning methods  3/19/2025 2001 by Catarina Gonzalez RN  Outcome: Progressing  3/19/2025 2000 by Catarina Gonzalez RN  Outcome: Progressing     Problem: CARDIOVASCULAR - ADULT  Goal: Maintains optimal cardiac output and hemodynamic stability  Description: INTERVENTIONS:  - Monitor I/O, vital signs and rhythm  - Monitor for S/S and trends of decreased cardiac output  - Administer and titrate ordered vasoactive medications to optimize hemodynamic stability  - Assess quality of pulses, skin color and temperature  - Assess for signs of decreased coronary artery perfusion  - Instruct patient to report change in severity of symptoms  3/19/2025 2001 by Catarina Gonzalez RN  Outcome: Progressing  3/19/2025 2000 by Catarina Gonzalez RN  Outcome: Progressing     Problem: RESPIRATORY - ADULT  Goal: Achieves optimal ventilation and oxygenation  Description: INTERVENTIONS:  - Assess for changes in respiratory status  - Assess for changes in mentation and behavior  - Position to facilitate oxygenation and minimize respiratory effort  - Oxygen administered by appropriate delivery if ordered  - Initiate smoking cessation education as indicated  - Encourage broncho-pulmonary hygiene including cough, deep breathe, Incentive Spirometry  - Assess the need for suctioning and aspirate as needed  - Assess and  instruct to report SOB or any respiratory difficulty  - Respiratory Therapy support as indicated  3/19/2025 2001 by Catarina Gonzalez, RN  Outcome: Progressing  3/19/2025 2000 by Catarina Gonzalez, RN  Outcome: Progressing

## 2025-03-20 NOTE — ASSESSMENT & PLAN NOTE
Wt Readings from Last 3 Encounters:   03/20/25 67.4 kg (148 lb 9.4 oz)   03/05/25 69.9 kg (154 lb)   03/02/25 67.5 kg (148 lb 13 oz)     Presents with worsening dyspnea, increasing weight. Decompensated heart failure  On admission: Cr 2.7, Trop 169>192. BNP 1144. CXR mild vascular congestion, cardiomegaly   Outpatient regimen: torsemide 20mg daily, losartan 50mg daily, hydralazine 25mg q8h    Underwent IV diuresis, and discharged on torsemide 40 mg daily  Patient had episodes of hyperkalemia, discharged on 20 mEq of KCl daily  Follow-up with patient's medical care for discharge soon as possible

## 2025-03-21 ENCOUNTER — PATIENT OUTREACH (OUTPATIENT)
Dept: CASE MANAGEMENT | Facility: HOSPITAL | Age: 65
End: 2025-03-21

## 2025-03-21 NOTE — PROGRESS NOTES
"Outpatient Care Manager Note: Chart notes reviewed. Patient discharged home yesterday. Call made to patient and he reports he is feeling \"fine\" and denies SOB or edema/abdominal distention. He does not have a cardiology appointment until May, but states he thinks he has a cardioversion procedure and cardiac biopsy planned. He is unsure of day and encouraged to call and find out. He also has not logged onto [employer] yet and currently has no insurance. I did recommend he contact his oncologist at [facility] as well as they may be able to guide him on what insurances may help with covering chemotherapy.   Call made to [facility] to reach out to patient to get an appointment ASAP  Will follow up next week.  "

## 2025-03-28 ENCOUNTER — PATIENT OUTREACH (OUTPATIENT)
Dept: CASE MANAGEMENT | Facility: HOSPITAL | Age: 65
End: 2025-03-28

## 2025-03-28 NOTE — PROGRESS NOTES
"Outpatient Care Manager Note: Chart notes reviewed. Despite numerous conversations, still no Hematology appointment and Cardiology appointment not till May.   Call made to patient. He states he feels more SOB and thinks the water is coming back. \"I only last 7 days after the hospital\". He does not know what his weight is. He denies dietary indiscretions or over drinking. Encouraged him to call cardiology today and discuss as he likely needs more diuretic as well as a sooner appointment. Confirmed he has the number. Will follow up next week.  "

## 2025-04-04 ENCOUNTER — PATIENT OUTREACH (OUTPATIENT)
Dept: CASE MANAGEMENT | Facility: HOSPITAL | Age: 65
End: 2025-04-04

## 2025-04-04 NOTE — PROGRESS NOTES
Outpatient Care Manager Note: Chart notes reviewed and call made to patient. He reports he did see heme/onc provider at [facility] an dis scheduled for treatments is a few weeks. He denies any acute symptoms. Encouraged patient to discuss needs with PCP and oncologist and to utilize any social work assistance that is often available for patient with cancer getting treatments. Safe Transition Program closed.

## 2025-04-08 NOTE — UTILIZATION REVIEW
URGENT/EMERGENT  INPATIENT/SPU AUTHORIZATION REQUEST    Date: 04/08/25            # Pages in this Request:     x New Request   Additional Information for PA#:     Office Contact Name:  Sheri LangleyAminaJohnathan Title: Utilization Review, Registed Nurse     Phone: [phone]  Ext.     Availability (Date/Time): M-F 8-4    Type of Review:    Inpatient Review    Late Pick-Up    For Late Pick-up Cases:  How your facility was first notified of the Late Pick-up: PATHS  When your facility was first notified of the Late Pick-up (date): 04/07/2025    Was the recipient a prisoner at the time of admission? no         RECIPIENT INFORMATION    Recipient ID#: [id number]  Recipient Name: Domingo Trevino    YOB: 1960  65 y.o. Recipient Alias:     Gender:  x Male  Female Medicaid Eligibility (HCB Package):          INSURANCE INFORMATION    (All other private or governmental health insurance benefits must be utilized prior to billing the [place] Program)    Was this admission the result of an MVA, other accident, assault, injury, fall, gunshot, bite etc.?  no   If yes, provide a brief description of the incident.      Does the recipient have other insurance coverage?  no  Insurance Company Name:    Policy #:  Did that insurance pay on this claim?    Yes  No     Did that insurance deny this claim?   Yes  No     If yes, reason for denial:      Does the recipient have Medicare?  no  Did Medicare exhaust prior to this admission?    Yes  No     Did Medicare partially pay this claim?   Yes  No     Did Medicare deny this claim?   Yes  No   If yes, reason for denial:      Was the recipient a prisoner at the time of admission?  no        PROVIDER INFORMATION    Hospital Name: [facility]   [facility] Provider ID#: [id number]     Admitting Physician: [facility]                          [employer] Provider ID#: [id number]        ADMISSION INFORMATION      Type of Admission: (please choose one)  ED    Admission  "Floor or Unit Type: Med/Surg    Dates/Times:        ED Date/Time: 3/18/2025 12:29 AM        Observation Date/Time: 03/18/2025  0255         IP Admission Date/Time: 3/19/25  4:12 PM        Discharge or Transfer Date/Time: 3/20/2025  1:58 PM        DIAGNOSIS/PROCEDURE CODES    Primary Diagnosis Code/Primary Diagnosis Code description:  I13.0 - Hypertensive heart and chronic kidney disease with heart failure and stage 1 through stage 4 chronic kidney disease, or unspecified chronic kidney disease   I50.33 - Acute on chronic diastolic (congestive) heart failure   C90.00 - Multiple myeloma not having achieved remission   N18.4 - Chronic kidney disease, stage 4 (severe)     Additional Diagnosis Code(s) and Description(s)-(up to 3 additional codes):    Procedure Code (1) and description:        CLINICAL INFORMATION - PRIOR ADMISSION ONLY    Is there a prior admission with a discharge date within 30 days of the date of this admission?    x Yes (Provide the following information)     Prior admission dates:  2/27/2025 - 3/2/2025 (3 days)  Formerly [facility], [facility] Prior Authorization Number:  [id number]    Review Outcome:   To Be Determined  Initial clinical Review was just faxed (04/08/2025) via EPIC.        CLINICAL INFORMATION - GENERAL REVIEW CHECKLIST    EMERGENCY DEPARTMENT: (Proceed to \"ADMISSION\" if Direct Admission)    Presenting Signs/Symptoms:  Chief Complaint   Patient presents with    Shortness of Breath     Pt reports SOB since last year. Seen yesterday for same CC. States  \"I went to the hospital they did blood work, told me I'm fine and I went home\". -CP, -dizziness   Patient is a 65-year-old male with a significant past medical history of atrial fibrillation, anticoagulated on warfarin, CHF, hypertension, presenting for evaluation of shortness of breath. Patient reports that he has had some difficulty catching his breath that has been a longstanding issue, for at least a year now. He said " admission secondary to CHF exacerbation as recently as approximately 3 weeks ago. Patient was seen and evaluated with similar complaint at an outside emergency department yesterday where he had lab workup as well as chest x-ray and was ultimately discharged to follow-up with his cardiologist. Patient reports continued shortness of breath that seems to be worsened with exertion. He has no pain with breathing. He denies any coughing or fevers. He denies any chest pain. He is unsure with regards to weight gain. He denies any lower extremity swelling. He has been compliant with his torsemide as well as his warfarin. He is otherwise without acute complaint.     Medication/treatment prior to arrival in the ED:  NA    Past Medical History:   A-fib (HCC), CHF (congestive heart failure) (HCC), CKD (chronic kidney disease), stage IV (HCC), and Hypertension.     Clinical Exam:  Constitutional:       Appearance: Normal appearance. He is ill-appearing (chronically).   HENT:      Head: Normocephalic and atraumatic.      Right Ear: External ear normal.      Left Ear: External ear normal.      Nose: Nose normal.   Eyes:      General: No scleral icterus.        Right eye: No discharge.         Left eye: No discharge.      Extraocular Movements: Extraocular movements intact.      Conjunctiva/sclera: Conjunctivae normal.   Cardiovascular:      Rate and Rhythm: Normal rate.      Heart sounds: Normal heart sounds. No murmur heard.     No friction rub. No gallop.   Pulmonary:      Effort: Pulmonary effort is normal. No respiratory distress.      Breath sounds: Rales present.   Abdominal:      General: Abdomen is flat. There is no distension.      Palpations: Abdomen is soft. There is no mass.      Tenderness: There is no abdominal tenderness.   Genitourinary:     Comments: Deferred  Musculoskeletal:      Right lower leg: No tenderness. No edema.      Left lower leg: No tenderness. No edema.   Skin:     General: Skin is warm and dry.    Neurological:      General: No focal deficit present.      Mental Status: He is alert.      Initial Vital Signs: (Temp, Pulse, Resp, and BP)   ED Triage Vitals   Temperature Pulse Respirations Blood Pressure SpO2   03/18/25 0028 03/18/25 0028 03/18/25 0028 03/18/25 0028 03/18/25 0028   98.1 °F (36.7 °C) 60 18 (S) (!) 184/97 98 %      Temp Source Heart Rate Source Patient Position - Orthostatic VS BP Location FiO2 (%)   03/18/25 0028 03/18/25 0100 03/18/25 0028 03/18/25 0028 --   Oral Monitor Sitting Right arm       Pain Score       03/18/25 0028       No Pain         Pertinent Repeat Vital Signs: (include times they were obtained)    Date/Time Temp Pulse Resp BP MAP (mmHg) SpO2 O2 Device Patient Position - Orthostatic VS Madisyn Coma Scale Score Pain      03/19/25 07:35:27 97.8 °F (36.6 °C) 64 18 149/96 114 97 % None (Room air) Lying -- --     03/19/25 0723 -- -- -- -- -- -- -- -- -- No Pain     03/19/25 05:29:17 -- 61 -- 146/97 113 99 % -- -- -- --     03/19/25 0529 -- -- -- 146/97 -- -- -- -- -- --     03/18/25 23:10:38 98 °F (36.7 °C) 63 18 166/98 121 97 % None (Room air) Lying -- --     03/18/25 21:21:54 -- 66 -- 160/92 115 97 % -- -- 15 No Pain     03/18/25 17:23:29 -- 62 18 164/95 118 95 % -- -- -- --     03/18/25 15:27:37 97.6 °F (36.4 °C) 63 18 143/105 118 98 % -- -- -- --     03/18/25 12:24:16 -- 56 -- 156/99 118 95 % -- -- -- --     03/18/25 0900 -- -- -- -- -- -- -- -- -- No Pain     03/18/25 08:03:07 97.2 °F (36.2 °C) 68 16 154/96 115 96 % -- -- -- --     03/18/25 06:47:01 -- 60 -- 161/108 126 98 % -- -- -- --     03/18/25 0636 -- -- -- -- -- -- -- -- -- No Pain     03/18/25 0600 -- 76 20 150/76 106 100 % None (Room air) Lying -- --     03/18/25 0530 -- -- -- -- -- -- -- -- 15 --     03/18/25 0500 -- 70 15 166/91 122 97 % None (Room air) Lying -- --     03/18/25 0430 -- 65 12 156/87 110 96 % None (Room air) Lying -- --     03/18/25 0400 -- 58 20 179/95 126 96 % None (Room air) Lying -- --     03/18/25  0330 -- 70 17 169/100 128 95 % None (Room air) Sitting -- --     03/18/25 0310 -- -- -- -- -- -- -- -- 15 --     03/18/25 0256 -- 60 18 159/96 118 96 % None (Room air) Lying -- --     03/18/25 0230 -- 56 20 172/104 130 97 % None (Room air) Sitting -- No Pain     03/18/25 0100 -- 80 -- 158/101 124 98 % None (Room air) Sitting -- --     03/18/25 0028 98.1 °F (36.7 °C) 60 18 184/97  -- 98 % None (Room air) Sitting -- No Pain     Pertinent Sustained Findings: (include times they were obtained)    Weight in Kilograms:   Wt Readings from Last 1 Encounters:   03/20/25 67.4 kg (148 lb 9.4 oz)     Pertinent Labs (results):  Results from last 7 days   Lab Units 03/19/25  0528 03/18/25  0536 03/18/25  0136   WBC Thousand/uL 5.68 5.13 5.22   HEMOGLOBIN g/dL 8.9* 8.5* 8.7*   HEMATOCRIT % 28.7* 28.0* 28.2*   PLATELETS Thousands/uL 232 226 231   TOTAL NEUT ABS Thousands/µL  --   --  3.36             Results from last 7 days   Lab Units 03/19/25  0532 03/18/25  0136 03/16/25  1253   SODIUM mmol/L 142 142 141   POTASSIUM mmol/L 5.2 4.0 4.1   CHLORIDE mmol/L 106 106 106   CO2 mmol/L 27 27 26   ANION GAP mmol/L 9 9 9   BUN mg/dL 51* 56* 51*   CREATININE mg/dL 2.57* 2.70* 2.6*   EGFR ml/min/1.73sq m 25 23 27*   CALCIUM mg/dL 8.9 8.8 9.3   MAGNESIUM mg/dL 2.0  --   --             Results from last 7 days   Lab Units 03/18/25  0136 03/16/25  1253   AST U/L 18 14   ALT U/L 15 13   ALK PHOS U/L 62 62   TOTAL PROTEIN g/dL 6.7 6.8   ALBUMIN g/dL 4.0 4   TOTAL BILIRUBIN mg/dL 0.68 0.8             Results from last 7 days   Lab Units 03/19/25  0532 03/18/25  0136 03/16/25  1253   GLUCOSE RANDOM mg/dL 79 95 88             Results from last 7 days   Lab Units 03/18/25  0536 03/18/25  0337 03/18/25  0136   HS TNI 0HR ng/L  --   --  169*   HS TNI 2HR ng/L  --  192*  --    HSTNI D2 ng/L  --  23*  --    HS TNI 4HR ng/L 194*  --   --    HSTNI D4 ng/L 25*  --   --              Results from last 7 days   Lab Units 03/19/25  0528 03/18/25  0536  "03/18/25 0136   PROTIME seconds 30.3* 30.1* 29.7*   INR   2.96* 2.93* 2.88*           Results from last 7 days   Lab Units 03/18/25 0136   BNP pg/mL 1,144*     Radiology (results):  XR chest 2 views   ED Interpretation by Russell Younger DO (03/18 0204)   Abnormal   Pulmonary vascular congestion       Final Interpretation by Llilie Gamez MD (03/18 0749)   Mild pulmonary edema with small pleural effusions.         EKG (results):   Atrial fibrillation  (A 357) with slow ventricular (V 55)  response with premature ventricular or aberrantly conducted complexes  Septal infarct  Nonspecific ST-t wave changes  Abnormal ECG  Other tests (results):    Tests pending final results:    Treatment in the ED:   Medication Administration from 03/18/2025 0022 to 03/18/2025 0622         Date/Time Order Dose Route Action     03/18/2025 0259 EDT furosemide (LASIX) injection 50 mg 50 mg Intravenous Given     03/18/2025 0525 EDT hydrALAZINE (APRESOLINE) tablet 25 mg 25 mg Oral Given     Other treatments:     Change in condition while in the ED:    Response to ED Treatment:   Admit to OBSERVATION          OBSERVATION: (Proceed to \"ADMISSION\" if Direct Admission)  Initial Presentation: 65 y.o. male presents to ED from home with worsening shortness of breath, no inciting event.   Has  been dealing with symptoms for awhile, now worse with walking.  Recently admitted for same problem. PMH  is  HTN,  HFpEF, AF,  CKD  nicotine dependence, and  amyloidosis, compliant with all home meds.  CXR shows  mild vascular congestion,  CM.  Labs  reveal creatinine  2.7,  troponin  169>192, BNP  1144.  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   Orders written during the observation period  Meds   furosemide, 80 mg, Intravenous, BID  hydrALAZINE, 25 mg, Oral, Q8H LOTTIE  losartan, 50 mg, Oral, " Daily  [Held by provider] warfarin, 7.5 mg, Oral, Once per day on Sunday Monday Tuesday Wednesday Friday Saturday   And  [START ON 3/20/2025] warfarin, 5 mg, Oral, Weekly  acetaminophen, 975 mg, Oral, Q8H PRN  aluminum-magnesium hydroxide-simethicone, 30 mL, Oral, Q6H PRN  LORazepam, 0.5 mg, Oral, HS PRN  melatonin, 3 mg, Oral, HS PRN  ondansetron, 4 mg, Intravenous, Q6H PRN  polyethylene glycol, 17 g, Oral, Daily PRN    Labs, imaging, other:  See Above Under Emergency Department    Consults and findings:  03/18/2025  H&P:  HFpEF, 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 Decompensated heart failure  Labs & Imaging independently reviewed: Cr 2.7, Trop 169>192. BNP 1144. CXR mild vascular congestion, cardiomegaly   Outpatient regimen: torsemide 20mg daily, losartan 50mg daily, hydralazine 25mg q8h     Plan:  Diuresis: IV lasix 50mg twice daily   Continue outpatient regimen  Cardiac diet, daily weights, I&O, monitor and replenish electrolytes   Elevated troponin  No chest pain but elevated in setting of CHF, see plan above  Chronic kidney disease (CKD), stage IV (severe) (HCC)        Lab Results   Component Value Date     EGFR 23 03/18/2025     EGFR 27 (L) 03/16/2025     EGFR 26 (L) 03/11/2025     CREATININE 2.70 (H) 03/18/2025     CREATININE 2.6 (H) 03/16/2025     CREATININE 2.65 (H) 03/11/2025      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  Code Status: Prior   Discussion with family:   Anticipated Length of Stay: Patient will be admitted on an observation basis with an anticipated length of stay of less than 2 midnights secondary to chf.    Test Results during the observation period  Pertinent Lab tests (dates/results):  Culture results (blood, urine, spinal, wound, respiratory, etc.):  Imaging tests (dates/results):  EKG (dates/results):  Other test (dates/results):  Tests pending (dates/results):    Response to Treatment, Major Change in Condition, Major Charge in Treatment during the observation period  OBSERVATION 3/18 CHANGED TO IP ADMISSION 3/19 @ 1612 Continue IV diuresis.          ALL Admissions:  3/19  IP ADMISSION  Remains on  IV  lasix.    Urinating briskly.   States orthopnea  and HERBERT improved.   Continue daily weight  and current meds.  O2  sats  94  % RA.  Admission Orders and Other Orders written within the first 24 hrs after admission  Meds   furosemide, 80 mg, Intravenous, BID  hydrALAZINE, 25 mg, Oral, Q8H LOTTIE  losartan, 50 mg, Oral, Daily  [Held by provider] warfarin, 7.5 mg, Oral, Once per day on Sunday Monday Tuesday Wednesday Friday Saturday   And  [START ON 3/20/2025] warfarin, 5 mg, Oral, Weekly  acetaminophen, 975 mg, Oral, Q8H PRN  aluminum-magnesium hydroxide-simethicone, 30 mL, Oral, Q6H PRN  LORazepam, 0.5 mg, Oral, HS PRN  melatonin, 3 mg, Oral, HS PRN  ondansetron, 4 mg, Intravenous, Q6H PRN  polyethylene glycol, 17 g, Oral, Daily PRN  Labs, imaging, other:  See Above Under Emergency Department    Consults and findings:    Test Results after admission  Pertinent Lab tests (dates/results):    Culture results (blood, urine, spinal, wound, respiratory, etc.):  Imaging tests (dates/results):    EKG (dates/results):    Other test (dates/results):  Tests pending (dates/results):    Surgical or Invasive Procedures  Name of surgery/procedure:  Date & Time:  Patient  Response:  Post-operative orders:  Operative Report/Findings:    Response to Treatment, Major Change in Condition, Major Charge in Treatment anytime during admission    Disposition on Discharge  Home, Rehab, SNF, LTC, Shelter, etc.: Home/Self Care    Cease to Breathe (CTB)  If a patient expires during an admission, in addition to the above information, please include:    Summary/timeline of the patient's decline in condition:    Medications and treatment:    Patient response to treatment:    Date and time patient ceased to breathe:        Is there a Readmission that follows this admission? no  If yes, provide dates:        InterQual Review  InterQual Criteria Met: yes    Please include the InterQual Review, InterQual year/version used, and the criteria selected:   Criteria Review   REVIEW SUMMARY     [email or web address]® Review Status: In Primary  Review Type: Admission  Criteria Status: Acute Met  Day of review: Episode Day 1  Condition Specific: Yes        REVIEW DETAILS     Product: LOC:Acute Adult  Subset: Heart Failure            [X] Select Day, One:          [X] Episode Day 1, One:              [X] ACUTE, One:                  [X] Heart failure, acute on chronic and, All:                      [X] Continued symptoms after at least 1 dose of diuretic and >= 2h treatment and, >= One:                          [X] Dyspnea, not returned to baseline                      [X] Finding, >= One:                          [X] Clinical risk factor, >= One:                              [X] Chronic kidney disease (excludes chronic dialysis) and creatinine >= 2.75 mg/dL                      [X] Intervention, >= One:                          [X] Diuretic, >= One:                              [X] Diuretic >= 2 doses        Version: [employer] 2024, Dec. 2024 Release         PLEASE SUBMIT THE COMPLETED FORM TO THE DEPARTMENT OF HUMAN SERVICES - DIVISION OF CLINICAL  REVIEW VIA FAX [phone] or VIA E-MAIL TO  [email or web address]    Signature: Sheri Vu RN Date:  04/08/25    Confidentiality Notice: The documents accompanying this telecopy may contain confidential information belonging to the sender.  The information is intended only for the use of the individual named above. If you are not the intended recipient, you are hereby notified  That any disclosure, copying, distribution or taking of any telecopy is strictly prohibited.

## 2025-04-08 NOTE — UTILIZATION REVIEW
URGENT/EMERGENT  INPATIENT/SPU AUTHORIZATION REQUEST    Date: 04/08/25            # Pages in this Request:     x New Request   Additional Information for PA#:     Office Contact Name:  Sheri LangleyAminaJohnathan Title: Utilization Review, Registed Nurse     Phone: [phone]  Ext.     Availability (Date/Time): M-F 8-4    Type of Review:    Inpatient Review    Late Pick-Up    For Late Pick-up Cases:  How your facility was first notified of the Late Pick-up: PATHS  When your facility was first notified of the Late Pick-up (date): 04/07/2025    Was the recipient a prisoner at the time of admission? no         RECIPIENT INFORMATION    Recipient ID#: [id number]  Recipient Name: Domingo Trevino    YOB: 1960  65 y.o. Recipient Alias:     Gender:  x Male  Female Medicaid Eligibility (HCB Package):          INSURANCE INFORMATION    (All other private or governmental health insurance benefits must be utilized prior to billing the [facility] Program)    Was this admission the result of an MVA, other accident, assault, injury, fall, gunshot, bite etc.?  no   If yes, provide a brief description of the incident.      Does the recipient have other insurance coverage?  no  Insurance Company Name:    Policy #:  Did that insurance pay on this claim?    Yes  No     Did that insurance deny this claim?   Yes  No     If yes, reason for denial:      Does the recipient have Medicare?  no  Did Medicare exhaust prior to this admission?    Yes  No     Did Medicare partially pay this claim?   Yes  No     Did Medicare deny this claim?   Yes  No   If yes, reason for denial:      Was the recipient a prisoner at the time of admission?  no        PROVIDER INFORMATION    Hospital Name: [facility]   [facility] Provider ID#: [id number]     Admitting Physician: [facility]                          [employer] Provider ID#: [id number]        ADMISSION INFORMATION      Type of Admission: (please choose one)  ED    Admission  "Floor or Unit Type: Med/Surg    Dates/Times:        ED Date/Time: 2/27/2025  1:44 AM        Observation Date/Time: 02/27/2025   0446         IP Admission Date/Time: 2/27/25  1:35 PM        Discharge or Transfer Date/Time: 3/2/2025  2:36 PM        DIAGNOSIS/PROCEDURE CODES    Primary Diagnosis Code/Primary Diagnosis Code description:  I13.0 - Hypertensive heart and chronic kidney disease with heart failure and stage 1 through stage 4 chronic kidney disease, or unspecified chronic kidney disease   I50.33 - Acute on chronic diastolic (congestive) heart failure   N18.4 - Chronic kidney disease, stage 4 (severe)   I48.11 - Longstanding persistent atrial fibrillation     Additional Diagnosis Code(s) and Description(s)-(up to 3 additional codes):    Procedure Code (1) and description:        CLINICAL INFORMATION - PRIOR ADMISSION ONLY    Is there a prior admission with a discharge date within 30 days of the date of this admission?    x No (Proceed to the next section - \"Clinical Information - General Review Checklist\")           CLINICAL INFORMATION - GENERAL REVIEW CHECKLIST    EMERGENCY DEPARTMENT: (Proceed to \"ADMISSION\" if Direct Admission)    Presenting Signs/Symptoms:  Chief Complaint   Patient presents with    Shortness of Breath     Shortness of breath for the past month, and pain in left leg   The patient is a 65 y.o. male with a PMH of CHF, HTN, cardiac amyloidosis, CAD, renal occlusion, A-fib on Coumadin, CKD who presents to the ED for evaluation of shortness of breath. He reports difficulty lying flat tonight, which prompted him to come to the ED. Of note, he was seen in ED at [facility] 2/22 and given IV Lasix, torsemide dose increased from 20 mg to 40 mg. He reports this has not helped. He also notes dyspnea with exertion. Lastly, he reports left leg pain that radiates from his buttock to his lateral leg, stopping at his knee. This began in the beginning of the month, improved with treatment however he has not been " taking anything at home. He otherwise denies fever, chills, cough, hemoptysis, leg swelling, calf pain, chest pain, syncope, recent travel, recent surgery, history of DVT/PE, saddle anesthesia, urinary retention, urinary incontinence, constipation, fecal incontinence, trauma, dysuria, hematuria.     Medication/treatment prior to arrival in the ED:  NA    Past Medical History: A-fib (HCC), CHF (congestive heart failure) (HCC), CKD (chronic kidney disease), stage IV (HCC), and Hypertension.     Clinical Exam:  Constitutional:       General: He is not in acute distress.     Appearance: He is well-developed. He is ill-appearing (chronically).   HENT:      Head: Normocephalic and atraumatic.   Eyes:      Conjunctiva/sclera: Conjunctivae normal.   Cardiovascular:      Rate and Rhythm: Bradycardia present. Rhythm irregular.      Heart sounds: No murmur heard.  Pulmonary:      Effort: Pulmonary effort is normal. No respiratory distress.      Breath sounds: No wheezing, rhonchi or rales.   Abdominal:      Palpations: Abdomen is soft.      Tenderness: There is no abdominal tenderness.   Musculoskeletal:         General: No swelling.      Cervical back: Neck supple.      Lumbar back: No bony tenderness. Negative right straight leg raise test and negative left straight leg raise test.      Right upper leg: Tenderness present.      Right lower leg: No tenderness. No edema.      Left lower leg: No tenderness. No edema.        Legs:       Comments: TTP to left buttock and lateral leg. No overlying erythema, crepitus, warmth, rash. 5 out of 5 strength with bilateral hip flexion/extension, abduction/adduction, knee flexion/extension, dorsiflexion, and plantarflexion.  Distal pulses 2+ bilaterally.  Sensation grossly intact.  Gait intact.   Skin:     General: Skin is warm and dry.      Capillary Refill: Capillary refill takes less than 2 seconds.   Neurological:      Mental Status: He is alert.   Psychiatric:         Mood and Affect:  Mood normal.     Initial Vital Signs: (Temp, Pulse, Resp, and BP)   ED Triage Vitals   Temperature Pulse Respirations Blood Pressure SpO2   02/27/25 0141 02/27/25 0141 02/27/25 0141 02/27/25 0141 02/27/25 0141   98 °F (36.7 °C) 63 19 (!) 180/84 95 %      Temp Source Heart Rate Source Patient Position - Orthostatic VS BP Location FiO2 (%)   02/27/25 0543 02/27/25 0141 02/27/25 0141 02/27/25 0141 --   Oral Monitor Sitting Left arm       Pain Score       02/27/25 0356       8         Pertinent Repeat Vital Signs: (include times they were obtained)    Date/Time Temp Pulse Resp BP MAP (mmHg) SpO2 Calculated FIO2 (%) - Nasal Cannula Nasal Cannula O2 Flow Rate (L/min) O2 Device Patient Position - Orthostatic VS Pain      02/28/25 11:00:38 98.1 °F (36.7 °C) 62 -- 155/102 120 97 % -- -- -- -- --     02/28/25 10:57:02 -- 77 -- 155/102 120 95 % -- -- -- -- --     02/28/25 07:46:07 98.2 °F (36.8 °C) 76 19 167/104 125 97 % -- -- -- -- --     02/28/25 0657 -- -- -- -- -- -- 26 1.5 L/min Nasal cannula -- --     02/28/25 06:28:55 -- 53 -- 154/102 119 94 % -- -- -- -- --     02/28/25 0545 -- 81 -- 161/100 -- 94 % -- -- -- Lying --     02/28/25 0400 -- 77 -- -- -- 93 % -- -- -- -- --     02/28/25 02:25:04 97.3 °F (36.3 °C) 66 18 171/100 124 95 % -- -- -- -- --     02/27/25 22:06:34 -- 57 -- 169/108 128 92 % -- -- -- -- --     02/27/25 2100 97.7 °F (36.5 °C) -- 20 155/82 -- -- -- -- -- -- --     02/27/25 2034 -- -- -- -- -- -- -- -- -- -- 5     02/27/25 19:22:26 98.2 °F (36.8 °C) 56 18 157/86 110 93 % -- -- -- -- --     02/27/25 14:48:37 98.2 °F (36.8 °C) 60 18 154/99 117 94 % -- -- -- -- --     02/27/25 13:22:50 -- 71 -- 154/104 121 98 % -- -- -- -- --     02/27/25 08:26:05 97.7 °F (36.5 °C) 56 -- 155/105 122 97 % -- -- -- -- --     02/27/25 08:24:51 97.7 °F (36.5 °C) 53 -- 157/107 124 91 % -- -- -- -- --     02/27/25 0731 -- -- -- -- -- -- -- -- -- -- 2 02/27/25 0614 -- -- -- -- -- -- -- -- -- -- 2 02/27/25 05:43:18 97.6 °F  (36.4 °C) 67 18 168/118 135 92 % -- -- None (Room air) -- 2     02/27/25 0445 -- 55 -- 141/105 120 93 % -- -- None (Room air) Lying --     02/27/25 0356 -- -- -- -- -- -- -- -- -- -- 8     02/27/25 0330 -- 48 12 186/101 135 96 % -- -- None (Room air) Lying --     02/27/25 0300 -- 59 16 150/100 120 98 % -- -- None (Room air) Lying --     02/27/25 0254 -- -- -- -- -- -- -- -- None (Room air) -- --     02/27/25 0141 98 °F (36.7 °C) 63 19 180/84 -- 95 % -- -- None (Room air) Sitting --     Pertinent Sustained Findings: (include times they were obtained)    Weight in Kilograms:   Wt Readings from Last 1 Encounters:   03/20/25 67.4 kg (148 lb 9.4 oz)     Pertinent Labs (results):  Results from last 7 days   Lab Units 02/28/25  0430 02/27/25  0237   WBC Thousand/uL 6.07 4.92   HEMOGLOBIN g/dL 9.3* 9.0*   HEMATOCRIT % 29.8* 29.2*   PLATELETS Thousands/uL 191 189   TOTAL NEUT ABS Thousands/µL 4.30 2.90             Results from last 7 days   Lab Units 02/28/25  0430 02/27/25  0402 02/27/25  0237   SODIUM mmol/L 143  --  144   POTASSIUM mmol/L 3.2*  --  3.2*   CHLORIDE mmol/L 102  --  105   CO2 mmol/L 29  --  29   ANION GAP mmol/L 12  --  10   BUN mg/dL 55*  --  60*   CREATININE mg/dL 2.21*  --  2.72*   EGFR ml/min/1.73sq m 30  --  23   CALCIUM mg/dL 9.3  --  9.0   MAGNESIUM mg/dL 2.1 1.8*  --            Results from last 7 days   Lab Units 02/27/25  0237   AST U/L 17   ALT U/L 16   ALK PHOS U/L 74   TOTAL PROTEIN g/dL 6.3*   ALBUMIN g/dL 3.9   TOTAL BILIRUBIN mg/dL 0.85            Results from last 7 days   Lab Units 02/28/25  0430 02/27/25  0237   GLUCOSE RANDOM mg/dL 98 92             Results from last 7 days   Lab Units 02/27/25  0920 02/27/25  0555 02/27/25  0402   HS TNI 0HR ng/L  --   --  176*   HS TNI 2HR ng/L  --  200*  --    HSTNI D2 ng/L  --  24*  --    HS TNI 4HR ng/L 185*  --   --    HSTNI D4 ng/L 9  --   --            Results from last 7 days   Lab Units 02/27/25  0237   D-DIMER QUANTITATIVE ug/ml FEU <0.27        "     Results from last 7 days   Lab Units 02/28/25  0430 02/27/25  0237   PROTIME seconds 23.2* 24.6*   INR   2.08* 2.25*   PTT seconds  --  39*           Results from last 7 days   Lab Units 02/27/25  0237   BNP pg/mL 1,136*     Radiology (results):  R chest 2 views   ED Interpretation by Yanni Tsang PA-C (02/27 0650)   Pulmonary edema, No evidence of infiltrate, pneumothorax, or other acute cardiopulmonary disease as interpreted by me       Final Interpretation by Hugo Pate MD (02/27 0906)   Pulmonary vascular congestion and small bilateral pleural effusions.         EKG (results):   ECG 12 lead   Final Result by Néstor Wright DO (02/27 0647)   Atrial fibrillation with slow ventricular response   Anterior infarct , age undetermined   Inferior infarct , age undetermined   Non-specific intra-ventricular conduction block   Abnormal ECG   Confirmed by Néstor Wright ([id number]) on 2/27/2025 6:46:58 AM     Other tests (results):    Tests pending final results:    Treatment in the ED:   Medication Administration from 02/27/2025 0135 to 02/27/2025 0532         Date/Time Order Dose Route Action     02/27/2025 0356 EST methocarbamol (ROBAXIN) tablet 500 mg 500 mg Oral Given     02/27/2025 0403 EST lidocaine (LIDODERM) 5 % patch 2 patch 2 patch Topical Medication Applied     02/27/2025 0356 EST acetaminophen (TYLENOL) tablet 650 mg 650 mg Oral Given     02/27/2025 0513 EST potassium chloride (Klor-Con M20) CR tablet 40 mEq 40 mEq Oral Given     02/27/2025 0514 EST furosemide (LASIX) injection 60 mg 60 mg Intravenous Given     Other treatments:     Change in condition while in the ED:    Response to ED Treatment:  Admit to OBSERVATION          OBSERVATION: (Proceed to \"ADMISSION\" if Direct Admission)  Initial Presentation: 65 y.o. male to ED presents for Shortness of breath. States he has chronic shortness of breath and dyspnea on exertion although symptoms increased from baseline. Usually dyspnea " improves when he takes torsemide and voids. Compliant with torsemide. Seen in [facility] ED on 2/22 and given IV Lasix, torsemide dose increased from 20 mg to 40 mg.   Plan; Give Iv Lasix 60 mg x1. Repeat BMP in am. PTA torsemide 40 mg daily. Fluid restriction 1800 ml.   Trend Troponin/ EKG x3. Tele monitoring. Cardiology consult. Replete potassium. Check serum Mg.   Orders written during the observation period  Meds  hydrALAZINE, 25 mg, Oral, Q8H LOTTIE  isosorbide dinitrate, 20 mg, Oral, TID after meals  losartan, 50 mg, Oral, Daily  warfarin, 7.5 mg, Oral, Once per day on Sunday Monday Tuesday Wednesday Friday Saturday   And  warfarin, 5 mg, Oral, Every Thursday    furosemide (LASIX) injection 60 mg  Dose: 60 mg  Freq: Once Route: IV x1 given  Start: 02/27/25 1645 End: 02/27/25 1700   acetaminophen, 975 mg, Oral, Q6H PRN  aluminum-magnesium hydroxide-simethicone, 30 mL, Oral, Q6H PRN  LORazepam, 0.5 mg, Oral, HS PRN  melatonin, 3 mg, Oral, HS PRN  ondansetron, 4 mg, Intravenous, Q6H PRN    Labs, imaging, other:  See Above Under Emergency Department    Consults and findings:  02/27/2025  H&P:    Acute on chronic heart failure with preserved ejection fraction (HCC)      Wt Readings from Last 3 Encounters:   02/27/25 73.9 kg (162 lb 14.7 oz)   04/19/22 87.5 kg (193 lb)   04/14/22 83.9 kg (185 lb)      Presents reporting shortness of breath and HERBERT increased from baseline  Recent ED visit at [facility] Feb 22 for CHF.  Given IV Lasix 40mg in ED and Discharged home with increased dose of torsemide from 20mg to 40 mg daily  History of cardiac AL amyloidosis, A-fib SVR and CHF with preserved EF 55 to 60%  Chronically elevated BNP above baseline  Will give 1 dose of IV Lasix 60 mg and monitor response  Continue PTA torsemide 40 mg daily  Monitor BMP   Cardiac diet with 1800 mL fluid restriction   Monitor daily weight I/O     Elevated troponin  Chronically elevated troponin in setting of cardiac amyloidosis.  Denies chest pain  Remote  "history of myocardial infarction in 2013, 2015, and 2019   EKG A-fib slow ventricular response rate 46  Cycle troponin/ EKG x 3  Monitor on telemetry  Cardiology consult  Primary hypertension  Losartan 50mg qd  Longstanding persistent atrial fibrillation (HCC)  EKG showing A-fib with slow RVR rate 46.  History of A-fib with slow ventricular rate. Previously on carvedilol which was discontinued   Anticoagulated with warfarin 7.5 mg 6 days/week, 5mg on Thursdays  INR therapeutic  Follows with cardiology at [facility]. Seen by EPS and does not wish to proceed with any invasive approach for management of his atrial fibrillation.   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 On chronic warfarin anticoagulation.   Stage 3b chronic kidney disease ([facility])        Lab Results   Component Value Date     EGFR 23 02/27/2025     EGFR 25 (L) 02/25/2025     EGFR 28 (L) 02/24/2025     CREATININE 2.72 (H) 02/27/2025     CREATININE 2.78 (H) 02/25/2025     CREATININE 2.52 (H) 02/24/2025      CKD at baseline.  CKD thought to be secondary to amyloidosis.  Continue outpatient f/u with nephrology at [facility]  Hypokalemia  K3.2.  Replete.  Repeat a.m. BMP  Check serum Mg  AL amyloidosis (HCC)  Amyloidosis seen on echo.  Followed by hematology at [facility]. S/p biopsy-proven multiple myeloma. High clinical suspicion that he has cardiac AL amyloidosis, patient declined endomyocardial biopsy   Multiple myeloma not having achieved remission ([facility])  Follows with hematology at [facility].  Was seen in October and undecided about treatment for MM.  Was supposed to follow-up after 2 weeks although has not seen them since as he states \"diagnosis not confirmed\"      VTE Pharmacologic Prophylaxis: VTE Score: 6 High Risk (Score >/= 5) - Pharmacological DVT Prophylaxis Ordered: warfarin (Coumadin). Sequential Compression Devices Ordered.  Code Status: Level 1 - Full Code per " pt  Discussion with family: Patient declined call to .      Anticipated Length of Stay: Patient will be admitted on an observation basis with an anticipated length of stay of less than 2 midnights secondary to CHF exacerbation, elevated troponin in patient with cardiac amyloidosis.    02/27/2025  Consult Cardio:    HFpEF, 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      Wt Readings from Last 3 Encounters:   02/27/25 71.7 kg (158 lb)   04/19/22 87.5 kg (193 lb)   04/14/22 83.9 kg (185 lb)      -TTE 10/21/2024 at [facility] showed EF 55 to 60%, severe LA, moderate RA, mild MR, mild AI, mild TR  -BNP: 1,136   -CXR: pulmonary vascular congestion and small B/L pleural effusions   -Troponin: Flat, trending downwards (206, 200, 185)  -Outpatient diuretic Rx torsemide 40 mg daily    -Inpatient diuretic Rx received IV lasix 60 mg x 1     -Continue telemetry   -Monitor I/O's  -Monitor BMP  -Continue cardiac diet with 1800 mL fluid restriction      AL amyloidosis (HCC)  -TTE 10/21/24 ([facility]): LVEF 55-60%,  moderate concentric hypertrophy, sparing of apex suggestive of cardiac amyloid  - [facility] scan 10/22/24: negative for TTR amyloid   - cardiac MRI 10/24/24: incomplete study due to claustrophobia, low normal LV function, LVEF 50-55%, RVEF 45%, mild biatrial enlargement, mild MR   - [facility] scan 10/22/24: negative for TTR amyloid  - declined further workup including cardiac biopsy for AL amyloid  Multiple myeloma not having achieved remission (HCC)  - elevated kappa and lamda free light chains noted on 10/22/4  - [facility] hematology 10/30/24: confirmed diagnosis of multiple myeloma with presumed AL cardiac amyloid, declined cardiac biopsy and further treatment for his MM   Chronic atrial fibrillation (HCC)  -EKG showing A-fib with slow RVR rate 46  -Previously on carvedilol which was discontinued due to bradycardia   -Anticoagulated with warfarin 7.5 mg 6 days/week, 5mg on Thursdays  -PT/INR: 24.6, 2.25 (around baseline)   -EP discussed  cardioversion with patient OP, but patient declined (November 2024)     -Continue anticoagulation therapy  -Monitor PT/INR      Primary hypertension  -BP: 155/105  -Outpatient Rx losartan 50 mg daily   - Blood pressure remains elevated  - Patient on Isordil 10 mg 3 times daily and hydralazine 10 mg 3 times daily     Hypokalemia  -K: 3.2  -Given 50 mg Kcl     -Continue to trend lytes  -Replete as needed      Renal infarction (HCC)  -Hx of thromboembolism to left renal artery in June 2023  - Diagnosed with atrial fibrillation after thromboembolism  -On chronic warfarin anticoagulation      Stage 3b chronic kidney disease (HCC)        Lab Results   Component Value Date     EGFR 23 02/27/2025     EGFR 25 (L) 02/25/2025     EGFR 28 (L) 02/24/2025     CREATININE 2.72 (H) 02/27/2025     CREATININE 2.78 (H) 02/25/2025     CREATININE 2.52 (H) 02/24/2025       Summary:  -Patient given furosemide 60 mg IV x 1 in the ED at about 5:30 AM and then he received his torsemide 40 mg p.o. at around 8 AM  - He still some crackles and JVD as well as other signs of volume overloaded on exam in addition to feeling short of breath at this time  - Will give an additional furosemide 60 mg IV x 1 this afternoon and reassess symptoms and labs tomorrow morning before giving further diuretics  - Blood pressure remains elevated which may also be contributing to her shortness of breath, so we will start hydralazine 10 mg 3 times daily and Isordil 10 mg 3 times daily for afterload reduction  - Check daily standing weights  - Monitor creatinine and electrolytes closely  - Hopefully be able to resume his oral diuretics in 24 to 48 hours  - Try to have goals of care conversation explain his underlying diagnosis, but still appears to be some poor medical literacy contributing to his hesitancy and receiving further treatment     Test Results during the observation period  Pertinent Lab tests (dates/results):  Culture results (blood, urine, spinal,  wound, respiratory, etc.):  Imaging tests (dates/results):  EKG (dates/results):  Other test (dates/results):  Tests pending (dates/results):    Surgical or Invasive Procedures during the observation period  Name of surgery/procedure:  Date & Time:  Patient Response:  Post-operative orders:  Operative Report/Findings:    Response to Treatment, Major Change in Condition, Major Charge in Treatment during the observation period  Admit to Observation to Inpatient Dx; Acute on chronic heart failure with preserved ejection fraction, Elevated troponin, Hypokalemia K 3.2         ALL Admissions:  Date: 2/28   Day 2:   Progress notes; Elevated BP. Give additional Iv Lasix 80 mg with close monitoring of his electrolytes and creat.  Repeat BMP in am. Isosorbide and hydralazine doses increased. Continue losartan for now. If creat worsens on next BMP, hold losartan.  INR is therapeutic on warfarin   Pt continues to have shortness of breath on minimal exertion with bilateral rales  Pt feels short of breath even with minimal exertion such as easing up from bed and sitting and walking to the bathroom.  Feeling better today compared to yesterday  Certification Statement: The patient will continue to require additional inpatient hospital stay due to shortness of breath   Admission Orders and Other Orders written within the first 24 hrs after admission  Meds   hydrALAZINE, 25 mg, Oral, Q8H LOTTIE  isosorbide dinitrate, 20 mg, Oral, TID after meals  losartan, 50 mg, Oral, Daily  warfarin, 7.5 mg, Oral, Once per day on Sunday Monday Tuesday Wednesday Friday Saturday   And  warfarin, 5 mg, Oral, Every Thursday    furosemide (LASIX) injection 80 mg  Dose: 80 mg  Freq: Once Route: IV x1 given  Start: 02/28/25 1030 End: 02/28/25 1057  acetaminophen, 975 mg, Oral, Q6H PRN  aluminum-magnesium hydroxide-simethicone, 30 mL, Oral, Q6H PRN  LORazepam, 0.5 mg, Oral, HS PRN  melatonin, 3 mg, Oral, HS PRN  ondansetron, 4 mg, Intravenous, Q6H  PRN    Labs, imaging, other:  See above Under Emergency Department    Consults and findings:  See Above Under Observation for H&P and Consults.    Test Results after admission  Pertinent Lab tests (dates/results):    Culture results (blood, urine, spinal, wound, respiratory, etc.):  Imaging tests (dates/results):    EKG (dates/results):    Other test (dates/results):  Tests pending (dates/results):    Surgical or Invasive Procedures  Name of surgery/procedure:  Date & Time:  Patient Response:  Post-operative orders:  Operative Report/Findings:    Response to Treatment, Major Change in Condition, Major Charge in Treatment anytime during admission    Disposition on Discharge  Home, Rehab, SNF, LTC, Shelter, etc.: Home/Self Care    Cease to Breathe (CTB)  If a patient expires during an admission, in addition to the above information, please include:    Summary/timeline of the patient's decline in condition:    Medications and treatment:    Patient response to treatment:    Date and time patient ceased to breathe:        Is there a Readmission that follows this admission? yes  If yes, provide dates:  3/18/2025 - 3/20/2025 (2 days)  [facility]      InterQual Review  InterQual Criteria Met: yes    Please include the InterQual Review, InterQual year/version used, and the criteria selected:   Criteria Review   REVIEW SUMMARY     InterQual® Review Status: In Primary  Review Type: Admission  Criteria Status: Acute Met  Day of review: Episode Day 1  Condition Specific: Yes        REVIEW DETAILS     Service Date: 2/27/2025  Product: LOC:Acute Adult  Subset: Heart Failure            [X] Select Day, One:          [X] Episode Day 1, One:              [X] ACUTE, One:                  [X] Heart failure, acute on chronic and, All:                      [X] Continued symptoms after at least 1 dose of diuretic and >= 2h treatment and, >= One:                          [X] Dyspnea, not returned to baseline                       [X] Finding, >= One:                          [X] Clinical risk factor, >= One:                              [X] Troponin > ULN                              [X] BUN >= 43 mg/dL                      [X] Intervention, >= One:                          [X] Diuretic, >= One:                              [X] Diuretic >= 2 doses        Version: [employer]® 2024, Dec. 2024 Release         PLEASE SUBMIT THE COMPLETED FORM TO THE DEPARTMENT OF HUMAN SERVICES - DIVISION OF CLINICAL  REVIEW VIA FAX [phone] or VIA E-MAIL TO [email or web address]    Signature: Sheri Vu RN Date:  04/08/25    Confidentiality Notice: The documents accompanying this telecopy may contain confidential information belonging to the sender.  The information is intended only for the use of the individual named above. If you are not the intended recipient, you are hereby notified  That any disclosure, copying, distribution or taking of any telecopy is strictly prohibited.

## 2025-04-13 ENCOUNTER — HOSPITAL ENCOUNTER (INPATIENT)
Facility: HOSPITAL | Age: 65
LOS: 3 days | Discharge: LEFT BEFORE DISCHARGE | End: 2025-04-17
Attending: EMERGENCY MEDICINE | Admitting: STUDENT IN AN ORGANIZED HEALTH CARE EDUCATION/TRAINING PROGRAM
Payer: COMMERCIAL

## 2025-04-13 ENCOUNTER — APPOINTMENT (EMERGENCY)
Dept: RADIOLOGY | Facility: HOSPITAL | Age: 65
End: 2025-04-13
Payer: COMMERCIAL

## 2025-04-13 DIAGNOSIS — E85.81 AL AMYLOIDOSIS (HCC): ICD-10-CM

## 2025-04-13 DIAGNOSIS — I50.9 ACUTE EXACERBATION OF CHF (CONGESTIVE HEART FAILURE) (HCC): Primary | ICD-10-CM

## 2025-04-13 LAB
ALBUMIN SERPL BCG-MCNC: 4.5 G/DL (ref 3.5–5)
ALP SERPL-CCNC: 78 U/L (ref 34–104)
ALT SERPL W P-5'-P-CCNC: 29 U/L (ref 7–52)
ANION GAP SERPL CALCULATED.3IONS-SCNC: 11 MMOL/L (ref 4–13)
APTT PPP: 41 SECONDS (ref 23–34)
AST SERPL W P-5'-P-CCNC: 29 U/L (ref 13–39)
ATRIAL RATE: 326 BPM
BASOPHILS # BLD AUTO: 0.04 THOUSANDS/ÂΜL (ref 0–0.1)
BASOPHILS NFR BLD AUTO: 1 % (ref 0–1)
BILIRUB SERPL-MCNC: 1.02 MG/DL (ref 0.2–1)
BNP SERPL-MCNC: 1593 PG/ML (ref 0–100)
BUN SERPL-MCNC: 60 MG/DL (ref 5–25)
CALCIUM SERPL-MCNC: 9.5 MG/DL (ref 8.4–10.2)
CARDIAC TROPONIN I PNL SERPL HS: 179 NG/L (ref ?–50)
CHLORIDE SERPL-SCNC: 107 MMOL/L (ref 96–108)
CO2 SERPL-SCNC: 27 MMOL/L (ref 21–32)
CREAT SERPL-MCNC: 2.45 MG/DL (ref 0.6–1.3)
EOSINOPHIL # BLD AUTO: 0.18 THOUSAND/ÂΜL (ref 0–0.61)
EOSINOPHIL NFR BLD AUTO: 3 % (ref 0–6)
ERYTHROCYTE [DISTWIDTH] IN BLOOD BY AUTOMATED COUNT: 17.1 % (ref 11.6–15.1)
GFR SERPL CREATININE-BSD FRML MDRD: 26 ML/MIN/1.73SQ M
GLUCOSE SERPL-MCNC: 128 MG/DL (ref 65–140)
HCT VFR BLD AUTO: 34.8 % (ref 36.5–49.3)
HGB BLD-MCNC: 10.6 G/DL (ref 12–17)
IMM GRANULOCYTES # BLD AUTO: 0.01 THOUSAND/UL (ref 0–0.2)
IMM GRANULOCYTES NFR BLD AUTO: 0 % (ref 0–2)
INR PPP: 3.71 (ref 0.85–1.19)
LYMPHOCYTES # BLD AUTO: 1.03 THOUSANDS/ÂΜL (ref 0.6–4.47)
LYMPHOCYTES NFR BLD AUTO: 19 % (ref 14–44)
MCH RBC QN AUTO: 21.6 PG (ref 26.8–34.3)
MCHC RBC AUTO-ENTMCNC: 30.5 G/DL (ref 31.4–37.4)
MCV RBC AUTO: 71 FL (ref 82–98)
MONOCYTES # BLD AUTO: 0.38 THOUSAND/ÂΜL (ref 0.17–1.22)
MONOCYTES NFR BLD AUTO: 7 % (ref 4–12)
NEUTROPHILS # BLD AUTO: 3.75 THOUSANDS/ÂΜL (ref 1.85–7.62)
NEUTS SEG NFR BLD AUTO: 70 % (ref 43–75)
NRBC BLD AUTO-RTO: 0 /100 WBCS
PLATELET # BLD AUTO: 233 THOUSANDS/UL (ref 149–390)
PMV BLD AUTO: 11.5 FL (ref 8.9–12.7)
POTASSIUM SERPL-SCNC: 4.3 MMOL/L (ref 3.5–5.3)
PROT SERPL-MCNC: 7.4 G/DL (ref 6.4–8.4)
PROTHROMBIN TIME: 35.9 SECONDS (ref 12.3–15)
QRS AXIS: 254 DEGREES
QRSD INTERVAL: 106 MS
QT INTERVAL: 454 MS
QTC INTERVAL: 434 MS
RBC # BLD AUTO: 4.9 MILLION/UL (ref 3.88–5.62)
SODIUM SERPL-SCNC: 145 MMOL/L (ref 135–147)
T WAVE AXIS: 170 DEGREES
VENTRICULAR RATE: 55 BPM
WBC # BLD AUTO: 5.39 THOUSAND/UL (ref 4.31–10.16)

## 2025-04-13 PROCEDURE — 85610 PROTHROMBIN TIME: CPT | Performed by: EMERGENCY MEDICINE

## 2025-04-13 PROCEDURE — 85025 COMPLETE CBC W/AUTO DIFF WBC: CPT

## 2025-04-13 PROCEDURE — 83880 ASSAY OF NATRIURETIC PEPTIDE: CPT | Performed by: EMERGENCY MEDICINE

## 2025-04-13 PROCEDURE — 93010 ELECTROCARDIOGRAM REPORT: CPT | Performed by: INTERNAL MEDICINE

## 2025-04-13 PROCEDURE — 71046 X-RAY EXAM CHEST 2 VIEWS: CPT

## 2025-04-13 PROCEDURE — 84484 ASSAY OF TROPONIN QUANT: CPT

## 2025-04-13 PROCEDURE — 93005 ELECTROCARDIOGRAM TRACING: CPT

## 2025-04-13 PROCEDURE — 36415 COLL VENOUS BLD VENIPUNCTURE: CPT

## 2025-04-13 PROCEDURE — 80053 COMPREHEN METABOLIC PANEL: CPT

## 2025-04-13 PROCEDURE — 99285 EMERGENCY DEPT VISIT HI MDM: CPT | Performed by: EMERGENCY MEDICINE

## 2025-04-13 PROCEDURE — 99285 EMERGENCY DEPT VISIT HI MDM: CPT

## 2025-04-13 PROCEDURE — 85730 THROMBOPLASTIN TIME PARTIAL: CPT | Performed by: EMERGENCY MEDICINE

## 2025-04-13 PROCEDURE — 96374 THER/PROPH/DIAG INJ IV PUSH: CPT

## 2025-04-13 PROCEDURE — 96375 TX/PRO/DX INJ NEW DRUG ADDON: CPT

## 2025-04-13 PROCEDURE — 99223 1ST HOSP IP/OBS HIGH 75: CPT | Performed by: STUDENT IN AN ORGANIZED HEALTH CARE EDUCATION/TRAINING PROGRAM

## 2025-04-13 RX ORDER — ONDANSETRON 2 MG/ML
4 INJECTION INTRAMUSCULAR; INTRAVENOUS EVERY 6 HOURS PRN
Status: DISCONTINUED | OUTPATIENT
Start: 2025-04-13 | End: 2025-04-17 | Stop reason: HOSPADM

## 2025-04-13 RX ORDER — LOSARTAN POTASSIUM 50 MG/1
50 TABLET ORAL ONCE
Status: COMPLETED | OUTPATIENT
Start: 2025-04-13 | End: 2025-04-13

## 2025-04-13 RX ORDER — LORAZEPAM 0.5 MG/1
0.5 TABLET ORAL
Status: DISCONTINUED | OUTPATIENT
Start: 2025-04-13 | End: 2025-04-17 | Stop reason: HOSPADM

## 2025-04-13 RX ORDER — FUROSEMIDE 10 MG/ML
100 INJECTION INTRAMUSCULAR; INTRAVENOUS
Status: DISCONTINUED | OUTPATIENT
Start: 2025-04-14 | End: 2025-04-14

## 2025-04-13 RX ORDER — LOSARTAN POTASSIUM 50 MG/1
50 TABLET ORAL DAILY
Status: DISCONTINUED | OUTPATIENT
Start: 2025-04-14 | End: 2025-04-14

## 2025-04-13 RX ORDER — POTASSIUM CHLORIDE 1500 MG/1
20 TABLET, EXTENDED RELEASE ORAL DAILY
Status: DISCONTINUED | OUTPATIENT
Start: 2025-04-14 | End: 2025-04-15

## 2025-04-13 RX ORDER — HYDRALAZINE HYDROCHLORIDE 25 MG/1
25 TABLET, FILM COATED ORAL EVERY 8 HOURS SCHEDULED
Status: DISCONTINUED | OUTPATIENT
Start: 2025-04-13 | End: 2025-04-14

## 2025-04-13 RX ORDER — CARVEDILOL 3.12 MG/1
3.12 TABLET ORAL 2 TIMES DAILY WITH MEALS
Status: DISCONTINUED | OUTPATIENT
Start: 2025-04-14 | End: 2025-04-17 | Stop reason: HOSPADM

## 2025-04-13 RX ORDER — WARFARIN SODIUM 5 MG/1
5 TABLET ORAL
Status: DISCONTINUED | OUTPATIENT
Start: 2025-04-14 | End: 2025-04-16

## 2025-04-13 RX ORDER — ACETAMINOPHEN 325 MG/1
650 TABLET ORAL EVERY 4 HOURS PRN
Status: DISCONTINUED | OUTPATIENT
Start: 2025-04-13 | End: 2025-04-17 | Stop reason: HOSPADM

## 2025-04-13 RX ORDER — HYDRALAZINE HYDROCHLORIDE 20 MG/ML
10 INJECTION INTRAMUSCULAR; INTRAVENOUS ONCE
Status: COMPLETED | OUTPATIENT
Start: 2025-04-13 | End: 2025-04-13

## 2025-04-13 RX ORDER — OXYCODONE HYDROCHLORIDE 10 MG/1
10 TABLET ORAL EVERY 4 HOURS PRN
Refills: 0 | Status: DISCONTINUED | OUTPATIENT
Start: 2025-04-13 | End: 2025-04-17 | Stop reason: HOSPADM

## 2025-04-13 RX ORDER — FUROSEMIDE 10 MG/ML
80 INJECTION INTRAMUSCULAR; INTRAVENOUS ONCE
Status: COMPLETED | OUTPATIENT
Start: 2025-04-13 | End: 2025-04-13

## 2025-04-13 RX ORDER — OXYCODONE HYDROCHLORIDE 5 MG/1
5 TABLET ORAL EVERY 4 HOURS PRN
Refills: 0 | Status: DISCONTINUED | OUTPATIENT
Start: 2025-04-13 | End: 2025-04-17 | Stop reason: HOSPADM

## 2025-04-13 RX ADMIN — MELATONIN 3 MG: 3 TAB ORAL at 21:44

## 2025-04-13 RX ADMIN — HYDRALAZINE HYDROCHLORIDE 10 MG: 20 INJECTION, SOLUTION INTRAMUSCULAR; INTRAVENOUS at 18:37

## 2025-04-13 RX ADMIN — LORAZEPAM 0.5 MG: 0.5 TABLET ORAL at 21:44

## 2025-04-13 RX ADMIN — HYDRALAZINE HYDROCHLORIDE 25 MG: 25 TABLET ORAL at 21:13

## 2025-04-13 RX ADMIN — FUROSEMIDE 80 MG: 10 INJECTION, SOLUTION INTRAVENOUS at 18:33

## 2025-04-13 RX ADMIN — LOSARTAN POTASSIUM 50 MG: 50 TABLET, FILM COATED ORAL at 18:36

## 2025-04-13 NOTE — ED PROVIDER NOTES
Time reflects when diagnosis was documented in both MDM as applicable and the Disposition within this note       Time User Action Codes Description Comment    4/13/2025  6:36 PM Fletcher Kincaid Add [I50.9] Acute exacerbation of CHF (congestive heart failure) (HCC)           ED Disposition       ED Disposition   Admit    Condition   Stable    Date/Time   Sun Apr 13, 2025  7:01 PM    Comment   Case was discussed with Dr. Santos and the patient's admission status was agreed to be Admission Status: observation status to the service of Dr. Flannery .               Assessment & Plan       Medical Decision Making  Based on my history and physical examination, I considered the following life or limb threatening conditions in my differential diagnosis:  CHF, cardiac ischemia, acute on chronic kidney disease    Based on my clinical acumen, I will order the appropriate diagnostic testing to narrow my differential diagnosis and arrive at a final diagnosis.      Amount and/or Complexity of Data Reviewed  Labs: ordered. Decision-making details documented in ED Course.  Radiology:  Decision-making details documented in ED Course.    Risk  Prescription drug management.  Decision regarding hospitalization.        ED Course as of 04/13/25 2147   Sun Apr 13, 2025   1710 hs TnI 0hr(!): 179  This is c/w chronic level, comparing here and LVH   1732 Creatinine(!): 2.45  At baseline   1806 XR chest 2 views  My independent interpretation of imaging:  increased L pleural effusion, cardiomegaly,    1901 POCT INR(!): 3.71  supratherapeutic   1902 BNP(!): 1,593  Tends to be elevated, chronically, mildly increased       Medications   furosemide (LASIX) injection 80 mg (80 mg Intravenous Given 4/13/25 1833)   hydrALAZINE (APRESOLINE) injection 10 mg (10 mg Intravenous Given 4/13/25 1837)   losartan (COZAAR) tablet 50 mg (50 mg Oral Given 4/13/25 1836)       ED Risk Strat Scores                    No data recorded        SBIRT 20yo+       Flowsheet Row Most Recent Value   Initial Alcohol Screen: US AUDIT-C     1. How often do you have a drink containing alcohol? 0 Filed at: 04/13/2025 1635   2. How many drinks containing alcohol do you have on a typical day you are drinking?  0 Filed at: 04/13/2025 1635   3a. Male UNDER 65: How often do you have five or more drinks on one occasion? 0 Filed at: 04/13/2025 1635   Audit-C Score 0 Filed at: 04/13/2025 1635   ALLEGRA: How many times in the past year have you...    Used an illegal drug or used a prescription medication for non-medical reasons? Never Filed at: 04/13/2025 1635                            History of Present Illness       Chief Complaint   Patient presents with    Shortness of Breath     Sob x3 months. Patient reports worsening today. Denies chest pain or cough.        Past Medical History:   Diagnosis Date    A-fib (HCC)     CHF (congestive heart failure) (HCC)     CKD (chronic kidney disease), stage IV (HCC)     Hypertension     Multiple myeloma not having achieved remission (HCC)       No past surgical history on file.   No family history on file.   Social History     Tobacco Use    Smoking status: Former     Current packs/day: 0.50     Types: Cigarettes    Smokeless tobacco: Never   Vaping Use    Vaping status: Never Used   Substance Use Topics    Alcohol use: Never    Drug use: Never      E-Cigarette/Vaping    E-Cigarette Use Never User       E-Cigarette/Vaping Substances      I have reviewed and agree with the history as documented.     66 yo M c/o shortness of breath, no fever, no chills.  No chest pain.            Review of Systems        Objective       ED Triage Vitals   Temperature Pulse Blood Pressure Respirations SpO2 Patient Position - Orthostatic VS   04/13/25 1636 04/13/25 1608 04/13/25 1608 04/13/25 1608 04/13/25 1608 04/13/25 1608   97.8 °F (36.6 °C) 60 (!) 178/117 (!) 30 97 % Sitting      Temp Source Heart Rate Source BP Location FiO2 (%) Pain Score    04/13/25 1636 04/13/25  1608 04/13/25 1608 -- 04/13/25 1716    Axillary Monitor Right arm  No Pain      Vitals      Date and Time Temp Pulse SpO2 Resp BP Pain Score FACES Pain Rating User   04/13/25 2018 -- -- -- 22 -- -- -- VS   04/13/25 2018 97.6 °F (36.4 °C) 73 94 % -- 180/112 -- -- DII   04/13/25 2012 -- -- -- -- -- No Pain -- NP   04/13/25 1830 -- 63 100 % 24 181/100 -- -- CO   04/13/25 1730 -- 58 100 % 24  165/114 Provider (Codi) made aware of pt BP, pt states he ran out of BP meds (Losartan) -- -- CO   04/13/25 1716 -- 68 100 % 24 187/110 No Pain -- CO   04/13/25 1636 97.8 °F (36.6 °C) -- -- -- -- -- -- CO   04/13/25 1630 -- 53 100 % 24 161/104 -- -- KE   04/13/25 1608 -- 60 97 % 30 178/117 -- -- KG            Physical Exam  Vitals and nursing note reviewed.   Constitutional:       Appearance: He is well-developed.   HENT:      Head: Normocephalic and atraumatic.      Right Ear: External ear normal.      Left Ear: External ear normal.      Nose: Nose normal.      Mouth/Throat:      Mouth: Mucous membranes are moist.   Eyes:      Conjunctiva/sclera: Conjunctivae normal.      Pupils: Pupils are equal, round, and reactive to light.   Cardiovascular:      Rate and Rhythm: Normal rate.   Pulmonary:      Effort: Pulmonary effort is normal.      Breath sounds: Rales present.   Abdominal:      Tenderness: There is no abdominal tenderness.   Musculoskeletal:         General: Normal range of motion.      Cervical back: Normal range of motion and neck supple.   Skin:     General: Skin is warm and dry.      Capillary Refill: Capillary refill takes less than 2 seconds.   Neurological:      Mental Status: He is alert and oriented to person, place, and time.      Cranial Nerves: No cranial nerve deficit.      Coordination: Coordination normal.   Psychiatric:         Behavior: Behavior normal.         Thought Content: Thought content normal.         Judgment: Judgment normal.         Results Reviewed       Procedure Component Value Units  Date/Time    B-Type Natriuretic Peptide(BNP) [[id number]]  (Abnormal) Collected: 04/13/25 1625    Lab Status: Final result Specimen: Blood from Arm, Left Updated: 04/13/25 1748     BNP 1,593 pg/mL     Protime-INR [[id number]]  (Abnormal) Collected: 04/13/25 1657    Lab Status: Final result Specimen: Blood from Arm, Left Updated: 04/13/25 1725     Protime 35.9 seconds      INR 3.71    Narrative:      INR Therapeutic Range    Indication                                             INR Range      Atrial Fibrillation                                               2.0-3.0  Hypercoagulable State                                    2.0.2.3  Left Ventricular Asist Device                            2.0-3.0  Mechanical Heart Valve                                  -    Aortic(with afib, MI, embolism, HF, LA enlargement,    and/or coagulopathy)                                     2.0-3.0 (2.5-3.5)     Mitral                                                             2.5-3.5  Prosthetic/Bioprosthetic Heart Valve               2.0-3.0  Venous thromboembolism (VTE: VT, PE        2.0-3.0    APTT [[id number]]  (Abnormal) Collected: 04/13/25 1657    Lab Status: Final result Specimen: Blood from Arm, Left Updated: 04/13/25 1725     PTT 41 seconds     HS Troponin 0hr (reflex protocol) [[id number]]  (Abnormal) Collected: 04/13/25 1625    Lab Status: Final result Specimen: Blood from Arm, Left Updated: 04/13/25 1652     hs TnI 0hr 179 ng/L     Comprehensive metabolic panel [[id number]]  (Abnormal) Collected: 04/13/25 1625    Lab Status: Final result Specimen: Blood from Arm, Left Updated: 04/13/25 1645     Sodium 145 mmol/L      Potassium 4.3 mmol/L      Chloride 107 mmol/L      CO2 27 mmol/L      ANION GAP 11 mmol/L      BUN 60 mg/dL      Creatinine 2.45 mg/dL      Glucose 128 mg/dL      Calcium 9.5 mg/dL      AST 29 U/L      ALT 29 U/L      Alkaline Phosphatase 78 U/L      Total Protein 7.4 g/dL      Albumin 4.5 g/dL      Total Bilirubin 1.02  mg/dL      eGFR 26 ml/min/1.73sq m     Narrative:      National Kidney Disease Foundation guidelines for Chronic Kidney Disease (CKD):     Stage 1 with normal or high GFR (GFR > 90 mL/min/1.73 square meters)    Stage 2 Mild CKD (GFR = 60-89 mL/min/1.73 square meters)    Stage 3A Moderate CKD (GFR = 45-59 mL/min/1.73 square meters)    Stage 3B Moderate CKD (GFR = 30-44 mL/min/1.73 square meters)    Stage 4 Severe CKD (GFR = 15-29 mL/min/1.73 square meters)    Stage 5 End Stage CKD (GFR <15 mL/min/1.73 square meters)  Note: GFR calculation is accurate only with a steady state creatinine    CBC and differential [[id number]]  (Abnormal) Collected: 04/13/25 1625    Lab Status: Final result Specimen: Blood from Arm, Left Updated: 04/13/25 1631     WBC 5.39 Thousand/uL      RBC 4.90 Million/uL      Hemoglobin 10.6 g/dL      Hematocrit 34.8 %      MCV 71 fL      MCH 21.6 pg      MCHC 30.5 g/dL      RDW 17.1 %      MPV 11.5 fL      Platelets 233 Thousands/uL      nRBC 0 /100 WBCs      Segmented % 70 %      Immature Grans % 0 %      Lymphocytes % 19 %      Monocytes % 7 %      Eosinophils Relative 3 %      Basophils Relative 1 %      Absolute Neutrophils 3.75 Thousands/µL      Absolute Immature Grans 0.01 Thousand/uL      Absolute Lymphocytes 1.03 Thousands/µL      Absolute Monocytes 0.38 Thousand/µL      Eosinophils Absolute 0.18 Thousand/µL      Basophils Absolute 0.04 Thousands/µL             XR chest 2 views    (Results Pending)       Procedures    ED Medication and Procedure Management   Prior to Admission Medications   Prescriptions Last Dose Informant Patient Reported? Taking?   LORazepam (ATIVAN) 0.5 mg tablet 4/13/2025  No Yes   Sig: Take 1 tablet (0.5 mg total) by mouth daily at bedtime as needed for anxiety for up to 7 days   carvedilol (COREG) 3.125 mg tablet 4/13/2025  Yes Yes   Sig: Take 3.125 mg by mouth 2 (two) times a day with meals   hydrALAZINE (APRESOLINE) 25 mg tablet 4/13/2025  No Yes   Sig: Take 1 tablet  (25 mg total) by mouth every 8 (eight) hours   losartan (COZAAR) 50 mg tablet 4/13/2025  Yes Yes   Sig: Take 50 mg by mouth daily   melatonin 3 mg 4/13/2025  Yes Yes   Sig: Take 3 mg by mouth daily at bedtime as needed (Insomnia)   potassium chloride (Klor-Con M20) 20 mEq tablet 4/13/2025  No Yes   Sig: Take 1 tablet (20 mEq total) by mouth daily   sildenafil (REVATIO) 20 mg tablet 4/13/2025  Yes Yes   Sig: Take 20 mg by mouth   torsemide (DEMADEX) 20 mg tablet 4/13/2025  No Yes   Sig: Take 2 tablets (40 mg total) by mouth daily   warfarin (COUMADIN) 5 mg tablet 4/13/2025  Yes Yes   Sig: Take 5 mg by mouth daily Alternating 7.5 mg 6 times a week, 5 mg on Thursdays      Facility-Administered Medications: None     Current Discharge Medication List        CONTINUE these medications which have NOT CHANGED    Details   carvedilol (COREG) 3.125 mg tablet Take 3.125 mg by mouth 2 (two) times a day with meals      hydrALAZINE (APRESOLINE) 25 mg tablet Take 1 tablet (25 mg total) by mouth every 8 (eight) hours  Qty: 90 tablet, Refills: 0    Associated Diagnoses: Primary hypertension      LORazepam (ATIVAN) 0.5 mg tablet Take 1 tablet (0.5 mg total) by mouth daily at bedtime as needed for anxiety for up to 7 days  Qty: 7 tablet, Refills: 0    Associated Diagnoses: Anxiety      losartan (COZAAR) 50 mg tablet Take 50 mg by mouth daily      melatonin 3 mg Take 3 mg by mouth daily at bedtime as needed (Insomnia)      potassium chloride (Klor-Con M20) 20 mEq tablet Take 1 tablet (20 mEq total) by mouth daily  Qty: 30 tablet, Refills: 0    Associated Diagnoses: Primary hypertension      sildenafil (REVATIO) 20 mg tablet Take 20 mg by mouth      torsemide (DEMADEX) 20 mg tablet Take 2 tablets (40 mg total) by mouth daily  Qty: 60 tablet, Refills: 0    Associated Diagnoses: Acute exacerbation of CHF (congestive heart failure) (HCC)      warfarin (COUMADIN) 5 mg tablet Take 5 mg by mouth daily Alternating 7.5 mg 6 times a week, 5 mg  on Thursdays           No discharge procedures on file.  ED SEPSIS DOCUMENTATION   Time reflects when diagnosis was documented in both MDM as applicable and the Disposition within this note       Time User Action Codes Description Comment    4/13/2025  6:36 PM Fletcher Kincaid Add [I50.9] Acute exacerbation of CHF (congestive heart failure) (HCC)                  Fletcher Kincaid MD  04/13/25 3011

## 2025-04-14 ENCOUNTER — APPOINTMENT (OUTPATIENT)
Dept: NON INVASIVE DIAGNOSTICS | Facility: HOSPITAL | Age: 65
End: 2025-04-14
Payer: COMMERCIAL

## 2025-04-14 ENCOUNTER — APPOINTMENT (INPATIENT)
Dept: RADIOLOGY | Facility: HOSPITAL | Age: 65
End: 2025-04-14
Payer: COMMERCIAL

## 2025-04-14 PROBLEM — E87.8 ELECTROLYTE ABNORMALITY: Status: ACTIVE | Noted: 2025-04-14

## 2025-04-14 PROBLEM — D63.8 ANEMIA, CHRONIC DISEASE: Status: ACTIVE | Noted: 2025-04-14

## 2025-04-14 LAB
ALBUMIN SERPL BCG-MCNC: 4 G/DL (ref 3.5–5)
ALP SERPL-CCNC: 54 U/L (ref 34–104)
ALT SERPL W P-5'-P-CCNC: 25 U/L (ref 7–52)
ANION GAP SERPL CALCULATED.3IONS-SCNC: 10 MMOL/L (ref 4–13)
AORTIC ROOT: 3.7 CM
AORTIC VALVE MEAN VELOCITY: 8.3 M/S
AST SERPL W P-5'-P-CCNC: 18 U/L (ref 13–39)
AV LVOT MEAN GRADIENT: 1 MMHG
AV LVOT PEAK GRADIENT: 3 MMHG
AV MEAN PRESS GRAD SYS DOP V1V2: 3 MMHG
AV PEAK GRADIENT: 8 MMHG
AV VELOCITY RATIO: 0.73
AV VMAX SYS DOP: 1.42 M/S
BACTERIA UR QL AUTO: ABNORMAL /HPF
BASOPHILS # BLD AUTO: 0.03 THOUSANDS/ÂΜL (ref 0–0.1)
BASOPHILS NFR BLD AUTO: 1 % (ref 0–1)
BILIRUB SERPL-MCNC: 1.32 MG/DL (ref 0.2–1)
BILIRUB UR QL STRIP: NEGATIVE
BSA FOR ECHO PROCEDURE: 1.97 M2
BUN SERPL-MCNC: 57 MG/DL (ref 5–25)
CALCIUM SERPL-MCNC: 9.5 MG/DL (ref 8.4–10.2)
CHLORIDE SERPL-SCNC: 107 MMOL/L (ref 96–108)
CLARITY UR: CLEAR
CO2 SERPL-SCNC: 30 MMOL/L (ref 21–32)
COLOR UR: ABNORMAL
CREAT SERPL-MCNC: 2.39 MG/DL (ref 0.6–1.3)
CREAT UR-MCNC: 93.2 MG/DL
CREAT UR-MCNC: 93.3 MG/DL
DOP CALC AO VTI: 21.71 CM
DOP CALC LVOT PEAK VEL VTI: 15.91 CM
DOP CALC LVOT PEAK VEL: 0.86 M/S
E WAVE DECELERATION TIME: 206 MS
EOSINOPHIL # BLD AUTO: 0.12 THOUSAND/ÂΜL (ref 0–0.61)
EOSINOPHIL NFR BLD AUTO: 2 % (ref 0–6)
ERYTHROCYTE [DISTWIDTH] IN BLOOD BY AUTOMATED COUNT: 16.8 % (ref 11.6–15.1)
FRACTIONAL SHORTENING: 18 (ref 28–44)
GFR SERPL CREATININE-BSD FRML MDRD: 27 ML/MIN/1.73SQ M
GLOBAL LONGITUIDAL STRAIN: -9 %
GLUCOSE P FAST SERPL-MCNC: 93 MG/DL (ref 65–99)
GLUCOSE SERPL-MCNC: 93 MG/DL (ref 65–140)
GLUCOSE UR STRIP-MCNC: NEGATIVE MG/DL
HCT VFR BLD AUTO: 31.2 % (ref 36.5–49.3)
HGB BLD-MCNC: 9.6 G/DL (ref 12–17)
HGB UR QL STRIP.AUTO: ABNORMAL
HYALINE CASTS #/AREA URNS LPF: ABNORMAL /LPF
IMM GRANULOCYTES # BLD AUTO: 0.01 THOUSAND/UL (ref 0–0.2)
IMM GRANULOCYTES NFR BLD AUTO: 0 % (ref 0–2)
INR PPP: 3.96 (ref 0.85–1.19)
INTERVENTRICULAR SEPTUM IN DIASTOLE (PARASTERNAL SHORT AXIS VIEW): 2.1 CM
INTERVENTRICULAR SEPTUM: 2.1 CM (ref 0.6–1.1)
KETONES UR STRIP-MCNC: NEGATIVE MG/DL
LAAS-AP2: 43.2 CM2
LAAS-AP4: 35.2 CM2
LEFT ATRIUM SIZE: 4.1 CM
LEFT ATRIUM VOLUME (MOD BIPLANE): 150 ML
LEFT ATRIUM VOLUME INDEX (MOD BIPLANE): 76.1 ML/M2
LEFT INTERNAL DIMENSION IN SYSTOLE: 3.3 CM (ref 2.1–4)
LEFT VENTRICULAR INTERNAL DIMENSION IN DIASTOLE: 4 CM (ref 3.5–6)
LEFT VENTRICULAR POSTERIOR WALL IN END DIASTOLE: 2.3 CM
LEFT VENTRICULAR STROKE VOLUME: 28 ML
LEUKOCYTE ESTERASE UR QL STRIP: NEGATIVE
LVSV (TEICH): 28 ML
LYMPHOCYTES # BLD AUTO: 0.91 THOUSANDS/ÂΜL (ref 0.6–4.47)
LYMPHOCYTES NFR BLD AUTO: 18 % (ref 14–44)
MAGNESIUM SERPL-MCNC: 2.2 MG/DL (ref 1.9–2.7)
MCH RBC QN AUTO: 21.3 PG (ref 26.8–34.3)
MCHC RBC AUTO-ENTMCNC: 30.8 G/DL (ref 31.4–37.4)
MCV RBC AUTO: 69 FL (ref 82–98)
MICROALBUMIN UR-MCNC: 774.5 MG/L
MICROALBUMIN/CREAT 24H UR: 831 MG/G CREATININE (ref 0–30)
MONOCYTES # BLD AUTO: 0.46 THOUSAND/ÂΜL (ref 0.17–1.22)
MONOCYTES NFR BLD AUTO: 9 % (ref 4–12)
MV E'TISSUE VEL-LAT: 4 CM/S
MV E'TISSUE VEL-SEP: 3 CM/S
MV PEAK E VEL: 143 CM/S
MV STENOSIS PRESSURE HALF TIME: 60 MS
MV VALVE AREA P 1/2 METHOD: 3.67
NEUTROPHILS # BLD AUTO: 3.58 THOUSANDS/ÂΜL (ref 1.85–7.62)
NEUTS SEG NFR BLD AUTO: 70 % (ref 43–75)
NITRITE UR QL STRIP: NEGATIVE
NON-SQ EPI CELLS URNS QL MICRO: ABNORMAL /HPF
NRBC BLD AUTO-RTO: 0 /100 WBCS
PH UR STRIP.AUTO: 6 [PH]
PLATELET # BLD AUTO: 218 THOUSANDS/UL (ref 149–390)
PMV BLD AUTO: 11.7 FL (ref 8.9–12.7)
POTASSIUM SERPL-SCNC: 3.2 MMOL/L (ref 3.5–5.3)
PROCALCITONIN SERPL-MCNC: 0.17 NG/ML
PROT SERPL-MCNC: 6.4 G/DL (ref 6.4–8.4)
PROT UR STRIP-MCNC: ABNORMAL MG/DL
PROT UR-MCNC: 115.3 MG/DL
PROT/CREAT UR: 1.2 MG/G{CREAT}
PROTHROMBIN TIME: 37.7 SECONDS (ref 12.3–15)
RA PRESSURE ESTIMATED: 15 MMHG
RBC # BLD AUTO: 4.51 MILLION/UL (ref 3.88–5.62)
RBC #/AREA URNS AUTO: ABNORMAL /HPF
RIGHT ATRIAL 2D VOLUME: 76 ML
RIGHT ATRIUM AREA SYSTOLE A4C: 22.6 CM2
RV PSP: 69 MMHG
SL CV LEFT ATRIUM LENGTH A2C: 7.9 CM
SL CV LV EF: 40
SL CV PED ECHO LEFT VENTRICLE DIASTOLIC VOLUME (MOD BIPLANE) 2D: 71 ML
SL CV PED ECHO LEFT VENTRICLE SYSTOLIC VOLUME (MOD BIPLANE) 2D: 43 ML
SODIUM SERPL-SCNC: 147 MMOL/L (ref 135–147)
SP GR UR STRIP.AUTO: 1.01 (ref 1–1.03)
TR MAX PG: 54 MMHG
TR PEAK VELOCITY: 3.7 M/S
TRICUSPID VALVE PEAK REGURGITATION VELOCITY: 3.67 M/S
UROBILINOGEN UR STRIP-ACNC: <2 MG/DL
WBC # BLD AUTO: 5.11 THOUSAND/UL (ref 4.31–10.16)
WBC #/AREA URNS AUTO: ABNORMAL /HPF

## 2025-04-14 PROCEDURE — 93356 MYOCRD STRAIN IMG SPCKL TRCK: CPT

## 2025-04-14 PROCEDURE — 82570 ASSAY OF URINE CREATININE: CPT | Performed by: PHYSICIAN ASSISTANT

## 2025-04-14 PROCEDURE — 71046 X-RAY EXAM CHEST 2 VIEWS: CPT

## 2025-04-14 PROCEDURE — 80053 COMPREHEN METABOLIC PANEL: CPT | Performed by: STUDENT IN AN ORGANIZED HEALTH CARE EDUCATION/TRAINING PROGRAM

## 2025-04-14 PROCEDURE — 93306 TTE W/DOPPLER COMPLETE: CPT

## 2025-04-14 PROCEDURE — 99255 IP/OBS CONSLTJ NEW/EST HI 80: CPT | Performed by: INTERNAL MEDICINE

## 2025-04-14 PROCEDURE — 83735 ASSAY OF MAGNESIUM: CPT | Performed by: STUDENT IN AN ORGANIZED HEALTH CARE EDUCATION/TRAINING PROGRAM

## 2025-04-14 PROCEDURE — 84145 PROCALCITONIN (PCT): CPT | Performed by: PHYSICIAN ASSISTANT

## 2025-04-14 PROCEDURE — 82043 UR ALBUMIN QUANTITATIVE: CPT | Performed by: PHYSICIAN ASSISTANT

## 2025-04-14 PROCEDURE — 84156 ASSAY OF PROTEIN URINE: CPT | Performed by: PHYSICIAN ASSISTANT

## 2025-04-14 PROCEDURE — 85025 COMPLETE CBC W/AUTO DIFF WBC: CPT | Performed by: STUDENT IN AN ORGANIZED HEALTH CARE EDUCATION/TRAINING PROGRAM

## 2025-04-14 PROCEDURE — 85610 PROTHROMBIN TIME: CPT | Performed by: STUDENT IN AN ORGANIZED HEALTH CARE EDUCATION/TRAINING PROGRAM

## 2025-04-14 PROCEDURE — 81001 URINALYSIS AUTO W/SCOPE: CPT | Performed by: PHYSICIAN ASSISTANT

## 2025-04-14 PROCEDURE — 99223 1ST HOSP IP/OBS HIGH 75: CPT | Performed by: INTERNAL MEDICINE

## 2025-04-14 PROCEDURE — 99232 SBSQ HOSP IP/OBS MODERATE 35: CPT | Performed by: PHYSICIAN ASSISTANT

## 2025-04-14 RX ORDER — BUMETANIDE 0.25 MG/ML
2 INJECTION, SOLUTION INTRAMUSCULAR; INTRAVENOUS ONCE
Status: COMPLETED | OUTPATIENT
Start: 2025-04-14 | End: 2025-04-14

## 2025-04-14 RX ORDER — LOSARTAN POTASSIUM 50 MG/1
50 TABLET ORAL DAILY
Status: DISCONTINUED | OUTPATIENT
Start: 2025-04-15 | End: 2025-04-17 | Stop reason: HOSPADM

## 2025-04-14 RX ORDER — HYDRALAZINE HYDROCHLORIDE 25 MG/1
50 TABLET, FILM COATED ORAL EVERY 8 HOURS SCHEDULED
Status: DISCONTINUED | OUTPATIENT
Start: 2025-04-14 | End: 2025-04-15

## 2025-04-14 RX ADMIN — HYDRALAZINE HYDROCHLORIDE 50 MG: 25 TABLET ORAL at 21:24

## 2025-04-14 RX ADMIN — FUROSEMIDE 100 MG: 10 INJECTION, SOLUTION INTRAVENOUS at 08:27

## 2025-04-14 RX ADMIN — LOSARTAN POTASSIUM 50 MG: 50 TABLET, FILM COATED ORAL at 08:27

## 2025-04-14 RX ADMIN — POTASSIUM CHLORIDE 20 MEQ: 1500 TABLET, EXTENDED RELEASE ORAL at 08:27

## 2025-04-14 RX ADMIN — CARVEDILOL 3.12 MG: 3.12 TABLET, FILM COATED ORAL at 08:27

## 2025-04-14 RX ADMIN — LORAZEPAM 0.5 MG: 0.5 TABLET ORAL at 23:24

## 2025-04-14 RX ADMIN — BUMETANIDE 2 MG: 0.25 INJECTION INTRAMUSCULAR; INTRAVENOUS at 16:54

## 2025-04-14 RX ADMIN — HYDRALAZINE HYDROCHLORIDE 25 MG: 25 TABLET ORAL at 05:31

## 2025-04-14 RX ADMIN — MELATONIN 3 MG: 3 TAB ORAL at 23:24

## 2025-04-14 NOTE — ASSESSMENT & PLAN NOTE
Wt Readings from Last 3 Encounters:   04/13/25 72.6 kg (160 lb 0.9 oz)   03/20/25 67.4 kg (148 lb 9.4 oz)   03/05/25 69.9 kg (154 lb)     Acute on chronic  Presents with worsening dyspnea, increasing weight. Decompensated heart failure  On admission: Cr 2.45, Trop 179. BNP 1593. CXR mild vascular congestion, cardiomegaly   Started on IV diuresis  Outpatient regimen: torsemide 40mg daily, losartan 50mg daily, hydralazine 25mg q8h  Cardiology consulted

## 2025-04-14 NOTE — ASSESSMENT & PLAN NOTE
BP above goal   Monitor with diuresis   Home medications: Carvedilol 3.125 mg twice daily, hydralazine 25 mg every 8 hours, losartan 50 mg daily, torsemide 40 mg daily  Continue current medications: Carvedilol 3.125 mg p.o. twice daily, hydralazine 25 mg every 8 hours, diuretics as above  Hold losartan while aggressively diuresis

## 2025-04-14 NOTE — ASSESSMENT & PLAN NOTE
No definitive diagnosis.  Patient does not have a cardiac MRI, and has declined endomyocardial biopsy  Bone marrow biopsy with [place] red negative  PYP scan negative for ATTR amyloid    Echocardiogram is highly suspicious of infiltrative cardiomyopathy with apical sparing decreased strain.    Will likely need cardiac MRI to definitively diagnose amyloidosis, and this may need to be facilitated with anesthesia.

## 2025-04-14 NOTE — ASSESSMENT & PLAN NOTE
Amyloidosis seen on echo  Followed by hematology at [facility].   S/p biopsy-proven multiple myeloma.   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

## 2025-04-14 NOTE — ASSESSMENT & PLAN NOTE
Follows with heme-onc  Prior bone marrow biopsy in October 2024: 60 to 70% plasma cells  Has not undergone any treatment -- saw heme/onc 3/31 and recommended starting Chacha-CyBorD but not yet started

## 2025-04-14 NOTE — PROGRESS NOTES
Progress Note - Hospitalist   Name: Domingo Trevino 65 y.o. male I MRN: [id number]  Unit/Bed#: E5 -01 I Date of Admission: 4/13/2025   Date of Service: 4/14/2025 I Hospital Day: 0    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  Wt Readings from Last 3 Encounters:   04/14/25 72 kg (158 lb 11.7 oz)   03/20/25 67.4 kg (148 lb 9.4 oz)   03/05/25 69.9 kg (154 lb)     Presents with worsening dyspnea, increasing weight. Decompensated heart failure  BNP 1593 & CXR w/ mild vascular congestion, cardiomegaly   Maintained on torsemide 40 mg q day, losartan 50 mg q day, hydralazine 25 mg q 8 hr   ECHO (04/2025) w/ EF 40%, global longitudinal strain, unable to evaluate diastolic dysfunction, severely dilated LA, mild to moderate MR/ TR  S/p Lasix IV 80 mg x 1 w/ poor outpt   04/14 - Nephro following who recommended d/c'ing Lasix & give Bumex IV 2 mg x 1 dose   Cardiology following   Pulmonary medicine consult for management of pulmonary HTN  I&Os, daily wts  AL amyloidosis (HCC)  ECHO noting cardiac amyloidosis  Followed by hematology at [facility]   S/p biopsy-proven multiple myeloma   Pt declined prior endomyocardial biopsy   Eventually will need cardiac MRI to r/o amyloidosis but would need sedation w/ claustrophobia  Chronic kidney disease (CKD), stage IV (severe) (HCC)  Lab Results   Component Value Date    EGFR 27 04/14/2025    EGFR 26 04/13/2025    EGFR 27 (L) 03/25/2025    CREATININE 2.39 (H) 04/14/2025    CREATININE 2.45 (H) 04/13/2025    CREATININE 2.56 (H) 03/25/2025     Baseline creatinine 2.3-2.7  At risk for LUDWIG w/ diuresis/cardiorenal syndrome w/ underlying multiple myeloma and amyloidosis  Nephrology following  I&Os, daily wts, retention protcol   Trend renal function w/ IV diuresis  F/u renal U/S   Primary hypertension  SBP 1405-177   C/w Coreg & losartan  Hydralazine increased to 25 to 50 mg q 8 hrs   Chronic atrial fibrillation (HCC)  Rate-controlled  Maintained on warfarin 5 mg q day & Coreg   INR  supratherapeutic, hold warfarin on 04/14    Recent Labs     04/13/25  1657 04/14/25  0732   INR 3.71* 3.96*     Elevated troponin  No anginal complaints  Non-MI troponin elevation in setting of CHF  Multiple myeloma not having achieved remission (HCC)  Resume outpatient follow-up with hematology upon discharge   Anemia, chronic disease  Recent Labs     04/13/25  1625 04/14/25  0732   HGB 10.6* 9.6*     Hgb at baseline   Trend   Electrolyte abnormality  Recent Labs     04/14/25  0732   K 3.2*   MG 2.2     Replete & trend     VTE Pharmacologic Prophylaxis:   High Risk (Score >/= 5) - Pharmacological DVT Prophylaxis Ordered: warfarin (Coumadin). Sequential Compression Devices Ordered.    Mobility:   Basic Mobility Inpatient Raw Score: 20  JH-HLM Goal: 6: Walk 10 steps or more  JH-HLM Achieved: 6: Walk 10 steps or more  JH-HLM Goal achieved. Continue to encourage appropriate mobility.    Patient Centered Rounds: I performed bedside rounds with nursing staff today.   Discussions with Specialists or Other Care Team Provider: Plan of care reviewed with pulmonary medicine, nephrology, cardiology, case management, nursing    Education and Discussions with Family / Patient: Patient declined call to .     Current Length of Stay: 0 day(s)  Current Patient Status: Observation   Certification Statement: The patient will continue to require additional inpatient hospital stay due to CHF  Discharge Plan: Anticipate discharge in 48-72 hrs to discharge location to be determined pending rehab evaluations.    Code Status: Level 1 - Full Code    Subjective   Patient doing okay.  He admits his respiratory status has improved since yesterday.  He is moving his bowels.  He is urinating frequently.  He has no acute complaints.    Objective :  Temp:  [97.6 °F (36.4 °C)-98.2 °F (36.8 °C)] 98.2 °F (36.8 °C)  HR:  [53-83] 62  BP: (126-187)/() 168/101  Resp:  [16-30] 16  SpO2:  [92 %-100 %] 99 %  O2 Device: None (Room  air)  Nasal Cannula O2 Flow Rate (L/min):  [2 L/min] 2 L/min    Body mass index is 20.38 kg/m².     Input and Output Summary (last 24 hours):     Intake/Output Summary (Last 24 hours) at 4/14/2025 0908  Last data filed at 4/14/2025 0731  Gross per 24 hour   Intake 240 ml   Output 650 ml   Net -410 ml       Physical Exam  Constitutional:       General: He is not in acute distress.     Appearance: He is not toxic-appearing.      Comments: Sleepy but subsequently arousable, chronically ill in appearance   HENT:      Head: Normocephalic.      Right Ear: External ear normal.      Left Ear: External ear normal.      Nose: Nose normal.      Mouth/Throat:      Mouth: Mucous membranes are dry.      Pharynx: Oropharynx is clear.   Eyes:      Extraocular Movements: Extraocular movements intact.      Conjunctiva/sclera: Conjunctivae normal.   Cardiovascular:      Rate and Rhythm: Normal rate and regular rhythm.      Pulses: Normal pulses.      Heart sounds: Normal heart sounds.   Pulmonary:      Comments: Decreased inspiratory effort, decreased bibasilarly  Abdominal:      General: Abdomen is flat. Bowel sounds are normal. There is no distension.      Palpations: Abdomen is soft.      Tenderness: There is no abdominal tenderness. There is no guarding.   Musculoskeletal:         General: Swelling (Trace lower extremity) present.      Cervical back: Normal range of motion.   Skin:     General: Skin is warm and dry.      Coloration: Skin is not jaundiced.      Findings: No bruising or lesion.   Neurological:      General: No focal deficit present.      Mental Status: He is alert and oriented to person, place, and time. Mental status is at baseline.   Psychiatric:         Mood and Affect: Mood normal.         Behavior: Behavior normal.      Comments: Flat affect         Telemetry:  Telemetry Orders (From admission, onward)               24 Hour Telemetry Monitoring  Continuous x 24 Hours (Telem)        Expiring   Question:  Reason  for 24 Hour Telemetry  Answer:  Decompensated CHF- and any one of the following: continuous diuretic infusion or total diuretic dose >200 mg daily, associated electrolyte derangement (I.e. K < 3.0), inotropic drip (continuous infusion), hx of ventricular arrhythmia, or new EF < 35%                     Telemetry Reviewed: Normal Sinus Rhythm  Indication for Continued Telemetry Use: Acute CHF on >200 mg lasix/day or equivalent dose or with new reduced EF.                Lab Results: I have reviewed the following results:   Results from last 7 days   Lab Units 04/14/25  0732   WBC Thousand/uL 5.11   HEMOGLOBIN g/dL 9.6*   HEMATOCRIT % 31.2*   PLATELETS Thousands/uL 218   SEGS PCT % 70   LYMPHO PCT % 18   MONO PCT % 9   EOS PCT % 2     Results from last 7 days   Lab Units 04/14/25  0732   SODIUM mmol/L 147   POTASSIUM mmol/L 3.2*   CHLORIDE mmol/L 107   CO2 mmol/L 30   BUN mg/dL 57*   CREATININE mg/dL 2.39*   ANION GAP mmol/L 10   CALCIUM mg/dL 9.5   ALBUMIN g/dL 4.0   TOTAL BILIRUBIN mg/dL 1.32*   ALK PHOS U/L 54   ALT U/L 25   AST U/L 18   GLUCOSE RANDOM mg/dL 93     Results from last 7 days   Lab Units 04/14/25  0732   INR  3.96*                   Recent Cultures (last 7 days):         Imaging Results Review: I reviewed radiology reports from this admission including: chest xray.  Other Study Results Review: EKG was reviewed.     Last 24 Hours Medication List:     Current Facility-Administered Medications:     acetaminophen (TYLENOL) tablet 650 mg, Q4H PRN    carvedilol (COREG) tablet 3.125 mg, BID With Meals    furosemide (LASIX) injection 100 mg, BID (diuretic)    hydrALAZINE (APRESOLINE) tablet 25 mg, Q8H LOTTIE    LORazepam (ATIVAN) tablet 0.5 mg, HS PRN    losartan (COZAAR) tablet 50 mg, Daily    melatonin tablet 3 mg, HS PRN    ondansetron (ZOFRAN) injection 4 mg, Q6H PRN    oxyCODONE (ROXICODONE) immediate release tablet 10 mg, Q4H PRN    oxyCODONE (ROXICODONE) IR tablet 5 mg, Q4H PRN    potassium chloride  (Klor-Con M20) CR tablet 20 mEq, Daily    warfarin (COUMADIN) tablet 5 mg, Daily (warfarin)    Administrative Statements   Today, Patient Was Seen By: Elizabeth Alfonso PA-C  I have spent a total time of 55 minutes in caring for this patient on the day of the visit/encounter including Diagnostic results, Prognosis, Risks and benefits of tx options, Instructions for management, Patient and family education, Impressions, Counseling / Coordination of care, Documenting in the medical record, Reviewing/placing orders in the medical record (including tests, medications, and/or procedures), Obtaining or reviewing history  , and Communicating with other healthcare professionals .    **Please Note: This note may have been constructed using a voice recognition system.**

## 2025-04-14 NOTE — CONSULTS
Consultation - Nephrology   Domingo Trevino 65 y.o. 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  Check renal artery doppler   Check UA with microscopy, UPC ratio and and UACR  HFpEF, 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 twice daily, hydralazine 25 mg every 8 hours, diuretics as above  Hold losartan while aggressively diuresis  Multiple myeloma not having achieved remission (HCC)  Follows with heme-onc  Prior bone marrow biopsy in October 2024: 60 to 70% plasma cells  Has not undergone any treatment -- saw heme/onc 3/31 and recommended starting Chacha-CyBorD but not yet started   Chronic atrial fibrillation (HCC)        Summary of Plan:   Consider changing lasix to bumex 2mg IV bid -- will d/w cardiology   Check urine protein evaluation   Strict I/O and daily weight   Check am BMP     HISTORY OF PRESENT ILLNESS:  Requesting Physician: Daniel Silva MD  Reason for Consult: Chronic kidney disease    Domingo Trevino is a 65 y.o.  year old male who was admitted to [facility] with worsening shortness of breath.  Patient reports chronic shortness of breath for the past few months.  He was recently admitted on 3/18 with acute CHF and discharged on torsemide 40 mg daily.  In the past few days his breathing has again worsened so he came back to the ER.  Chest x-ray was consistent with pulmonary edema and pleural effusions and he was again started on IV Lasix.  He has a history of CKD so nephrology was consulted to assist with diuresis and renal optimization.    Patient denies seeing a nephrologist in the past.  He does not use any NSAIDs at home.  He reports significant shortness of breath with exertion.  Denies any lower extremity edema.  Does have good urine output today.      PAST MEDICAL HISTORY:  Past Medical History:   Diagnosis Date    A-fib (HCC)     CHF (congestive heart failure) (HCC)     CKD (chronic kidney disease), stage IV ([facility])     Hypertension     Multiple myeloma not having achieved remission ([facility])        PAST SURGICAL HISTORY:  No past surgical history on file.    ALLERGIES:  No Known Allergies    SOCIAL HISTORY:  Social History     Substance and Sexual Activity   Alcohol Use Never     Social History     Substance and Sexual Activity   Drug Use Never     Social History     Tobacco Use   Smoking Status Former    Current packs/day: 0.50    Types: Cigarettes   Smokeless Tobacco Never       FAMILY HISTORY:  No family history on file.    MEDICATIONS:  Scheduled Meds:  Current Facility-Administered Medications   Medication Dose Route Frequency Provider Last Rate    acetaminophen  650 mg Oral Q4H PRN Loren Santos MD      carvedilol  3.125 mg Oral BID With Meals Loren Santos MD      furosemide  100 mg Intravenous BID (diuretic) Loren Santos MD      hydrALAZINE  25 mg Oral Q8H [facility] Loren Santos MD      LORazepam  0.5 mg Oral  HS PRN Loren Santos MD      losartan  50 mg Oral Daily Loren Santos MD      melatonin  3 mg Oral HS PRN Loren Santos MD      ondansetron  4 mg Intravenous Q6H PRN Loren Santos MD      oxyCODONE  10 mg Oral Q4H PRN Loren Snatos MD      oxyCODONE  5 mg Oral Q4H PRN Loren Santos MD      potassium chloride  20 mEq Oral Daily Loren Santos MD      [Held by provider] warfarin  5 mg Oral Daily (warfarin) Loren Santos MD         PRN Meds:.  acetaminophen    LORazepam    melatonin    ondansetron    oxyCODONE    oxyCODONE    Continuous Infusions:     REVIEW OF SYSTEMS:  A complete review of systems was done. Pertinent positives and negatives noted in the HPI but otherwise the review of systems is negative.    PHYSICAL EXAM:  Current Weight: Weight - Scale: 71.7 kg (158 lb)  First Weight: Weight - Scale: 67.4 kg (148 lb 9.4 oz)  Vitals:    04/14/25 1111   BP: (!) 145/103   Pulse: 57   Resp: 20   Temp: 98 °F (36.7 °C)   SpO2: 98%       Intake/Output Summary (Last 24 hours) at 4/14/2025 1423  Last data filed at 4/14/2025 1111  Gross per 24 hour   Intake 600 ml   Output 950 ml   Net -350 ml     General: short of breath with exertion   Skin:  No rash  Eyes:  Sclerae anicteric, no periorbital edema   ENT:  Moist mucous membranes  Neck:  Trachea midline, symmetric   Chest:  Clear to auscultation bilaterally with no wheezes, rales or rhonchi  CVS:  Regular rate and rhythm  Abdomen:  Soft, nontender, nondistended  Neuro:  Awake and alert  Psych:  Appropriate affect  Extremities: no lower extremity edema     Lab Results:   Results from last 7 days   Lab Units 04/14/25  0732 04/13/25  1625   WBC Thousand/uL 5.11 5.39   HEMOGLOBIN g/dL 9.6* 10.6*   HEMATOCRIT % 31.2* 34.8*   PLATELETS Thousands/uL 218 233   SODIUM mmol/L 147 145   POTASSIUM  mmol/L 3.2* 4.3   CHLORIDE mmol/L 107 107   CO2 mmol/L 30 27   BUN mg/dL 57* 60*   CREATININE mg/dL 2.39* 2.45*   CALCIUM mg/dL 9.5 9.5   MAGNESIUM mg/dL 2.2  --        Radiology Results:   XR chest 2 views   Final Result by Walter Main MD (04/14 0958)      Worsening pulmonary edema and pleural effusions.      Infiltrate at the left lung base which could be pneumonia in the appropriate clinical setting. Asymmetric edema or atelectasis or other options.      The study was marked in EPIC for immediate notification.            Workstation performed: [id number]

## 2025-04-14 NOTE — PLAN OF CARE
Problem: PAIN - ADULT  Goal: Verbalizes/displays adequate comfort level or baseline comfort level  Description: Interventions:- Encourage patient to monitor pain and request assistance- Assess pain using appropriate pain scale- Administer analgesics based on type and severity of pain and evaluate response- Implement non-pharmacological measures as appropriate and evaluate response- Consider cultural and social influences on pain and pain management- Notify physician/advanced practitioner if interventions unsuccessful or patient reports new pain  Outcome: Progressing     Problem: Knowledge Deficit  Goal: Patient/family/caregiver demonstrates understanding of disease process, treatment plan, medications, and discharge instructions  Description: Complete learning assessment and assess knowledge base.Interventions:- Provide teaching at level of understanding- Provide teaching via preferred learning methods  Outcome: Progressing     Problem: DISCHARGE PLANNING  Goal: Discharge to home or other facility with appropriate resources  Description: INTERVENTIONS:- Identify barriers to discharge w/patient and caregiver- Arrange for needed discharge resources and transportation as appropriate- Identify discharge learning needs (meds, wound care, etc.)- Arrange for interpretive services to assist at discharge as needed- Refer to Case Management Department for coordinating discharge planning if the patient needs post-hospital services based on physician/advanced practitioner order or complex needs related to functional status, cognitive ability, or social support system  Outcome: Progressing

## 2025-04-14 NOTE — ASSESSMENT & PLAN NOTE
Lab Results   Component Value Date    EGFR 27 04/14/2025    EGFR 26 04/13/2025    EGFR 27 (L) 03/25/2025    CREATININE 2.39 (H) 04/14/2025    CREATININE 2.45 (H) 04/13/2025    CREATININE 2.56 (H) 03/25/2025     Baseline creatinine 2.3-2.7  At risk for LUDWIG w/ diuresis/cardiorenal syndrome w/ underlying multiple myeloma and amyloidosis  Nephrology following  I&Os, daily wts, retention protcol   Trend renal function w/ IV diuresis  F/u renal U/S

## 2025-04-14 NOTE — H&P
H&P - Hospitalist   Name: Domingo Trevino 65 y.o. 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 Decompensated heart failure  On admission: Cr 2.45, Trop 179. BNP 1593. CXR mild vascular congestion, cardiomegaly   Started on IV diuresis  Outpatient regimen: torsemide 40mg daily, losartan 50mg daily, hydralazine 25mg q8h  Cardiology consulted  Primary hypertension  BP elevated on admission  Continue Coreg, hydralazine and losartan  Elevated troponin  No anginal complaints  Non-MI troponin elevation in setting of CHF  Chronic kidney disease (CKD), stage IV (severe) (HCC)  Lab Results   Component Value Date    EGFR 26 04/13/2025    EGFR 27 (L) 03/25/2025    EGFR 24 03/20/2025    CREATININE 2.45 (H) 04/13/2025    CREATININE 2.56 (H) 03/25/2025    CREATININE 2.64 (H) 03/20/2025   Creatinine at baseline  Continue to monitor with diuresis  I&Os  AL amyloidosis (HCC)  Amyloidosis seen on echo  Followed by hematology at [facility].   S/p biopsy-proven multiple myeloma.   High clinical suspicion that he has cardiac AL amyloidosis  patient declined endomyocardial biopsy   Multiple myeloma not having achieved remission (HCC)  Resume outpatient follow-up with hematology upon discharge   Chronic atrial fibrillation (HCC)  Rate-controlled  Continue warfarin  Continue monitoring INR      VTE Pharmacologic Prophylaxis:   Moderate Risk (Score 3-4) - Pharmacological DVT Prophylaxis Ordered: warfarin (Coumadin).  Code Status: Level 1 - Full Code   Discussion with family:  patient.     Anticipated Length of Stay: Patient will be admitted on an observation basis with an anticipated length of stay of  less than 2 midnights secondary to acute CHF.    History of Present Illness   Chief Complaint: worsening shortness of breath    Domingo Trevino is a 65 y.o. male with a PMH of MM, HTN, HFpEF, CKD stage IV, afib who presents with worsening shortness of breath that he notes to have started 7 days after he was last discharged. He endorses no changes to his food intake/fluid intake. Endorses taking all his medications as prescribed. Denies any palpitations, chest pain. Endorses SOB worse on exertion. Unable to lay flat. Progressive worsening of shortness of breath prompted patient to come to ED for further evaluation.    Denies cough, colds, fevers, chills.    Review of Systems   Constitutional:  Negative for chills and fever.   HENT:  Negative for ear pain and sore throat.    Eyes:  Negative for pain and visual disturbance.   Respiratory:  Positive for shortness of breath. Negative for cough.    Cardiovascular:  Negative for chest pain and palpitations.   Gastrointestinal:  Positive for abdominal distention. Negative for abdominal pain and vomiting.   Genitourinary:  Negative for dysuria and hematuria.   Musculoskeletal:  Negative for arthralgias and back pain.   Skin:  Negative for color change and rash.   Neurological:  Negative for seizures and syncope.   All other systems reviewed and are negative.        Historical Information   Past Medical History:   Diagnosis Date    A-fib (HCC)     CHF (congestive heart failure) (HCC)     CKD (chronic kidney disease), stage IV (HCC)     Hypertension     Multiple myeloma not having achieved remission (HCC)      No past surgical history on file.  Social History     Tobacco Use    Smoking status: Former     Current packs/day: 0.50     Types: Cigarettes    Smokeless tobacco: Never   Vaping Use    Vaping status: Never Used   Substance and Sexual Activity    Alcohol use: Never    Drug use: Never    Sexual activity: Not on file     E-Cigarette/Vaping    E-Cigarette Use Never User       E-Cigarette/Vaping Substances       Social History:  Marital Status: Single   Patient Pre-hospital Living Situation: Home  Patient Pre-hospital Level of Mobility: walks  Patient Pre-hospital Diet Restrictions: N/A    Meds/Allergies   I have reviewed home medications with patient personally.  Prior to Admission medications    Medication Sig Start Date End Date Taking? Authorizing Provider   carvedilol (COREG) 3.125 mg tablet Take 3.125 mg by mouth 2 (two) times a day with meals   Yes Historical Provider, MD   hydrALAZINE (APRESOLINE) 25 mg tablet Take 1 tablet (25 mg total) by mouth every 8 (eight) hours 3/2/25  Yes Raegan Lara PA-C   LORazepam (ATIVAN) 0.5 mg tablet Take 1 tablet (0.5 mg total) by mouth daily at bedtime as needed for anxiety for up to 7 days 3/20/25 4/13/25 Yes Abby Mata MD   losartan (COZAAR) 50 mg tablet Take 50 mg by mouth daily   Yes Historical Provider, MD   melatonin 3 mg Take 3 mg by mouth daily at bedtime as needed (Insomnia)   Yes Historical Provider, MD   potassium chloride (Klor-Con M20) 20 mEq tablet Take 1 tablet (20 mEq total) by mouth daily 3/20/25  Yes Abby Mata MD   sildenafil (REVATIO) 20 mg tablet Take 20 mg by mouth 9/6/24  Yes Historical Provider, MD   torsemide (DEMADEX) 20 mg tablet Take 2 tablets (40 mg total) by mouth daily 3/20/25  Yes Abby Mata MD   warfarin (COUMADIN) 5 mg tablet Take 5 mg by mouth daily Alternating 7.5 mg 6 times a week, 5 mg on Thursdays   Yes Historical Provider, MD     No Known Allergies    Objective :  Temp:  [97.6 °F (36.4 °C)-97.8 °F (36.6 °C)] 97.6 °F (36.4 °C)  HR:  [53-73] 73  BP: (161-187)/(100-117) 180/112  Resp:  [22-30] 22  SpO2:  [94 %-100 %] 94 %  O2 Device: None (Room air)  Nasal Cannula O2 Flow Rate (L/min):  [2 L/min] 2 L/min    Physical Exam  Vitals and nursing note reviewed.   Constitutional:       General: He is not in acute distress.     Appearance: He is well-developed. He is  "ill-appearing.   HENT:      Head: Normocephalic and atraumatic.   Eyes:      Conjunctiva/sclera: Conjunctivae normal.   Cardiovascular:      Rate and Rhythm: Normal rate and regular rhythm.      Heart sounds: No murmur heard.  Pulmonary:      Effort: Pulmonary effort is normal. No respiratory distress.      Breath sounds: Wheezing and rales present.   Abdominal:      Palpations: Abdomen is soft.      Tenderness: There is no abdominal tenderness.   Musculoskeletal:         General: No swelling.      Cervical back: Neck supple.      Right lower leg: No edema.      Left lower leg: No edema.   Skin:     General: Skin is warm and dry.      Capillary Refill: Capillary refill takes less than 2 seconds.   Neurological:      General: No focal deficit present.      Mental Status: He is alert and oriented to person, place, and time. Mental status is at baseline.   Psychiatric:         Mood and Affect: Mood normal.            Lines/Drains:            Lab Results: I have reviewed the following results:  Results from last 7 days   Lab Units 04/13/25  1625   WBC Thousand/uL 5.39   HEMOGLOBIN g/dL 10.6*   HEMATOCRIT % 34.8*   PLATELETS Thousands/uL 233   SEGS PCT % 70   LYMPHO PCT % 19   MONO PCT % 7   EOS PCT % 3     Results from last 7 days   Lab Units 04/13/25  1625   SODIUM mmol/L 145   POTASSIUM mmol/L 4.3   CHLORIDE mmol/L 107   CO2 mmol/L 27   BUN mg/dL 60*   CREATININE mg/dL 2.45*   ANION GAP mmol/L 11   CALCIUM mg/dL 9.5   ALBUMIN g/dL 4.5   TOTAL BILIRUBIN mg/dL 1.02*   ALK PHOS U/L 78   ALT U/L 29   AST U/L 29   GLUCOSE RANDOM mg/dL 128     Results from last 7 days   Lab Units 04/13/25  1657   INR  3.71*         No results found for: \"HGBA1C\"        Imaging Results Review: I personally reviewed the following image studies in PACS and associated radiology reports: chest xray. My interpretation of the radiology images/reports is: vascular congestion.  Other Study Results Review: EKG was reviewed.     Administrative " Statements       ** Please Note: This note has been constructed using a voice recognition system. **

## 2025-04-14 NOTE — PLAN OF CARE
Problem: Potential for Falls  Goal: Patient will remain free of falls  Description: INTERVENTIONS:- Educate patient/family on patient safety including physical limitations- Instruct patient to call for assistance with activity - Consult OT/PT to assist with strengthening/mobility - Keep Call bell within reach- Keep bed low and locked with side rails adjusted as appropriate- Keep care items and personal belongings within reach- Initiate and maintain comfort rounds- Make Fall Risk Sign visible to staff- Apply yellow socks and bracelet for high fall risk patients- Consider moving patient to room near nurses station  INTERVENTIONS:- Educate patient/family on patient safety including physical limitations- Instruct patient to call for assistance with activity - Consult OT/PT to assist with strengthening/mobility - Keep Call bell within reach- Keep bed low and locked with side rails adjusted as appropriate- Keep care items and personal belongings within reach- Initiate and maintain comfort rounds- Make Fall Risk Sign visible to staff - Apply yellow socks and bracelet for high fall risk patients- Consider moving patient to room near nurses station  Outcome: Progressing     Problem: PAIN - ADULT  Goal: Verbalizes/displays adequate comfort level or baseline comfort level  Description: Interventions:- Encourage patient to monitor pain and request assistance- Assess pain using appropriate pain scale- Administer analgesics based on type and severity of pain and evaluate response- Implement non-pharmacological measures as appropriate and evaluate response- Consider cultural and social influences on pain and pain management- Notify physician/advanced practitioner if interventions unsuccessful or patient reports new pain  Outcome: Progressing     Problem: INFECTION - ADULT  Goal: Absence or prevention of progression during hospitalization  Description: INTERVENTIONS:- Assess and monitor for signs and symptoms of infection-  Monitor lab/diagnostic results- Monitor all insertion sites, i.e. indwelling lines, tubes, and drains- Monitor endotracheal if appropriate and nasal secretions for changes in amount and color- [place] appropriate cooling/warming therapies per order- Administer medications as ordered- Instruct and encourage patient and family to use good hand hygiene technique- Identify and instruct in appropriate isolation precautions for identified infection/condition  Outcome: Progressing  Goal: Absence of fever/infection during neutropenic period  Description: INTERVENTIONS:- Monitor WBC  Outcome: Progressing     Problem: SAFETY ADULT  Goal: Patient will remain free of falls  Description: INTERVENTIONS:- Educate patient/family on patient safety including physical limitations- Instruct patient to call for assistance with activity - Consult OT/PT to assist with strengthening/mobility - Keep Call bell within reach- Keep bed low and locked with side rails adjusted as appropriate- Keep care items and personal belongings within reach- Initiate and maintain comfort rounds- Make Fall Risk Sign visible to staff- Apply yellow socks and bracelet for high fall risk patients- Consider moving patient to room near nurses station  INTERVENTIONS:- Educate patient/family on patient safety including physical limitations- Instruct patient to call for assistance with activity - Consult OT/PT to assist with strengthening/mobility - Keep Call bell within reach- Keep bed low and locked with side rails adjusted as appropriate- Keep care items and personal belongings within reach- Initiate and maintain comfort rounds- Make Fall Risk Sign visible to staff- Apply yellow socks and bracelet for high fall risk patients- Consider moving patient to room near nurses station  Outcome: Progressing  Goal: Maintain or return to baseline ADL function  Description: INTERVENTIONS:-  Assess patient's ability to carry out ADLs; assess patient's baseline for ADL function  and identify physical deficits which impact ability to perform ADLs (bathing, care of mouth/teeth, toileting, grooming, dressing, etc.)- Assess/evaluate cause of self-care deficits - Assess range of motion- Assess patient's mobility; develop plan if impaired- Assess patient's need for assistive devices and provide as appropriate- Encourage maximum independence but intervene and supervise when necessary- Involve family in performance of ADLs- Assess for home care needs following discharge - Consider OT consult to assist with ADL evaluation and planning for discharge- Provide patient education as appropriate  Outcome: Progressing  Goal: Maintains/Returns to pre admission functional level  Description: INTERVENTIONS:- Perform AM-PAC 6 Click Basic Mobility/ Daily Activity assessment daily.- Set and communicate daily mobility goal to care team and patient/family/caregiver. - Collaborate with rehabilitation services on mobility goals if consulted- Perform Range of Motion 3 times a day.- Reposition patient every 2 hours.- Dangle patient 3 times a day- Stand patient 3 times a day- Ambulate patient 3 times a day- Out of bed to chair 3 times a day - Out of bed for meals 3 times a day- Out of bed for toileting- Record patient progress and toleration of activity level   Outcome: Progressing     Problem: DISCHARGE PLANNING  Goal: Discharge to home or other facility with appropriate resources  Description: INTERVENTIONS:- Identify barriers to discharge w/patient and caregiver- Arrange for needed discharge resources and transportation as appropriate- Identify discharge learning needs (meds, wound care, etc.)- Arrange for interpretive services to assist at discharge as needed- Refer to Case Management Department for coordinating discharge planning if the patient needs post-hospital services based on physician/advanced practitioner order or complex needs related to functional status, cognitive ability, or social support  system  Outcome: Progressing     Problem: Knowledge Deficit  Goal: Patient/family/caregiver demonstrates understanding of disease process, treatment plan, medications, and discharge instructions  Description: Complete learning assessment and assess knowledge base.Interventions:- Provide teaching at level of understanding- Provide teaching via preferred learning methods  Outcome: Progressing

## 2025-04-14 NOTE — ASSESSMENT & PLAN NOTE
Per prior heme-onc notes endomyocardial biopsy was recommended due to high suspicion for cardiac amyloid but patient declined  Admitted with recurrent CHF   Currently on lasix 100mg IV bid per cardiology   Strict I/O and daily weights   Home diuretics: Torsemide 40 mg daily

## 2025-04-14 NOTE — UTILIZATION REVIEW
Initial Clinical Review  OBSERVATION ADMISSION 4/13/2025 1901  CONVERTED TO   INPATIENT ADMISSION 4/14/2025 1042  DUE TO ACUTE ON CHRONIC DYSPNEA DECOMPENSATED ACUTE HF    Admission: Date/Time/Statement:   Admission Orders (From admission, onward)       Ordered        04/14/25 1042  INPATIENT ADMISSION  Once            04/13/25 1901  Place in Observation  Once                          Orders Placed This Encounter   Procedures    INPATIENT ADMISSION     Standing Status:   Standing     Number of Occurrences:   1     Level of Care:   Med Surg [16]     Estimated length of stay:   More than 2 Midnights     Certification:   I certify that inpatient services are medically necessary for this patient for a duration of greater than two midnights. See H&P and MD Progress Notes for additional information about the patient's course of treatment.     ED Arrival Information       Expected   -    Arrival   4/13/2025 16:05    Acuity   Emergent              Means of arrival   Walk-In    Escorted by   Self    Service   Hospitalist    Admission type   Emergency              Arrival complaint   shortness of breath             Chief Complaint   Patient presents with    Shortness of Breath     Sob x3 months. Patient reports worsening today. Denies chest pain or cough.        Initial Presentation: 65 y.o. male PMH MM not in remission, HTN, HFpEF, chronic AFib,CKD stage IV, afib to ED as walk in  presents with worsening shortness of breath that he notes to have started 7 days after he was last discharged. Endorses no changes to his food intake/fluid intake. Endorses taking all his medications as prescribed. Denies any palpitations, chest pain. Endorses SOB worse on exertion. Unable to lay flat   EXAM  hypertensive, tachypnea, wheeze w rales;   Labs elevated BUN, TROP, PTT, BNP @ 1,593; CR @ 2.45 -baseline,   CXR ED MD read w/ increased L pleural effusion, cardiomegaly. Started on IV diuresis   Observation admission due to acute on chronic  decompensated HF, elevated TROP. Consult Cardology for recss, cont IV diuresis, cont PTA Coreg, hydralazine and losartan, trend CR w diuresis; High clinical suspicion that he has cardiac AL amyloidosis  patient declined endomyocardial biopsy, OP Hematology, cont Warfarin, trend INR  Anticipated Length of Stay/Certification Statement: Patient will be admitted on an observation basis with an anticipated length of stay of less than 2 midnights secondary to acute CHF.   Date: 4/14/2025   Day 2: changed to Inpatient  Cardiology   HFpEF, 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 versus hypertension   examines mildly volume up with positive JVD  Significant response to furosemide IV 80 mg overnight,  increase IV furosemide to 100 mg twice daily  Hold beta-blocker to allow adequate diuresis with restrictive cardiomyopathy  Need cardiac MRI to rule out amyloidosis with anesthesia  Continue hydralazine 25 mg every 8 hours and can titrate up to 50 mg if necessary  Continue losartan 50 mg daily  Attending  respiratory status  improved since yesterday. He is moving his bowels. He is urinating frequently.   ECHO (04/2025) w/ EF 40%, global longitudinal strain, unable to evaluate diastolic dysfunction, severely dilated LA, mild to moderate MR/ TR, noting cardiac amyloidosis  S/p Lasix IV 80 mg x 1 w/ poor outpt   04/14 - Nephro following who recommended d/c'ing Lasix & give Bumex IV 2 mg x 1 dose   Cardiology following   Pulmonary medicine consult for management of pulmonary HTN  I&Os, daily wts  Nephrology  BMP, magnesium, phosphorus in a.m.  Recommend pulmonary consultation for assistance with pulmonary hypertension for elevated RVSP  DC IV Lasix and placed on Bumex 2 mg IV x 1 today then reevaluate in a.m. further dosage cautious diuresis  Follow-up with hematology oncology for myeloma management  No role for renal biopsy at this time  Supplement potassium  Increase hydralazine to 75 mg p.o. every 8  Check renal vascular Dopplers    ED  Treatment-Medication Administration from 04/13/2025 1604 to 04/13/2025 2011         Date/Time Order Dose Route Action     04/13/2025 1833 furosemide (LASIX) injection 80 mg 80 mg Intravenous Given     04/13/2025 1837 hydrALAZINE (APRESOLINE) injection 10 mg 10 mg Intravenous Given     04/13/2025 1836 losartan (COZAAR) tablet 50 mg 50 mg Oral Given            Scheduled Medications:  carvedilol, 3.125 mg, Oral, BID With Meals  hydrALAZINE, 50 mg, Oral, Q8H LOTTIE  [START ON 4/15/2025] losartan, 50 mg, Oral, Daily  potassium chloride, 20 mEq, Oral, Daily  [Held by provider] warfarin, 5 mg, Oral, Daily (warfarin)    4/14 IV X1=  furosemide (LASIX) injection 100 mg  Dose: 100 mg  Freq: 2 times daily (diuretic) Route: IV  Start: 04/14/25 0800 End: 04/14/25 1505    4/14 IV x1=  bumetanide (BUMEX) injection 2 mg  Dose: 2 mg  Freq: Once Route: IV  Start: 04/14/25 1530 End: 04/14/25 1654     Continuous IV Infusions:     PRN Meds:  acetaminophen, 650 mg, Oral, Q4H PRN  LORazepam, 0.5 mg, Oral, HS PRN  melatonin, 3 mg, Oral, HS PRN  ondansetron, 4 mg, Intravenous, Q6H PRN  oxyCODONE, 10 mg, Oral, Q4H PRN  oxyCODONE, 5 mg, Oral, Q4H PRN      ED Triage Vitals   Temperature Pulse Respirations Blood Pressure SpO2 Pain Score   04/13/25 1636 04/13/25 1608 04/13/25 1608 04/13/25 1608 04/13/25 1608 04/13/25 1716   97.8 °F (36.6 °C) 60 (!) 30 (!) 178/117 97 % No Pain     Weight (last 2 days)       Date/Time Weight    04/14/25 11:11:15 71.7 (158)    04/14/25 0600 72 (158.73)    04/13/25 2031 72.6 (160.05)    04/13/25 1953 67.4 (148.59)            Vital Signs (last 3 days)       Date/Time Temp Pulse Resp BP MAP (mmHg) SpO2 Calculated FIO2 (%) - Nasal Cannula Nasal Cannula O2 Flow Rate (L/min) O2 Device Patient Position - Orthostatic VS Pain    04/14/25 15:45:40 97.7 °F (36.5 °C) 53 18 177/99 125 98 % -- -- -- Lying --    04/14/25 11:11:15 98 °F (36.7 °C) 57 20 145/103 117 98 % -- -- -- Lying --    04/14/25 0820 -- -- -- -- -- -- -- -- -- --  No Pain    04/14/25 07:31:37 98.2 °F (36.8 °C) 62 16 168/101 123 99 % -- -- None (Room air) -- --    04/14/25 05:29:13 -- 67 -- 161/104 123 92 % -- -- -- -- --    04/13/25 23:35:42 -- 83 -- 126/81 96 93 % -- -- -- -- --    04/13/25 23:29:25 97.6 °F (36.4 °C) 70 -- 126/81 96 93 % 28 2 L/min Nasal cannula Lying --    04/13/25 22:21:08 -- 62 22 144/85 105 94 % 28 2 L/min Nasal cannula -- --    04/13/25 20:18:26 97.6 °F (36.4 °C) 73 22 180/112 135 94 % -- -- None (Room air) Lying --    04/13/25 2012 -- -- -- -- -- -- -- -- -- -- No Pain    04/13/25 1830 -- 63 24 181/100 136 100 % 28 2 L/min Nasal cannula Sitting --    04/13/25 1730 -- 58 24 165/114  136 100 % 28 2 L/min Nasal cannula Sitting --    04/13/25 1716 -- 68 24 187/110 -- 100 % 28 2 L/min Nasal cannula Sitting No Pain    04/13/25 1636 97.8 °F (36.6 °C) -- -- -- -- -- -- -- Nasal cannula -- --    04/13/25 1630 -- 53 24 161/104 125 100 % -- -- -- -- --    04/13/25 1608 -- 60 30 178/117 -- 97 % -- -- None (Room air) Sitting --              Pertinent Labs/Diagnostic Test Results:   Radiology:  XR chest 2 views   Final Interpretation by Walter Main MD (04/14 0958)      Worsening pulmonary edema and pleural effusions.      Infiltrate at the left lung base which could be pneumonia in the appropriate clinical setting. Asymmetric edema or atelectasis or other options.      The study was marked in EPIC for immediate notification.            Workstation performed: [id number]         XR chest pa and lateral    (Results Pending)   VAS renal artery complete    (Results Pending)     Cardiology:  Echo complete w/ contrast if indicated   Final Result by Néstor Wright DO (04/14 1415)        Left Ventricle: Left ventricular cavity size is normal. Wall thickness    is severely increased. The left ventricular ejection fraction is 40% by    visual estimation. Systolic function is mildly reduced. Global    longitudinal strain is reduced at -9%.  There is a cherry on  top    appearance of the global longitudinal strain pattern suggesting cardiac    amyloidosis. There is mild global hypokinesis. Unable to grade diastolic    function due to atrial fibrillation. Left atrial filling pressure is    elevated. There is a prominent myocardial speckle pattern.     Left Atrium: The atrium is severely dilated (>48 mL/m2).     Right Atrium: The atrium is dilated.     Mitral Valve: There is mild to moderate regurgitation with an    eccentrically directed jet.     Tricuspid Valve: There is mild to moderate regurgitation. The right    ventricular systolic pressure is moderately to severely elevated. The    estimated right ventricular systolic pressure is 69.00 mmHg.      Strain was performed to quantify interventricular dyssynchrony and    evaluate components of myocardial function due to amyloidosis and heart    failure. Results from the utilization of Strain Analysis are listed in the    report below.      Echo findings are highly suggestive of cardiac amyloidosis         ECG 12 lead   Final Result by Sylvester Perez MD (04/13 0786)   **Suspect arm lead reversal, interpretation assumes no reversal   Atrial fibrillation with slow ventricular response   Anterolateral infarct (cited on or before 18-Apr-2022)   Abnormal ECG   When compared with ECG of 18-Mar-2025 05:27,   No significant change was found   Confirmed by Sylvester Perez ([id number]) on 4/13/2025 4:36:12 PM        GI:  No orders to display           Results from last 7 days   Lab Units 04/14/25  0732 04/13/25  1625   WBC Thousand/uL 5.11 5.39   HEMOGLOBIN g/dL 9.6* 10.6*   HEMATOCRIT % 31.2* 34.8*   PLATELETS Thousands/uL 218 233   TOTAL NEUT ABS Thousands/µL 3.58 3.75         Results from last 7 days   Lab Units 04/14/25  0732 04/13/25  1625   SODIUM mmol/L 147 145   POTASSIUM mmol/L 3.2* 4.3   CHLORIDE mmol/L 107 107   CO2 mmol/L 30 27   ANION GAP mmol/L 10 11   BUN mg/dL 57* 60*   CREATININE mg/dL 2.39* 2.45*   EGFR ml/min/1.73sq m 27 26  "  CALCIUM mg/dL 9.5 9.5   MAGNESIUM mg/dL 2.2  --      Results from last 7 days   Lab Units 04/14/25  0732 04/13/25  1625   AST U/L 18 29   ALT U/L 25 29   ALK PHOS U/L 54 78   TOTAL PROTEIN g/dL 6.4 7.4   ALBUMIN g/dL 4.0 4.5   TOTAL BILIRUBIN mg/dL 1.32* 1.02*         Results from last 7 days   Lab Units 04/14/25  0732 04/13/25  1625   GLUCOSE RANDOM mg/dL 93 128             No results found for: \"BETA-HYDROXYBUTYRATE\"                   Results from last 7 days   Lab Units 04/13/25  1625   HS TNI 0HR ng/L 179*         Results from last 7 days   Lab Units 04/14/25  0732 04/13/25  1657   PROTIME seconds 37.7* 35.9*   INR  3.96* 3.71*   PTT seconds  --  41*         Results from last 7 days   Lab Units 04/14/25  0732   PROCALCITONIN ng/ml 0.17                 Results from last 7 days   Lab Units 04/13/25  1625   BNP pg/mL 1,593*                                     Results from last 7 days   Lab Units 04/14/25  1712   CLARITY UA  Clear   COLOR UA  Light Yellow   SPEC GRAV UA  1.010   PH UA  6.0   GLUCOSE UA mg/dl Negative   KETONES UA mg/dl Negative   BLOOD UA  Moderate*   PROTEIN UA mg/dl 100 (2+)*   NITRITE UA  Negative   BILIRUBIN UA  Negative   UROBILINOGEN UA (BE) mg/dl <2.0   LEUKOCYTES UA  Negative   WBC UA /hpf 1-2   RBC UA /hpf Innumerable*   BACTERIA UA /hpf Occasional   EPITHELIAL CELLS WET PREP /hpf None Seen   CREATININE UR mg/dL 93.3   PROTEIN UR mg/dL 115.3   PROT/CREAT RATIO UR  1.2*             Past Medical History:   Diagnosis Date    A-fib (HCC)     CHF (congestive heart failure) (HCC)     CKD (chronic kidney disease), stage IV (HCC)     Hypertension     Multiple myeloma not having achieved remission (HCC)      Present on Admission:   Primary hypertension   Multiple myeloma not having achieved remission (HCC)   HFpEF, 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   Elevated troponin   Chronic kidney disease (CKD), stage IV (severe) (HCC)   Chronic atrial fibrillation (HCC)   AL amyloidosis (HCC)      Admitting " Diagnosis: SOB (shortness of breath) [R06.02]  Acute exacerbation of CHF (congestive heart failure) (HCC) [I50.9]  Age/Sex: 65 y.o. male    Network Utilization Review Department  ATTENTION: Please call with any questions or concerns to [phone] and carefully listen to the prompts so that you are directed to the right person. All voicemails are confidential.   For Discharge needs, contact Care Management DC Support Team at [phone] opt. 2  Send all requests for admission clinical reviews, approved or denied determinations and any other requests to dedicated fax number below belonging to the campus where the patient is receiving treatment. List of dedicated fax numbers for the Facilities:  FACILITY NAME UR FAX NUMBER   ADMISSION DENIALS (Administrative/Medical Necessity) [phone]   DISCHARGE SUPPORT TEAM (NETWORK) [phone]   PARENT CHILD HEALTH (Maternity/NICU/Pediatrics) [phone]   [facility] [phone]   [facility] [phone]   [facility] [phone]   [facility] [phone]   [facility] [phone]   [facility] [phone]   [facility] [phone]   [facility] [phone]   [facility] [phone]   [facility] [phone]   [facility] [phone]   [facility] [phone]

## 2025-04-14 NOTE — ASSESSMENT & PLAN NOTE
Wt Readings from Last 3 Encounters:   04/14/25 72 kg (158 lb 11.7 oz)   03/20/25 67.4 kg (148 lb 9.4 oz)   03/05/25 69.9 kg (154 lb)     Presents with worsening dyspnea, increasing weight. Decompensated heart failure  BNP 1593 & CXR w/ mild vascular congestion, cardiomegaly   Maintained on torsemide 40 mg q day, losartan 50 mg q day, hydralazine 25 mg q 8 hr   ECHO (04/2025) w/ EF 40%, global longitudinal strain, unable to evaluate diastolic dysfunction, severely dilated LA, mild to moderate MR/ TR  S/p Lasix IV 80 mg x 1 w/ poor outpt   04/14 - Nephro following who recommended d/c'ing Lasix & give Bumex IV 2 mg x 1 dose   Cardiology following   Pulmonary medicine consult for management of pulmonary HTN  I&Os, daily wts

## 2025-04-14 NOTE — ASSESSMENT & PLAN NOTE
Follows with oncology at [facility]  Will be starting on Chacha CyBorD starting next week for multiple myeloma

## 2025-04-14 NOTE — CONSULTS
Consultation - Cardiology   Name: Domingo Trevino 65 y.o. male I MRN: [id number]  Unit/Bed#: E5 -01 I Date of Admission: 4/13/2025   Date of Service: 4/14/2025 I Hospital Day: 0       Inpatient consult to Cardiology     Date/Time  4/14/2025 9:52 AM     Performed by  Patrick Arroyo MD   Authorized by  Loren Santos MD         Physician Requesting Evaluation: Daniel Silva MD   Reason for Evaluation / Principal Problem: HFpEF    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  Etiology: Hypertension versus amyloid, last LVEF from 2024 was 55%, no images in our system  EKG: Rate controlled atrial fibrillation, low voltage limb leads    PTA cardiac medications: Coreg 3.25 mg twice daily, losartan 50 mg daily, torsemide 40 mg daily, hydralazine 25 mg every 8 hours    Current cardiac medications: Coreg 3.25 mg twice daily, IV Lasix 100 mg twice daily , losartan 50 mg daily, hydralazine 25 mg every every 8 hours    Echocardiogram shows severely thickened LV, with speckled appearance and reduced strain with apical sparing.  IVC severely dilated with no significant collapsibility.    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  Did not have significant response to 80 of IV Lasix overnight.  Agree with increasing to Lasix 100 twice daily  Can hold beta-blocker to allow for adequate diuresis and restrictive cardiomyopathy  Ideally will need cardiac MRI to rule out amyloidosis, this would need to be done with anesthesia as patient is claustrophobic  Continue hydralazine 25 mg every 8 hours, can uptitrate to 50 if blood pressure remains high  Continue losartan 50 mg daily      AL amyloidosis (HCC)  No definitive diagnosis.  Patient does not have a cardiac MRI, and has declined endomyocardial biopsy  Bone marrow biopsy with [place] red negative  PYP scan negative for ATTR amyloid    Echocardiogram is highly suspicious of  infiltrative cardiomyopathy with apical sparing decreased strain.    Will likely need cardiac MRI to definitively diagnose amyloidosis, and this may need to be facilitated with anesthesia.    Primary hypertension  On hydralazine 25 mg every 8 hours and losartan 50 mg daily    Elevated troponin  Likely non-MI troponin elevation in the setting of acute exacerbation of diastolic heart failure.  Chronic kidney disease (CKD), stage IV (severe) ([facility])  Nephrology on board.  Multiple myeloma not having achieved remission ([facility])  Follows with oncology at [facility]  Will be starting on Chacha CyBorD starting next week for multiple myeloma  Chronic atrial fibrillation (HCC)  On warfarin  Today's INR 3.96  Medications reviewed. Continue current medications at prescribed doses.    History of Present Illness   Domingo Trevino is a 65 y.o. male with a past medical history of multiple myeloma not yet started on chemotherapy, 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osis, CKD stage IV, hypertension who presents with worsening shortness of breath.  He has been taking torsemide 40 a.m., 20 p.m. states he feels significantly short of breath even with slight movement to going to the bathroom.  Denies any chest pain, or palpitations.    At the time of my evaluation overall patient appears mildly volume up, does have positive JVD.  Chest x-ray shows pulmonary congestion.  Patient follows up with [facility] cardiology and [facility] oncology.  Is supposed to be starting on Chacha CyBorD for his multiple myeloma next week.    He has been compliant with his medications and has not had any dietary indiscretions.    Review of Systems   Constitutional:  Negative for chills, fatigue and fever.   Respiratory:  Positive for shortness of breath. Negative for apnea, chest tightness and wheezing.    Cardiovascular:  Negative for chest pain and palpitations.   Gastrointestinal:  Negative for abdominal distention and abdominal pain.   Neurological:  Negative for dizziness, tremors, syncope,  facial asymmetry, weakness, light-headedness and headaches.     Medical History Review: I have reviewed the patient's PMH, PSH, Social History, Family History, Meds, and Allergies     Objective :  Temp:  [97.6 °F (36.4 °C)-98.2 °F (36.8 °C)] 98.2 °F (36.8 °C)  HR:  [53-83] 62  BP: (126-187)/() 168/101  Resp:  [16-30] 16  SpO2:  [92 %-100 %] 99 %  O2 Device: Nasal cannula  Nasal Cannula O2 Flow Rate (L/min):  [2 L/min] 2 L/min  Orthostatic Blood Pressures      Flowsheet Row Most Recent Value   Blood Pressure 168/101 filed at 04/14/2025 0731   Patient Position - Orthostatic VS Lying filed at 04/13/2025 2329          First Weight: Weight - Scale: 67.4 kg (148 lb 9.4 oz) (04/13/25 1953)  Vitals:    04/13/25 2031 04/14/25 0600   Weight: 72.6 kg (160 lb 0.9 oz) 72 kg (158 lb 11.7 oz)       Physical Exam  Constitutional:       Appearance: He is ill-appearing.   Neck:      Vascular: JVD present.   Pulmonary:      Breath sounds: Examination of the right-lower field reveals rales. Examination of the left-lower field reveals rales. Rales present.   Musculoskeletal:      Right lower leg: Edema present.      Left lower leg: Edema present.   Skin:     General: Skin is warm.   Neurological:      General: No focal deficit present.      Mental Status: He is alert and oriented to person, place, and time.           Lab Results: I have reviewed the following results:CBC/BMP:   .     04/14/25  0732   WBC 5.11   HGB 9.6*   HCT 31.2*      SODIUM 147   K 3.2*      CO2 30   BUN 57*   CREATININE 2.39*   GLUC 93   MG 2.2    , Creatinine Clearance: Estimated Creatinine Clearance: 32.1 mL/min (A) (by C-G formula based on SCr of 2.39 mg/dL (H)).  Results from last 7 days   Lab Units 04/13/25  1625   WBC Thousand/uL 5.39   HEMOGLOBIN g/dL 10.6*   HEMATOCRIT % 34.8*   PLATELETS Thousands/uL 233     Results from last 7 days   Lab Units 04/13/25  1625   POTASSIUM mmol/L 4.3   CHLORIDE mmol/L 107   CO2 mmol/L 27   BUN mg/dL 60*  "  CREATININE mg/dL 2.45*   CALCIUM mg/dL 9.5     Results from last 7 days   Lab Units 04/13/25  1657 04/07/25  1156   INR  3.71* 3.2   PTT seconds 41*  --      No results found for: \"HGBA1C\"  Lab Results   Component Value Date    TROPONINI 1.01 () 06/17/2021       Imaging Results Review: No pertinent imaging studies reviewed.  Other Study Results Review: EKG was reviewed.     VTE Prophylaxis: VTE covered by:  [Held by provider] warfarin, Oral         "

## 2025-04-14 NOTE — ASSESSMENT & PLAN NOTE
ECHO noting cardiac amyloidosis  Followed by hematology at [facility]   S/p biopsy-proven multiple myeloma   Pt declined prior endomyocardial biopsy   Eventually will need cardiac MRI to r/o amyloidosis but would need sedation w/ claustrophobia

## 2025-04-14 NOTE — ASSESSMENT & PLAN NOTE
Lab Results   Component Value Date    EGFR 26 04/13/2025    EGFR 27 (L) 03/25/2025    EGFR 24 03/20/2025    CREATININE 2.45 (H) 04/13/2025    CREATININE 2.56 (H) 03/25/2025    CREATININE 2.64 (H) 03/20/2025   Creatinine at baseline  Continue to monitor with diuresis  I&Os

## 2025-04-14 NOTE — ASSESSMENT & PLAN NOTE
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

## 2025-04-14 NOTE — CASE MANAGEMENT
Case Management Assessment & Discharge Planning Note    Patient name Domingo Trevino  Location [facility] 5 /E5 -* MRN [id number]  : 1960 Date 2025       Current Admission Date: 2025  Current Admission Diagnosis:Primary hypertension   Patient Active Problem List    Diagnosis Date Noted Date Diagnosed    Anemia, chronic disease 2025     Electrolyte abnormality 2025     Renal infarction (HCC) 2025     Chronic kidney disease (CKD), stage IV (severe) (HCC) 2025     AL amyloidosis (HCC) 2025     Multiple myeloma not having achieved remission (HCC) 2025     Chronic atrial fibrillation (HCC) 2025     HFpEF, 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 2022     Primary hypertension 2022     Elevated troponin 2022     Hypokalemia 2022       LOS (days): 0  Geometric Mean LOS (GMLOS) (days):   Days to GMLOS:     OBJECTIVE:  PATIENT READMITTED TO HOSPITAL  Risk of Unplanned Readmission Score: 25.72      Current admission status: Inpatient    [facility] Pharmacy:   [id number] [place] [facility] [address] [facility]  [address] [place] [address]  Phone: [phone] Fax: [phone]    Primary Care Provider: Maricruz Peres    Primary Insurance: [facility]  Secondary Insurance:     ASSESSMENT:    Readmission [employer]  30 Day Readmission: Yes  During your hospital stay, did someone (provider, nurse, ) explain your care to you in a way you could understand?: Yes  Did you feel medically stable to leave the hospital?: Yes  Were you able to pay for your medication at the pharmacy?: Yes  Did you have reliable transportation to take you to your appointments?: Yes    Patient Information  Admitted from:: Home  Mental Status: Alert  During Assessment patient was accompanied by: Not accompanied during assessment  Assessment information provided by:: Patient  Support Systems: Self  County of Residence: [facility] city do you  live in?: [place]  Home entry access options. Select all that apply.: Stairs  Number of steps to enter home.: 10  Type of Current Residence: Other (Comment) (currently renting a room)  Living Arrangements: Lives Alone  Is patient a ?: No    Activities of Daily Living Prior to Admission  Functional Status: Independent  Completes ADLs independently?: Yes  Ambulates independently?: Yes  Does patient use assisted devices?: No  Does patient currently own DME?: No  Does patient have a history of Outpatient Therapy (PT/OT)?: No  Does the patient have a history of Short-Term Rehab?: No  Does patient have a history of HHC?: No  Does patient currently have HHC?: No    Patient Information Continued  Does patient have prescription coverage?: Yes  Can the patient afford their medications and any related supplies (such as glucometers or test strips)?: Yes  Does patient receive dialysis treatments?: No  Does patient have a history of substance abuse?: No  Does patient have a history of Mental Health Diagnosis?: No    Means of Transportation  [facility]    DISCHARGE DETAILS:    Discharge planning discussed with:: pt     CM contacted family/caregiver?: No- see comments (pt declined to add anyone as a contact)  Were Treatment Team discharge recommendations reviewed with patient/caregiver?: Yes  Did patient/caregiver verbalize understanding of patient care needs?: Yes  Were patient/caregiver advised of the risks associated with not following Treatment Team discharge recommendations?: Yes    Treatment Team Recommendation: Home  Discharge Destination Plan:: Home  Transport at Discharge : Self     Additional Comments: Cm spoke with pt regarding cm role and dc planning. Pt is currently living alone renting a room with [address]. He says this may be temporary but he does not know if he will be moving back in with his former SO. He is ind with ADLs without use of DME. [facility] [address] . Reports  no issues affording medications since he has insurance now. Pt drives adn has a car. Due to his current unstable situation, cm provided resources from  [facility] should he find a need for them.

## 2025-04-14 NOTE — ASSESSMENT & PLAN NOTE
Etiology: Hypertension versus amyloid, last LVEF from 2024 was 55%, no images in our system  EKG: Rate controlled atrial fibrillation, low voltage limb leads    PTA cardiac medications: Coreg 3.25 mg twice daily, losartan 50 mg daily, torsemide 40 mg daily, hydralazine 25 mg every 8 hours    Current cardiac medications: Coreg 3.25 mg twice daily, IV Lasix 100 mg twice daily , losartan 50 mg daily, hydralazine 25 mg every every 8 hours    Echocardiogram shows severely thickened LV, with speckled appearance and reduced strain with apical sparing.  IVC severely dilated with no significant collapsibility.    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  Did not have significant response to 80 of IV Lasix overnight.  Agree with increasing to Lasix 100 twice daily  Can hold beta-blocker to allow for adequate diuresis and restrictive cardiomyopathy  Ideally will need cardiac MRI to rule out amyloidosis, this would need to be done with anesthesia as patient is claustrophobic  Continue hydralazine 25 mg every 8 hours, can uptitrate to 50 if blood pressure remains high  Continue losartan 50 mg daily

## 2025-04-15 ENCOUNTER — APPOINTMENT (INPATIENT)
Dept: NON INVASIVE DIAGNOSTICS | Facility: HOSPITAL | Age: 65
End: 2025-04-15
Payer: COMMERCIAL

## 2025-04-15 PROBLEM — I27.20 PULMONARY HYPERTENSION (HCC): Status: ACTIVE | Noted: 2025-04-15

## 2025-04-15 PROBLEM — Z87.891 FORMER SMOKER: Status: ACTIVE | Noted: 2025-04-15

## 2025-04-15 PROBLEM — R80.1 PERSISTENT PROTEINURIA: Status: ACTIVE | Noted: 2025-04-15

## 2025-04-15 LAB
ANION GAP SERPL CALCULATED.3IONS-SCNC: 10 MMOL/L (ref 4–13)
BUN SERPL-MCNC: 54 MG/DL (ref 5–25)
CALCIUM SERPL-MCNC: 8.9 MG/DL (ref 8.4–10.2)
CHLORIDE SERPL-SCNC: 105 MMOL/L (ref 96–108)
CO2 SERPL-SCNC: 29 MMOL/L (ref 21–32)
CREAT SERPL-MCNC: 2.48 MG/DL (ref 0.6–1.3)
ERYTHROCYTE [DISTWIDTH] IN BLOOD BY AUTOMATED COUNT: 16.5 % (ref 11.6–15.1)
GFR SERPL CREATININE-BSD FRML MDRD: 26 ML/MIN/1.73SQ M
GLUCOSE SERPL-MCNC: 94 MG/DL (ref 65–140)
HCT VFR BLD AUTO: 29.8 % (ref 36.5–49.3)
HGB BLD-MCNC: 9.3 G/DL (ref 12–17)
INR PPP: 3.45 (ref 0.85–1.19)
MCH RBC QN AUTO: 21.5 PG (ref 26.8–34.3)
MCHC RBC AUTO-ENTMCNC: 31.2 G/DL (ref 31.4–37.4)
MCV RBC AUTO: 69 FL (ref 82–98)
PLATELET # BLD AUTO: 216 THOUSANDS/UL (ref 149–390)
POTASSIUM SERPL-SCNC: 3.3 MMOL/L (ref 3.5–5.3)
PROCALCITONIN SERPL-MCNC: 0.17 NG/ML
PROTHROMBIN TIME: 34 SECONDS (ref 12.3–15)
RBC # BLD AUTO: 4.32 MILLION/UL (ref 3.88–5.62)
SODIUM SERPL-SCNC: 144 MMOL/L (ref 135–147)
WBC # BLD AUTO: 6.08 THOUSAND/UL (ref 4.31–10.16)

## 2025-04-15 PROCEDURE — 85027 COMPLETE CBC AUTOMATED: CPT | Performed by: STUDENT IN AN ORGANIZED HEALTH CARE EDUCATION/TRAINING PROGRAM

## 2025-04-15 PROCEDURE — 80048 BASIC METABOLIC PNL TOTAL CA: CPT | Performed by: STUDENT IN AN ORGANIZED HEALTH CARE EDUCATION/TRAINING PROGRAM

## 2025-04-15 PROCEDURE — 93975 VASCULAR STUDY: CPT

## 2025-04-15 PROCEDURE — 85610 PROTHROMBIN TIME: CPT | Performed by: STUDENT IN AN ORGANIZED HEALTH CARE EDUCATION/TRAINING PROGRAM

## 2025-04-15 PROCEDURE — 99255 IP/OBS CONSLTJ NEW/EST HI 80: CPT | Performed by: INTERNAL MEDICINE

## 2025-04-15 PROCEDURE — 99232 SBSQ HOSP IP/OBS MODERATE 35: CPT | Performed by: PHYSICIAN ASSISTANT

## 2025-04-15 PROCEDURE — 97166 OT EVAL MOD COMPLEX 45 MIN: CPT

## 2025-04-15 PROCEDURE — 84145 PROCALCITONIN (PCT): CPT | Performed by: PHYSICIAN ASSISTANT

## 2025-04-15 PROCEDURE — 97162 PT EVAL MOD COMPLEX 30 MIN: CPT

## 2025-04-15 PROCEDURE — 99232 SBSQ HOSP IP/OBS MODERATE 35: CPT | Performed by: INTERNAL MEDICINE

## 2025-04-15 PROCEDURE — 99232 SBSQ HOSP IP/OBS MODERATE 35: CPT

## 2025-04-15 RX ORDER — DOXAZOSIN 1 MG/1
1 TABLET ORAL 2 TIMES DAILY
Status: DISCONTINUED | OUTPATIENT
Start: 2025-04-15 | End: 2025-04-17 | Stop reason: HOSPADM

## 2025-04-15 RX ORDER — HYDRALAZINE HYDROCHLORIDE 25 MG/1
25 TABLET, FILM COATED ORAL ONCE
Status: DISCONTINUED | OUTPATIENT
Start: 2025-04-15 | End: 2025-04-17 | Stop reason: HOSPADM

## 2025-04-15 RX ORDER — BUMETANIDE 0.25 MG/ML
2 INJECTION, SOLUTION INTRAMUSCULAR; INTRAVENOUS 2 TIMES DAILY
Status: COMPLETED | OUTPATIENT
Start: 2025-04-15 | End: 2025-04-16

## 2025-04-15 RX ORDER — HYDRALAZINE HYDROCHLORIDE 25 MG/1
75 TABLET, FILM COATED ORAL EVERY 8 HOURS SCHEDULED
Status: DISCONTINUED | OUTPATIENT
Start: 2025-04-15 | End: 2025-04-17 | Stop reason: HOSPADM

## 2025-04-15 RX ORDER — POTASSIUM CHLORIDE 1500 MG/1
40 TABLET, EXTENDED RELEASE ORAL ONCE
Status: COMPLETED | OUTPATIENT
Start: 2025-04-15 | End: 2025-04-15

## 2025-04-15 RX ORDER — POTASSIUM CHLORIDE 1500 MG/1
20 TABLET, EXTENDED RELEASE ORAL 2 TIMES DAILY WITH MEALS
Status: DISCONTINUED | OUTPATIENT
Start: 2025-04-15 | End: 2025-04-17 | Stop reason: HOSPADM

## 2025-04-15 RX ADMIN — BUMETANIDE 2 MG: 0.25 INJECTION INTRAMUSCULAR; INTRAVENOUS at 17:41

## 2025-04-15 RX ADMIN — HYDRALAZINE HYDROCHLORIDE 50 MG: 25 TABLET ORAL at 05:13

## 2025-04-15 RX ADMIN — BUMETANIDE 2 MG: 0.25 INJECTION INTRAMUSCULAR; INTRAVENOUS at 10:10

## 2025-04-15 RX ADMIN — MELATONIN 3 MG: 3 TAB ORAL at 23:29

## 2025-04-15 RX ADMIN — HYDRALAZINE HYDROCHLORIDE 50 MG: 25 TABLET ORAL at 14:09

## 2025-04-15 RX ADMIN — LORAZEPAM 0.5 MG: 0.5 TABLET ORAL at 23:29

## 2025-04-15 RX ADMIN — LOSARTAN POTASSIUM 50 MG: 50 TABLET, FILM COATED ORAL at 08:35

## 2025-04-15 RX ADMIN — CARVEDILOL 3.12 MG: 3.12 TABLET, FILM COATED ORAL at 08:34

## 2025-04-15 RX ADMIN — DOXAZOSIN 1 MG: 1 TABLET ORAL at 11:54

## 2025-04-15 RX ADMIN — CARVEDILOL 3.12 MG: 3.12 TABLET, FILM COATED ORAL at 16:48

## 2025-04-15 RX ADMIN — HYDRALAZINE HYDROCHLORIDE 50 MG: 25 TABLET ORAL at 21:03

## 2025-04-15 RX ADMIN — POTASSIUM CHLORIDE 40 MEQ: 1500 TABLET, EXTENDED RELEASE ORAL at 08:34

## 2025-04-15 RX ADMIN — POTASSIUM CHLORIDE 20 MEQ: 1500 TABLET, EXTENDED RELEASE ORAL at 08:34

## 2025-04-15 RX ADMIN — POTASSIUM CHLORIDE 20 MEQ: 1500 TABLET, EXTENDED RELEASE ORAL at 16:48

## 2025-04-15 NOTE — ASSESSMENT & PLAN NOTE
Former smoker, smoked 1 ppd for approximately 40 years. Quit January 2025  Last CT chest w/o co 10/20/2024 no suspicious lesions for malignancy. Did show moderate emphysematous changes   No PFTs

## 2025-04-15 NOTE — ASSESSMENT & PLAN NOTE
TTE with RVSP elevated at 69  Likely group 2 pulmonary hypertension in the setting of chronic heart failure and suspected cardiac amyloid. Group 3 is possible given prior CT scan w/ moderate emphysematous changes. No recent CT imaging. No PFTs

## 2025-04-15 NOTE — PLAN OF CARE
Problem: PAIN - ADULT  Goal: Verbalizes/displays adequate comfort level or baseline comfort level  Description: Interventions:- Encourage patient to monitor pain and request assistance- Assess pain using appropriate pain scale- Administer analgesics based on type and severity of pain and evaluate response- Implement non-pharmacological measures as appropriate and evaluate response- Consider cultural and social influences on pain and pain management- Notify physician/advanced practitioner if interventions unsuccessful or patient reports new pain  Outcome: Progressing     Problem: INFECTION - ADULT  Goal: Absence or prevention of progression during hospitalization  Description: INTERVENTIONS:- Assess and monitor for signs and symptoms of infection- Monitor lab/diagnostic results- Monitor all insertion sites, i.e. indwelling lines, tubes, and drains- Monitor endotracheal if appropriate and nasal secretions for changes in amount and color- [place] appropriate cooling/warming therapies per order- Administer medications as ordered- Instruct and encourage patient and family to use good hand hygiene technique- Identify and instruct in appropriate isolation precautions for identified infection/condition  Outcome: Progressing  Goal: Absence of fever/infection during neutropenic period  Description: INTERVENTIONS:- Monitor WBC  Outcome: Progressing     Problem: SAFETY ADULT  Goal: Patient will remain free of falls  Description: INTERVENTIONS:- Educate patient/family on patient safety including physical limitations- Instruct patient to call for assistance with activity - Consult OT/PT to assist with strengthening/mobility - Keep Call bell within reach- Keep bed low and locked with side rails adjusted as appropriate- Keep care items and personal belongings within reach- Initiate and maintain comfort rounds- Make Fall Risk Sign visible to staff- Offer Toileting every 2 Hours, in advance of need- Initiate/Maintain bed alarm-  Obtain necessary fall risk management equipment: Apply yellow socks and bracelet for high fall risk patients- Consider moving patient to room near nurses station  INTERVENTIONS:- Educate patient/family on patient safety including physical limitations- Instruct patient to call for assistance with activity - Consult OT/PT to assist with strengthening/mobility - Keep Call bell within reach- Keep bed low and locked with side rails adjusted as appropriate- Keep care items and personal belongings within reach- Initiate and maintain comfort rounds- Make Fall Risk Sign visible to staff- Offer Toileting every 2 Hours, in advance of need- Initiate/Maintain bed alarm- Obtain necessary fall risk management equipment: Apply yellow socks and bracelet for high fall risk patients- Consider moving patient to room near nurses station  Outcome: Progressing  Goal: Maintain or return to baseline ADL function  Description: INTERVENTIONS:-  Assess patient's ability to carry out ADLs; assess patient's baseline for ADL function and identify physical deficits which impact ability to perform ADLs (bathing, care of mouth/teeth, toileting, grooming, dressing, etc.)- Assess/evaluate cause of self-care deficits - Assess range of motion- Assess patient's mobility; develop plan if impaired- Assess patient's need for assistive devices and provide as appropriate- Encourage maximum independence but intervene and supervise when necessary- Involve family in performance of ADLs- Assess for home care needs following discharge - Consider OT consult to assist with ADL evaluation and planning for discharge- Provide patient education as appropriate  Outcome: Progressing  Goal: Maintains/Returns to pre admission functional level  Description: INTERVENTIONS:- Perform AM-PAC 6 Click Basic Mobility/ Daily Activity assessment daily.- Set and communicate daily mobility goal to care team and patient/family/caregiver. - Collaborate with rehabilitation services on  mobility goals if consulted- Perform Range of Motion 2 times a day.- Reposition patient every 2 hours.- Dangle patient 2 times a day- Stand patient 3 times a day- Ambulate patient 3 times a day- Out of bed to chair 3 times a day - Out of bed for meals 3 times a day- Out of bed for toileting- Record patient progress and toleration of activity level   Outcome: Progressing     Problem: DISCHARGE PLANNING  Goal: Discharge to home or other facility with appropriate resources  Description: INTERVENTIONS:- Identify barriers to discharge w/patient and caregiver- Arrange for needed discharge resources and transportation as appropriate- Identify discharge learning needs (meds, wound care, etc.)- Arrange for interpretive services to assist at discharge as needed- Refer to Case Management Department for coordinating discharge planning if the patient needs post-hospital services based on physician/advanced practitioner order or complex needs related to functional status, cognitive ability, or social support system  Outcome: Progressing     Problem: Knowledge Deficit  Goal: Patient/family/caregiver demonstrates understanding of disease process, treatment plan, medications, and discharge instructions  Description: Complete learning assessment and assess knowledge base.Interventions:- Provide teaching at level of understanding- Provide teaching via preferred learning methods  Outcome: Progressing     Problem: Prexisting or High Potential for Compromised Skin Integrity  Goal: Skin integrity is maintained or improved  Description: INTERVENTIONS:- Identify patients at risk for skin breakdown- Assess and monitor skin integrity- Assess and monitor nutrition and hydration status- Monitor labs - Assess for incontinence - Turn and reposition patient- Assist with mobility/ambulation- Relieve pressure over bony prominences- Avoid friction and shearing- Provide appropriate hygiene as needed including keeping skin clean and dry- Evaluate need  for skin moisturizer/barrier cream- Collaborate with interdisciplinary team - Patient/family teaching- Consider wound care consult   Outcome: Progressing

## 2025-04-15 NOTE — ASSESSMENT & PLAN NOTE
Recent Labs     04/13/25  1625 04/14/25  0732 04/15/25  0434   HGB 10.6* 9.6* 9.3*     Hgb at baseline   Trend

## 2025-04-15 NOTE — ASSESSMENT & PLAN NOTE
Wt Readings from Last 3 Encounters:   04/15/25 71.7 kg (158 lb 1.1 oz)   03/20/25 67.4 kg (148 lb 9.4 oz)   03/05/25 69.9 kg (154 lb)   TTE with suspicion of infiltrative cardiomyopathy with apical sparing   Will need cardiac MRI   Cardiology following   Diuresis per cardiology and primary team

## 2025-04-15 NOTE — PROGRESS NOTES
Progress Note - Cardiology   Name: Domingo Trevino 65 y.o. 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  IVC severely dilated with no significant collapsibility.    Elevated RVSP, likely group 2 PH      Plan:     Patient examines mildly volume up, with positive JVD.    Okay to continue with diuresis for 1 more day, will cautious with kidney function  Can hold beta-blocker to allow for adequate diuresis   Patient's blood pressure continues to be elevated, can increase hydralazine to 75 mg every 8 hours  Continue losartan 50 mg daily    AL amyloidosis (HCC)  No definitive diagnosis.  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    Will likely need cardiac MRI to definitively diagnose amyloidosis, and this may need to be facilitated with anesthesia.    Primary hypertension  On hydralazine 25 mg every 8 hours and losartan 50 mg daily    Elevated troponin  Likely non-MI troponin elevation in the setting of acute exacerbation of diastolic heart failure.  Chronic kidney disease (CKD), stage IV (severe)  ([facility])  Nephrology on board.  Multiple myeloma not having achieved remission (HCC)  Follows with oncology at [facility]  Will be starting on [facility] starting next week for multiple myeloma  Chronic atrial fibrillation (HCC)  On warfarin  Today's INR 3.96  Anemia, chronic disease    Electrolyte abnormality    Pulmonary hypertension (HCC)      Subjective     Patient does not feel better compared to yesterday.  Feels still short of breath and lethargic.    Objective :  Temp:  [97.7 °F (36.5 °C)-98.8 °F (37.1 °C)] 97.9 °F (36.6 °C)  HR:  [53-84] 84  BP: (145-177)/() 172/102  Resp:  [18-24] 24  SpO2:  [93 %-100 %] 93 %  O2 Device: Nasal cannula  Orthostatic Blood Pressures      Flowsheet Row Most Recent Value   Blood Pressure 172/102 filed at 04/15/2025 0822   Patient Position - Orthostatic VS Sitting filed at 04/15/2025 0822          First Weight: Weight - Scale: 67.4 kg (148 lb 9.4 oz) (04/13/25 1953)  Vitals:    04/14/25 1111 04/15/25 0545   Weight: 71.7 kg (158 lb) 71.7 kg (158 lb 1.1 oz)     Physical Exam      Lab Results: I have reviewed the following results:CBC/BMP:   .     04/15/25  0434   WBC 6.08   HGB 9.3*   HCT 29.8*      SODIUM 144   K 3.3*      CO2 29   BUN 54*   CREATININE 2.48*   GLUC 94    , Creatinine Clearance: Estimated Creatinine Clearance: 30.9 mL/min (A) (by C-G formula based on SCr of 2.48 mg/dL (H)).  Results from last 7 days   Lab Units 04/15/25  0434 04/14/25  0732 04/13/25  1625   WBC Thousand/uL 6.08 5.11 5.39   HEMOGLOBIN g/dL 9.3* 9.6* 10.6*   HEMATOCRIT % 29.8* 31.2* 34.8*   PLATELETS Thousands/uL 216 218 233     Results from last 7 days   Lab Units 04/15/25  0434 04/14/25  0732 04/13/25  1625   POTASSIUM mmol/L 3.3* 3.2* 4.3   CHLORIDE mmol/L 105 107 107   CO2 mmol/L 29 30 27   BUN mg/dL 54* 57* 60*   CREATININE mg/dL 2.48* 2.39* 2.45*   CALCIUM mg/dL 8.9 9.5 9.5     Results from last 7 days   Lab Units 04/15/25  0434 04/14/25  0732 04/13/25  1657   INR  3.45* 3.96* 3.71*  "  PTT seconds  --   --  41*     No results found for: \"HGBA1C\"  Lab Results   Component Value Date    TROPONINI 1.01 () 06/17/2021       Imaging Results Review: No pertinent imaging studies reviewed.  Other Study Results Review: EKG was reviewed.     VTE Pharmacologic Prophylaxis: VTE covered by:  [Held by provider] warfarin, Oral     VTE Mechanical Prophylaxis:       "

## 2025-04-15 NOTE — ASSESSMENT & PLAN NOTE
Lab Results   Component Value Date    EGFR 26 04/15/2025    EGFR 27 04/14/2025    EGFR 26 04/13/2025    CREATININE 2.48 (H) 04/15/2025    CREATININE 2.39 (H) 04/14/2025    CREATININE 2.45 (H) 04/13/2025     Baseline creatinine 2.3-2.7  At risk for LUDWIG w/ diuresis/cardiorenal syndrome w/ underlying multiple myeloma and amyloidosis  Nephrology consulted   I&Os, daily wts, retention protcol   Trend renal function w/ IV diuresis  F/u renal U/S

## 2025-04-15 NOTE — PLAN OF CARE
Problem: Potential for Falls  Goal: Patient will remain free of falls  Description: INTERVENTIONS:- Educate patient/family on patient safety including physical limitations- Instruct patient to call for assistance with activity - Consult OT/PT to assist with strengthening/mobility - Keep Call bell within reach- Keep bed low and locked with side rails adjusted as appropriate- Keep care items and personal belongings within reach- Initiate and maintain comfort rounds- Make Fall Risk Sign visible to staff- Offer Toileting every 2 Hours, in advance of need- Initiate/Maintain bed alarm- Obtain necessary fall risk management equipment: walker  - Apply yellow socks and bracelet for high fall risk patients- Consider moving patient to room near nurses station  INTERVENTIONS:- Educate patient/family on patient safety including physical room near nurses station  Outcome: Progressing     Problem: PAIN - ADULT  Goal: Verbalizes/displays adequate comfort level or baseline comfort level  Description: Interventions:- Encourage patient to monitor pain and request assistance- Assess pain using appropriate pain scale- Administer analgesics based on type and severity of pain and evaluate response- Implement non-pharmacological measures as appropriate and evaluate response- Consider cultural and social influences on pain and pain management- Notify physician/advanced practitioner if interventions unsuccessful or patient reports new pain  Outcome: Progressing     Problem: INFECTION - ADULT  Goal: Absence or prevention of progression during hospitalization  Description: INTERVENTIONS:- Assess and monitor for signs and symptoms of infection- Monitor lab/diagnostic results- Monitor all insertion sites, i.e. indwelling lines, tubes, and drains- Monitor endotracheal if appropriate and nasal secretions for changes in amount and color- [place] appropriate cooling/warming therapies per order- Administer medications as ordered- Instruct and  encourage patient and family to use good hand hygiene technique- Identify and instruct in appropriate isolation precautions for identified infection/condition  Outcome: Progressing     Problem: SAFETY ADULT  Goal: Patient will remain free of falls  Description: INTERVENTIONS:- Educate patient/family on patient safety including physical limitations- Instruct patient to call for assistance with activity - Consult OT/PT to assist with strengthening/mobility - Keep Call bell within reach- Keep bed low and locked with side rails adjusted as appropriate- Keep care items and personal belongings within reach- Initiate and maintain comfort rounds- Make Fall Risk Sign visible to staff- Offer Toileting every 2 Hours, in advance of need- Initiate/Maintain bed alarm- Obtain necessary fall risk management equipment: walker- Apply yellow socks and bracelet for high fall risk patiend bracelet for high fall risk patients- Consider moving patient to room near nurses station  Outcome: Progressing  Goal: Maintain or return to baseline ADL function  Description: INTERVENTIONS:-  Assess patient's ability to carry out ADLs; assess patient's baseline for ADL function and identify physical deficits which impact ability to perform ADLs (bathing, care of mouth/teeth, toileting, grooming, dressing, etc.)- Assess/evaluate cause of self-care deficits - Assess range of motion- Assess patient's mobility; develop plan if impaired- Assess patient's need for assistive devices and provide as appropriate- Encourage maximum independence but intervene and supervise when necessary- Involve family in performance of ADLs- Assess for home care needs following discharge - Consider OT consult to assist with ADL evaluation and planning for discharge- Provide patient education as appropriate  Outcome: Progressing  Goal: Maintains/Returns to pre admission functional level  Description: INTERVENTIONS:- Perform AM-PAC 6 Click Basic Mobility/ Daily Activity  assessment daily.- Set and communicate daily mobility goal to care rogressing     Problem: DISCHARGE PLANNING  Goal: Discharge to home or other facility with appropriate resources  Description: INTERVENTIONS:- Identify barriers to discharge w/patient and caregiver- Arrange for needed discharge resources and transportation as appropriate- Identify discharge learning needs (meds, wound care, etc.)- Arrange for interpretive services to assist at discharge as needed- Refer to Case Management Department for coordinating discharge planning if the patient needs post-hospital services based on physician/advanced practitioner order or complex needs related to functional status, cognitive ability, or social support system  Outcome: Progressing     Problem: Knowledge Deficit  Goal: Patient/family/caregiver demonstrates understanding of disease process, treatment plan, medications, and discharge instructions  Description: Complete learning assessment and assess knowledge base.Interventions:- Provide teaching at level of understanding- Provide teaching via preferred learning methods  Outcome: Progressing     Problem: Prexisting or High Potential for Compromised Skin Integrity  Goal: Skin integrity is maintained or improved  Description: INTERVENTIONS:- Identify patients at risk for skin breakdown- Assess and monitor skin integrity- Assess and monitor nutrition and hydration status- Monitor labs - Assess for incontinence - Turn and reposition patient- Assist with mobility/ambulation- Relieve pressure over bony prominences- Avoid friction and shearing- Provide appropriate hygiene as needed including keeping skin clean and dry- Evaluate need for skin moisturizer/barrier cream- Collaborate with interdisciplinary team - Patient/family teaching- Consider wound care consult   Outcome: Progressing

## 2025-04-15 NOTE — ASSESSMENT & PLAN NOTE
-160   PTA meds: Carvedilol 3.125 mg BID, hydralazine 25 mg q 8 hrs, losartan 50 mg q day, torsemide 40 mg q day  C/w Carvedilol & losartan   Hydralazine increased to 25 to 75 mg q 8 hrs   Cardura 1 mg BID added

## 2025-04-15 NOTE — ASSESSMENT & PLAN NOTE
Etiology: Hypertension versus amyloid, last LVEF from 2024 was 55%, no images in our system  EKG: Rate controlled atrial fibrillation, low voltage limb leads    PTA cardiac medications: Coreg 3.25 mg twice daily, losartan 50 mg daily, torsemide 40 mg daily, hydralazine 25 mg every 8 hours    Current cardiac medications: Coreg 3.25 mg twice daily, IV Lasix 100 mg twice daily , losartan 50 mg daily, hydralazine 25 mg every every 8 hours    Echocardiogram shows severely thickened LV, with speckled appearance and reduced strain with apical sparing.  IVC severely dilated with no significant collapsibility.    Elevated RVSP, likely group 2 PH      Plan:     Patient examines mildly volume up, with positive JVD.    Okay to continue with diuresis for 1 more day, will cautious with kidney function  Can hold beta-blocker to allow for adequate diuresis   Patient's blood pressure continues to be elevated, can increase hydralazine to 75 mg every 8 hours  Continue losartan 50 mg daily

## 2025-04-15 NOTE — ASSESSMENT & PLAN NOTE
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  Admission creatinine of 2.45 mg/dL on 4/13/25.  Creatinine today is at 2.48 mg/dL continues to remain stable for now  check BMP, magnesium, phosphorus in a.m.  Recommend pulmonary consultation for assistance with pulmonary hypertension  Pending renal ultrasound vascular to evaluate kidney size due to history of prior renal infarct  Placed on Bumex 2 mg IV twice daily for now  Cautious use of diuretics due to concern for prerenal azotemia as he is clinically only minimally hypervolemic likely respiratory concerns are secondary to pulmonary hypertension  Place on a renal diet when allowed diet order.   Strict I/O.  Daily weights  Urinary retention protocol  Avoid nephrotoxins, adjust meds to appropriate GFR.  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  Cannot rule out underlying component of multiple myeloma affecting the kidney at this time.  Outpatient for nephrology follows up with no nephrologist

## 2025-04-15 NOTE — PROGRESS NOTES
04/15/25 1000   Clinical Encounter Type   Visited With Patient   Routine Visit Introduction   [employer] Encounters   [employer] Needs Prayer

## 2025-04-15 NOTE — CONSULTS
Consultation - Pulmonology   Name: Domingo Trevino 65 y.o. male I MRN: [id number]  Unit/Bed#: E5 -01 I Date of Admission: 4/13/2025   Date of Service: 4/15/2025 I Hospital Day: 1       Inpatient consult to Pulmonology     Date/Time  4/15/2025 7:52 AM     Performed by  Michael Mccormick DO   Authorized by  Elizabeth Alfonso PA-C           Physician Requesting Evaluation: Daniel Silva MD   Reason for Evaluation / Principal Problem: Pulmonary Hypertension     Assessment & Plan  Pulmonary hypertension (HCC)  TTE with RVSP elevated at 69  Likely group 2 pulmonary hypertension in the setting of chronic heart failure and suspected cardiac amyloid. Group 3 is possible given prior CT scan w/ moderate emphysematous changes. No recent CT imaging. 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  Wt Readings from Last 3 Encounters:   04/15/25 71.7 kg (158 lb 1.1 oz)   03/20/25 67.4 kg (148 lb 9.4 oz)   03/05/25 69.9 kg (154 lb)   TTE with suspicion of infiltrative cardiomyopathy with apical sparing   Will need cardiac MRI   Cardiology following   Diuresis per cardiology and primary team  Primary hypertension  Per primary and cardiology   Chronic kidney disease (CKD), stage IV (severe) (HCC)  Lab Results   Component Value Date    EGFR 26 04/15/2025    EGFR 27 04/14/2025    EGFR 26 04/13/2025    CREATININE 2.48 (H) 04/15/2025    CREATININE 2.39 (H) 04/14/2025    CREATININE 2.45 (H) 04/13/2025   Nephrology consulted   Chronic atrial fibrillation (HCC)  Rate controlled   On Warfarin   Multiple myeloma not having achieved remission (HCC)  Follows with heme-onc  Prior bone marrow biopsy in October 2024: 60 to 70% plasma cells  Elevated Kappa/Lambda free light chain   Has not undergone any treatment -- saw heme/onc 3/31 and recommended starting Chacha-CyBorD but not yet started   Former smoker  Former smoker, smoked 1 ppd for approximately 40 years. Quit January 2025  Last CT chest w/o co 10/20/2024 no suspicious lesions for  malignancy. Did show moderate emphysematous changes   No PFTs     Plan:   - Continued diuresis per cards/nephro/primary  - Further work up of amyloid per cardiology    - For completeness, PFT as an outpatient to r/o obstructive lung disease in the setting of smoking history and emphysematous changes on prior CT chest   - Wean supplemental oxygen to SpO2 >88 %   - may trial Albuterol MDI PRN     I have discussed the above management plan in detail with the primary service.     History of Present Illness   Domingo Trevino is a 65 y.o. 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  Pulmonary consulted due to the concern for pulmonary hypertension as noted on recent TTE.    Upon arrival, patient was afebrile, hypertensive, requiring 2 L nasal cannula.  Labs were notable for elevated BNP, elevated troponin x1, creatine at baseline, no leukocytosis. He had a chest x-ray concerning for pulmonary congestion and bilateral pleural effusions. He was admitted and started diuretics. TTE obtained yesterday, EF 40%, severely dilated left atrium, dilated right atrium, mild-mod MR, mild-mod TR, RVSP 69 as well as findings concerning for amyloidosis. Of note, patient with recent admission 3/18/2025-3/20/2025 for presumed heart failure exacerbation.     [facility], last seen Dr. Livingston on 3/31/2025. S/p BM bx 10/2024 showing 60-70% plasma cells, Congo-Red negative. Recommended endomyocardial biopsy due to high suspicion for cardiac amyloid, though patient declined. To start Chacha-CyBorD, though has not occurred as of yet. [facility] cardi[facility], last seen 3/12/2025.    Pulmonary history:   No prior pulmonary disease. Former smoker, smoked 1 ppd for approximately 40 years. Quit January 2025. No prior PFTs. Mild COVID infection 2 years ago. Patient is a retired nurse. No known work  exposures. No family history of lung disease.     Review of systems:  12 point review of systems was completed and was otherwise negative except as listed in HPI.    Historical Information   Historical Information   Past Medical History:   Diagnosis Date    A-fib (HCC)     CHF (congestive heart failure) (HCC)     CKD (chronic kidney disease), stage IV (HCC)     Hypertension     Multiple myeloma not having achieved remission (HCC)      No past surgical history on file.  Social History     Tobacco Use    Smoking status: Former     Current packs/day: 0.50     Types: Cigarettes    Smokeless tobacco: Never   Vaping Use    Vaping status: Never Used   Substance and Sexual Activity    Alcohol use: Never    Drug use: Never    Sexual activity: Not on file     E-Cigarette/Vaping    E-Cigarette Use Never User      E-Cigarette/Vaping Substances     No family history on file.    Occupational History: Retired nurse     Objective :  Temp:  [97.7 °F (36.5 °C)-98.8 °F (37.1 °C)] 98.8 °F (37.1 °C)  HR:  [53-78] 61  BP: (145-177)/() 164/110  Resp:  [18-20] 19  SpO2:  [96 %-100 %] 97 %  O2 Device: None (Room air)    Physical exam:   General: No acute distress  HENT: Normocephalic, atraumatic.  PERRL, EOMI, Moist mucous membranes.  Neck: Supple  Lungs: Diminished bibasilar breath sounds, no wheezing, rales, or rhonchi.  On 2L NC  Heart: irregular, S1-S2 present, no murmurs rubs or gallops  Extremities: Warm and well perfused, no cyanosis, clubbing, or edema  Skin: Warm, dry, no rashes or lesions  Neurologic: No focal neurologic deficits      Lab Results: I have reviewed the following results:  .     04/15/25  0434   WBC 6.08   HGB 9.3*   HCT 29.8*      SODIUM 144   K 3.3*      CO2 29   BUN 54*   CREATININE 2.48*   GLUC 94   INR 3.45*     ABG: No new results in last 24 hours.    Imaging:   CXR 4/13/2025  IMPRESSION:  Worsening pulmonary edema and pleural effusions.  Infiltrate at the left lung base which could be  pneumonia in the appropriate clinical setting. Asymmetric edema or atelectasis or other options.    CT chest w/o LVHN 10/20/2024  Lungs: Moderate emphysematous changes with prominent blebs in the lingula   abutting the left heart border, and smaller blebs at the lung apices. Bibasilar   compressive atelectasis vs infiltrate. Mild, smooth interlobular septal   thickening at the lung apices.   Pleura: Bilateral freely layering pleural effusions. No pneumothorax.   Large airways: Within normal limits     Impression:   1. Features of pulmonary edema/fluid overload. Bilateral pleural effusions with   compressive atelectasis versus infiltrate. Mild, smooth interlobular septal   thickening.   2. Cardiomegaly.   3.  Slight dilatation of the pulmonary trunk could reflect pulmonary arterial   hypertension.   4. Suggestion of partially imaged left renal atrophy     TTE 4/14/25  Left Ventricle Left ventricular cavity size is normal. Wall thickness is severely increased. The left ventricular ejection fraction is 40% by visual estimation. Systolic function is mildly reduced. Global longitudinal strain is reduced at -9%.  There is a cherry on top appearance of the global longitudinal strain pattern suggesting cardiac amyloidosis. There is mild global hypokinesis. Unable to grade diastolic function due to atrial fibrillation. Left atrial filling pressure is elevated. There is a prominent myocardial speckle pattern.   Right Ventricle Right ventricular cavity size is normal. Systolic function is normal. Wall thickness is normal.   Left Atrium The atrium is severely dilated (>48 mL/m2).   Right Atrium The atrium is dilated.   Aortic Valve The aortic valve is trileaflet. The leaflets are not thickened. The leaflets are mildly calcified. The leaflets exhibit normal mobility. There is no evidence of regurgitation. The aortic valve has no significant stenosis.   Mitral Valve There is mild diffuse thickening of the anterior leaflet and  posterior leaflet.  There is mild to moderate regurgitation with an eccentrically directed jet. There is no evidence of stenosis.   Tricuspid Valve Tricuspid valve structure is normal. There is mild to moderate regurgitation. There is no evidence of stenosis. The right ventricular systolic pressure is moderately to severely elevated. The estimated right ventricular systolic pressure is 69.00 mmHg.   Pulmonic Valve Pulmonic valve structure is normal. There is no evidence of regurgitation. There is no evidence of stenosis.   Ascending Aorta The aortic root is normal in size.   IVC/SVC The right atrial pressure is estimated at 15.0 mmHg. The inferior vena cava is dilated. Respirophasic changes were blunted (less than 50% variation).   Pericardium There is no pericardial effusion. The pericardium is normal in appearance     PFT Results Reviewed: No prior    Michael Mccormick DO   Pulmonary Critical Care, PGY V

## 2025-04-15 NOTE — ASSESSMENT & PLAN NOTE
Rate-controlled  Maintained on warfarin 5 mg q day & Coreg   INR supratherapeutic, Warfarin on hold     Recent Labs     04/13/25  1657 04/14/25  0732 04/15/25  0434   INR 3.71* 3.96* 3.45*

## 2025-04-15 NOTE — PROGRESS NOTES
Progress Note - Hospitalist   Name: Domingo Trevino 65 y.o. 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 Decompensated heart failure  BNP 1593 & CXR w/ mild vascular congestion, cardiomegaly   Maintained on torsemide 40 mg q day, losartan 50 mg q day, hydralazine 25 mg q 8 hr   ECHO (04/2025) w/ EF 40%, global longitudinal strain, unable to evaluate diastolic dysfunction, severely dilated LA, mild to moderate MR/ TR  Diuresis:  S/p Lasix IV 80 mg x 1 w/ poor outpt  Ordered Lasix 100 mg IV BID but did not receive any doses   04/14 - Nephro following who recommended d/c'ing Lasix & give Bumex IV 2 mg   C/w Bumex 2 mg IV BID  Cardiology/pulmonary/nephrology following, appreciate recs  I&Os, daily wts  -1.45 L  AL amyloidosis ([facility])  ECHO noting cardiac amyloidosis  Followed by [facility] at [facility]   S/p biopsy-proven multiple myeloma   Pt declined prior endomyocardial biopsy   Eventually will need cardiac MRI to r/o amyloidosis but would need sedation w/ claustrophobia  Pulmonary hypertension (HCC)  TTE w/ RVSP elevated at 69   Pulmonology following, appreciate recs  Chronic kidney disease (CKD), stage IV (severe) ([facility])  Lab Results   Component Value Date    EGFR 26 04/15/2025    EGFR 27 04/14/2025    EGFR 26 04/13/2025    CREATININE 2.48 (H) 04/15/2025    CREATININE 2.39 (H) 04/14/2025    CREATININE 2.45 (H) 04/13/2025     Baseline creatinine 2.3-2.7  At risk for LUDWIG w/ diuresis/cardiorenal syndrome w/ underlying multiple myeloma and amyloidosis  Nephrology consulted   I&Os, daily wts, retention protcol   Trend renal function w/ IV diuresis  F/u renal U/S   Primary hypertension  -160   PTA  meds: Carvedilol 3.125 mg BID, hydralazine 25 mg q 8 hrs, losartan 50 mg q day, torsemide 40 mg q day  C/w Carvedilol & losartan   Hydralazine increased to 25 to 75 mg q 8 hrs   Cardura 1 mg BID added   Chronic atrial fibrillation (HCC)  Rate-controlled  Maintained on warfarin 5 mg q day & Coreg   INR supratherapeutic, Warfarin on hold     Recent Labs     04/13/25  1657 04/14/25  0732 04/15/25  0434   INR 3.71* 3.96* 3.45*     Elevated troponin  No anginal complaints  Non-MI troponin elevation in setting of CHF  Anemia, chronic disease  Recent Labs     04/13/25  1625 04/14/25  0732 04/15/25  0434   HGB 10.6* 9.6* 9.3*     Hgb at baseline   Trend   Multiple myeloma not having achieved remission (HCC)  Resume outpatient follow-up with hematology upon discharge   Electrolyte abnormality  Recent Labs     04/14/25  0732 04/15/25  0434   K 3.2* 3.3*   MG 2.2  --      Replete & trend     VTE Pharmacologic Prophylaxis:   High Risk (Score >/= 5) - Pharmacological DVT Prophylaxis Contraindicated. Sequential Compression Devices Ordered.    Mobility:   Basic Mobility Inpatient Raw Score: 20  JH-HLM Goal: 6: Walk 10 steps or more  JH-HLM Achieved: 6: Walk 10 steps or more  JH-HLM Goal achieved. Continue to encourage appropriate mobility.    Patient Centered Rounds: I performed bedside rounds with nursing staff today.   Discussions with Specialists or Other Care Team Provider: Plan of care reviewed with pulmonary medicine, cardiology, nephrology, case management, nursing    Education and Discussions with Family / Patient: Patient declined call to .     Current Length of Stay: 1 day(s)  Current Patient Status: Inpatient   Certification Statement: The patient will continue to require additional inpatient hospital stay due to amyloidosis/CHF  Discharge Plan: Anticipate discharge in 48 hrs to home.    Code Status: Level 1 - Full Code    Subjective   Patient states he feels like his breathing is worse than yesterday.  He  notes he gets some improvement of breathing when he stops using oxygen?  He is very eager to eat.  When I ask him if he is capable of lying flat he cannot really tell me.  He urinated frequently overnight yesterday.    Objective :  Temp:  [97.7 °F (36.5 °C)-98.8 °F (37.1 °C)] 97.9 °F (36.6 °C)  HR:  [53-84] 84  BP: (145-177)/() 172/102  Resp:  [18-24] 24  SpO2:  [93 %-100 %] 93 %  O2 Device: None (Room air)    Body mass index is 20.29 kg/m².     Input and Output Summary (last 24 hours):     Intake/Output Summary (Last 24 hours) at 4/15/2025 0834  Last data filed at 4/15/2025 0300  Gross per 24 hour   Intake 840 ml   Output 2050 ml   Net -1210 ml       Physical Exam  Constitutional:       General: He is not in acute distress.     Appearance: He is not toxic-appearing.      Comments: Sleepy but subsequently arousable, chronically ill in appearance   HENT:      Head: Normocephalic.      Right Ear: External ear normal.      Left Ear: External ear normal.      Nose: Nose normal.      Mouth/Throat:      Mouth: Mucous membranes are dry.      Pharynx: Oropharynx is clear.   Eyes:      Extraocular Movements: Extraocular movements intact.      Conjunctiva/sclera: Conjunctivae normal.   Cardiovascular:      Rate and Rhythm: Normal rate and regular rhythm.      Pulses: Normal pulses.      Heart sounds: Normal heart sounds.   Pulmonary:      Comments: Decreased inspiratory effort, decreased bibasilarly  Abdominal:      General: Abdomen is flat. Bowel sounds are normal. There is no distension.      Palpations: Abdomen is soft.      Tenderness: There is no abdominal tenderness. There is no guarding.   Musculoskeletal:         General: Swelling (Trace lower extremity) present.      Cervical back: Normal range of motion.   Skin:     General: Skin is warm and dry.      Coloration: Skin is not jaundiced.      Findings: No bruising or lesion.   Neurological:      General: No focal deficit present.      Mental Status: He is alert  and oriented to person, place, and time. Mental status is at baseline.   Psychiatric:         Mood and Affect: Mood normal.         Behavior: Behavior normal.      Comments: Flat affect         Telemetry:  Telemetry Orders (From admission, onward)               24 Hour Telemetry Monitoring  Continuous x 24 Hours (Telem)        Expiring   Question:  Reason for 24 Hour Telemetry  Answer:  Decompensated CHF- and any one of the following: continuous diuretic infusion or total diuretic dose >200 mg daily, associated electrolyte derangement (I.e. K < 3.0), inotropic drip (continuous infusion), hx of ventricular arrhythmia, or new EF < 35%                     Telemetry Reviewed: Normal Sinus Rhythm  Indication for Continued Telemetry Use: Acute CHF on >200 mg lasix/day or equivalent dose or with new reduced EF.                Lab Results: I have reviewed the following results:   Results from last 7 days   Lab Units 04/15/25  0434 04/14/25  0732   WBC Thousand/uL 6.08 5.11   HEMOGLOBIN g/dL 9.3* 9.6*   HEMATOCRIT % 29.8* 31.2*   PLATELETS Thousands/uL 216 218   SEGS PCT %  --  70   LYMPHO PCT %  --  18   MONO PCT %  --  9   EOS PCT %  --  2     Results from last 7 days   Lab Units 04/15/25  0434 04/14/25  0732   SODIUM mmol/L 144 147   POTASSIUM mmol/L 3.3* 3.2*   CHLORIDE mmol/L 105 107   CO2 mmol/L 29 30   BUN mg/dL 54* 57*   CREATININE mg/dL 2.48* 2.39*   ANION GAP mmol/L 10 10   CALCIUM mg/dL 8.9 9.5   ALBUMIN g/dL  --  4.0   TOTAL BILIRUBIN mg/dL  --  1.32*   ALK PHOS U/L  --  54   ALT U/L  --  25   AST U/L  --  18   GLUCOSE RANDOM mg/dL 94 93     Results from last 7 days   Lab Units 04/15/25  0434   INR  3.45*             Results from last 7 days   Lab Units 04/15/25  0434 04/14/25  0732   PROCALCITONIN ng/ml 0.17 0.17       Recent Cultures (last 7 days):         Imaging Results Review: I reviewed radiology reports from this admission including: chest xray.  Other Study Results Review: EKG was reviewed.     Last 24  Hours Medication List:     Current Facility-Administered Medications:     acetaminophen (TYLENOL) tablet 650 mg, Q4H PRN    bumetanide (BUMEX) injection 2 mg, BID    carvedilol (COREG) tablet 3.125 mg, BID With Meals    hydrALAZINE (APRESOLINE) tablet 50 mg, Q8H LOTTIE    LORazepam (ATIVAN) tablet 0.5 mg, HS PRN    losartan (COZAAR) tablet 50 mg, Daily    melatonin tablet 3 mg, HS PRN    ondansetron (ZOFRAN) injection 4 mg, Q6H PRN    oxyCODONE (ROXICODONE) immediate release tablet 10 mg, Q4H PRN    oxyCODONE (ROXICODONE) IR tablet 5 mg, Q4H PRN    potassium chloride (Klor-Con M20) CR tablet 20 mEq, Daily    potassium chloride (Klor-Con M20) CR tablet 40 mEq, Once    [Held by provider] warfarin (COUMADIN) tablet 5 mg, Daily (warfarin)    Administrative Statements   Today, Patient Was Seen By: Elizabeth Alfonso PA-C  I have spent a total time of 45 minutes in caring for this patient on the day of the visit/encounter including Diagnostic results, Prognosis, Risks and benefits of tx options, Instructions for management, Patient and family education, Impressions, Counseling / Coordination of care, Documenting in the medical record, Reviewing/placing orders in the medical record (including tests, medications, and/or procedures), Obtaining or reviewing history  , and Communicating with other healthcare professionals .    **Please Note: This note may have been constructed using a voice recognition system.**

## 2025-04-15 NOTE — PLAN OF CARE
Problem: Potential for Falls  Goal: Patient will remain free of falls  Description: INTERVENTIONS:- Educate patient/family on patient safety including physical limitations- Instruct patient to call for assistance with activity - Consult OT/PT to assist with strengthening/mobility - Keep Call bell within reach- Keep bed low and locked with side rails adjusted as appropriate- Keep care items and personal belongings within reach- Initiate and maintain comfort rounds- Make Fall Risk Sign visible to staff room near nurses station  INTERVENTIONS:- Educate patient/family on patient safety including physical limitations- Instruct patient to call for assistance with activity - Consult OT/PT to assist with strengthening/mobility - Keep Call bell within reach- Keep bed low and locked with side rails adjusted as appropriate- Keep care items and personal belongings within reach- Initiate and maintain comfort rounds- Make Fall Risk Sign visible to staff  Outcome: Progressing     Problem: PAIN - ADULT  Goal: Verbalizes/displays adequate comfort level or baseline comfort level  Description: Interventions:- Encourage patient to monitor pain and request assistance- Assess pain using appropriate pain scale- Administer analgesics based on type and severity of pain and evaluate response- Implement non-pharmacological measures as appropriate and evaluate response- Consider cultural and social influences on pain and pain management- Notify physician/advanced practitioner if interventions unsuccessful or patient reports new pain  Outcome: Progressing     Problem: INFECTION - ADULT  Goal: Absence or prevention of progression during hospitalization  Description: INTERVENTIONS:- Assess and monitor for signs and symptoms of infection- Monitor lab/diagnostic results- Monitor all insertion sites, i.e. indwelling lines, tubes, and drains- Monitor endotracheal if appropriate and nasal secretions for changes in amount and color- [place]  appropriate cooling/warming therapies per order- Administer medications as ordered- Instruct and encourage patient and family to use good hand hygiene technique- Identify and instruct in appropriate isolation precautions for identified infection/condition  Outcome: Progressing     Problem: SAFETY ADULT  Goal: Patient will remain free of falls  Description: INTERVENTIONS:- Educate patient/family on patient safety including physical limitations- Instruct patient to call for assistance with activity - Consult OT/PT to assist with strengthening/mobility - Keep Call bell within reach- Keep bed low and locked with side rails adjusted as appropriate- Keep care items and personal belongings within reach- Initiate and maintain comfort rounds- Make Fall Risk Sign visible to staff  INTERVENTIONS:- Educate patient/family on patient safety including physical limitations- Instruct patient to call for assistance with activity - Consult OT/PT to assist with strengthening/mobility - Keep Call bell within reach- Keep bed low and locked with side rails adjusted as appropriate- Keep care items and personal belongings within reach- Initiate and maintain comfort rounds- Make Fall Risk Sign visible to staff  Outcome: Progressing  Goal: Maintain or return to baseline ADL function  Description: INTERVENTIONS:-  Assess patient's ability to carry out ADLs; assess patient's baseline for ADL function and identify physical deficits which impact ability to perform ADLs (bathing, care of mouth/teeth, toileting, grooming, dressing, etc.)- Assess/evaluate cause of self-care deficits - Assess range of motion- Assess patient's mobility; develop plan if impaired- Assess patient's need for assistive devices and provide as appropriate- Encourage maximum independence but intervene and supervise when necessary- Involve family in performance of ADLs- Assess for home care needs following discharge - Consider OT consult to assist with ADL evaluation and  planning for discharge- Provide patient education as appropriate  Outcome: Progressing  Goal: Maintains/Returns to pre admission functional level  Description: INTERVENTIONS:- Perform AM-PAC 6 Click Basic Mobility/ Daily Activity assessment daily.- Set and communicate daily mobility goal to care team and patient/family/caregiver. - Collaborate with rehabilitation services on mobility goals if consulted  Outcome: Progressing     Problem: DISCHARGE PLANNING  Goal: Discharge to home or other facility with appropriate resources  Description: INTERVENTIONS:- Identify barriers to discharge w/patient and caregiver- Arrange for needed discharge resources and transportation as appropriate- Identify discharge learning needs (meds, wound care, etc.)- Arrange for interpretive services to assist at discharge as needed- Refer to Case Management Department for coordinating discharge planning if the patient needs post-hospital services based on physician/advanced practitioner order or complex needs related to functional status, cognitive ability, or social support system  Outcome: Progressing     Problem: Knowledge Deficit  Goal: Patient/family/caregiver demonstrates understanding of disease process, treatment plan, medications, and discharge instructions  Description: Complete learning assessment and assess knowledge base.Interventions:- Provide teaching at level of understanding- Provide teaching via preferred learning methods  Outcome: Progressing     Problem: Prexisting or High Potential for Compromised Skin Integrity  Goal: Skin integrity is maintained or improved  Description: INTERVENTIONS:- Identify patients at risk for skin breakdown- Assess and monitor skin integrity- Assess and monitor nutrition and hydration status- Monitor labs - Assess for incontinence - Turn and reposition patient- Assist with mobility/ambulation- Relieve pressure over bony prominences- Avoid friction and shearing- Provide appropriate hygiene as  needed including keeping skin clean and dry- Evaluate need for skin moisturizer/barrier cream- Collaborate with interdisciplinary team - Patient/family teaching- Consider wound care consult   Outcome: Progressing

## 2025-04-15 NOTE — PROGRESS NOTES
Pastoral Care Progress Note              Patient was visited by volunteer ,  provided listening support and prayer.

## 2025-04-15 NOTE — ASSESSMENT & PLAN NOTE
Optimize hemodynamic status to avoid renal ischemic injury.  Maintain MAP > 65mmHg  Avoid BP fluctuations.  Home medications:Carvedilol 3.125 mg twice daily, hydralazine 25 mg every 8 hours, losartan 50 mg daily, torsemide 40 mg daily   Current medications: Losartan 50 mg p.o. daily hydralazine 50 mg p.o. every 8,Coreg 3.125 mg p.o. twice daily  Will add Cardura 1 mg p.o. twice daily for now

## 2025-04-15 NOTE — ASSESSMENT & PLAN NOTE
Follows with oncology at [facility]  Will be starting on [facility] starting next week for multiple myeloma

## 2025-04-15 NOTE — OCCUPATIONAL THERAPY NOTE
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    Primary hypertension    Elevated troponin    Hypokalemia    Chronic kidney disease (CKD), stage IV (severe) (HCC)    AL amyloidosis (HCC)    Multiple myeloma not having achieved remission (HCC)    Chronic atrial fibrillation (HCC)    Anemia, chronic disease    Electrolyte abnormality    Pulmonary hypertension (HCC)    Former smoker    Persistent proteinuria    Past Medical History  Past Medical History:   Diagnosis Date    A-fib (HCC)     CHF (congestive heart failure) (HCC)     CKD (chronic kidney disease), stage IV (HCC)     Hypertension     Multiple myeloma not having achieved remission (HCC)      Past Surgical History  No past surgical history on file.        04/15/25 0908   Note Type   Note type Re-Evaluation   Pain Assessment   Pain Assessment Tool 0-10   Pain Score No Pain   Restrictions/Precautions   Weight Bearing Precautions Per Order No   Other Precautions Fall Risk  (SPO2=% on RA(with activity--at rest))   Home Living   Type of Home Other (Comment)  (rents a room--2nd flr(10 jelani with railing))   Home Layout One level   Bathroom Shower/Tub Tub/shower unit   Bathroom Toilet Standard   Home Equipment   (denies)   Prior Function   Level of [place] Independent with ADLs;Independent with functional mobility;Independent with IADLS   Lives With Alone   Falls in the last 6 months 0   Lifestyle   Autonomy PTA pt states independence with all aspects of his ADLs, transfers, ambulation--w/o device; +, neg falls   Reciprocal Relationships ? family support   Service to Others worked in the [employer] field   Intrinsic Gratification watching TV   Subjective   Subjective \"It's my breathing that's the problem.\"   ADL   Where Assessed Edge of bed   Eating Assistance 6  Modified independent   Grooming Assistance 6  Modified Independent   UB Bathing " Assistance 6  Modified Independent   LB Bathing Assistance 6  Modified Independent   UB Dressing Assistance 6  Modified independent   LB Dressing Assistance 6  Modified independent   Toileting Assistance  6  Modified independent   Transfers   Sit to Stand 7  Independent   Stand to Sit 7  Independent   Functional Mobility   Functional Mobility 5  Supervision   Additional items   (w/o device)   Balance   Static Sitting Normal   Dynamic Sitting Good   Static Standing Fair +   Dynamic Standing Fair   Activity Tolerance   Activity Tolerance Patient limited by fatigue   Medical Staff Made Aware nsg, P.T., CM   RUE Assessment   RUE Assessment WFL   RUE Strength   RUE Overall Strength Within Functional Limits - able to perform ADL tasks with strength  (4+/5 throughout)   LUE Assessment   LUE Assessment WFL   LUE Strength   LUE Overall Strength Within Functional Limits - able to perform ADL tasks with strength  (4+/5 throughout)   Hand Function   Gross Motor Coordination Functional   Fine Motor Coordination Functional   Sensation   Light Touch No apparent deficits   Proprioception   Proprioception No apparent deficits   Vision-Basic Assessment   Current Vision   (no glasses)   Vision - Complex Assessment   Acuity Able to read clock/calendar on wall without difficulty   Psychosocial   Psychosocial (WDL) X   Patient Behaviors/Mood Flat affect;Cooperative   Perception   Inattention/Neglect Appears intact   Cognition   Overall Cognitive Status WFL   Arousal/Participation Alert   Attention Within functional limits   Orientation Level Oriented X4   Memory Within functional limits   Following Commands Follows one step commands without difficulty   Assessment   Limitation Decreased endurance;Decreased high-level ADLs   Prognosis Good   Assessment Pt is a 66y/o male admitted to the hospital 2* noted SOB. Pt noted with elevated troponins. Pt with PMH HTN, HFpEF, CKD stage IV, afib, multiple myeloma. PTA pt states independence with all  "aspects of his ADLs, transfers, ambulation--w/o device; +, neg falls. During initial eval, pt demonstrated slight deficits with his functional balance, functional mobility, and activity tolerance. Pt was able to demonstrate good ADL status. Pt would benefit from a restorative program on the unit to improve his overall endurance, balance, and mobility. Acute OT tx not indicated at this time 2* pt's limited ADL deficits.   Goals   Patient Goals \"to be able to breath better\"   Plan   OT Frequency Eval only   Discharge Recommendation   Rehab Resource Intensity Level, OT III (Minimum Resource Intensity)  (outpt respiratory therapy ?)   AM-PAC Daily Activity Inpatient   Lower Body Dressing 4   Bathing 4   Toileting 4   Upper Body Dressing 4   Grooming 4   Eating 4   Daily Activity Raw Score 24   Daily Activity Standardized Score (Calc for Raw Score >=11) 57.54   AM-PAC Applied Cognition Inpatient   Following a Speech/Presentation 4   Understanding Ordinary Conversation 4   Taking Medications 3   Remembering Where Things Are Placed or Put Away 3   Remembering List of 4-5 Errands 3   Taking Care of Complicated Tasks 3   Applied Cognition Raw Score 20   Applied Cognition Standardized Score 41.76   Kirk Arrington       "

## 2025-04-15 NOTE — ASSESSMENT & PLAN NOTE
Per prior heme-onc notes endomyocardial biopsy was recommended due to high suspicion for cardiac amyloid but patient declined  Admitted with issues with worsening shortness of breath  Placed on Bumex 2 mg IV twice daily for today  Recommend pulmonary consultation for evaluation of pulmonary hypertension and management  Strict I/O and daily weights   Home diuretics: Torsemide 40 mg daily

## 2025-04-15 NOTE — ASSESSMENT & PLAN NOTE
Lab Results   Component Value Date    EGFR 26 04/15/2025    EGFR 27 04/14/2025    EGFR 26 04/13/2025    CREATININE 2.48 (H) 04/15/2025    CREATININE 2.39 (H) 04/14/2025    CREATININE 2.45 (H) 04/13/2025   Nephrology consulted

## 2025-04-15 NOTE — ASSESSMENT & PLAN NOTE
Most recent potassium 3.3 mEq  Caution supplementation and recheck  Increase K-Dur to 20 mg p.o. twice daily for now

## 2025-04-15 NOTE — ASSESSMENT & PLAN NOTE
most recent hemoglobin at 9.3 g/dL  Maintain hemoglobin greater than 8 grams/deciliter  Follow-up with hematology oncology

## 2025-04-15 NOTE — ASSESSMENT & PLAN NOTE
Most recent protein creatinine ratio 1.2 g from/12/25  Most recent albumin creatinine ratio 831 mg from 4/12/2025  Okay to continue losartan for now.  No role for renal biopsy at this time especially in light of concerns for atrophic left kidney

## 2025-04-15 NOTE — ASSESSMENT & PLAN NOTE
Recommend pulmonary consultation for further evaluation and management  Most recent echo from 4/14 with elevated RVSP

## 2025-04-15 NOTE — PHYSICAL THERAPY NOTE
PT EVALUATION    65 y.o.    [id number]    SOB (shortness of breath) [R06.02]  Acute exacerbation of CHF (congestive heart failure) (HCC) [I50.9]    Past Medical History:   Diagnosis Date    A-fib (HCC)     CHF (congestive heart failure) (HCC)     CKD (chronic kidney disease), stage IV (HCC)     Hypertension     Multiple myeloma not having achieved remission (HCC)          No past surgical history on file.       04/15/25 0924   PT Last Visit   PT Visit Date 04/15/25   Note Type   Note type Evaluation   Pain Assessment   Pain Assessment Tool 0-10   Pain Score No Pain   Restrictions/Precautions   Weight Bearing Precautions Per Order No   Other Precautions O2  (1L O2)   Home Living   Type of Home Other (Comment)  (Rented Room)   Home Layout Stairs to enter with rails;Performs ADLs on one level  (10 VICK with HR)   Bathroom Shower/Tub Tub/shower unit   Bathroom Toilet Standard   Additional Comments no DME   Prior Function   Level of [place] Independent with ADLs;Independent with functional mobility;Independent with IADLS   Lives With Alone   IADLs Independent with meal prep;Independent with medication management;Independent with driving   Falls in the last 6 months 0   Vocational Retired   Comments +   General   Family/Caregiver Present No   Cognition   Overall Cognitive Status WFL   Arousal/Participation Cooperative   Orientation Level Oriented X4   Memory Within functional limits   Following Commands Follows one step commands without difficulty   Subjective   Subjective Pt agreeable to evaluation.   RLE Assessment   RLE Assessment WFL   LLE Assessment   LLE Assessment WFL   Bed Mobility   Additional Comments Received pt seated EOB.   Transfers   Sit to Stand 6  Modified independent   Stand to Sit 6  Modified independent   Ambulation/Elevation   Gait pattern Wide ROSS;Excessively slow   Gait Assistance 5  Supervision   Assistive Device None   Distance 100' x 2 with seated rest break   Stair Management  Assistance 5  Supervision   Stair Management Technique Two rails;Reciprocal   Number of Stairs   (Practice Stairs x 2)   Ambulation/Elevation Additional Comments (S)  95%, 79 bpm at rest --> 87%-92% after ambulation on RA   Balance   Static Sitting Good   Dynamic Sitting Good   Static Standing Fair   Dynamic Standing Fair   Ambulatory Fair   Endurance Deficit   Endurance Deficit Yes   Endurance Deficit Description HERBERT, fatigue   Activity Tolerance   Activity Tolerance Treatment limited secondary to medical complications (Comment)   Medical Staff Made Aware LAURENT Bradley; Pt seen for Co-treatment with Occupational Therapist due to pt's medical complexity, functional limitations and limited activity tolerance.   Nurse Made Aware yes   Assessment   Assessment Pt is an 65 y.o. male admitted to [facility] on 4/13/25 with c/o SOB. Pt is being treated for primary HTN. PT consulted with eval and treat and activity as tolerated orders. Pt lives in a rented room with 10 VICK. At baseline, pt is independent with ADLs and ambulation without AD. Upon evaluation, pt was mod I - Supervision for all mobility without AD. No overt LOB noted. Pt was primarily limited due to SOB. The patient's AM-[facility] Basic Mobility Inpatient Short Form Raw Score is 24. A Raw score of greater than 16 suggests the patient may benefit from discharge to home. Please also refer to the recommendation of the Physical Therapist for safe discharge planning. No needs identified for skilled PT in acute setting or at d/c. Recommend restorative therapy for ambulation as tolerated.   Barriers to Discharge None   Discharge Recommendation   Rehab Resource Intensity Level, PT No post-acute rehabilitation needs   AM-[facility] Basic Mobility Inpatient   Turning in Flat Bed Without Bedrails 4   Lying on Back to Sitting on Edge of Flat Bed Without Bedrails 4   Moving Bed to Chair 4   Standing Up From Chair Using Arms 4   Walk in Room 4   Climb 3-5 Stairs With Railing 4    Basic Mobility Inpatient Raw Score 24   Basic Mobility Standardized Score 57.68   [facility] Highest Level Of Mobility   -HLM Goal 8: Walk 250 feet or more   -HLM Achieved 7: Walk 25 feet or more   End of Consult   Patient Position at End of Consult Seated edge of bed;All needs within reach     Hx/personal factors: VICK home environment, limited home support, and new O2 requirements, comorbidities    Examination:decreased activity tolerance, gait deviations, and decreased cardiovascular endurance.     Clinical: unpredictable Ongoing medical status, Continuous monitoring, Decreased activity tolerance compared to baseline, Decline from PLOF., hypoxia, and new onset O2 use.     Complexity: moderate        Rosario Marrufo, PT

## 2025-04-15 NOTE — ASSESSMENT & PLAN NOTE
No definitive diagnosis.  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    Will likely need cardiac MRI to definitively diagnose amyloidosis, and this may need to be facilitated with anesthesia.

## 2025-04-15 NOTE — ASSESSMENT & PLAN NOTE
ECHO noting cardiac amyloidosis  Followed by hematology at [facility]   S/p biopsy-proven multiple myeloma   Pt declined prior endomyocardial biopsy   Eventually will need cardiac MRI to r/o amyloidosis but would need sedation w/ claustrophobia

## 2025-04-15 NOTE — ASSESSMENT & PLAN NOTE
Wt Readings from Last 3 Encounters:   04/15/25 71.7 kg (158 lb 1.1 oz)   03/20/25 67.4 kg (148 lb 9.4 oz)   03/05/25 69.9 kg (154 lb)     Presents with worsening dyspnea, increasing weight. Decompensated heart failure  BNP 1593 & CXR w/ mild vascular congestion, cardiomegaly   Maintained on torsemide 40 mg q day, losartan 50 mg q day, hydralazine 25 mg q 8 hr   ECHO (04/2025) w/ EF 40%, global longitudinal strain, unable to evaluate diastolic dysfunction, severely dilated LA, mild to moderate MR/ TR  Diuresis:  S/p Lasix IV 80 mg x 1 w/ poor outpt  Ordered Lasix 100 mg IV BID but did not receive any doses   04/14 - Nephro following who recommended d/c'ing Lasix & give Bumex IV 2 mg   C/w Bumex 2 mg IV BID  Cardiology/pulmonary/nephrology following, appreciate recs  I&Os, daily wts  -1.45 L

## 2025-04-15 NOTE — UTILIZATION REVIEW
SEE INITIAL REVIEW AT BOTTOM    Continued Stay Review    Date: 4/15                          Current Patient Class: Inpatient  Current Level of Care: MS    HPI:65 y.o. male initially admitted on 4/13 OBS, 4/14 IP   Current Diagnosis:      Assessment/Plan:   HFpEF, AL amyloidosis, CKD, electrolyte imbalance - pt on IV Bumex BID.  Has been HTN.  Improving this morning.  Is on oxygen 1L NC. BUN/Cr trending down, K 3.3 0 increasing K dur to 20 mg BID.  Can continue Losartan for now.  Does not need renal biopsy at this time.  INR today 3.45.  Starting Cardura 1 mg BID today.  Pt notes he feels SOB today.  UO 2.4 L in last 24 hrs.  On exam has decreased breath sounds at bases.        Medications:   Scheduled Medications:  bumetanide, 2 mg, Intravenous, BID  carvedilol, 3.125 mg, Oral, BID With Meals  doxazosin, 1 mg, Oral, BID  hydrALAZINE, 50 mg, Oral, Q8H LOTTIE  losartan, 50 mg, Oral, Daily  potassium chloride, 20 mEq, Oral, BID With Meals  [Held by provider] warfarin, 5 mg, Oral, Daily (warfarin)      Continuous IV Infusions:     PRN Meds:  acetaminophen, 650 mg, Oral, Q4H PRN  LORazepam, 0.5 mg, Oral, HS PRN - x 1 4/13, 4/14  melatonin, 3 mg, Oral, HS PRN - x 1 4/13, 4/14  ondansetron, 4 mg, Intravenous, Q6H PRN  oxyCODONE, 10 mg, Oral, Q4H PRN  oxyCODONE, 5 mg, Oral, Q4H PRN      Discharge Plan: TBD    Vital Signs (last 3 days)       Date/Time Temp Pulse Resp BP MAP (mmHg) SpO2 Calculated FIO2 (%) - Nasal Cannula Nasal Cannula O2 Flow Rate (L/min) O2 Device Patient Position - Orthostatic VS Pain    04/15/25 11:54:13 -- 71 -- 160/100 120 92 % -- -- -- -- --    04/15/25 1154 -- -- -- 160/100 -- -- -- -- -- -- --    04/15/25 11:15:39 98.1 °F (36.7 °C) 75 18 145/109 121 96 % 24 1 L/min Nasal cannula Lying --    04/15/25 10:09:17 -- 69 -- 153/98 116 98 % -- -- -- -- --    04/15/25 10:04:41 -- 71 -- 173/122 139 94 % -- -- -- -- --    04/15/25 0836 -- -- -- -- -- -- -- -- -- -- No Pain    04/15/25 08:22:34 97.9 °F (36.6 °C)  84 24 172/102 125 93 % -- -- Nasal cannula Sitting --    04/15/25 05:14:27 -- 61 -- 164/110 128 97 % -- -- -- -- --    04/15/25 0513 -- -- -- 164/110 -- -- -- -- -- -- --    04/15/25 0300 98.8 °F (37.1 °C) -- 19 160/102 -- -- -- -- -- -- --    04/14/25 22:52:47 98 °F (36.7 °C) 78 -- 169/108 128 100 % -- -- -- -- --    04/14/25 2131 -- -- -- -- -- -- -- -- None (Room air) -- 9    04/14/25 21:24:24 -- 62 -- 165/108 127 96 % -- -- -- -- --    04/14/25 2124 -- -- -- 165/108 -- -- -- -- -- -- --    04/14/25 15:45:40 97.7 °F (36.5 °C) 53 18 177/99 125 98 % -- -- -- Lying --    04/14/25 11:11:15 98 °F (36.7 °C) 57 20 145/103 117 98 % -- -- -- Lying --    04/14/25 0820 -- -- -- -- -- -- -- -- -- -- No Pain    04/14/25 07:31:37 98.2 °F (36.8 °C) 62 16 168/101 123 99 % -- -- None (Room air) -- --    04/14/25 05:29:13 -- 67 -- 161/104 123 92 % -- -- -- -- --    04/13/25 23:35:42 -- 83 -- 126/81 96 93 % -- -- -- -- --    04/13/25 23:29:25 97.6 °F (36.4 °C) 70 -- 126/81 96 93 % 28 2 L/min Nasal cannula Lying --    04/13/25 22:21:08 -- 62 22 144/85 105 94 % 28 2 L/min Nasal cannula -- --    04/13/25 20:18:26 97.6 °F (36.4 °C) 73 22 180/112 135 94 % -- -- None (Room air) Lying --    04/13/25 2012 -- -- -- -- -- -- -- -- -- -- No Pain    04/13/25 1830 -- 63 24 181/100 136 100 % 28 2 L/min Nasal cannula Sitting --    04/13/25 1730 -- 58 24 165/114  136 100 % 28 2 L/min Nasal cannula Sitting --    04/13/25 1716 -- 68 24 187/110 -- 100 % 28 2 L/min Nasal cannula Sitting No Pain    04/13/25 1636 97.8 °F (36.6 °C) -- -- -- -- -- -- -- Nasal cannula -- --    04/13/25 1630 -- 53 24 161/104 125 100 % -- -- -- -- --    04/13/25 1608 -- 60 30 178/117 -- 97 % -- -- None (Room air) Sitting --          Weight (last 2 days)       Date/Time Weight    04/15/25 0545 71.7 (158.07)    04/14/25 11:11:15 71.7 (158)    04/14/25 0600 72 (158.73)    04/13/25 2031 72.6 (160.05)    04/13/25 1953 67.4 (148.59)            Pertinent Labs/Diagnostic Results:    Radiology:  XR chest 2 views   Final Interpretation by Walter Main MD (04/14 6841)      Worsening pulmonary edema and pleural effusions.      Infiltrate at the left lung base which could be pneumonia in the appropriate clinical setting. Asymmetric edema or atelectasis or other options.      The study was marked in EPIC for immediate notification.            Workstation performed: [id number]         XR chest pa and lateral    (Results Pending)   VAS renal artery complete    (Results Pending)     Cardiology:  Echo complete w/ contrast if indicated   Final Result by Néstor Wright DO (04/14 7214)        Left Ventricle: Left ventricular cavity size is normal. Wall thickness    is severely increased. The left ventricular ejection fraction is 40% by    visual estimation. Systolic function is mildly reduced. Global    longitudinal strain is reduced at -9%.  There is a cherry on top    appearance of the global longitudinal strain pattern suggesting cardiac    amyloidosis. There is mild global hypokinesis. Unable to grade diastolic    function due to atrial fibrillation. Left atrial filling pressure is    elevated. There is a prominent myocardial speckle pattern.     Left Atrium: The atrium is severely dilated (>48 mL/m2).     Right Atrium: The atrium is dilated.     Mitral Valve: There is mild to moderate regurgitation with an    eccentrically directed jet.     Tricuspid Valve: There is mild to moderate regurgitation. The right    ventricular systolic pressure is moderately to severely elevated. The    estimated right ventricular systolic pressure is 69.00 mmHg.      Strain was performed to quantify interventricular dyssynchrony and    evaluate components of myocardial function due to amyloidosis and heart    failure. Results from the utilization of Strain Analysis are listed in the    report below.      Echo findings are highly suggestive of cardiac amyloidosis         ECG 12 lead   Final Result by Sylvester  MD Chris (04/13 0119)   Suspect arm lead reversal, interpretation assumes no reversal   Atrial fibrillation with slow ventricular response   Anterolateral infarct (cited on or before 18-Apr-2022)   Abnormal ECG   When compared with ECG of 18-Mar-2025 05:27,   No significant change was found   Confirmed by Sylvester Perez ([id number]) on 4/13/2025 4:36:12 PM        GI:  No orders to display           Results from last 7 days   Lab Units 04/15/25  0434 04/14/25  0732 04/13/25  1625   WBC Thousand/uL 6.08 5.11 5.39   HEMOGLOBIN g/dL 9.3* 9.6* 10.6*   HEMATOCRIT % 29.8* 31.2* 34.8*   PLATELETS Thousands/uL 216 218 233   TOTAL NEUT ABS Thousands/µL  --  3.58 3.75         Results from last 7 days   Lab Units 04/15/25  0434 04/14/25  0732 04/13/25  1625   SODIUM mmol/L 144 147 145   POTASSIUM mmol/L 3.3* 3.2* 4.3   CHLORIDE mmol/L 105 107 107   CO2 mmol/L 29 30 27   ANION GAP mmol/L 10 10 11   BUN mg/dL 54* 57* 60*   CREATININE mg/dL 2.48* 2.39* 2.45*   EGFR ml/min/1.73sq m 26 27 26   CALCIUM mg/dL 8.9 9.5 9.5   MAGNESIUM mg/dL  --  2.2  --      Results from last 7 days   Lab Units 04/14/25 0732 04/13/25  1625   AST U/L 18 29   ALT U/L 25 29   ALK PHOS U/L 54 78   TOTAL PROTEIN g/dL 6.4 7.4   ALBUMIN g/dL 4.0 4.5   TOTAL BILIRUBIN mg/dL 1.32* 1.02*         Results from last 7 days   Lab Units 04/15/25  0434 04/14/25  0732 04/13/25  1625   GLUCOSE RANDOM mg/dL 94 93 128               Results from last 7 days   Lab Units 04/13/25  1625   HS TNI 0HR ng/L 179*         Results from last 7 days   Lab Units 04/15/25  0434 04/14/25  0732 04/13/25  1657   PROTIME seconds 34.0* 37.7* 35.9*   INR  3.45* 3.96* 3.71*   PTT seconds  --   --  41*         Results from last 7 days   Lab Units 04/15/25  0434 04/14/25  0732   PROCALCITONIN ng/ml 0.17 0.17                 Results from last 7 days   Lab Units 04/13/25  1625   BNP pg/mL 1,593*           Results from last 7 days   Lab Units 04/14/25  1712   CLARITY UA  Clear   COLOR UA  Light  Yellow   SPEC GRAV UA  1.010   PH UA  6.0   GLUCOSE UA mg/dl Negative   KETONES UA mg/dl Negative   BLOOD UA  Moderate*   PROTEIN UA mg/dl 100 (2+)*   NITRITE UA  Negative   BILIRUBIN UA  Negative   UROBILINOGEN UA (BE) mg/dl <2.0   LEUKOCYTES UA  Negative   WBC UA /hpf 1-2   RBC UA /hpf Innumerable*   BACTERIA UA /hpf Occasional   EPITHELIAL CELLS WET PREP /hpf None Seen   CREATININE UR mg/dL 93.2  93.3   PROTEIN UR mg/dL 115.3   PROT/CREAT RATIO UR  1.2*     Network Utilization Review Department  ATTENTION: Please call with any questions or concerns to [phone] and carefully listen to the prompts so that you are directed to the right person. All voicemails are confidential.   For Discharge needs, contact Care Management DC Support Team at [phone] opt. [phone]  Send all requests for admission clinical reviews, approved or denied determinations and any other requests to dedicated fax number below belonging to the campus where the patient is receiving treatment. List of dedicated fax numbers for the Facilities:  FACILITY NAME UR FAX NUMBER   ADMISSION DENIALS (Administrative/Medical Necessity) [phone]   DISCHARGE SUPPORT TEAM (NETWORK) [phone]   PARENT CHILD HEALTH (Maternity/NICU/Pediatrics) [phone]   [facility] [phone]   [facility] [phone]   [facility] [phone]   [facility] [phone]   [facility] [phone]   [facility] [phone]   [facility] [phone]   [facility] [phone]   [facility] [phone]   [facility] [phone]   [facility] [phone]   [facility] [phone]

## 2025-04-16 LAB
ANION GAP SERPL CALCULATED.3IONS-SCNC: 8 MMOL/L (ref 4–13)
BUN SERPL-MCNC: 56 MG/DL (ref 5–25)
CALCIUM SERPL-MCNC: 9.2 MG/DL (ref 8.4–10.2)
CHLORIDE SERPL-SCNC: 106 MMOL/L (ref 96–108)
CO2 SERPL-SCNC: 30 MMOL/L (ref 21–32)
CREAT SERPL-MCNC: 2.26 MG/DL (ref 0.6–1.3)
GFR SERPL CREATININE-BSD FRML MDRD: 29 ML/MIN/1.73SQ M
GLUCOSE SERPL-MCNC: 98 MG/DL (ref 65–140)
INR PPP: 2.52 (ref 0.85–1.19)
MAGNESIUM SERPL-MCNC: 2.1 MG/DL (ref 1.9–2.7)
PHOSPHATE SERPL-MCNC: 4.3 MG/DL (ref 2.3–4.1)
POTASSIUM SERPL-SCNC: 3.6 MMOL/L (ref 3.5–5.3)
PROTHROMBIN TIME: 26.9 SECONDS (ref 12.3–15)
SODIUM SERPL-SCNC: 144 MMOL/L (ref 135–147)

## 2025-04-16 PROCEDURE — 84100 ASSAY OF PHOSPHORUS: CPT | Performed by: INTERNAL MEDICINE

## 2025-04-16 PROCEDURE — 99232 SBSQ HOSP IP/OBS MODERATE 35: CPT

## 2025-04-16 PROCEDURE — 85610 PROTHROMBIN TIME: CPT | Performed by: PHYSICIAN ASSISTANT

## 2025-04-16 PROCEDURE — 99232 SBSQ HOSP IP/OBS MODERATE 35: CPT | Performed by: INTERNAL MEDICINE

## 2025-04-16 PROCEDURE — 93975 VASCULAR STUDY: CPT | Performed by: SURGERY

## 2025-04-16 PROCEDURE — 80048 BASIC METABOLIC PNL TOTAL CA: CPT | Performed by: INTERNAL MEDICINE

## 2025-04-16 PROCEDURE — 83735 ASSAY OF MAGNESIUM: CPT | Performed by: PHYSICIAN ASSISTANT

## 2025-04-16 RX ADMIN — HYDRALAZINE HYDROCHLORIDE 50 MG: 25 TABLET ORAL at 05:10

## 2025-04-16 RX ADMIN — LORAZEPAM 0.5 MG: 0.5 TABLET ORAL at 21:12

## 2025-04-16 RX ADMIN — CARVEDILOL 3.12 MG: 3.12 TABLET, FILM COATED ORAL at 17:15

## 2025-04-16 RX ADMIN — POTASSIUM CHLORIDE 20 MEQ: 1500 TABLET, EXTENDED RELEASE ORAL at 17:15

## 2025-04-16 RX ADMIN — CARVEDILOL 3.12 MG: 3.12 TABLET, FILM COATED ORAL at 08:30

## 2025-04-16 RX ADMIN — BUMETANIDE 2 MG: 0.25 INJECTION INTRAMUSCULAR; INTRAVENOUS at 08:30

## 2025-04-16 RX ADMIN — MELATONIN 3 MG: 3 TAB ORAL at 21:12

## 2025-04-16 RX ADMIN — BUMETANIDE 2 MG: 0.25 INJECTION INTRAMUSCULAR; INTRAVENOUS at 17:15

## 2025-04-16 RX ADMIN — WARFARIN SODIUM 7.5 MG: 5 TABLET ORAL at 17:15

## 2025-04-16 RX ADMIN — POTASSIUM CHLORIDE 20 MEQ: 1500 TABLET, EXTENDED RELEASE ORAL at 08:30

## 2025-04-16 RX ADMIN — LOSARTAN POTASSIUM 50 MG: 50 TABLET, FILM COATED ORAL at 08:30

## 2025-04-16 RX ADMIN — HYDRALAZINE HYDROCHLORIDE 50 MG: 25 TABLET ORAL at 21:12

## 2025-04-16 NOTE — ASSESSMENT & PLAN NOTE
Lab Results   Component Value Date    EGFR 29 04/16/2025    EGFR 26 04/15/2025    EGFR 27 04/14/2025    CREATININE 2.26 (H) 04/16/2025    CREATININE 2.48 (H) 04/15/2025    CREATININE 2.39 (H) 04/14/2025     Baseline creatinine 2.3-2.7  Nephrology following with diuresis in setting of multiple myeloma with amyloidosis  Renal Doppler without evidence of renal artery stenosis  Continue close monitoring with diuresis  Will need outpatient nephrologist

## 2025-04-16 NOTE — PROGRESS NOTES
"Progress Note - Cardiology   Domingo Trevino 65 y.o. 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 with reduced stream, biatrial enlargement, speckled pattern myocardium with a strain pattern consistent with amyloidosis  Multiple myeloma  Severe chronic kidney disease stage IV  Suspected AL amyloidosis-was recommended for endomyocardial biopsy but patient declined  Hypertension  Anemia of chronic disease  Pulmonary hypertension-likely group 2 given suspected cardiac amyloid  Atrial fibrillation  Proteinuria secondary to chronic kidney disease/multiple myeloma/amyloid    Results from last 7 days   Lab Units 04/14/25  1211   SL CV LV EF  40        PLAN  He examines to be euvolemic from my standpoint this afternoon, could likely transition to p.o. diuretics as per nephrology recommendation  Continue carvedilol 3.125 mg twice daily doxazosin 1 mg twice daily hydralazine 75 mg every 8 hours losartan 50 mg daily and warfarin for atrial fibrillation   needs close outpatient follow-up with hematology oncology for treatment for multiple myeloma    Subjective:   HPI  He is feeling well, denies any chest pain shortness of breath or lower extremity swelling      Review of Systems: As noted in HPI. Rest of ROS is negative.    Vitals:   BP (!) 140/101 (BP Location: Left arm)   Pulse 57   Temp 98 °F (36.7 °C) (Oral)   Resp 17   Ht 6' 2\" (1.88 m)   Wt 70.7 kg (155 lb 13.8 oz)   SpO2 94%   BMI 20.01 kg/m²   I/O         04/14 0701  04/15 0700 04/15 0701  04/16 0700 04/16 0701  04/17 0700    P.O. 840 960 600    Total Intake(mL/kg) 840 (11.7) 960 (13.6) 600 (8.5)    Urine (mL/kg/hr) 2350 (1.4) 2350 (1.4) 1500 (2.3)    Total Output 2350 2350 1500    Net -1518 -1390 -900                 Weight (last 2 days)       Date/Time Weight    04/16/25 05:09:27 70.7 " (155.87)    04/15/25 0545 71.7 (158.07)    04/14/25 11:11:15 71.7 (158)    04/14/25 0600 72 (158.73)            Physical Exam   Constitutional: awake, alert and oriented, in no acute distress, no obvious deformities  Head: Normocephalic, without obvious abnormality, atraumatic  Eyes: conjunctivae clear and moist. Sclera anicteric. No xanthelasmas. Pupils equal bilaterally. Extraocular motions are full.  Ear nose mouth and throat: ears are symmetrical bilaterally, hearing appears to be equal bilaterally, no nasal discharge or epistaxis, oropharynx is clear with moist mucous membranes  Neck: Trachea is midline, neck is supple, no thyromegaly or significant lymphadenopathy, there is full range of motion.  Lungs: clear to auscultation bilaterally, no wheezes, no rales, no rhonchi, no accessory muscle use, breathing is nonlabored  Heart: regular rate and rhythm, S1, S2 normal, no murmur, no click, no rub and no gallop, no lower extremity edema  Abdomen: soft, non-tender; bowel sounds normal; no masses, no organomegaly  Psychiatric: Patient is oriented to time, place, person, mood/affect is negative for depression, anxiety, agitation, appears to have appropriate insight  Skin: Skin is warm, dry, intact. No obvious rashes or lesions on exposed extremities. Nail beds are pink with no cyanosis or clubbing.    TELEMETRY:   Lab Results:  Results from last 7 days   Lab Units 04/15/25  0434   WBC Thousand/uL 6.08   HEMOGLOBIN g/dL 9.3*   HEMATOCRIT % 29.8*   PLATELETS Thousands/uL 216     Results from last 7 days   Lab Units 04/16/25  0516 04/15/25  0434 04/14/25  0732   POTASSIUM mmol/L 3.6   < > 3.2*   CHLORIDE mmol/L 106   < > 107   CO2 mmol/L 30   < > 30   BUN mg/dL 56*   < > 57*   CREATININE mg/dL 2.26*   < > 2.39*   CALCIUM mg/dL 9.2   < > 9.5   ALK PHOS U/L  --   --  54   ALT U/L  --   --  25   AST U/L  --   --  18    < > = values in this interval not displayed.     Results from last 7 days   Lab Units 04/16/25  0516  "  POTASSIUM mmol/L 3.6   CHLORIDE mmol/L 106   CO2 mmol/L 30   BUN mg/dL 56*   CREATININE mg/dL 2.26*   CALCIUM mg/dL 9.2           Medications:    Current Facility-Administered Medications:     acetaminophen (TYLENOL) tablet 650 mg, 650 mg, Oral, Q4H PRN, Loren Santos MD    bumetanide (BUMEX) injection 2 mg, 2 mg, Intravenous, BID, Joyce Yancey MD, 2 mg at 04/16/25 0830    carvedilol (COREG) tablet 3.125 mg, 3.125 mg, Oral, BID With Meals, Loren Santos MD, 3.125 mg at 04/16/25 0830    doxazosin (CARDURA) tablet 1 mg, 1 mg, Oral, BID, Joyce Yancey MD, 1 mg at 04/15/25 1154    hydrALAZINE (APRESOLINE) tablet 25 mg, 25 mg, Oral, Once, Elizabeth Alfonso PA-C    hydrALAZINE (APRESOLINE) tablet 75 mg, 75 mg, Oral, Q8H LOTTIE, Elizabeth Alfonso PA-C, 50 mg at 04/16/25 0510    LORazepam (ATIVAN) tablet 0.5 mg, 0.5 mg, Oral, HS PRN, Loren Santos MD, 0.5 mg at 04/15/25 2329    losartan (COZAAR) tablet 50 mg, 50 mg, Oral, Daily, Joyce Yancey MD, 50 mg at 04/16/25 0830    melatonin tablet 3 mg, 3 mg, Oral, HS PRN, Loren Santos MD, 3 mg at 04/15/25 2329    ondansetron (ZOFRAN) injection 4 mg, 4 mg, Intravenous, Q6H PRN, Loren Santos MD    oxyCODONE (ROXICODONE) immediate release tablet 10 mg, 10 mg, Oral, Q4H PRN, Loren Santos MD    oxyCODONE (ROXICODONE) IR tablet 5 mg, 5 mg, Oral, Q4H PRN, Loren Santos MD    potassium chloride (Klor-Con M20) CR tablet 20 mEq, 20 mEq, Oral, BID With Meals, Joyce Yancey MD, 20 mEq at 04/16/25 0830    warfarin (COUMADIN) tablet 7.5 mg, 7.5 mg, Oral, Once (warfarin), Gabriela Salazar PA-C    Portions of the record may have been created with voice recognition software. Occasional wrong word or \"sound a like\" substitutions may have occurred due to the inherent limitations of voice recognition software. Read the chart carefully " and recognize, using context, where substitutions have occurred.    Néstor Wright DO, [facility]  4/16/2025 4:26 PM

## 2025-04-16 NOTE — ASSESSMENT & PLAN NOTE
Recent Labs     04/14/25  0732 04/15/25  0434 04/16/25  0516   K 3.2*   < > 3.6   MG 2.2  --  2.1    < > = values in this interval not displayed.     Replete & trend

## 2025-04-16 NOTE — ASSESSMENT & PLAN NOTE
Patient follows with hematology oncology and had prior bone marrow biopsy in October 2020 for  He has follow-up to discuss treatment starting 4/21

## 2025-04-16 NOTE — ASSESSMENT & PLAN NOTE
Echo suggestive of cardiac amyloid, patient has declined endomyocardial biopsy  Eventually will need cardiac MRI to confirm amyloid  Discussed the above with patient

## 2025-04-16 NOTE — ASSESSMENT & PLAN NOTE
SBP suboptimally controlled  Continue PTA Coreg 3.25 mg twice daily, losartan 50 mg daily  Home hydralazine increased from 25 mg to 75 mg every 8 hours  Cardura 1 mg twice daily added although patient is not taking this, consider discontinuation over 24 hours

## 2025-04-16 NOTE — ASSESSMENT & PLAN NOTE
Per prior heme-onc notes endomyocardial biopsy was recommended due to high suspicion for cardiac amyloid but patient declined  Admitted with issues with worsening shortness of breath  Placed on Bumex 2 mg IV twice daily for today  Recommend pulmonary consultation for evaluation of pulmonary hypertension and management  Strict I/O and daily weights   Home diuretics: Torsemide 40 mg daily  Continue Bumex IV twice daily for today reevaluate in a.m. likely convert over to p.o. diuretics from tomorrow

## 2025-04-16 NOTE — ASSESSMENT & PLAN NOTE
Rate controlled with Coreg, maintained on warfarin 5 mg Tuesday Thursday Sunday and 7.5 mg other days  Continue home dosing now that warfarin is not supratherapeutic    Recent Labs     04/14/25  0732 04/15/25  0434 04/16/25  0516   INR 3.96* 3.45* 2.52*

## 2025-04-16 NOTE — PROGRESS NOTES
Follow up Consultation    Nephrology   Domingo Trevino 65 y.o. male MRN: [id number]  Unit/Bed#: E5 -01 Encounter: [id number]      Physician Requesting Consult: Daniel Silva MD  65-year-old male with multiple comorbidities including multiple myeloma, AL amyloidosis concerns, hypertension, CHF, A-fib and CKD stage IV presented with worsening shortness of breath on 4/13/25. Nephrology consulted for assistance with diuretic management.   Assessment & Plan  Chronic kidney disease (CKD), stage IV (severe) (HCC)  At risk for LUDWIG most likely secondary to ongoing diuresis along with cardiorenal syndrome with underlying multiple myeloma and concerns for AL amyloidosis  After review of records it appears that the patient has a baseline Creatinine of 2.3-2.7 mg/dL since October 2024.  Admission creatinine of 2.45 mg/dL on 4/13/25.  Creatinine today is at 2.26 mg/dL continues to remain stable for now and slightly below baseline  check BMP, magnesium, phosphorus in a.m.  Recommend pulmonary consultation for assistance with pulmonary hypertension  Renal vascular Dopplers from 4/15/2025 showing right kidney approximately 11 cm left kidney 7 cm.  Left main renal artery not visualized, no evidence of right renal artery stenosis  Continue on Bumex 2 mg IV twice daily for now and likely transition to p.o. diuretics from tomorrow  Cautious use of diuretics due to concern for prerenal azotemia as he is clinically only minimally hypervolemic likely respiratory concerns are secondary to pulmonary hypertension  Overall volume status improving in last 24 hours  Place on a renal diet when allowed diet order.   Strict I/O.  Daily weights  Urinary retention protocol  Avoid nephrotoxins, adjust meds to appropriate GFR.  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  Cannot rule out underlying  component of multiple myeloma affecting the kidney at this time.  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 likely convert over to p.o. diuretics from tomorrow  Primary hypertension  Optimize hemodynamic status to avoid renal ischemic injury.  Maintain MAP > 65mmHg  Avoid BP fluctuations.  Home medications:Carvedilol 3.125 mg twice daily, hydralazine 25 mg every 8 hours, losartan 50 mg daily, torsemide 40 mg daily   Current medications: Losartan 50 mg p.o. daily hydralazine 75 mg p.o. every 8,Coreg 3.125 mg p.o. twice daily, Cardura 1 mg p.o. twice daily, Bumex 2 mg IV twice daily  Likely convert over to p.o. diuretics from tomorrow  Since he is not taking the Cardura watch blood pressures if continue to improve with increased dose of hydralazine may consider DC Cardura next 24 hours  Multiple myeloma not having achieved remission (HCC)  Follows with heme-onc  Prior bone marrow biopsy in October 2024: 60 to 70% plasma cells  Has not undergone any treatment -- saw heme/onc 3/31 and recommended starting Chacha-CyBorD but not yet started   Anemia, chronic disease  most recent hemoglobin at 9.3 g/dL  Maintain hemoglobin greater than 8 grams/deciliter  Follow-up with hematology oncology  Pulmonary hypertension (HCC)  Recommend pulmonary consultation for further evaluation and management  Most recent echo from 4/14 with elevated RVSP  Hypokalemia  Most recent potassium 3.3 mEq  Caution supplementation and recheck  Increase K-Dur  "to 20 mg p.o. twice daily for now  Persistent proteinuria  Most recent protein creatinine ratio 1.2 g from  Most recent albumin creatinine ratio 831 mg from 2025  Okay to continue losartan for now.  No role for renal biopsy at this time especially in light of concerns for atrophic left kidney        Thanks for the consult  Will continue to follow  Please call with questions/ concerns.  Above-mentioned orders and Plan in terms of continue IV Bumex today change over to p.o. diuretics from tomorrow placed on low phosphorus diet were discussed with the team in depth and they agree.  Please secure chat the Nephrologist on call for this campus if you have any Nephrological questions or concerns.    Joyce Yancey MD, FASN, 2025, 10:26 AM              Objective :   Patient seen and examined in his room blood pressures remain elevated not wanting to take additional BP meds.  Saturating 94 to 97% on room air.  Wants to be discharged home had good urine output close to 2.4 L / 24 hours.  Standing scale weight decreased by 3 pounds in last 24 hours      PHYSICAL EXAM  BP (!) 143/103 (BP Location: Right arm)   Pulse 69   Temp 98.1 °F (36.7 °C) (Oral)   Resp 19   Ht 6' 2\" (1.88 m)   Wt 70.7 kg (155 lb 13.8 oz)   SpO2 94%   BMI 20.01 kg/m²   Temp (24hrs), Av.7 °F (36.5 °C), Min:97.2 °F (36.2 °C), Max:98.1 °F (36.7 °C)        Intake/Output Summary (Last 24 hours) at 2025 1026  Last data filed at 2025 0945  Gross per 24 hour   Intake 840 ml   Output 2475 ml   Net -1635 ml       I/O last 24 hours:  In: 1320 [P.O.:1320]  Out: 2675 [Urine:2675]      Current Weight: Weight - Scale: 70.7 kg (155 lb 13.8 oz)  First Weight: Weight - Scale: 67.4 kg (148 lb 9.4 oz)  Physical Exam  Vitals and nursing note reviewed.   Constitutional:       General: He is not in acute distress.     Appearance: Normal appearance. He is normal weight. He is ill-appearing. He is not toxic-appearing.   HENT:      Head: " Normocephalic and atraumatic.      Mouth/Throat:      Pharynx: No oropharyngeal exudate.   Eyes:      General: No scleral icterus.  Cardiovascular:      Rate and Rhythm: Normal rate.   Pulmonary:      Effort: No respiratory distress.      Breath sounds: No wheezing.   Abdominal:      General: There is no distension.      Palpations: Abdomen is soft.      Tenderness: There is no abdominal tenderness.   Musculoskeletal:         General: No swelling.      Cervical back: No rigidity.   Skin:     Coloration: Skin is not jaundiced.   Neurological:      General: No focal deficit present.      Mental Status: He is alert and oriented to person, place, and time.   Psychiatric:         Mood and Affect: Mood normal.         Behavior: Behavior normal.             Review of Systems   Constitutional:  Positive for fatigue. Negative for chills and fever.   HENT:  Negative for congestion.    Respiratory:  Positive for shortness of breath. Negative for cough and wheezing.    Cardiovascular:  Negative for leg swelling.   Gastrointestinal:  Negative for constipation.   Genitourinary:  Negative for decreased urine volume.   Musculoskeletal:  Negative for back pain.   Neurological:  Negative for headaches.   Psychiatric/Behavioral:  Negative for agitation and confusion.    All other systems reviewed and are negative.      Scheduled Meds:  Current Facility-Administered Medications   Medication Dose Route Frequency Provider Last Rate    acetaminophen  650 mg Oral Q4H PRN Loren Santos MD      bumetanide  2 mg Intravenous BID Joyce Yancey MD      carvedilol  3.125 mg Oral BID With Meals Loren Santos MD      doxazosin  1 mg Oral BID Joyce Yancey MD      hydrALAZINE  25 mg Oral Once Elizabeth Alfonso PA-C      hydrALAZINE  75 mg Oral Q8H [facility] Elizabeth Alfonso PA-C      LORazepam  0.5 mg Oral HS PRN Loren Santos MD      losartan  50 mg Oral Daily Joyce Yancey MD       melatonin  3 mg Oral HS PRN Loren Santos MD      ondansetron  4 mg Intravenous Q6H PRN Loren Santos MD      oxyCODONE  10 mg Oral Q4H PRN Loren Santos MD      oxyCODONE  5 mg Oral Q4H PRN Loren Santos MD      potassium chloride  20 mEq Oral BID With Meals Joyce Yancey MD      [Held by provider] warfarin  5 mg Oral Daily (warfarin) Loren Santos MD         PRN Meds:.  acetaminophen    LORazepam    melatonin    ondansetron    oxyCODONE    oxyCODONE    Continuous Infusions:       Invasive Devices:     Invasive Devices       Peripheral Intravenous Line  Duration             Peripheral IV 04/13/25 Left Forearm 2 days                      LABORATORY:    Results from last 7 days   Lab Units 04/16/25  0516 04/15/25  0434 04/14/25  0732 04/13/25  1625   WBC Thousand/uL  --  6.08 5.11 5.39   HEMOGLOBIN g/dL  --  9.3* 9.6* 10.6*   HEMATOCRIT %  --  29.8* 31.2* 34.8*   PLATELETS Thousands/uL  --  216 218 233   POTASSIUM mmol/L 3.6 3.3* 3.2* 4.3   CHLORIDE mmol/L 106 105 107 107   CO2 mmol/L 30 29 30 27   BUN mg/dL 56* 54* 57* 60*   CREATININE mg/dL 2.26* 2.48* 2.39* 2.45*   CALCIUM mg/dL 9.2 8.9 9.5 9.5   MAGNESIUM mg/dL 2.1  --  2.2  --    PHOSPHORUS mg/dL 4.3*  --   --   --       rest all reviewed    RADIOLOGY:  XR chest pa and lateral   Final Result by Walter Main MD (04/15 1536)      Stable pulmonary edema and left basilar opacity and pleural effusions            Workstation performed: [id number]         XR chest 2 views   Final Result by Walter Main MD (04/14 0958)      Worsening pulmonary edema and pleural effusions.      Infiltrate at the left lung base which could be pneumonia in the appropriate clinical setting. Asymmetric edema or atelectasis or other options.      The study was marked in EPIC for immediate notification.            Workstation performed: [id number]     "     VAS renal artery complete    (Results Pending)     Rest all reviewed    Portions of the record may have been created with voice recognition software. Occasional wrong word or \"sound a like\" substitutions may have occurred due to the inherent limitations of voice recognition software. Read the chart carefully and recognize, using context, where substitutions have occurred.If you have any questions, please contact the dictating provider.  "

## 2025-04-16 NOTE — ASSESSMENT & PLAN NOTE
Recent Labs     04/13/25  1625 04/14/25  0732 04/15/25  0434   HGB 10.6* 9.6* 9.3*     Hemoglobin around baseline

## 2025-04-16 NOTE — CASE MANAGEMENT
Case Management Progress Note    Patient name Domingo Trevino  Location [facility] /E5 -* MRN [id number]  : 1960 Date 2025       LOS (days): 2  Geometric Mean LOS (GMLOS) (days):   Days to GMLOS:        OBJECTIVE:     Current admission status: Inpatient  [facility] Pharmacy:   [id number] [place] - [address]  [address] [address]  Phone: [phone] Fax: [phone]    Primary Care Provider: Maricruz Peres    Primary Insurance: [facility]  Secondary Insurance:     PROGRESS NOTE:    Cm provided pt with a list of resources as well as pulmonary rehab locations within [facility] network as OT recommends this for pt and he is in agreement.

## 2025-04-16 NOTE — PLAN OF CARE
Problem: Potential for Falls  Goal: Patient will remain free of falls  Description: INTERVENTIONS:- Educate patient/family on patient safety including physical limitations- Instruct patient to call for assistance with activity - Consult OT/PT to assist with strengthening/mobility - Keep Call bell within reach- Keep bed low and locked with side rails adjusted as appropriate- Keep care items and personal belongings within reach- Initiate and maintain comfort rounds- Make Fall Risk Sign visible to staff- Apply yellow socks and bracelet for high fall risk patients- Consider moving patient to room near nurses station  INTERVENTIONS:- Educate patient/family on patient safety including physical limitations- Instruct patient to call for assistance with activity - Consult OT/PT to assist with strengthening/mobility - Keep Call bell within reach- Keep bed low and locked with side rails adjusted as appropriate- Keep care items and personal belongings within reach- Initiate and maintain comfort rounds- Make Fall Risk Sign visible to staff- Apply yellow socks and bracelet for high fall risk patients- Consider moving patient to room near nurses station  Outcome: Progressing     Problem: PAIN - ADULT  Goal: Verbalizes/displays adequate comfort level or baseline comfort level  Description: Interventions:- Encourage patient to monitor pain and request assistance- Assess pain using appropriate pain scale- Administer analgesics based on type and severity of pain and evaluate response- Implement non-pharmacological measures as appropriate and evaluate response- Consider cultural and social influences on pain and pain management- Notify physician/advanced practitioner if interventions unsuccessful or patient reports new pain  Outcome: Progressing     Problem: INFECTION - ADULT  Goal: Absence or prevention of progression during hospitalization  Description: INTERVENTIONS:- Assess and monitor for signs and symptoms of infection- Monitor  lab/diagnostic results- Monitor all insertion sites, i.e. indwelling lines, tubes, and drains- Monitor endotracheal if appropriate and nasal secretions for changes in amount and color- [place] appropriate cooling/warming therapies per order- Administer medications as ordered- Instruct and encourage patient and family to use good hand hygiene technique- Identify and instruct in appropriate isolation precautions for identified infection/condition  Outcome: Progressing     Problem: SAFETY ADULT  Goal: Patient will remain free of falls  Description: INTERVENTIONS:- Educate patient/family on patient safety including physical limitations- Instruct patient to call for assistance with activity - Consult OT/PT to assist with strengthening/mobility - Keep Call bell within reach- Keep bed low and locked with side rails adjusted as appropriate- Keep care items and personal belongings within reach- Initiate and maintain comfort rounds- Make Fall Risk Sign visible to staff- Apply yellow socks and bracelet for high fall risk patients- Consider moving patient to room near nurses station  INTERVENTIONS:- Educate patient/family on patient safety including physical limitations- Instruct patient to call for assistance with activity - Consult OT/PT to assist with strengthening/mobility - Keep Call bell within reach- Keep bed low and locked with side rails adjusted as appropriate- Keep care items and personal belongings within reach- Initiate and maintain comfort rounds- Make Fall Risk Sign visible to staff- Apply yellow socks and bracelet for high fall risk patients- Consider moving patient to room near nurses station  Outcome: Progressing  Goal: Maintain or return to baseline ADL function  Description: INTERVENTIONS:-  Assess patient's ability to carry out ADLs; assess patient's baseline for ADL function and identify physical deficits which impact ability to perform ADLs (bathing, care of mouth/teeth, toileting, grooming, dressing,  etc.)- Assess/evaluate cause of self-care deficits - Assess range of motion- Assess patient's mobility; develop plan if impaired- Assess patient's need for assistive devices and provide as appropriate- Encourage maximum independence but intervene and supervise when necessary- Involve family in performance of ADLs- Assess for home care needs following discharge - Consider OT consult to assist with ADL evaluation and planning for discharge- Provide patient education as appropriate  Outcome: Progressing  Goal: Maintains/Returns to pre admission functional level  Description: INTERVENTIONS:- Perform AM-PAC 6 Click Basic Mobility/ Daily Activity assessment daily.- Set and communicate daily mobility goal to care team and patient/family/caregiver. - Collaborate with rehabilitation services on mobility goals if consulted- Perform Range of Motion 3 times a day.- Reposition patient every 2 hours.- Dangle patient 3 times a day- Stand patient 3 times a day- Ambulate patient 3 times a day- Out of bed to chair 3 times a day - Out of bed for meals 3 times a day- Out of bed for toileting- Record patient progress and toleration of activity level   Outcome: Progressing     Problem: DISCHARGE PLANNING  Goal: Discharge to home or other facility with appropriate resources  Description: INTERVENTIONS:- Identify barriers to discharge w/patient and caregiver- Arrange for needed discharge resources and transportation as appropriate- Identify discharge learning needs (meds, wound care, etc.)- Arrange for interpretive services to assist at discharge as needed- Refer to Case Management Department for coordinating discharge planning if the patient needs post-hospital services based on physician/advanced practitioner order or complex needs related to functional status, cognitive ability, or social support system  Outcome: Progressing     Problem: Knowledge Deficit  Goal: Patient/family/caregiver demonstrates understanding of disease process,  treatment plan, medications, and discharge instructions  Description: Complete learning assessment and assess knowledge base.Interventions:- Provide teaching at level of understanding- Provide teaching via preferred learning methods  Outcome: Progressing     Problem: Prexisting or High Potential for Compromised Skin Integrity  Goal: Skin integrity is maintained or improved  Description: INTERVENTIONS:- Identify patients at risk for skin breakdown- Assess and monitor skin integrity- Assess and monitor nutrition and hydration status- Monitor labs - Assess for incontinence - Turn and reposition patient- Assist with mobility/ambulation- Relieve pressure over bony prominences- Avoid friction and shearing- Provide appropriate hygiene as needed including keeping skin clean and dry- Evaluate need for skin moisturizer/barrier cream- Collaborate with interdisciplinary team - Patient/family teaching- Consider wound care consult   Outcome: Progressing

## 2025-04-16 NOTE — ASSESSMENT & PLAN NOTE
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  Admission creatinine of 2.45 mg/dL on 4/13/25.  Creatinine today is at 2.26 mg/dL continues to remain stable for now and slightly below baseline  check BMP, magnesium, phosphorus in a.m.  Recommend pulmonary consultation for assistance with pulmonary hypertension  Renal vascular Dopplers from 4/15/2025 showing right kidney approximately 11 cm left kidney 7 cm.  Left main renal artery not visualized, no evidence of right renal artery stenosis  Continue on Bumex 2 mg IV twice daily for now and likely transition to p.o. diuretics from tomorrow  Cautious use of diuretics due to concern for prerenal azotemia as he is clinically only minimally hypervolemic likely respiratory concerns are secondary to pulmonary hypertension  Overall volume status improving in last 24 hours  Place on a renal diet when allowed diet order.   Strict I/O.  Daily weights  Urinary retention protocol  Avoid nephrotoxins, adjust meds to appropriate GFR.  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  Cannot rule out underlying component of multiple myeloma affecting the kidney at this time.  Outpatient for nephrology follows up with no nephrologist

## 2025-04-16 NOTE — ASSESSMENT & PLAN NOTE
Optimize hemodynamic status to avoid renal ischemic injury.  Maintain MAP > 65mmHg  Avoid BP fluctuations.  Home medications:Carvedilol 3.125 mg twice daily, hydralazine 25 mg every 8 hours, losartan 50 mg daily, torsemide 40 mg daily   Current medications: Losartan 50 mg p.o. daily hydralazine 75 mg p.o. every 8,Coreg 3.125 mg p.o. twice daily, Cardura 1 mg p.o. twice daily, Bumex 2 mg IV twice daily  Likely convert over to p.o. diuretics from tomorrow  Since he is not taking the Cardura watch blood pressures if continue to improve with increased dose of hydralazine may consider DC Cardura next 24 hours

## 2025-04-16 NOTE — PROGRESS NOTES
Progress Note - Hospitalist   Name: Domingo Trevino 65 y.o. 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 Previously discharged at 148 lbs during last hospital stay  PTA torsemide 40 mg daily, continues on IV Bumex 2 mg twice daily today  Cardiology and nephrology following  Daily weights, intake and output.  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, patient has declined endomyocardial biopsy  Eventually will need cardiac MRI to confirm amyloid  Discussed the above with patient  Pulmonary hypertension ([facility])  TTE w/ RVSP elevated at 69   Pulmonology evaluated, suspect due to restrictive cardiomyopathy and heart failure  Pressures currently may be more elevated due to volume, continue diuresis  Will discuss repeat echocardiogram to reevaluate pulmonary pressures per cardiology  Chronic kidney disease (CKD), stage IV (severe) ([facility])  Lab Results   Component Value Date    EGFR 29 04/16/2025    EGFR 26 04/15/2025    EGFR 27 04/14/2025    CREATININE 2.26 (H) 04/16/2025    CREATININE 2.48 (H) 04/15/2025    CREATININE 2.39 (H) 04/14/2025     Baseline creatinine 2.3-2.7  Nephrology following with diuresis in setting of multiple myeloma with amyloidosis  Renal Doppler without evidence of renal artery stenosis  Continue close monitoring with diuresis  Will need outpatient nephrologist  Primary hypertension  SBP suboptimally  controlled  Continue PTA Coreg 3.25 mg twice daily, losartan 50 mg daily  Home hydralazine increased from 25 mg to 75 mg every 8 hours  Cardura 1 mg twice daily added although patient is not taking this, consider discontinuation over 24 hours  Chronic atrial fibrillation (HCC)  Rate controlled with Coreg, maintained on warfarin 5 mg Tuesday Thursday Sunday and 7.5 mg other days  Continue home dosing now that warfarin is not supratherapeutic    Recent Labs     04/14/25  0732 04/15/25  0434 04/16/25  0516   INR 3.96* 3.45* 2.52*     Elevated troponin  No anginal complaint, Non-MI troponin elevation in setting of CHF  Cardiology following  Anemia, chronic disease  Recent Labs     04/13/25  1625 04/14/25  0732 04/15/25  0434   HGB 10.6* 9.6* 9.3*     Hemoglobin around baseline  Multiple myeloma not having achieved remission (HCC)  Patient follows with hematology oncology and had prior bone marrow biopsy in October 2020 for  He has follow-up to discuss treatment starting 4/21  Hypokalemia  Potassium 3.6 today after increase potassium repletion  Continue 20 mEq twice daily  Former smoker  Former smoker smoked 1 pack a day for 40 years, quit January 2025  CT chest in October 24 showed moderate emphysematous changes  Would benefit from formal PFTs in the outpatient  Persistent proteinuria  Nephrology following, continue losartan    VTE Pharmacologic Prophylaxis:   coumadin    Mobility:   Basic Mobility Inpatient Raw Score: 23  JH-HLM Goal: 7: Walk 25 feet or more  JH-HLM Achieved: 7: Walk 25 feet or more  JH-HLM Goal achieved. Continue to encourage appropriate mobility.    Patient Centered Rounds: I performed bedside rounds with nursing staff today.   Discussions with Specialists or Other Care Team Provider: Nephrology    Education and Discussions with Family / Patient: Patient    Current Length of Stay: 2 day(s)  Current Patient Status: Inpatient   Certification Statement: The patient will continue to require additional  inpatient hospital stay due to diuresis  Discharge Plan: Anticipate discharge in 24-48 hrs to home.    Code Status: Level 1 - Full Code    Subjective   Patient seen and examined.  Currently denies any chest pain or lower leg swelling.  Tolerating diet and voiding well.  Still intermittently has shortness of breath.  He is urinating frequently.  We discussed his amyloidosis diagnosis and he does confirm he does not want a biopsy.  He does have a follow-up scheduled with hematology on the 21st.    Objective :  Temp:  [97.2 °F (36.2 °C)-98.1 °F (36.7 °C)] 98.1 °F (36.7 °C)  HR:  [60-81] 69  BP: (117-173)/() 143/103  Resp:  [16-19] 19  SpO2:  [92 %-100 %] 94 %  O2 Device: None (Room air)  Nasal Cannula O2 Flow Rate (L/min):  [1 L/min] 1 L/min    Body mass index is 20.01 kg/m².     Input and Output Summary (last 24 hours):     Intake/Output Summary (Last 24 hours) at 4/16/2025 0939  Last data filed at 4/16/2025 0900  Gross per 24 hour   Intake 1080 ml   Output 2475 ml   Net -1395 ml       Physical Exam  Vitals and nursing note reviewed.   Constitutional:       Appearance: He is not ill-appearing or diaphoretic.   Cardiovascular:      Rate and Rhythm: Normal rate. Rhythm irregularly irregular.   Pulmonary:      Effort: Pulmonary effort is normal. No respiratory distress.      Comments: Decreased BS bases, room air 95%  Abdominal:      General: Bowel sounds are normal.      Palpations: Abdomen is soft.      Tenderness: There is no abdominal tenderness.   Musculoskeletal:      Right lower leg: No edema.      Left lower leg: No edema.   Skin:     General: Skin is warm and dry.   Neurological:      Mental Status: He is alert and oriented to person, place, and time. Mental status is at baseline.   Psychiatric:         Behavior: Behavior is cooperative.         Lab Results: I have reviewed the following results:   Results from last 7 days   Lab Units 04/15/25  0434 04/14/25  0732   WBC Thousand/uL 6.08 5.11   HEMOGLOBIN  g/dL 9.3* 9.6*   HEMATOCRIT % 29.8* 31.2*   PLATELETS Thousands/uL 216 218   SEGS PCT %  --  70   LYMPHO PCT %  --  18   MONO PCT %  --  9   EOS PCT %  --  2     Results from last 7 days   Lab Units 04/16/25  0516 04/15/25  0434 04/14/25  0732   SODIUM mmol/L 144   < > 147   POTASSIUM mmol/L 3.6   < > 3.2*   CHLORIDE mmol/L 106   < > 107   CO2 mmol/L 30   < > 30   BUN mg/dL 56*   < > 57*   CREATININE mg/dL 2.26*   < > 2.39*   ANION GAP mmol/L 8   < > 10   CALCIUM mg/dL 9.2   < > 9.5   ALBUMIN g/dL  --   --  4.0   TOTAL BILIRUBIN mg/dL  --   --  1.32*   ALK PHOS U/L  --   --  54   ALT U/L  --   --  25   AST U/L  --   --  18   GLUCOSE RANDOM mg/dL 98   < > 93    < > = values in this interval not displayed.     Results from last 7 days   Lab Units 04/16/25  0516   INR  2.52*     Results from last 7 days   Lab Units 04/15/25  0434 04/14/25  0732   PROCALCITONIN ng/ml 0.17 0.17     Last 24 Hours Medication List:     Current Facility-Administered Medications:     acetaminophen (TYLENOL) tablet 650 mg, Q4H PRN    bumetanide (BUMEX) injection 2 mg, BID    carvedilol (COREG) tablet 3.125 mg, BID With Meals    doxazosin (CARDURA) tablet 1 mg, BID    hydrALAZINE (APRESOLINE) tablet 25 mg, Once    hydrALAZINE (APRESOLINE) tablet 75 mg, Q8H LOTTIE    LORazepam (ATIVAN) tablet 0.5 mg, HS PRN    losartan (COZAAR) tablet 50 mg, Daily    melatonin tablet 3 mg, HS PRN    ondansetron (ZOFRAN) injection 4 mg, Q6H PRN    oxyCODONE (ROXICODONE) immediate release tablet 10 mg, Q4H PRN    oxyCODONE (ROXICODONE) IR tablet 5 mg, Q4H PRN    potassium chloride (Klor-Con M20) CR tablet 20 mEq, BID With Meals    [Held by provider] warfarin (COUMADIN) tablet 5 mg, Daily (warfarin)    Administrative Statements   Today, Patient Was Seen By: Gabriela Salazar PA-C    **Please Note: This note may have been constructed using a voice recognition system.**

## 2025-04-16 NOTE — ASSESSMENT & PLAN NOTE
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 Previously discharged at 148 lbs during last hospital stay  PTA torsemide 40 mg daily, continues on IV Bumex 2 mg twice daily today  Cardiology and nephrology following  Daily weights, intake and output.  Low-sodium fluid restricted diet

## 2025-04-16 NOTE — ASSESSMENT & PLAN NOTE
Former smoker smoked 1 pack a day for 40 years, quit January 2025  CT chest in October 24 showed moderate emphysematous changes  Would benefit from formal PFTs in the outpatient   7 (severe pain)

## 2025-04-16 NOTE — ASSESSMENT & PLAN NOTE
TTE w/ RVSP elevated at 69   Pulmonology evaluated, suspect due to restrictive cardiomyopathy and heart failure  Pressures currently may be more elevated due to volume, continue diuresis  Will discuss repeat echocardiogram to reevaluate pulmonary pressures per cardiology

## 2025-04-16 NOTE — PLAN OF CARE
Problem: Potential for Falls  Goal: Patient will remain free of falls  Description: INTERVENTIONS:- Educate patient/family on patient safety including physical limitations- Instruct patient to call for assistance with activity - Consult OT/PT to assist with strengthening/mobility - Keep Call bell within reach- Keep bed low and locked with side rails adjusted as appropriate- Keep care items and personal belongings within reach- Initiate and maintain comfort rounds- Make Fall Risk Sign visible to staff  INTERVENTIONS:- Educate patient/family on patient safety including physical limitations- Instruct patient to call for assistance with activity - Consult OT/PT to assist with strengthening/mobility - Keep Call bell within reach- Keep bed low and locked with side rails adjusted as appropriate- Keep care items and personal belongings within reach- Initiate and maintain comfort rounds- Make Fall Risk Sign visible to staff  Outcome: Progressing     Problem: PAIN - ADULT  Goal: Verbalizes/displays adequate comfort level or baseline comfort level  Description: Interventions:- Encourage patient to monitor pain and request assistance- Assess pain using appropriate pain scale- Administer analgesics based on type and severity of pain and evaluate response- Implement non-pharmacological measures as appropriate and evaluate response- Consider cultural and social influences on pain and pain management- Notify physician/advanced practitioner if interventions unsuccessful or patient reports new pain  Outcome: Progressing     Problem: INFECTION - ADULT  Goal: Absence or prevention of progression during hospitalization  Description: INTERVENTIONS:- Assess and monitor for signs and symptoms of infection- Monitor lab/diagnostic results- Monitor all insertion sites, i.e. indwelling lines, tubes, and drains- Monitor endotracheal if appropriate and nasal secretions for changes in amount and color- [place] appropriate cooling/warming  therapies per order- Administer medications as ordered- Instruct and encourage patient and family to use good hand hygiene technique- Identify and instruct in appropriate isolation precautions for identified infection/condition  Outcome: Progressing     Problem: SAFETY ADULT  Goal: Patient will remain free of falls  Description: INTERVENTIONS:- Educate patient/family on patient safety including physical limitations- Instruct patient to call for assistance with activity - Consult OT/PT to assist with strengthening/mobility - Keep Call bell within reach- Keep bed low and locked with side rails adjusted as appropriate- Keep care items and personal belongings within reach- Initiate and maintain comfort rounds- Make Fall Risk Sign visible to staff  INTERVENTIONS:- Educate patient/family on patient safety including physical limitations- Instruct patient to call for assistance with activity - Consult OT/PT to assist with strengthening/mobility - Keep Call bell within reach- Keep bed low and locked with side rails adjusted as appropriate- Keep care items and personal belongings within reach- Initiate and maintain comfort rounds- Make Fall Risk Sign visible to staff  Outcome: Progressing  Goal: Maintain or return to baseline ADL function  Description: INTERVENTIONS:-  Assess patient's ability to carry out ADLs; assess patient's baseline for ADL function and identify physical deficits which impact ability to perform ADLs (bathing, care of mouth/teeth, toileting, grooming, dressing, etc.)- Assess/evaluate cause of self-care deficits - Assess range of motion- Assess patient's mobility; develop plan if impaired- Assess patient's need for assistive devices and provide as appropriate- Encourage maximum independence but intervene and supervise when necessary- Involve family in performance of ADLs- Assess for home care needs following discharge - Consider OT consult to assist with ADL evaluation and planning for discharge- Provide  patient education as appropriate  Outcome: Progressing  Goal: Maintains/Returns to pre admission functional level  Description: INTERVENTIONS:- Perform AM-PAC 6 Click Basic Mobility/ Daily Activity assessment daily.- Set and communicate daily mobility goal to care team and patient/family/caregiver. - Collaborate with rehabilitation services on mobility goals if consulted    Outcome: Progressing     Problem: DISCHARGE PLANNING  Goal: Discharge to home or other facility with appropriate resources  Description: INTERVENTIONS:- Identify barriers to discharge w/patient and caregiver- Arrange for needed discharge resources and transportation as appropriate- Identify discharge learning needs (meds, wound care, etc.)- Arrange for interpretive services to assist at discharge as needed- Refer to Case Management Department for coordinating discharge planning if the patient needs post-hospital services based on physician/advanced practitioner order or complex needs related to functional status, cognitive ability, or social support system  Outcome: Progressing     Problem: Knowledge Deficit  Goal: Patient/family/caregiver demonstrates understanding of disease process, treatment plan, medications, and discharge instructions  Description: Complete learning assessment and assess knowledge base.Interventions:- Provide teaching at level of understanding- Provide teaching via preferred learning methods  Outcome: Progressing     Problem: Prexisting or High Potential for Compromised Skin Integrity  Goal: Skin integrity is maintained or improved  Description: INTERVENTIONS:- Identify patients at risk for skin breakdown- Assess and monitor skin integrity- Assess and monitor nutrition and hydration status- Monitor labs - Assess for incontinence - Turn and reposition patient- Assist with mobility/ambulation- Relieve pressure over bony prominences- Avoid friction and shearing- Provide appropriate hygiene as needed including keeping skin  clean and dry- Evaluate need for skin moisturizer/barrier cream- Collaborate with interdisciplinary team - Patient/family teaching- Consider wound care consult   Outcome: Progressing

## 2025-04-17 ENCOUNTER — RESULTS FOLLOW-UP (OUTPATIENT)
Dept: OTHER | Facility: HOSPITAL | Age: 65
End: 2025-04-17

## 2025-04-17 ENCOUNTER — TELEPHONE (OUTPATIENT)
Dept: CARDIOLOGY CLINIC | Facility: CLINIC | Age: 65
End: 2025-04-17

## 2025-04-17 VITALS
HEART RATE: 59 BPM | RESPIRATION RATE: 17 BRPM | BODY MASS INDEX: 19.72 KG/M2 | SYSTOLIC BLOOD PRESSURE: 160 MMHG | DIASTOLIC BLOOD PRESSURE: 100 MMHG | OXYGEN SATURATION: 96 % | TEMPERATURE: 98.2 F | WEIGHT: 153.66 LBS | HEIGHT: 74 IN

## 2025-04-17 DIAGNOSIS — N18.4 CHRONIC KIDNEY DISEASE (CKD), STAGE IV (SEVERE) (HCC): Primary | ICD-10-CM

## 2025-04-17 LAB
ANION GAP SERPL CALCULATED.3IONS-SCNC: 11 MMOL/L (ref 4–13)
BUN SERPL-MCNC: 53 MG/DL (ref 5–25)
CALCIUM SERPL-MCNC: 8.8 MG/DL (ref 8.4–10.2)
CHLORIDE SERPL-SCNC: 104 MMOL/L (ref 96–108)
CO2 SERPL-SCNC: 29 MMOL/L (ref 21–32)
CREAT SERPL-MCNC: 2.2 MG/DL (ref 0.6–1.3)
GFR SERPL CREATININE-BSD FRML MDRD: 30 ML/MIN/1.73SQ M
GLUCOSE SERPL-MCNC: 86 MG/DL (ref 65–140)
PHOSPHATE SERPL-MCNC: 4.5 MG/DL (ref 2.3–4.1)
POTASSIUM SERPL-SCNC: 3.7 MMOL/L (ref 3.5–5.3)
SODIUM SERPL-SCNC: 144 MMOL/L (ref 135–147)

## 2025-04-17 PROCEDURE — 84100 ASSAY OF PHOSPHORUS: CPT | Performed by: INTERNAL MEDICINE

## 2025-04-17 PROCEDURE — 80048 BASIC METABOLIC PNL TOTAL CA: CPT | Performed by: INTERNAL MEDICINE

## 2025-04-17 RX ADMIN — HYDRALAZINE HYDROCHLORIDE 50 MG: 25 TABLET ORAL at 05:02

## 2025-04-17 NOTE — TELEPHONE ENCOUNTER
----- Message from Joyce Yancey MD sent at 4/17/2025 12:55 PM EDT -----  Please let the patient know that most recent lab work in terms of renal parameters and electrolytes are stable.    He supposedly left AGAINST MEDICAL ADVICE yesterday.  Please follow-up with cardiology for his diuretic management.  He has a follow-up appointment with Dr. Reyes already scheduled please make sure he has an order for renal function panel to be done prior to that appointment  Please do not miss your next appointment with Dr. Reyes.  Will discuss further at the upcoming visit, let me know if they have any questions or concerns.    Thanks

## 2025-04-17 NOTE — PROGRESS NOTES
Pt leaving AMA due to family emergency, IV removed, belongings taken. AMA form signed by patient and MD, placed in chart.

## 2025-04-17 NOTE — UTILIZATION REVIEW
NOTIFICATION OF ADMISSION DISCHARGE   This is a Notification of Discharge from [facility]. Please be advised that this patient has been discharge from our facility. Below you will find the admission and discharge date and time including the patient’s disposition.   UTILIZATION REVIEW CONTACT:  Utilization Review Assistants  Network Utilization Review Department  Phone: [phone] x carefully listen to the prompts. All voicemails are confidential.  Email: [email or web address]     ADMISSION INFORMATION  PRESENTATION DATE: 4/13/2025  4:07 PM  OBERVATION ADMISSION DATE: 04/13/2025 1901  INPATIENT ADMISSION DATE: 4/14/25 10:42 AM   DISCHARGE DATE: 4/17/2025  5:56 AM   DISPOSITION:Left against medical advice or discontinued care    Network Utilization Review Department  ATTENTION: Please call with any questions or concerns to [phone] and carefully listen to the prompts so that you are directed to the right person. All voicemails are confidential.   For Discharge needs, contact Care Management DC Support Team at [phone] opt. 2  Send all requests for admission clinical reviews, approved or denied determinations and any other requests to dedicated fax number below belonging to the campus where the patient is receiving treatment. List of dedicated fax numbers for the Facilities:  FACILITY NAME UR FAX NUMBER   ADMISSION DENIALS (Administrative/Medical Necessity) [phone]   DISCHARGE SUPPORT TEAM ([facility]) [phone]   PARENT CHILD HEALTH (Maternity/NICU/Pediatrics) [phone]   [facility] [phone]   [facility] [phone]   [facility] [phone]   [facility] [phone]   On license of [facility] [phone]   [facility] [phone]   [facility] [phone]   [facility]  [facility] [phone]   [facility] [phone]   [facility] [phone]   [facility] [phone]   [facility] [phone]

## 2025-04-17 NOTE — TELEPHONE ENCOUNTER
Attempted to call patient to schedule hospital follow up. No answer and unable to leave voicemail as mailbox is full. Will try again later.

## 2025-04-18 NOTE — RESTORATIVE TECHNICIAN NOTE
Restorative Technician Note      Patient Name: Domingo Trevino     Restorative Tech Visit Date: 04/18/25  Note Type: Mobility  Patient Position Upon Consult: Supine  Activity Performed: Ambulated  Patient Position at End of Consult: All needs within reach; Bedside chair

## 2025-04-18 NOTE — TELEPHONE ENCOUNTER
I spoke to Domingo he is aware of results and recommendations. He is aware to keep his appt with Dr Reyes 5/1/25 and repeat labs prior to his follow up .     ----- Message from Joyce Yancey MD sent at 4/17/2025 12:55 PM EDT -----  Please let the patient know that most recent lab work in terms of renal parameters and electrolytes are stable.    He supposedly left AGAINST MEDICAL ADVICE yesterday.  Please follow-up with cardiology for his diuretic management.  He has a follow-up appointment with Dr. Reyes already scheduled please make sure he has an order for renal function panel to be done prior to that appointment  Please do not miss your next appointment with Dr. Reyes.  Will discuss further at the upcoming visit, let me know if they have any questions or concerns.    Thanks

## 2025-04-21 NOTE — ASSESSMENT & PLAN NOTE
Former smoker smoked 1 pack a day for 40 years, quit January 2025  CT chest in October 24 showed moderate emphysematous changes  Would benefit from formal PFTs in the outpatient

## 2025-04-21 NOTE — ASSESSMENT & PLAN NOTE
"Rate controlled with Coreg, maintained on warfarin 5 mg Tuesday Thursday Sunday and 7.5 mg other days  Continue home dosing now that warfarin is not supratherapeutic    No results for input(s): \"INR\" in the last 72 hours.  Patient left AMA on the morning of 4/17  "

## 2025-04-21 NOTE — DISCHARGE SUMMARY
Discharge Summary - Hospitalist   Name: Domingo Trevino 65 y.o. 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 Previously discharged at 148 lbs during last hospital stay  PTA torsemide 40 mg daily, continues on IV Bumex 2 mg twice daily today  Cardiology and nephrology following  Daily weights, intake and output.  Low-sodium fluid restricted diet  Patient left AMA on the morning of 4/17  AL amyloidosis (HCC)  Echo suggestive of cardiac amyloid, patient has declined endomyocardial biopsy  Eventually will need cardiac MRI to confirm amyloid  Discussed the above with patient  Patient left AMA on the morning of 4/17  Pulmonary hypertension (HCC)  TTE w/ RVSP elevated at 69   Pulmonology evaluated, suspect due to restrictive cardiomyopathy and heart failure  Pressures currently may be more elevated due to volume, continue diuresis  Will discuss repeat echocardiogram to reevaluate pulmonary pressures per cardiology  Patient left AMA on the morning of 4/17  Chronic kidney disease (CKD), stage IV (severe) ([facility])  Lab Results   Component Value Date    EGFR 30 04/17/2025    EGFR 29 04/16/2025    EGFR 26 04/15/2025    CREATININE 2.20 (H) 04/17/2025    CREATININE 2.26 (H) 04/16/2025    CREATININE 2.48 (H) 04/15/2025     Baseline creatinine 2.3-2.7  Nephrology following with diuresis in setting of multiple myeloma with amyloidosis  Renal Doppler without evidence of renal artery  "stenosis  Continue close monitoring with diuresis  Will need outpatient nephrologist  Patient left AMA on the morning of 4/17  Primary hypertension  SBP suboptimally controlled  Continue PTA Coreg 3.25 mg twice daily, losartan 50 mg daily  Home hydralazine increased from 25 mg to 75 mg every 8 hours  Cardura 1 mg twice daily added although patient is not taking this, consider discontinuation over 24 hours  Patient left AMA on the morning of 4/17  Chronic atrial fibrillation (HCC)  Rate controlled with Coreg, maintained on warfarin 5 mg Tuesday Thursday Sunday and 7.5 mg other days  Continue home dosing now that warfarin is not supratherapeutic    No results for input(s): \"INR\" in the last 72 hours.  Patient left AMA on the morning of 4/17  Elevated troponin  No anginal complaint, Non-MI troponin elevation in setting of CHF  Cardiology following  Patient left AMA on the morning of 4/17  Anemia, chronic disease  No results for input(s): \"HGB\" in the last 72 hours.    Hemoglobin around baseline  Multiple myeloma not having achieved remission (HCC)  Patient follows with hematology oncology and had prior bone marrow biopsy in October 2020 for  He has follow-up to discuss treatment starting 4/21  Patient left AMA on the morning of 4/17  Hypokalemia  Potassium 3.6 today after increase potassium repletion  Continue 20 mEq twice daily  Former smoker  Former smoker smoked 1 pack a day for 40 years, quit January 2025  CT chest in October 24 showed moderate emphysematous changes  Would benefit from formal PFTs in the outpatient  Persistent proteinuria  Nephrology following, continue losartan     Medical Problems       Resolved Problems  Date Reviewed: 2/27/2025   None       Discharging Physician / Practitioner: VEENA Burkett  PCP: Maricruz Peres  Admission Date:   Admission Orders (From admission, onward)       Ordered        04/14/25 1042  INPATIENT ADMISSION  Once            04/13/25 1901  Place in " Observation  Once                          Discharge Date: 04/21/25        Reason for Admission: Heart failure    Hospital Course:   Domingo Trevino is a 65 y.o. male patient who originally presented to the hospital on 4/13/2025 due to heart failure.  He was being treated by cardiology however on the morning of 4/17 left AMA as he had family concerns.         The patient, initially admitted to the hospital as inpatient, was discharged earlier than expected given the following: Leaving AMA.  Please see above list of diagnoses and related plan for additional information.     Condition at Discharge: fair    Discharge Day Visit / Exam:   * Please refer to separate progress note for these details *      Discharge instructions/Information to patient and family:   See after visit summary for information provided to patient and family.      Provisions for Follow-Up Care:  See after visit summary for information related to follow-up care and any pertinent home health orders.      Mobility at time of Discharge:   Basic Mobility Inpatient Raw Score: 23  JH-HLM Goal: 7: Walk 25 feet or more  JH-HLM Achieved: 8: Walk 250 feet ot more  HLM Goal achieved. Continue to encourage appropriate mobility.     Disposition:   Other: Patient left AMA    Planned Readmission: No    Discharge Medications:  See after visit summary for reconciled discharge medications provided to patient and/or family.      **Please Note: This note may have been constructed using a voice recognition system**

## 2025-04-21 NOTE — ASSESSMENT & PLAN NOTE
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 Previously discharged at 148 lbs during last hospital stay  PTA torsemide 40 mg daily, continues on IV Bumex 2 mg twice daily today  Cardiology and nephrology following  Daily weights, intake and output.  Low-sodium fluid restricted diet  Patient left AMA on the morning of 4/17

## 2025-04-21 NOTE — ASSESSMENT & PLAN NOTE
Echo suggestive of cardiac amyloid, patient has declined endomyocardial biopsy  Eventually will need cardiac MRI to confirm amyloid  Discussed the above with patient  Patient left AMA on the morning of 4/17

## 2025-04-21 NOTE — ASSESSMENT & PLAN NOTE
Lab Results   Component Value Date    EGFR 30 04/17/2025    EGFR 29 04/16/2025    EGFR 26 04/15/2025    CREATININE 2.20 (H) 04/17/2025    CREATININE 2.26 (H) 04/16/2025    CREATININE 2.48 (H) 04/15/2025     Baseline creatinine 2.3-2.7  Nephrology following with diuresis in setting of multiple myeloma with amyloidosis  Renal Doppler without evidence of renal artery stenosis  Continue close monitoring with diuresis  Will need outpatient nephrologist  Patient left AMA on the morning of 4/17

## 2025-04-21 NOTE — ASSESSMENT & PLAN NOTE
Patient follows with hematology oncology and had prior bone marrow biopsy in October 2020 for  He has follow-up to discuss treatment starting 4/21  Patient left AMA on the morning of 4/17

## 2025-04-21 NOTE — ASSESSMENT & PLAN NOTE
SBP suboptimally controlled  Continue PTA Coreg 3.25 mg twice daily, losartan 50 mg daily  Home hydralazine increased from 25 mg to 75 mg every 8 hours  Cardura 1 mg twice daily added although patient is not taking this, consider discontinuation over 24 hours  Patient left AMA on the morning of 4/17

## 2025-04-21 NOTE — ASSESSMENT & PLAN NOTE
No anginal complaint, Non-MI troponin elevation in setting of CHF  Cardiology following  Patient left AMA on the morning of 4/17

## 2025-04-21 NOTE — ASSESSMENT & PLAN NOTE
TTE w/ RVSP elevated at 69   Pulmonology evaluated, suspect due to restrictive cardiomyopathy and heart failure  Pressures currently may be more elevated due to volume, continue diuresis  Will discuss repeat echocardiogram to reevaluate pulmonary pressures per cardiology  Patient left AMA on the morning of 4/17

## 2025-04-22 ENCOUNTER — OFFICE VISIT (OUTPATIENT)
Dept: PULMONOLOGY | Facility: CLINIC | Age: 65
End: 2025-04-22
Payer: COMMERCIAL

## 2025-04-22 ENCOUNTER — APPOINTMENT (EMERGENCY)
Dept: RADIOLOGY | Facility: HOSPITAL | Age: 65
DRG: 346 | End: 2025-04-22
Payer: COMMERCIAL

## 2025-04-22 ENCOUNTER — HOSPITAL ENCOUNTER (INPATIENT)
Facility: HOSPITAL | Age: 65
LOS: 8 days | Discharge: HOME | DRG: 346 | End: 2025-04-30
Attending: EMERGENCY MEDICINE | Admitting: INTERNAL MEDICINE
Payer: COMMERCIAL

## 2025-04-22 VITALS
SYSTOLIC BLOOD PRESSURE: 138 MMHG | BODY MASS INDEX: 21.11 KG/M2 | OXYGEN SATURATION: 96 % | HEART RATE: 95 BPM | TEMPERATURE: 96.5 F | HEIGHT: 74 IN | WEIGHT: 164.5 LBS | DIASTOLIC BLOOD PRESSURE: 100 MMHG

## 2025-04-22 DIAGNOSIS — F17.211 CIGARETTE NICOTINE DEPENDENCE IN REMISSION: ICD-10-CM

## 2025-04-22 DIAGNOSIS — I27.20 PULMONARY HYPERTENSION (HCC): ICD-10-CM

## 2025-04-22 DIAGNOSIS — R06.02 SHORTNESS OF BREATH: Primary | ICD-10-CM

## 2025-04-22 DIAGNOSIS — I47.29 NSVT (NONSUSTAINED VENTRICULAR TACHYCARDIA) (HCC): ICD-10-CM

## 2025-04-22 DIAGNOSIS — J43.1 PANLOBULAR EMPHYSEMA (HCC): Primary | ICD-10-CM

## 2025-04-22 DIAGNOSIS — I50.9 ACUTE EXACERBATION OF CHF (CONGESTIVE HEART FAILURE) (HCC): ICD-10-CM

## 2025-04-22 DIAGNOSIS — R79.89 ELEVATED TROPONIN: ICD-10-CM

## 2025-04-22 DIAGNOSIS — E85.81 AL AMYLOIDOSIS (HCC): Chronic | ICD-10-CM

## 2025-04-22 DIAGNOSIS — I50.9 PLEURAL EFFUSION DUE TO CONGESTIVE HEART FAILURE (HCC): ICD-10-CM

## 2025-04-22 DIAGNOSIS — C90.00 MULTIPLE MYELOMA NOT HAVING ACHIEVED REMISSION (HCC): Chronic | ICD-10-CM

## 2025-04-22 LAB
2HR DELTA HS TROPONIN: -8 NG/L
ALBUMIN SERPL BCG-MCNC: 4.1 G/DL (ref 3.5–5)
ALP SERPL-CCNC: 56 U/L (ref 34–104)
ALT SERPL W P-5'-P-CCNC: 16 U/L (ref 7–52)
ANION GAP SERPL CALCULATED.3IONS-SCNC: 9 MMOL/L (ref 4–13)
APTT PPP: 37 SECONDS (ref 23–34)
AST SERPL W P-5'-P-CCNC: 19 U/L (ref 13–39)
ATRIAL RATE: 340 BPM
ATRIAL RATE: 352 BPM
ATRIAL RATE: 394 BPM
BASOPHILS # BLD AUTO: 0.02 THOUSANDS/ÂΜL (ref 0–0.1)
BASOPHILS NFR BLD AUTO: 0 % (ref 0–1)
BILIRUB SERPL-MCNC: 1.04 MG/DL (ref 0.2–1)
BNP SERPL-MCNC: 1099 PG/ML (ref 0–100)
BUN SERPL-MCNC: 50 MG/DL (ref 5–25)
CALCIUM SERPL-MCNC: 9.1 MG/DL (ref 8.4–10.2)
CARDIAC TROPONIN I PNL SERPL HS: 184 NG/L (ref ?–50)
CARDIAC TROPONIN I PNL SERPL HS: 192 NG/L (ref ?–50)
CHLORIDE SERPL-SCNC: 104 MMOL/L (ref 96–108)
CO2 SERPL-SCNC: 28 MMOL/L (ref 21–32)
CREAT SERPL-MCNC: 2.77 MG/DL (ref 0.6–1.3)
EOSINOPHIL # BLD AUTO: 0.12 THOUSAND/ÂΜL (ref 0–0.61)
EOSINOPHIL NFR BLD AUTO: 3 % (ref 0–6)
ERYTHROCYTE [DISTWIDTH] IN BLOOD BY AUTOMATED COUNT: 17.3 % (ref 11.6–15.1)
GFR SERPL CREATININE-BSD FRML MDRD: 23 ML/MIN/1.73SQ M
GLUCOSE SERPL-MCNC: 101 MG/DL (ref 65–140)
HCT VFR BLD AUTO: 30.5 % (ref 36.5–49.3)
HGB BLD-MCNC: 9.6 G/DL (ref 12–17)
IMM GRANULOCYTES # BLD AUTO: 0.01 THOUSAND/UL (ref 0–0.2)
IMM GRANULOCYTES NFR BLD AUTO: 0 % (ref 0–2)
INR PPP: 2.98 (ref 0.85–1.19)
LYMPHOCYTES # BLD AUTO: 1.11 THOUSANDS/ÂΜL (ref 0.6–4.47)
LYMPHOCYTES NFR BLD AUTO: 23 % (ref 14–44)
MCH RBC QN AUTO: 21.9 PG (ref 26.8–34.3)
MCHC RBC AUTO-ENTMCNC: 31.5 G/DL (ref 31.4–37.4)
MCV RBC AUTO: 70 FL (ref 82–98)
MONOCYTES # BLD AUTO: 0.37 THOUSAND/ÂΜL (ref 0.17–1.22)
MONOCYTES NFR BLD AUTO: 8 % (ref 4–12)
NEUTROPHILS # BLD AUTO: 3.2 THOUSANDS/ÂΜL (ref 1.85–7.62)
NEUTS SEG NFR BLD AUTO: 66 % (ref 43–75)
NRBC BLD AUTO-RTO: 0 /100 WBCS
PLATELET # BLD AUTO: 225 THOUSANDS/UL (ref 149–390)
PMV BLD AUTO: 11.5 FL (ref 8.9–12.7)
POTASSIUM SERPL-SCNC: 3.7 MMOL/L (ref 3.5–5.3)
PROT SERPL-MCNC: 6.6 G/DL (ref 6.4–8.4)
PROTHROMBIN TIME: 30.4 SECONDS (ref 12.3–15)
QRS AXIS: -88 DEGREES
QRS AXIS: 153 DEGREES
QRS AXIS: 169 DEGREES
QRSD INTERVAL: 96 MS
QRSD INTERVAL: 96 MS
QRSD INTERVAL: 98 MS
QT INTERVAL: 478 MS
QT INTERVAL: 492 MS
QT INTERVAL: 538 MS
QTC INTERVAL: 439 MS
QTC INTERVAL: 478 MS
QTC INTERVAL: 504 MS
RBC # BLD AUTO: 4.39 MILLION/UL (ref 3.88–5.62)
SODIUM SERPL-SCNC: 141 MMOL/L (ref 135–147)
T WAVE AXIS: 225 DEGREES
T WAVE AXIS: 258 DEGREES
T WAVE AXIS: 261 DEGREES
TSH SERPL DL<=0.05 MIU/L-ACNC: 1.88 UIU/ML (ref 0.45–4.5)
VENTRICULAR RATE: 48 BPM
VENTRICULAR RATE: 53 BPM
VENTRICULAR RATE: 60 BPM
WBC # BLD AUTO: 4.83 THOUSAND/UL (ref 4.31–10.16)

## 2025-04-22 PROCEDURE — 99214 OFFICE O/P EST MOD 30 MIN: CPT | Performed by: STUDENT IN AN ORGANIZED HEALTH CARE EDUCATION/TRAINING PROGRAM

## 2025-04-22 PROCEDURE — 85730 THROMBOPLASTIN TIME PARTIAL: CPT | Performed by: PHYSICIAN ASSISTANT

## 2025-04-22 PROCEDURE — 36415 COLL VENOUS BLD VENIPUNCTURE: CPT | Performed by: PHYSICIAN ASSISTANT

## 2025-04-22 PROCEDURE — 85025 COMPLETE CBC W/AUTO DIFF WBC: CPT | Performed by: PHYSICIAN ASSISTANT

## 2025-04-22 PROCEDURE — 93010 ELECTROCARDIOGRAM REPORT: CPT | Performed by: INTERNAL MEDICINE

## 2025-04-22 PROCEDURE — 99223 1ST HOSP IP/OBS HIGH 75: CPT | Performed by: INTERNAL MEDICINE

## 2025-04-22 PROCEDURE — 99285 EMERGENCY DEPT VISIT HI MDM: CPT | Performed by: PHYSICIAN ASSISTANT

## 2025-04-22 PROCEDURE — 71046 X-RAY EXAM CHEST 2 VIEWS: CPT

## 2025-04-22 PROCEDURE — 80053 COMPREHEN METABOLIC PANEL: CPT | Performed by: PHYSICIAN ASSISTANT

## 2025-04-22 PROCEDURE — 99285 EMERGENCY DEPT VISIT HI MDM: CPT

## 2025-04-22 PROCEDURE — 84484 ASSAY OF TROPONIN QUANT: CPT | Performed by: PHYSICIAN ASSISTANT

## 2025-04-22 PROCEDURE — 93005 ELECTROCARDIOGRAM TRACING: CPT

## 2025-04-22 PROCEDURE — 85610 PROTHROMBIN TIME: CPT | Performed by: PHYSICIAN ASSISTANT

## 2025-04-22 PROCEDURE — 83880 ASSAY OF NATRIURETIC PEPTIDE: CPT | Performed by: PHYSICIAN ASSISTANT

## 2025-04-22 PROCEDURE — 84443 ASSAY THYROID STIM HORMONE: CPT | Performed by: PHYSICIAN ASSISTANT

## 2025-04-22 RX ORDER — FLUTICASONE FUROATE AND VILANTEROL 200; 25 UG/1; UG/1
1 POWDER RESPIRATORY (INHALATION) DAILY
Status: DISCONTINUED | OUTPATIENT
Start: 2025-04-23 | End: 2025-04-30 | Stop reason: HOSPADM

## 2025-04-22 RX ORDER — PROCHLORPERAZINE MALEATE 10 MG
10 TABLET ORAL EVERY 6 HOURS PRN
Status: DISCONTINUED | OUTPATIENT
Start: 2025-04-22 | End: 2025-04-30 | Stop reason: HOSPADM

## 2025-04-22 RX ORDER — CYCLOPHOSPHAMIDE 50 MG/1
500 CAPSULE ORAL
COMMUNITY
Start: 2025-04-18

## 2025-04-22 RX ORDER — LOSARTAN POTASSIUM 50 MG/1
50 TABLET ORAL DAILY
Status: DISCONTINUED | OUTPATIENT
Start: 2025-04-23 | End: 2025-04-26

## 2025-04-22 RX ORDER — SIMETHICONE 80 MG
80 TABLET,CHEWABLE ORAL 4 TIMES DAILY PRN
Status: DISCONTINUED | OUTPATIENT
Start: 2025-04-22 | End: 2025-04-30 | Stop reason: HOSPADM

## 2025-04-22 RX ORDER — LABETALOL HYDROCHLORIDE 5 MG/ML
10 INJECTION, SOLUTION INTRAVENOUS EVERY 4 HOURS PRN
Status: DISCONTINUED | OUTPATIENT
Start: 2025-04-22 | End: 2025-04-30 | Stop reason: HOSPADM

## 2025-04-22 RX ORDER — FLUTICASONE FUROATE, UMECLIDINIUM BROMIDE AND VILANTEROL TRIFENATATE 100; 62.5; 25 UG/1; UG/1; UG/1
1 POWDER RESPIRATORY (INHALATION) DAILY
Qty: 180 BLISTER | Refills: 0 | Status: SHIPPED | OUTPATIENT
Start: 2025-04-22 | End: 2025-07-21

## 2025-04-22 RX ORDER — WARFARIN SODIUM 5 MG/1
5 TABLET ORAL
Status: DISCONTINUED | OUTPATIENT
Start: 2025-04-24 | End: 2025-04-25

## 2025-04-22 RX ORDER — PROCHLORPERAZINE MALEATE 10 MG
10 TABLET ORAL EVERY 6 HOURS PRN
COMMUNITY
Start: 2025-04-18

## 2025-04-22 RX ORDER — CARVEDILOL 3.12 MG/1
3.12 TABLET ORAL 2 TIMES DAILY WITH MEALS
Status: DISCONTINUED | OUTPATIENT
Start: 2025-04-22 | End: 2025-04-30 | Stop reason: HOSPADM

## 2025-04-22 RX ORDER — POTASSIUM CHLORIDE 1500 MG/1
20 TABLET, EXTENDED RELEASE ORAL DAILY
Status: DISCONTINUED | OUTPATIENT
Start: 2025-04-23 | End: 2025-04-23

## 2025-04-22 RX ORDER — ACYCLOVIR 400 MG/1
400 TABLET ORAL 2 TIMES DAILY
COMMUNITY
Start: 2025-04-18 | End: 2025-04-22 | Stop reason: ALTCHOICE

## 2025-04-22 RX ORDER — FUROSEMIDE 10 MG/ML
40 INJECTION INTRAMUSCULAR; INTRAVENOUS ONCE
Status: COMPLETED | OUTPATIENT
Start: 2025-04-22 | End: 2025-04-22

## 2025-04-22 RX ORDER — WARFARIN SODIUM 5 MG/1
5 TABLET ORAL
Status: DISCONTINUED | OUTPATIENT
Start: 2025-04-24 | End: 2025-04-22

## 2025-04-22 RX ORDER — HYDRALAZINE HYDROCHLORIDE 25 MG/1
25 TABLET, FILM COATED ORAL EVERY 8 HOURS SCHEDULED
Status: DISCONTINUED | OUTPATIENT
Start: 2025-04-22 | End: 2025-04-26

## 2025-04-22 RX ORDER — FUROSEMIDE 10 MG/ML
40 INJECTION INTRAMUSCULAR; INTRAVENOUS
Status: DISCONTINUED | OUTPATIENT
Start: 2025-04-23 | End: 2025-04-23

## 2025-04-22 RX ORDER — ACETAMINOPHEN 325 MG/1
650 TABLET ORAL EVERY 6 HOURS PRN
Status: DISCONTINUED | OUTPATIENT
Start: 2025-04-22 | End: 2025-04-30 | Stop reason: HOSPADM

## 2025-04-22 RX ORDER — LORAZEPAM 0.5 MG/1
0.5 TABLET ORAL
Status: DISCONTINUED | OUTPATIENT
Start: 2025-04-22 | End: 2025-04-30 | Stop reason: HOSPADM

## 2025-04-22 RX ORDER — DEXAMETHASONE 4 MG/1
40 TABLET ORAL
COMMUNITY
Start: 2025-04-18 | End: 2026-03-14

## 2025-04-22 RX ORDER — POLYETHYLENE GLYCOL 3350 17 G/17G
17 POWDER, FOR SOLUTION ORAL DAILY PRN
Status: DISCONTINUED | OUTPATIENT
Start: 2025-04-22 | End: 2025-04-30 | Stop reason: HOSPADM

## 2025-04-22 RX ADMIN — LORAZEPAM 0.5 MG: 0.5 TABLET ORAL at 23:44

## 2025-04-22 RX ADMIN — WARFARIN SODIUM 7.5 MG: 5 TABLET ORAL at 17:15

## 2025-04-22 RX ADMIN — MELATONIN 3 MG: 3 TAB ORAL at 23:44

## 2025-04-22 RX ADMIN — CARVEDILOL 3.12 MG: 3.12 TABLET, FILM COATED ORAL at 17:16

## 2025-04-22 RX ADMIN — HYDRALAZINE HYDROCHLORIDE 25 MG: 25 TABLET ORAL at 23:44

## 2025-04-22 RX ADMIN — HYDRALAZINE HYDROCHLORIDE 25 MG: 25 TABLET ORAL at 17:17

## 2025-04-22 RX ADMIN — FUROSEMIDE 40 MG: 10 INJECTION, SOLUTION INTRAVENOUS at 16:05

## 2025-04-22 NOTE — H&P
"H&P - Hospitalist   Name: Domingo Trevino 65 y.o. 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  Wt Readings from Last 3 Encounters:   04/22/25 74.6 kg (164 lb 7.4 oz)   04/22/25 74.6 kg (164 lb 8 oz)   04/17/25 69.7 kg (153 lb 10.6 oz)       Weight gain from 153 lbs to 164 lbs also noted from 4/17 to 4/22  Acutely short of breath  Will start him on IV lasix 40 mg BID  Daily weight  I's/O's  1800ml FR  Nephrology consulted    Primary hypertension  Continue hydralazine, losartan, Coreg  Monitor vital signs  Hypokalemia  Potassium 3.7 on admission  Will continue home potassium 20 m Eq daily  Trend labs in the am  Chronic kidney disease (CKD), stage IV (severe) (HCC)  Lab Results   Component Value Date    EGFR 23 04/22/2025    EGFR 30 04/17/2025    EGFR 29 04/16/2025    CREATININE 2.77 (H) 04/22/2025    CREATININE 2.20 (H) 04/17/2025    CREATININE 2.26 (H) 04/16/2025   At risk for LUDWIG most likely secondary to ongoing diuresis along  with underlying multiple myeloma and concerns for AL amyloidosis   Strict I/O  Daily weights  Nephrology consulted  AL amyloidosis (HCC)  Follows with heme-onc outpatient  Chronic atrial fibrillation (HCC)  On admission EKG consistent with A-fib  Continue home  Coumadin 7.5 mg on S,M,T,W,F,S and followed by 5mg on Thursday  With check PT/INR  Pulmonary hypertension (HCC)  Following with outpatient pulmonology  Continue hydralazine, losartan, Coreg  Will start him on IV lasix 40 mg " "BID  Monitor vital signs  Strict I/O and daily weights       VTE Pharmacologic Prophylaxis:   Moderate Risk (Score 3-4) - Pharmacological DVT Prophylaxis Ordered: warfarin (Coumadin).  Code Status: Level 1 - Full Code   Discussion with family: Updated  (wife) at bedside.    Anticipated Length of Stay: Patient will be admitted on an inpatient basis with an anticipated length of stay of greater than 2 midnights secondary to pending evaluations.    History of Present Illness   Chief Complaint: TERE Trevino is a 65 y.o. male with a PMH of AL amyloidosis, multiple myeloma, renal infarction, hypertension, HFpEF, A-fib, pulmonary hypertension, and CKD, who presents with shortness of breath which acutely worsened today. he was just admitted to the hospital last week for \"HFpEF\" however signed out AMA because he needed to take his daughter to an appointment in [place]. since then he has been mildly short of breath but was able to tolerate it. He reports that this morning he visited his pulmonologist and afterwards became acutely short of breath causing him to return to the hospital. Weight gain from 153 lbs to 164 lbs also noted from 4/17 to 4/22. Troponins (192) are BNP (1,099) elevated. Patient denies any chest pain, shortness of breath, cough, congestion, abdominal pain, nausea, vomiting, headache, dizziness, fevers, or chills. Does have crackles on auscultation and mild B/L lower extremity edema. Will admit for IV diuresis and cardiac workup, Will start him on IV lasix 40 mg BID, Will consult nephrology.     Review of Systems   Constitutional:  Negative for activity change, chills and fever.   Respiratory:  Positive for shortness of breath and wheezing.    Gastrointestinal:  Negative for abdominal distention and abdominal pain.   Genitourinary:  Negative for difficulty urinating and dysuria.   Musculoskeletal:  Negative for arthralgias.   Allergic/Immunologic: Negative for environmental " allergies.   Neurological:  Negative for dizziness and headaches.   Psychiatric/Behavioral:  Negative for agitation, behavioral problems and confusion.        Historical Information   Past Medical History:   Diagnosis Date    A-fib (HCC)     CHF (congestive heart failure) (HCC)     CKD (chronic kidney disease), stage IV (HCC)     Hypertension     Multiple myeloma not having achieved remission ([facility])      History reviewed. No pertinent surgical history.  Social History     Tobacco Use    Smoking status: Former     Current packs/day: 0.50     Types: Cigarettes    Smokeless tobacco: Never   Vaping Use    Vaping status: Never Used   Substance and Sexual Activity    Alcohol use: Never    Drug use: Never    Sexual activity: Not on file     E-Cigarette/Vaping    E-Cigarette Use Never User      E-Cigarette/Vaping Substances     Social History:  Marital Status:   Patient Pre-hospital Living Situation: Home  Patient Pre-hospital Level of Mobility: walks  Patient Pre-hospital Diet Restrictions: none    Meds/Allergies   I have reviewed home medications with patient personally.  Prior to Admission medications    Medication Sig Start Date End Date Taking? Authorizing Provider   carvedilol (COREG) 3.125 mg tablet Take 3.125 mg by mouth 2 (two) times a day with meals  Patient not taking: Reported on 4/22/2025    Historical Provider, MD   cyclophosphamide (CYTOXAN) 50 mg capsule Take 500 mg by mouth  Patient not taking: Reported on 4/22/2025 4/18/25   Historical Provider, MD   dexamethasone (DECADRON) 4 mg tablet Take 40 mg by mouth  Patient not taking: Reported on 4/22/2025 4/18/25 3/14/26  Historical Provider, MD   fluticasone-umeclidinium-vilanterol (Trelegy Ellipta) 100-62.5-25 mcg/actuation inhaler Inhale 1 puff daily Rinse mouth after use. 4/22/25 7/21/25  Ally Haque MD   hydrALAZINE (APRESOLINE) 25 mg tablet Take 1 tablet (25 mg total) by mouth every 8 (eight) hours 3/2/25   Raegan Lara PA-C   LORazepam (ATIVAN)  0.5 mg tablet Take 1 tablet (0.5 mg total) by mouth daily at bedtime as needed for anxiety for up to 7 days 3/20/25 4/13/25  Abby Mata MD   losartan (COZAAR) 50 mg tablet Take 50 mg by mouth daily    Historical Provider, MD   melatonin 3 mg Take 3 mg by mouth daily at bedtime as needed (Insomnia)    Historical Provider, MD   potassium chloride (Klor-Con M20) 20 mEq tablet Take 1 tablet (20 mEq total) by mouth daily 3/20/25   Abby Mata MD   prochlorperazine (COMPAZINE) 10 mg tablet Take 10 mg by mouth every 6 (six) hours as needed 4/18/25   Historical Provider, MD   sildenafil (REVATIO) 20 mg tablet Take 20 mg by mouth 9/6/24   Historical Provider, MD   torsemide (DEMADEX) 20 mg tablet Take 2 tablets (40 mg total) by mouth daily 3/20/25   Abby Mata MD   warfarin (COUMADIN) 5 mg tablet Take 5 mg by mouth daily Alternating 7.5 mg 6 times a week, 5 mg on Thursdays    Historical Provider, MD   acyclovir (ZOVIRAX) 400 MG tablet Take 400 mg by mouth 2 (two) times a day  Patient not taking: Reported on 4/22/2025 4/18/25 4/22/25  Historical Provider, MD     No Known Allergies    Objective :  Temp:  [96.5 °F (35.8 °C)-98.1 °F (36.7 °C)] 98.1 °F (36.7 °C)  HR:  [68-95] 74  BP: (138-173)/() 173/106  Resp:  [18-20] 18  SpO2:  [96 %-100 %] 99 %  O2 Device: None (Room air)    Physical Exam  Constitutional:       Appearance: He is ill-appearing.   Cardiovascular:      Rate and Rhythm: Normal rate. Rhythm irregular.      Heart sounds: Normal heart sounds.   Pulmonary:      Effort: Pulmonary effort is normal.      Breath sounds: Wheezing present.   Abdominal:      General: Bowel sounds are normal.      Palpations: Abdomen is soft.   Musculoskeletal:         General: Normal range of motion.      Right lower leg: Edema present.      Left lower leg: Edema present.   Skin:     General: Skin is warm and dry.   Neurological:      General: No focal deficit present.      Mental Status: He is alert.  "Mental status is at baseline.   Psychiatric:         Mood and Affect: Mood normal.         Behavior: Behavior normal.             Lab Results: I have reviewed the following results:  Results from last 7 days   Lab Units 04/22/25  1318   WBC Thousand/uL 4.83   HEMOGLOBIN g/dL 9.6*   HEMATOCRIT % 30.5*   PLATELETS Thousands/uL 225   SEGS PCT % 66   LYMPHO PCT % 23   MONO PCT % 8   EOS PCT % 3     Results from last 7 days   Lab Units 04/22/25  1318   SODIUM mmol/L 141   POTASSIUM mmol/L 3.7   CHLORIDE mmol/L 104   CO2 mmol/L 28   BUN mg/dL 50*   CREATININE mg/dL 2.77*   ANION GAP mmol/L 9   CALCIUM mg/dL 9.1   ALBUMIN g/dL 4.1   TOTAL BILIRUBIN mg/dL 1.04*   ALK PHOS U/L 56   ALT U/L 16   AST U/L 19   GLUCOSE RANDOM mg/dL 101     Results from last 7 days   Lab Units 04/22/25  1318   INR  2.98*         No results found for: \"HGBA1C\"        Imaging Results Review: No pertinent imaging studies reviewed.  Other Study Results Review: EKG was reviewed.     Administrative Statements   I have spent a total time of 50 minutes in caring for this patient on the day of the visit/encounter including Diagnostic results, Prognosis, Risks and benefits of tx options, Patient and family education, Importance of tx compliance, Impressions, Counseling / Coordination of care, Documenting in the medical record, Reviewing/placing orders in the medical record (including tests, medications, and/or procedures), Obtaining or reviewing history  , and Communicating with other healthcare professionals .    ** Please Note: This note has been constructed using a voice recognition system. **    "

## 2025-04-22 NOTE — ED NOTES
Pt asking for food and drink, RN informed pt for the time being, he must be NPO until results come back.      Lucero Pak RN  04/22/25 0700

## 2025-04-22 NOTE — PLAN OF CARE
Problem: Potential for Falls  Goal: Patient will remain free of falls  Description: INTERVENTIONS:- Educate patient/family on patient safety including physical limitations- Instruct patient to call for assistance with activity - Consult OT/PT to assist with strengthening/mobility - Keep Call bell within reach- Keep bed low and locked with side rails adjusted as appropriate- Keep care items and personal belongings within reach- Initiate and maintain comfort rounds- Make Fall Risk Sign visible to staff- Offer Toileting every 3 Hours, in advance of need- Initiate/Maintain bed alarm- Obtain necessary fall risk management equipment: alarm - Apply yellow socks and bracelet for high fall risk patients- Consider moving patient to room near nurses station  Outcome: Progressing     Problem: DISCHARGE PLANNING  Goal: Discharge to home or other facility with appropriate resources  Description: INTERVENTIONS:- Identify barriers to discharge w/patient and caregiver- Arrange for needed discharge resources and transportation as appropriate- Identify discharge learning needs (meds, wound care, etc.)- Arrange for interpretive services to assist at discharge as needed- Refer to Case Management Department for coordinating discharge planning if the patient needs post-hospital services based on physician/advanced practitioner order or complex needs related to functional status, cognitive ability, or social support system  Outcome: Progressing     Problem: Knowledge Deficit  Goal: Patient/family/caregiver demonstrates understanding of disease process, treatment plan, medications, and discharge instructions  Description: Complete learning assessment and assess knowledge base.Interventions:- Provide teaching at level of understanding- Provide teaching via preferred learning methods  Outcome: Progressing     Problem: CARDIOVASCULAR - ADULT  Goal: Maintains optimal cardiac output and hemodynamic stability  Description: INTERVENTIONS:-  Monitor I/O, vital signs and rhythm- Monitor for S/S and trends of decreased cardiac output- Administer and titrate ordered vasoactive medications to optimize hemodynamic stability- Assess quality of pulses, skin color and temperature- Assess for signs of decreased coronary artery perfusion- Instruct patient to report change in severity of symptoms  Outcome: Progressing     Problem: RESPIRATORY - ADULT  Goal: Achieves optimal ventilation and oxygenation  Description: INTERVENTIONS:- Assess for changes in respiratory status- Assess for changes in mentation and behavior- Position to facilitate oxygenation and minimize respiratory effort- Oxygen administered by appropriate delivery if ordered- Initiate smoking cessation education as indicated- Encourage broncho-pulmonary hygiene including cough, deep breathe, Incentive Spirometry- Assess the need for suctioning and aspirate as needed- Assess and instruct to report SOB or any respiratory difficulty- Respiratory Therapy support as indicated  Outcome: Progressing

## 2025-04-22 NOTE — ASSESSMENT & PLAN NOTE
On admission EKG consistent with A-fib  Continue home  Coumadin 7.5 mg on S,M,T,W,F,S and followed by 5mg on Thursday  With check PT/INR

## 2025-04-22 NOTE — ASSESSMENT & PLAN NOTE
"Admitted with shortness of breath, Weight gain from 153 lbs to 164 lbs also noted from 4/17 to 4/22  he was just admitted to the hospital last week for \"HFpEF\" however signed out AMA because he needed to take his daughter to an appointment   Will start him on IV lasix 40 mg BID  Supplemental oxygen as needed  Strict I/O and daily weights   FR 1800ML  "

## 2025-04-22 NOTE — PATIENT INSTRUCTIONS
It was a pleasure seeing you today!    Start Trelegy 1 puff daily rinse your mouth out afterwards  Obtain pulmonary function test  CT Chest in October 2025  Follow up in 6 months     Ally Haque MD  Pulmonary and Critical Care Medicine

## 2025-04-22 NOTE — ED NOTES
"Patient refused Covid/Flu/RSV testing. States, \" I don't have none of that.\" Doctor and PA made aware.     Cj Bhakta  04/22/25 1319       Cj Bhakta  04/22/25 1320    "

## 2025-04-22 NOTE — PROGRESS NOTES
Pulmonary Outpatient Progress Note   Domingo Trevino 65 y.o. male MRN: [id number]  4/22/2025      Assessment:    COPD unclassified, CT with emphysema, no PFT on file, mMRC 2-3 not on inhalers.  Based on his numerous comorbidities we should start him on triple therapy as he does have peripheral eosinophilia.  Order placed for Trelegy.  Order placed for PFT.  Nicotine dependence in remission quit in January 2025 after a 40-pack-year history of 1 pack/day, had a CT in October 2024 that read no nodules.  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  I advised him he should talk with his cardiologist in May at [facility] to actually come up with a proper regimen.  Unsure if there is any precapillary involvement for Group 1 or group. Would need right heart cath  Chronic A-fib on warfarin  Family history of sickle cell    Plan:    Start triple therapy Trelegy to optimize lung function  Obtain PFT to evaluate for obstruction  Obtain low-dose lung cancer screening CT in October 2025 as this will be 1 year from his previous CT  Defer to cardiology regarding sildenafil and right heart catheterization  Follow-up in 7 months  I have spent a total time of 32 minutes in caring for this patient on the day of the visit/encounter including Diagnostic results, Risks and benefits of tx options, Instructions for management, Patient and family education, Importance of tx compliance, Risk factor reductions, Impressions, Counseling / Coordination of care, Documenting in the medical record, Reviewing/placing orders in the medical record (including tests, medications, and/or procedures), and Obtaining or reviewing history  .      History of Present Illness   HPI:    65-year-old gentleman here for follow-up, has a past medical  history of multiple myeloma, chronic kidney disease stage IV, presumed amyloid, nicotine dependence in remission, emphysema.    Patient was seen inpatient for possible HFpEF flare, was seen by the pulmonary team for respiratory distress and was advised to refer to outpatient pulmonary for PFTs and inhaler optimization.    He is here currently states that he does use sildenafil occasionally, has been using it for the past several days.  Does not have a right heart catheterization on file.  He says when he takes it Benefiel he feels better.  He is able to walk up a flight of stairs but does get dyspnea.  Able to walk out to the parking lot.  At rest he has no dyspnea or conversational dyspnea.  He quit smoking in January 2025 after 40-pack-year history.  No PFTs on file does not follow with pulmonary.  Follows with cardiology at [facility].    Currently retired used to work as a nurse in [place].  Family history of sickle cell    Review of Systems   Constitutional:  Positive for activity change. Negative for fatigue and fever.   HENT: Negative.     Eyes: Negative.    Respiratory:  Positive for shortness of breath. Negative for cough and wheezing.    Cardiovascular:  Positive for palpitations.   Gastrointestinal: Negative.    Endocrine: Negative.    Genitourinary: Negative.    Musculoskeletal: Negative.    Skin:  Positive for rash.        Palm of hands   Allergic/Immunologic: Positive for immunocompromised state.   Neurological: Negative.    Hematological: Negative.    Psychiatric/Behavioral: Negative.          Historical Information   Past Medical History:   Diagnosis Date   • A-fib ([facility])    • CHF (congestive heart failure) ([facility])    • CKD (chronic kidney disease), stage IV ([facility])    • Hypertension    • Multiple myeloma not having achieved remission ([facility])      History reviewed. No pertinent surgical history.  History reviewed. No pertinent family history.  Social History     Tobacco Use   Smoking Status Former   •  Current packs/day: 0.50   • Types: Cigarettes   Smokeless Tobacco Never       Occupational History: retired     Meds/Allergies     Current Outpatient Medications:   •  fluticasone-umeclidinium-vilanterol (Trelegy Ellipta) 100-62.5-25 mcg/actuation inhaler, Inhale 1 puff daily Rinse mouth after use., Disp: 180 blister, Rfl: 0  •  hydrALAZINE (APRESOLINE) 25 mg tablet, Take 1 tablet (25 mg total) by mouth every 8 (eight) hours, Disp: 90 tablet, Rfl: 0  •  losartan (COZAAR) 50 mg tablet, Take 50 mg by mouth daily, Disp: , Rfl:   •  melatonin 3 mg, Take 3 mg by mouth daily at bedtime as needed (Insomnia), Disp: , Rfl:   •  potassium chloride (Klor-Con M20) 20 mEq tablet, Take 1 tablet (20 mEq total) by mouth daily, Disp: 30 tablet, Rfl: 0  •  prochlorperazine (COMPAZINE) 10 mg tablet, Take 10 mg by mouth every 6 (six) hours as needed, Disp: , Rfl:   •  sildenafil (REVATIO) 20 mg tablet, Take 20 mg by mouth, Disp: , Rfl:   •  torsemide (DEMADEX) 20 mg tablet, Take 2 tablets (40 mg total) by mouth daily, Disp: 60 tablet, Rfl: 0  •  warfarin (COUMADIN) 5 mg tablet, Take 5 mg by mouth daily Alternating 7.5 mg 6 times a week, 5 mg on Thursdays, Disp: , Rfl:   •  acyclovir (ZOVIRAX) 400 MG tablet, Take 400 mg by mouth 2 (two) times a day (Patient not taking: Reported on 4/22/2025), Disp: , Rfl:   •  carvedilol (COREG) 3.125 mg tablet, Take 3.125 mg by mouth 2 (two) times a day with meals (Patient not taking: Reported on 4/22/2025), Disp: , Rfl:   •  cyclophosphamide (CYTOXAN) 50 mg capsule, Take 500 mg by mouth (Patient not taking: Reported on 4/22/2025), Disp: , Rfl:   •  dexamethasone (DECADRON) 4 mg tablet, Take 40 mg by mouth (Patient not taking: Reported on 4/22/2025), Disp: , Rfl:   •  LORazepam (ATIVAN) 0.5 mg tablet, Take 1 tablet (0.5 mg total) by mouth daily at bedtime as needed for anxiety for up to 7 days, Disp: 7 tablet, Rfl: 0  No Known Allergies    Vitals: Blood pressure 138/100, pulse 95, temperature (!) 96.5  "°F (35.8 °C), temperature source Tympanic, height 6' 2\" (1.88 m), weight 74.6 kg (164 lb 8 oz), SpO2 96%., Body mass index is 21.12 kg/m². Oxygen Therapy  SpO2: 96 %  Oxygen Therapy: None (Room air)    Physical Exam:    GEN: alert and oriented x 3, pleasant and cooperative   HEENT:  Normocephalic, atraumatic  NECK: No JVD   HEART: Rate, normal S1 and S2  LUNGS: Clear to auscultation bilaterally; no wheezes, rales, or rhonchi; respiration nonlabored   ABDOMEN:  Normoactive bowel sounds, soft, no tenderness, no distention  EXTREMITIES: no edema  NEURO: no gross focal findings  SKIN:  Dry, intact, warm to touch    Labs: I have personally reviewed pertinent lab results.  Creatinine 2.2  INR 2.5  Peripheral eosinophilia 0.18    Imaging and other studies: Results Review Statement: I personally reviewed the following image studies in PACS and associated radiology reports: chest xray. My interpretation of the radiology images/reports is: Chest x-ray with no acute disease.    Pulmonary function testing not on file    Other Studies: Results Review Statement: No pertinent imaging studies reviewed.    Ally Haque MD  Pulmonary and Critical Care Medicine     "

## 2025-04-22 NOTE — ED PROVIDER NOTES
"Time reflects when diagnosis was documented in both MDM as applicable and the Disposition within this note       Time User Action Codes Description Comment    4/22/2025  4:02 PM Mai Pereira Add [R06.02] Shortness of breath     4/22/2025  4:02 PM Mai Pereira Add [I50.9] Acute exacerbation of CHF (congestive heart failure) (HCC)     4/22/2025  4:02 PM Mai Pereira Add [R79.89] Elevated troponin     4/22/2025  5:12 PM Sammy Adams Add [I27.20] Pulmonary hypertension (HCC)           ED Disposition       ED Disposition   Admit    Condition   Stable    Date/Time   Tue Apr 22, 2025  4:02 PM    Comment   Case was discussed with Dr. Perez and the patient's admission status was agreed to be Admission Status: inpatient status to the service of Dr. Dr. Perez .               Assessment & Plan       Medical Decision Making  Patient is a 65-year-old male with a past medical history of AL amyloidosis, multiple myeloma, renal infarction, hypertension, HFpEF, A-fib, pulmonary hypertension, and CKD presenting to the ER for shortness of breath which acutely worsened today.  Patient reports that he was just admitted to the hospital last week for \"HFpEF\" however signed out AMA.  Denies fevers, chest pain, abdominal pain, or vomiting.  On exam patient well-appearing in no acute distress.  Vital signs are within normal limits.  Physical examination reveals rales throughout all lung fields.  No lower extremity swelling.  Examinations otherwise unremarkable.  I discussed with patient that we will recheck chest x-ray to assess for effusions/infection.  Will also check EKG/troponin to rule out cardiac etiology.  In addition we will check basic labs to rule out leukocytosis, anemia, electrolyte abnormalities, and worsening kidney function.  Patient reports that he is very uncomfortable and feels like he cannot breathe so despite having normal oxygen he was placed on 2 L.  Patient reports improvement in symptoms with supplemental " oxygen.    Patient's EKG reveals A-fib with no ischemic changes when interpreted by me.  Troponin is 192.  It appears that patient's troponin is always elevated however this time it is also acutely elevated from baseline.  BNP is 1099 and this is also close to patient's baseline.  His chest x-ray reveals cardiomegaly and bilateral effusions unchanged from last week.  No other cardiopulmonary abnormalities when interpreted by me.  Discussed lab and imaging findings with patient.  I discussed with patient that I reviewed records it appears that he was 154 pounds last week and this week is 164 pounds.  I discussed with patient that it appears his shortness of breath is being caused by fluid overload.  Discussed my recommendation for admission and patient is agreeable.  Discussed case with Dr. Perez who accepts patient for admission.    Problems Addressed:  Acute exacerbation of CHF (congestive heart failure) (HCC): acute illness or injury  Elevated troponin: acute illness or injury  Shortness of breath: acute illness or injury    Amount and/or Complexity of Data Reviewed  Labs: ordered. Decision-making details documented in ED Course.  Radiology: ordered and independent interpretation performed.    Risk  Prescription drug management.  Decision regarding hospitalization.        ED Course as of 04/22/25 1945 Tue Apr 22, 2025   1347 hs TnI 0hr(!): 192  Troponin is typically always elevated however this time it is also acutely elevated from baseline.   1404 BNP(!): 1,099  Always elevated, slightly decreased from last week       Medications   LORazepam (ATIVAN) tablet 0.5 mg (has no administration in time range)   carvedilol (COREG) tablet 3.125 mg (has no administration in time range)   fluticasone-vilanterol 200-25 mcg/actuation 1 puff (has no administration in time range)     And   umeclidinium 62.5 mcg/actuation inhaler AEPB 1 puff (has no administration in time range)   hydrALAZINE (APRESOLINE) tablet 25 mg (has  no administration in time range)   losartan (COZAAR) tablet 50 mg (has no administration in time range)   melatonin tablet 3 mg (has no administration in time range)   potassium chloride (Klor-Con M20) CR tablet 20 mEq (has no administration in time range)   prochlorperazine (COMPAZINE) tablet 10 mg (has no administration in time range)   furosemide (LASIX) injection 40 mg (has no administration in time range)   acetaminophen (TYLENOL) tablet 650 mg (has no administration in time range)   polyethylene glycol (MIRALAX) packet 17 g (has no administration in time range)   simethicone (MYLICON) chewable tablet 80 mg (has no administration in time range)   warfarin (COUMADIN) tablet 7.5 mg (has no administration in time range)     And   warfarin (COUMADIN) tablet 5 mg (has no administration in time range)   furosemide (LASIX) injection 40 mg (40 mg Intravenous Given 4/22/25 1605)       ED Risk Strat Scores   HEART Risk Score      Flowsheet Row Most Recent Value   Heart Score Risk Calculator    History 0 Filed at: 04/22/2025 1945   ECG 1 Filed at: 04/22/2025 1945   Age 1 Filed at: 04/22/2025 1945   Risk Factors 1 Filed at: 04/22/2025 1945   Troponin 2 Filed at: 04/22/2025 1945   HEART Score 5 Filed at: 04/22/2025 1945          HEART Risk Score      Flowsheet Row Most Recent Value   Heart Score Risk Calculator    History 0 Filed at: 04/22/2025 1945   ECG 1 Filed at: 04/22/2025 1945   Age 1 Filed at: 04/22/2025 1945   Risk Factors 1 Filed at: 04/22/2025 1945   Troponin 2 Filed at: 04/22/2025 1945   HEART Score 5 Filed at: 04/22/2025 1945                      No data recorded        SBIRT 22yo+      Flowsheet Row Most Recent Value   Initial Alcohol Screen: US AUDIT-C     1. How often do you have a drink containing alcohol? 0 Filed at: 04/22/2025 1423   2. How many drinks containing alcohol do you have on a typical day you are drinking?  0 Filed at: 04/22/2025 0173   3a. Male UNDER 65: How often do you have five or more  "drinks on one occasion? 0 Filed at: 04/22/2025 1423   Audit-C Score 0 Filed at: 04/22/2025 1423   ALLEGRA: How many times in the past year have you...    Used an illegal drug or used a prescription medication for non-medical reasons? Never Filed at: 04/22/2025 1423                            History of Present Illness       Chief Complaint   Patient presents with    Shortness of Breath     Pt c/o SOB for the past x 2 hrs. Pt denies cp/n/v/d or fevers       Past Medical History:   Diagnosis Date    A-fib (HCC)     CHF (congestive heart failure) (HCC)     CKD (chronic kidney disease), stage IV (HCC)     Hypertension     Multiple myeloma not having achieved remission (HCC)       History reviewed. No pertinent surgical history.   History reviewed. No pertinent family history.   Social History     Tobacco Use    Smoking status: Former     Current packs/day: 0.50     Types: Cigarettes    Smokeless tobacco: Never   Vaping Use    Vaping status: Never Used   Substance Use Topics    Alcohol use: Never    Drug use: Never      E-Cigarette/Vaping    E-Cigarette Use Never User       E-Cigarette/Vaping Substances      I have reviewed and agree with the history as documented.     Patient is a 65-year-old male with a past medical history of AL amyloidosis, multiple myeloma, renal infarction, hypertension, HFpEF, A-fib, pulmonary hypertension, and CKD presenting to the ER for shortness of breath which acutely worsened today.  Patient reports that he was just admitted to the hospital last week for \"HFpEF\" however signed out AMA as he needed to take his daughter to an appointment in [place].  He reports that since then he has been mildly short of breath but was able to tolerate it.  He reports that this morning he visited his pulmonologist and afterwards became acutely short of breath causing him to return to the ER.  Patient denies any chest pain, shortness of breath, cough, congestion, abdominal pain, nausea, vomiting, lower " extremity swelling, headache, dizziness, fevers, or chills.  He reports he has been taking all of his medications as prescribed including his Coumadin, torsemide, and hydralazine.  Reports mild weight gain but unsure how much.        Review of Systems   Constitutional:  Positive for unexpected weight change. Negative for chills and fever.   HENT:  Negative for ear pain and sore throat.    Eyes:  Negative for pain and visual disturbance.   Respiratory:  Positive for shortness of breath. Negative for cough.    Cardiovascular:  Negative for chest pain and palpitations.   Gastrointestinal:  Negative for abdominal pain and vomiting.   Genitourinary:  Negative for dysuria and hematuria.   Musculoskeletal:  Negative for arthralgias and back pain.   Skin:  Negative for color change and rash.   Neurological:  Negative for seizures and syncope.   All other systems reviewed and are negative.          Objective       ED Triage Vitals [04/22/25 1245]   Temperature Pulse Blood Pressure Respirations SpO2 Patient Position - Orthostatic VS   98.1 °F (36.7 °C) 68 162/92 20 100 % Sitting      Temp Source Heart Rate Source BP Location FiO2 (%) Pain Score    Oral Monitor Right arm -- No Pain      Vitals      Date and Time Temp Pulse SpO2 Resp BP Pain Score FACES Pain Rating User   04/22/25 1938 98.5 °F (36.9 °C) 56 100 % 20 161/97 -- -- IN   04/22/25 1730 -- -- -- -- -- No Pain -- CR   04/22/25 1727 -- -- -- -- -- No Pain -- CR   04/22/25 1704 97.5 °F (36.4 °C) 61 100 % 20 182/90 -- -- IN   04/22/25 1600 -- 74 99 % 18 173/106 -- -- LB   04/22/25 1245 98.1 °F (36.7 °C) 68 100 % 20 162/92 No Pain -- [facility]            Physical Exam  Vitals and nursing note reviewed.   Constitutional:       General: He is not in acute distress.     Appearance: He is well-developed.   HENT:      Head: Normocephalic and atraumatic.   Eyes:      Conjunctiva/sclera: Conjunctivae normal.   Cardiovascular:      Rate and Rhythm: Normal rate and regular rhythm.       Heart sounds: No murmur heard.  Pulmonary:      Effort: Pulmonary effort is normal. No respiratory distress.      Breath sounds: Rales present. No wheezing.   Abdominal:      Palpations: Abdomen is soft.      Tenderness: There is no abdominal tenderness.   Musculoskeletal:         General: No swelling.      Cervical back: Neck supple.      Right lower leg: No tenderness. No edema.      Left lower leg: No tenderness. No edema.   Skin:     General: Skin is warm and dry.      Capillary Refill: Capillary refill takes less than 2 seconds.   Neurological:      Mental Status: He is alert.   Psychiatric:         Mood and Affect: Mood normal.         Results Reviewed       Procedure Component Value Units Date/Time    HS Troponin I 4hr [[id number]] Updated: 04/22/25 1737    Lab Status: No result Specimen: Blood from Arm, Right     HS Troponin I 2hr [[id number]]  (Abnormal) Collected: 04/22/25 1543    Lab Status: Final result Specimen: Blood from Arm, Right Updated: 04/22/25 1618     hs TnI 2hr 184 ng/L      Delta 2hr hsTnI -8 ng/L     TSH, 3rd generation with Free T4 reflex [[id number]]  (Normal) Collected: 04/22/25 1318    Lab Status: Final result Specimen: Blood from Arm, Right Updated: 04/22/25 1409     TSH 3RD GENERATON 1.882 uIU/mL     Protime-INR [[id number]]  (Abnormal) Collected: 04/22/25 1318    Lab Status: Final result Specimen: Blood from Arm, Right Updated: 04/22/25 1405     Protime 30.4 seconds      INR 2.98    Narrative:      INR Therapeutic Range    Indication                                             INR Range      Atrial Fibrillation                                               2.0-3.0  Hypercoagulable State                                    2.0.2.3  Left Ventricular Asist Device                            2.0-3.0  Mechanical Heart Valve                                  -    Aortic(with afib, MI, embolism, HF, LA enlargement,    and/or coagulopathy)                                     2.0-3.0 (2.5-3.5)      Mitral                                                             2.5-3.5  Prosthetic/Bioprosthetic Heart Valve               2.0-3.0  Venous thromboembolism (VTE: VT, PE        2.0-3.0    APTT [[id number]]  (Abnormal) Collected: 04/22/25 1318    Lab Status: Final result Specimen: Blood from Arm, Right Updated: 04/22/25 1405     PTT 37 seconds     B-Type Natriuretic Peptide(BNP) [[id number]]  (Abnormal) Collected: 04/22/25 1318    Lab Status: Final result Specimen: Blood from Arm, Right Updated: 04/22/25 1347     BNP 1,099 pg/mL     HS Troponin 0hr (reflex protocol) [[id number]]  (Abnormal) Collected: 04/22/25 1318    Lab Status: Final result Specimen: Blood from Arm, Right Updated: 04/22/25 1347     hs TnI 0hr 192 ng/L     Comprehensive metabolic panel [[id number]]  (Abnormal) Collected: 04/22/25 1318    Lab Status: Final result Specimen: Blood from Arm, Right Updated: 04/22/25 1341     Sodium 141 mmol/L      Potassium 3.7 mmol/L      Chloride 104 mmol/L      CO2 28 mmol/L      ANION GAP 9 mmol/L      BUN 50 mg/dL      Creatinine 2.77 mg/dL      Glucose 101 mg/dL      Calcium 9.1 mg/dL      AST 19 U/L      ALT 16 U/L      Alkaline Phosphatase 56 U/L      Total Protein 6.6 g/dL      Albumin 4.1 g/dL      Total Bilirubin 1.04 mg/dL      eGFR 23 ml/min/1.73sq m     Narrative:      [employer] guidelines for Chronic Kidney Disease (CKD):     Stage 1 with normal or high GFR (GFR > 90 mL/min/1.73 square meters)    Stage 2 Mild CKD (GFR = 60-89 mL/min/1.73 square meters)    Stage 3A Moderate CKD (GFR = 45-59 mL/min/1.73 square meters)    Stage 3B Moderate CKD (GFR = 30-44 mL/min/1.73 square meters)    Stage 4 Severe CKD (GFR = 15-29 mL/min/1.73 square meters)    Stage 5 End Stage CKD (GFR <15 mL/min/1.73 square meters)  Note: GFR calculation is accurate only with a steady state creatinine    CBC and differential [[id number]]  (Abnormal) Collected: 04/22/25 1318    Lab Status: Final result Specimen:  Blood from Arm, Right Updated: 04/22/25 1326     WBC 4.83 Thousand/uL      RBC 4.39 Million/uL      Hemoglobin 9.6 g/dL      Hematocrit 30.5 %      MCV 70 fL      MCH 21.9 pg      MCHC 31.5 g/dL      RDW 17.3 %      MPV 11.5 fL      Platelets 225 Thousands/uL      nRBC 0 /100 WBCs      Segmented % 66 %      Immature Grans % 0 %      Lymphocytes % 23 %      Monocytes % 8 %      Eosinophils Relative 3 %      Basophils Relative 0 %      Absolute Neutrophils 3.20 Thousands/µL      Absolute Immature Grans 0.01 Thousand/uL      Absolute Lymphocytes 1.11 Thousands/µL      Absolute Monocytes 0.37 Thousand/µL      Eosinophils Absolute 0.12 Thousand/µL      Basophils Absolute 0.02 Thousands/µL             XR chest 2 views   ED Interpretation by Mai Pereira PA-C (04/22 1406)   Cardiomegaly, bilateral effusions appear unchanged from last week.  No other acute cardiopulmonary abnormalities when interpreted by me.          Procedures    ED Medication and Procedure Management   Prior to Admission Medications   Prescriptions Last Dose Informant Patient Reported? Taking?   LORazepam (ATIVAN) 0.5 mg tablet   No No   Sig: Take 1 tablet (0.5 mg total) by mouth daily at bedtime as needed for anxiety for up to 7 days   carvedilol (COREG) 3.125 mg tablet  Self Yes No   Sig: Take 3.125 mg by mouth 2 (two) times a day with meals   Patient not taking: Reported on 4/22/2025   cyclophosphamide (CYTOXAN) 50 mg capsule  Self Yes No   Sig: Take 500 mg by mouth   Patient not taking: Reported on 4/22/2025   dexamethasone (DECADRON) 4 mg tablet  Self Yes No   Sig: Take 40 mg by mouth   Patient not taking: Reported on 4/22/2025   fluticasone-umeclidinium-vilanterol (Trelegy Ellipta) 100-62.5-25 mcg/actuation inhaler   No No   Sig: Inhale 1 puff daily Rinse mouth after use.   hydrALAZINE (APRESOLINE) 25 mg tablet  Self No No   Sig: Take 1 tablet (25 mg total) by mouth every 8 (eight) hours   losartan (COZAAR) 50 mg tablet  Self Yes No   Sig: Take  50 mg by mouth daily   melatonin 3 mg  Self Yes No   Sig: Take 3 mg by mouth daily at bedtime as needed (Insomnia)   potassium chloride (Klor-Con M20) 20 mEq tablet  Self No No   Sig: Take 1 tablet (20 mEq total) by mouth daily   prochlorperazine (COMPAZINE) 10 mg tablet  Self Yes No   Sig: Take 10 mg by mouth every 6 (six) hours as needed   sildenafil (REVATIO) 20 mg tablet  Self Yes No   Sig: Take 20 mg by mouth   torsemide (DEMADEX) 20 mg tablet  Self No No   Sig: Take 2 tablets (40 mg total) by mouth daily   warfarin (COUMADIN) 5 mg tablet  Self Yes No   Sig: Take 5 mg by mouth daily Alternating 7.5 mg 6 times a week, 5 mg on Thursdays      Facility-Administered Medications: None     Current Discharge Medication List        CONTINUE these medications which have NOT CHANGED    Details   carvedilol (COREG) 3.125 mg tablet Take 3.125 mg by mouth 2 (two) times a day with meals      cyclophosphamide (CYTOXAN) 50 mg capsule Take 500 mg by mouth      dexamethasone (DECADRON) 4 mg tablet Take 40 mg by mouth      fluticasone-umeclidinium-vilanterol (Trelegy Ellipta) 100-62.5-25 mcg/actuation inhaler Inhale 1 puff daily Rinse mouth after use.  Qty: 180 blister, Refills: 0    Associated Diagnoses: Panlobular emphysema (HCC)      hydrALAZINE (APRESOLINE) 25 mg tablet Take 1 tablet (25 mg total) by mouth every 8 (eight) hours  Qty: 90 tablet, Refills: 0    Associated Diagnoses: Primary hypertension      LORazepam (ATIVAN) 0.5 mg tablet Take 1 tablet (0.5 mg total) by mouth daily at bedtime as needed for anxiety for up to 7 days  Qty: 7 tablet, Refills: 0    Associated Diagnoses: Anxiety      losartan (COZAAR) 50 mg tablet Take 50 mg by mouth daily      melatonin 3 mg Take 3 mg by mouth daily at bedtime as needed (Insomnia)      potassium chloride (Klor-Con M20) 20 mEq tablet Take 1 tablet (20 mEq total) by mouth daily  Qty: 30 tablet, Refills: 0    Associated Diagnoses: Primary hypertension      prochlorperazine (COMPAZINE)  10 mg tablet Take 10 mg by mouth every 6 (six) hours as needed      sildenafil (REVATIO) 20 mg tablet Take 20 mg by mouth      torsemide (DEMADEX) 20 mg tablet Take 2 tablets (40 mg total) by mouth daily  Qty: 60 tablet, Refills: 0    Associated Diagnoses: Acute exacerbation of CHF (congestive heart failure) (HCC)      warfarin (COUMADIN) 5 mg tablet Take 5 mg by mouth daily Alternating 7.5 mg 6 times a week, 5 mg on Thursdays           No discharge procedures on file.  ED SEPSIS DOCUMENTATION   Time reflects when diagnosis was documented in both MDM as applicable and the Disposition within this note       Time User Action Codes Description Comment    4/22/2025  4:02 PM Mai Pereira Add [R06.02] Shortness of breath     4/22/2025  4:02 PM Mai Pereira Add [I50.9] Acute exacerbation of CHF (congestive heart failure) (HCC)     4/22/2025  4:02 PM Mai Pereira Add [R79.89] Elevated troponin     4/22/2025  5:12 PM Sammy Adams Add [I27.20] Pulmonary hypertension (HCC)                  Mai Pereira PA-C  04/22/25 1945

## 2025-04-22 NOTE — ASSESSMENT & PLAN NOTE
Lab Results   Component Value Date    EGFR 23 04/22/2025    EGFR 30 04/17/2025    EGFR 29 04/16/2025    CREATININE 2.77 (H) 04/22/2025    CREATININE 2.20 (H) 04/17/2025    CREATININE 2.26 (H) 04/16/2025   At risk for LUDWIG most likely secondary to ongoing diuresis along  with underlying multiple myeloma and concerns for AL amyloidosis   Strict I/O  Daily weights  Nephrology consulted

## 2025-04-22 NOTE — ASSESSMENT & PLAN NOTE
Following with outpatient pulmonology  Continue hydralazine, losartan, Coreg  Will start him on IV lasix 40 mg BID  Monitor vital signs  Strict I/O and daily weights

## 2025-04-22 NOTE — ASSESSMENT & PLAN NOTE
Wt Readings from Last 3 Encounters:   04/22/25 74.6 kg (164 lb 7.4 oz)   04/22/25 74.6 kg (164 lb 8 oz)   04/17/25 69.7 kg (153 lb 10.6 oz)       Weight gain from 153 lbs to 164 lbs also noted from 4/17 to 4/22  Acutely short of breath  Will start him on IV lasix 40 mg BID  Daily weight  I's/O's  1800ml FR  Nephrology consulted

## 2025-04-23 ENCOUNTER — DOCUMENTATION (OUTPATIENT)
Dept: HEMATOLOGY ONCOLOGY | Facility: CLINIC | Age: 65
End: 2025-04-23

## 2025-04-23 PROBLEM — C90.00 MYELOMA (HCC): Status: ACTIVE | Noted: 2025-04-23

## 2025-04-23 LAB
ANION GAP SERPL CALCULATED.3IONS-SCNC: 8 MMOL/L (ref 4–13)
BASOPHILS # BLD AUTO: 0.03 THOUSANDS/ÂΜL (ref 0–0.1)
BASOPHILS NFR BLD AUTO: 1 % (ref 0–1)
BUN SERPL-MCNC: 51 MG/DL (ref 5–25)
CALCIUM SERPL-MCNC: 9.1 MG/DL (ref 8.4–10.2)
CHLORIDE SERPL-SCNC: 106 MMOL/L (ref 96–108)
CO2 SERPL-SCNC: 30 MMOL/L (ref 21–32)
CREAT SERPL-MCNC: 2.53 MG/DL (ref 0.6–1.3)
EOSINOPHIL # BLD AUTO: 0.2 THOUSAND/ÂΜL (ref 0–0.61)
EOSINOPHIL NFR BLD AUTO: 4 % (ref 0–6)
ERYTHROCYTE [DISTWIDTH] IN BLOOD BY AUTOMATED COUNT: 17.3 % (ref 11.6–15.1)
GFR SERPL CREATININE-BSD FRML MDRD: 25 ML/MIN/1.73SQ M
GLUCOSE SERPL-MCNC: 102 MG/DL (ref 65–140)
HCT VFR BLD AUTO: 33 % (ref 36.5–49.3)
HGB BLD-MCNC: 10.1 G/DL (ref 12–17)
IMM GRANULOCYTES # BLD AUTO: 0.01 THOUSAND/UL (ref 0–0.2)
IMM GRANULOCYTES NFR BLD AUTO: 0 % (ref 0–2)
INR PPP: 3.22 (ref 0.85–1.19)
LYMPHOCYTES # BLD AUTO: 1.15 THOUSANDS/ÂΜL (ref 0.6–4.47)
LYMPHOCYTES NFR BLD AUTO: 22 % (ref 14–44)
MAGNESIUM SERPL-MCNC: 2.1 MG/DL (ref 1.9–2.7)
MCH RBC QN AUTO: 21.1 PG (ref 26.8–34.3)
MCHC RBC AUTO-ENTMCNC: 30.6 G/DL (ref 31.4–37.4)
MCV RBC AUTO: 69 FL (ref 82–98)
MONOCYTES # BLD AUTO: 0.38 THOUSAND/ÂΜL (ref 0.17–1.22)
MONOCYTES NFR BLD AUTO: 7 % (ref 4–12)
NEUTROPHILS # BLD AUTO: 3.55 THOUSANDS/ÂΜL (ref 1.85–7.62)
NEUTS SEG NFR BLD AUTO: 66 % (ref 43–75)
NRBC BLD AUTO-RTO: 0 /100 WBCS
PLATELET # BLD AUTO: 224 THOUSANDS/UL (ref 149–390)
PMV BLD AUTO: 11 FL (ref 8.9–12.7)
POTASSIUM SERPL-SCNC: 3.6 MMOL/L (ref 3.5–5.3)
PROTHROMBIN TIME: 32.3 SECONDS (ref 12.3–15)
RBC # BLD AUTO: 4.78 MILLION/UL (ref 3.88–5.62)
SODIUM SERPL-SCNC: 144 MMOL/L (ref 135–147)
WBC # BLD AUTO: 5.32 THOUSAND/UL (ref 4.31–10.16)

## 2025-04-23 PROCEDURE — 85610 PROTHROMBIN TIME: CPT | Performed by: INTERNAL MEDICINE

## 2025-04-23 PROCEDURE — 80048 BASIC METABOLIC PNL TOTAL CA: CPT | Performed by: NURSE PRACTITIONER

## 2025-04-23 PROCEDURE — 83735 ASSAY OF MAGNESIUM: CPT | Performed by: NURSE PRACTITIONER

## 2025-04-23 PROCEDURE — 99254 IP/OBS CNSLTJ NEW/EST MOD 60: CPT | Performed by: INTERNAL MEDICINE

## 2025-04-23 PROCEDURE — 99232 SBSQ HOSP IP/OBS MODERATE 35: CPT | Performed by: PHYSICIAN ASSISTANT

## 2025-04-23 PROCEDURE — 85025 COMPLETE CBC W/AUTO DIFF WBC: CPT | Performed by: NURSE PRACTITIONER

## 2025-04-23 PROCEDURE — 99222 1ST HOSP IP/OBS MODERATE 55: CPT | Performed by: STUDENT IN AN ORGANIZED HEALTH CARE EDUCATION/TRAINING PROGRAM

## 2025-04-23 RX ORDER — POTASSIUM CHLORIDE 1500 MG/1
20 TABLET, EXTENDED RELEASE ORAL 2 TIMES DAILY WITH MEALS
Status: DISCONTINUED | OUTPATIENT
Start: 2025-04-23 | End: 2025-04-26

## 2025-04-23 RX ORDER — FUROSEMIDE 10 MG/ML
80 INJECTION INTRAMUSCULAR; INTRAVENOUS
Status: DISCONTINUED | OUTPATIENT
Start: 2025-04-23 | End: 2025-04-25

## 2025-04-23 RX ORDER — ISOSORBIDE DINITRATE 10 MG/1
10 TABLET ORAL
Status: DISCONTINUED | OUTPATIENT
Start: 2025-04-23 | End: 2025-04-24

## 2025-04-23 RX ADMIN — POTASSIUM CHLORIDE 20 MEQ: 1500 TABLET, EXTENDED RELEASE ORAL at 17:57

## 2025-04-23 RX ADMIN — HYDRALAZINE HYDROCHLORIDE 25 MG: 25 TABLET ORAL at 06:29

## 2025-04-23 RX ADMIN — FLUTICASONE FUROATE AND VILANTEROL TRIFENATATE 1 PUFF: 200; 25 POWDER RESPIRATORY (INHALATION) at 08:06

## 2025-04-23 RX ADMIN — FUROSEMIDE 80 MG: 10 INJECTION, SOLUTION INTRAVENOUS at 17:57

## 2025-04-23 RX ADMIN — POTASSIUM CHLORIDE 20 MEQ: 1500 TABLET, EXTENDED RELEASE ORAL at 08:07

## 2025-04-23 RX ADMIN — MELATONIN 3 MG: 3 TAB ORAL at 23:05

## 2025-04-23 RX ADMIN — FUROSEMIDE 40 MG: 10 INJECTION, SOLUTION INTRAVENOUS at 08:06

## 2025-04-23 RX ADMIN — CARVEDILOL 3.12 MG: 3.12 TABLET, FILM COATED ORAL at 17:57

## 2025-04-23 RX ADMIN — CARVEDILOL 3.12 MG: 3.12 TABLET, FILM COATED ORAL at 08:07

## 2025-04-23 RX ADMIN — UMECLIDINIUM 1 PUFF: 62.5 AEROSOL, POWDER ORAL at 08:06

## 2025-04-23 RX ADMIN — LOSARTAN POTASSIUM 50 MG: 50 TABLET, FILM COATED ORAL at 08:07

## 2025-04-23 RX ADMIN — HYDRALAZINE HYDROCHLORIDE 25 MG: 25 TABLET ORAL at 21:40

## 2025-04-23 RX ADMIN — LORAZEPAM 0.5 MG: 0.5 TABLET ORAL at 23:05

## 2025-04-23 NOTE — CONSULTS
Consultation - Cardiology   Name: Domingo Trevino 65 y.o. male I MRN: [id number]  Unit/Bed#: E4 -01 I Date of Admission: 4/22/2025   Date of Service: 4/23/2025 I Hospital Day: 1       Inpatient consult to Cardiology     Date/Time  4/23/2025 7:30 AM     Performed by  Patrick Arroyo MD   Authorized by  Ryan Perez DO           Physician Requesting Evaluation: Srikanth Kaufman MD   Reason for Evaluation / Principal Problem: Shortness of breath/HFpEF    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  Etiology: Likely amyloid, last LVEF from 2024 was 55%  EKG: Rate controlled atrial fibrillation, low voltage limb leads     PTA cardiac medications: Coreg 3.25 mg twice daily, losartan 50 mg daily, torsemide 40 mg a.m./torsemide 20 mg p.m. daily, hydralazine 25 mg every 8 hours     Current cardiac medications: Coreg 3.25 mg twice daily, IV Lasix 40 mg twice daily , losartan 50 mg daily, hydralazine 25 mg every every 8 hours        Patient's dry weight is 155 LBS, current standing scale weight is 154 LBS  Patient put out 1.62 L overnight.  Is net -1.08 L.     Plan:      Patient examines close to euvolemia, okay to continue IV diuresis for today  Transition to home dosing of torsemide 40 mg a.m. and 20 mg p.m.  Renal function seems to be improving after diuresis, monitor  Can hold beta-blocker to allow for adequate diuresis   Patient's blood pressure continues to be elevated, can increase hydralazine to 50 mg every 8 hours  Continue losartan 50 mg daily  Primary hypertension  Continue Cozaar and hydralazine  Can uptitrate hydralazine  Hypokalemia    Chronic kidney disease (CKD), stage IV (severe) (HCC)    AL amyloidosis (HCC)  No definitive diagnosis.  Patient does not have a cardiac MRI, and has declined endomyocardial biopsy  Bone marrow biopsy with Congo red negative  PYP scan negative for ATTR amyloid     Echocardiogram is highly suspicious of infiltrative cardiomyopathy with apical sparing decreased  strain.     Will likely need cardiac MRI to definitively diagnose amyloidosis, and this may need to be facilitated with anesthesia.  At this point he would benefit from obtaining Chacha-CyBorD for multiple myeloma  Chronic atrial fibrillation (HCC)  Continue warfarin, INR 3.2  Pulmonary hypertension (HCC)  Likely group 2  Medications reviewed. Continue current medications at prescribed doses.    History of Present Illness   Domingo Trevino is a 65 y.o. male who presents with with a past medical history of multiple myeloma not yet started on chemotherapy, 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osis, CKD stage IV, hypertension who presents with worsening shortness of breath.  He has been taking torsemide 40 a.m., 20 p.m. states that he reduced his dose of p.m. torsemide because he was noticing increased urine output.  Shortly after which he became short of breath.    He was recently hospitalized on 4/14/2025 for a few days until he left AMA.  At the time of admission patient examines volume overloaded, chest x-ray showed bilateral pleural effusions with vascular congestion, BNP was mildly elevated at 1099.  Patient was started on IV Lasix 40 mg twice daily     At the time of my evaluation overall patient appears mildly volume up.  JVD does not appear to be elevated     Patient denies any dietary indiscretions.    Review of Systems  Medical History Review: I have reviewed the patient's PMH, PSH, Social History, Family History, Meds, and Allergies     Objective :  Temp:  [97.5 °F (36.4 °C)-98.6 °F (37 °C)] 98 °F (36.7 °C)  HR:  [56-74] 66  BP: (151-182)/() 151/100  Resp:  [18-20] 18  SpO2:  [95 %-100 %] 99 %  O2 Device: Nasal cannula  Nasal Cannula O2 Flow Rate (L/min):  [2 L/min] 2 L/min  Orthostatic Blood Pressures      Flowsheet Row Most Recent Value   Blood Pressure 151/100 filed at 04/23/2025 0751   Patient Position - Orthostatic VS Sitting filed at 04/23/2025 0751          First Weight: Weight - Scale: 74.6 kg (164 lb 7.4 oz)  "(04/22/25 1245)  Vitals:    04/22/25 1704 04/23/25 0600   Weight: 72.5 kg (159 lb 13.3 oz) 69.9 kg (154 lb 1.6 oz)     Physical Exam  Vitals reviewed.   Constitutional:       Appearance: Normal appearance.   HENT:      Head: Normocephalic and atraumatic.   Neck:      Vascular: No JVD.   Cardiovascular:      Rate and Rhythm: Normal rate. Rhythm irregular.      Heart sounds: Murmur heard.      Gallop present.   Pulmonary:      Effort: Pulmonary effort is normal. No respiratory distress.      Breath sounds: No stridor. No wheezing or rales.   Chest:      Chest wall: No tenderness.   Musculoskeletal:      Right lower leg: No edema.      Left lower leg: No edema.   Skin:     General: Skin is warm and dry.   Neurological:      General: No focal deficit present.      Mental Status: He is alert and oriented to person, place, and time.   Psychiatric:         Mood and Affect: Mood normal.         Behavior: Behavior normal.            Lab Results: I have reviewed the following results:CBC/BMP:   .     04/23/25  0542   WBC 5.32   HGB 10.1*   HCT 33.0*      SODIUM 144   K 3.6      CO2 30   BUN 51*   CREATININE 2.53*   GLUC 102   MG 2.1      Results from last 7 days   Lab Units 04/23/25  0542 04/22/25  1318   WBC Thousand/uL 5.32 4.83   HEMOGLOBIN g/dL 10.1* 9.6*   HEMATOCRIT % 33.0* 30.5*   PLATELETS Thousands/uL 224 225     Results from last 7 days   Lab Units 04/23/25  0542 04/22/25  1318 04/17/25  0508   POTASSIUM mmol/L 3.6 3.7 3.7   CHLORIDE mmol/L 106 104 104   CO2 mmol/L 30 28 29   BUN mg/dL 51* 50* 53*   CREATININE mg/dL 2.53* 2.77* 2.20*   CALCIUM mg/dL 9.1 9.1 8.8     Results from last 7 days   Lab Units 04/23/25  0542 04/22/25  1318   INR  3.22* 2.98*   PTT seconds  --  37*     No results found for: \"HGBA1C\"  Lab Results   Component Value Date    TROPONINI 1.01 () 06/17/2021       Imaging Results Review: No pertinent imaging studies reviewed.  Other Study Results Review: EKG was reviewed.     VTE " Prophylaxis: VTE covered by:  warfarin, Oral, 7.5 mg at 04/22/25 1715   And  [START ON 4/24/2025] warfarin, Oral

## 2025-04-23 NOTE — ASSESSMENT & PLAN NOTE
Been as per hematology oncology  Patient unfortunately still not decided on whether he wants to proceed with treatment  He is interested in possible second opinion  Discussed with primary service.

## 2025-04-23 NOTE — ASSESSMENT & PLAN NOTE
Etiology: Likely amyloid, last LVEF from 2024 was 55%  EKG: Rate controlled atrial fibrillation, low voltage limb leads     PTA cardiac medications: Coreg 3.25 mg twice daily, losartan 50 mg daily, torsemide 40 mg a.m./torsemide 20 mg p.m. daily, hydralazine 25 mg every 8 hours     Current cardiac medications: Coreg 3.25 mg twice daily, IV Lasix 40 mg twice daily , losartan 50 mg daily, hydralazine 25 mg every every 8 hours        Patient's dry weight is 155 LBS, current standing scale weight is 154 LBS  Patient put out 1.62 L overnight.  Is net -1.08 L.     Plan:      Patient examines close to euvolemia, okay to continue IV diuresis for today  Transition to home dosing of torsemide 40 mg a.m. and 20 mg p.m.  Renal function seems to be improving after diuresis, monitor  Can hold beta-blocker to allow for adequate diuresis   Patient's blood pressure continues to be elevated, can increase hydralazine to 50 mg every 8 hours  Continue losartan 50 mg daily

## 2025-04-23 NOTE — PLAN OF CARE
Problem: Decreased Cardiac Output  Goal: Cardiac output adequate for individual needs  Description: INTERVENTIONS: Monitor for signs and symptoms of decreased cardiac output - Monitor for dyspnea with exertion and at rest- Monitor for orthopnea- Monitor for signs of tachycardia. Place patient on telemetry monitoring.- Assess patient for jugular vein distention- Assess patient for lower extremity edema and poor peripheral perfusion - Auscultate lung sound for Fine bibasilar crackles - Monitor for cardiac arrythmias - Administer beta blockers, antiarrhythmic, and blood pressure medications as ordered  Outcome: Progressing     Problem: Impaired Gas Exchange  Goal: Optimize oxygenation and ensure adequate ventilation  Description: INTERVENTIONS: Monitor for signs and symptoms of respiratory distress              - Elevate HOB or use high fowlers to promote lung expansion              - Administer oxygen as ordered to maintain adequate oxygenation              - Encourage use of IS to promote lung expansion and prevent PN              - Monitor ABGs to assess oxygenation status              - Monitor blood oxygen level to maintain adequate oxygenation              - Encourage cough and deep breathing exercises to promote lung expansion              - Monitor patient's mental status for increased confusion  Outcome: Progressing     Problem: Knowledge Deficit  Goal: Patient is able to verbalize understanding of Heart Failure after education  Description: INTERVENTIONS:- Educate the patient and family on signs and symptoms of HF- Provide the patient with HF education and HF zone tool- Educate on the importance of daily weight in the AM and reporting a weight gain             of 3 or more pounds to their primary care physician- Monitor for SOB- Maintain and sodium and fluid restriction- Educate the patient on the importance of medications such as: diuretics, betablockers,             antiarrhythmics and their purpose,  dose, route, side effects and labs             if they are needed  Outcome: Progressing

## 2025-04-23 NOTE — ASSESSMENT & PLAN NOTE
No definitive diagnosis.  Patient does not have a cardiac MRI, and has declined endomyocardial biopsy  Bone marrow biopsy with Congo red negative  PYP scan negative for ATTR amyloid     Echocardiogram is highly suspicious of infiltrative cardiomyopathy with apical sparing decreased strain.     Will likely need cardiac MRI to definitively diagnose amyloidosis, and this may need to be facilitated with anesthesia.  At this point he would benefit from obtaining Chacha-CyBorD for multiple myeloma

## 2025-04-23 NOTE — ASSESSMENT & PLAN NOTE
Lab Results   Component Value Date    EGFR 25 04/23/2025    EGFR 23 04/22/2025    EGFR 30 04/17/2025    CREATININE 2.53 (H) 04/23/2025    CREATININE 2.77 (H) 04/22/2025    CREATININE 2.20 (H) 04/17/2025   At risk for LUDWIG most likely secondary to ongoing diuresis along  with underlying multiple myeloma and concerns for AL amyloidosis   Nephrology following  Monitor with diuresis, remains within baseline

## 2025-04-23 NOTE — ASSESSMENT & PLAN NOTE
Lab Results   Component Value Date    EGFR 25 04/23/2025    EGFR 23 04/22/2025    EGFR 30 04/17/2025    CREATININE 2.53 (H) 04/23/2025    CREATININE 2.77 (H) 04/22/2025    CREATININE 2.20 (H) 04/17/2025

## 2025-04-23 NOTE — UTILIZATION REVIEW
Initial Clinical Review    Admission: Date/Time/Statement:   Admission Orders (From admission, onward)       Ordered        04/22/25 1603  INPATIENT ADMISSION  Once                          Orders Placed This Encounter   Procedures    INPATIENT ADMISSION     Standing Status:   Standing     Number of Occurrences:   1     Level of Care:   Med Surg [16]     Estimated length of stay:   More than 2 Midnights     Certification:   I certify that inpatient services are medically necessary for this patient for a duration of greater than two midnights. See H&P and MD Progress Notes for additional information about the patient's course of treatment.     ED Arrival Information       Expected   -    Arrival   4/22/2025 12:41    Acuity   Urgent              Means of arrival   Wheelchair    Escorted by   Self    Service   Hospitalist    Admission type   Emergency              Arrival complaint   sob             Chief Complaint   Patient presents with    Shortness of Breath     Pt c/o SOB for the past x 2 hrs. Pt denies cp/n/v/d or fevers       Initial Presentation: 65 y.o. male presents to the ED from home with c/o acute onset SOB today.  Just saw Pulmonology today.  Recent admit for HF but signed out AMA. Has weight gain from 153 to 164 lbs.  PMH: AL amyloidosis, multiple myeloma, renal infarction, hypertension, HFpEF, A-fib, pulmonary hypertension, and CKD.  In the ED he was HTN. No c/o pain. Treated with IV Lasix 40 mg x 1.  Labs - elevated BNP, BUN/Cr, INR, Troponin, T bili.  ECG - A fib.  Imaging - Persistent vascular congestion with small pleural effusions. On exam ill appearing, irreg HR, + wheezing, BLE edema, crackles.  Admitted to INPATIENT status with SOB, HFpEF, HTN, low K, pulm HTN - PLAN: IV Lasix 40 mg BID, PRN oxygen, daily wt, fluid restriction, Nephrology consult, home meds hydralazine, losartan, Coreg, Coumadin, replete and trend K, PT/INR.      Anticipated Length of Stay/Certification Statement: Patient will be  admitted on an inpatient basis with an anticipated length of stay of greater than 2 midnights secondary to pending evaluations.     Date: 4/23   Day 2:   SOB, HFpEF, HTN, low K, pulm HTN - increased IV Lasix to 80 mg BID.  Continue fluid restriction.  Remains on oxygen 2L NC. BP elevated but downtrending.  Creat trending down today. Lytes WNL.  Pt reports some ongoing SOB but feels better on oxygen. Urinating well. Normal breath sounds.      4/23 Cardio Consult - HFpEF, 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, chronic Afib, Pulm HTN - examines volume up, increase IV Lasix to 80 mg BID, trend creat, lytes closely, start Isordil TIB. On exam pt has murmur and gallop.     4/23 Nephrology Consult - persistent vascular congestion with small bilateral pleural effusions - agree with IV Lasix 80 mg BID. Renal function at baseline, pt considering 2nd opinion.  Trend lytes. On exam + rales, abd distention.      Date: 4/24  Day 3: Has surpassed a 2nd midnight with active treatments and services.  SOB, HFpEF, HTN, low K, pulm HTN - remains on oxygen w/ some intermittent SOB.  Feels some improvement.  No issues w/ urinating.  On exam rales bilat.  No c/o CP. Remains on IV Lasix 80 mg BID. Slight increase in BUN/CR.        ED Treatment-Medication Administration from 04/22/2025 1241 to 04/22/2025 1658         Date/Time Order Dose Route Action     04/22/2025 1605 furosemide (LASIX) injection 40 mg 40 mg Intravenous Given            Scheduled Medications:  [START ON 5/1/2025] amiodarone, 200 mg, Oral, Daily With Breakfast  amiodarone, 400 mg, Oral, BID With Meals  carvedilol, 3.125 mg, Oral, BID With Meals  fluticasone-vilanterol, 1 puff, Inhalation, Daily   And  umeclidinium, 1 puff, Inhalation, Daily  furosemide, 80 mg, Intravenous, BID (diuretic)  hydrALAZINE, 25 mg, Oral, Q8H LOTTIE  isosorbide dinitrate, 10 mg, Oral, TID after meals  losartan, 50 mg, Oral, Daily  potassium chloride, 20 mEq, Oral, BID With Meals  [Held by provider] warfarin,  7.5 mg, Oral, Once per day on Sunday Monday Tuesday Wednesday Friday Saturday   And  [Held by provider] warfarin, 5 mg, Oral, Every Thursday      Continuous IV Infusions:     PRN Meds:  acetaminophen, 650 mg, Oral, Q6H PRN  labetalol, 10 mg, Intravenous, Q4H PRN - x 1 4/22, 4/23  LORazepam, 0.5 mg, Oral, HS PRN - x 1 4/22  melatonin, 3 mg, Oral, HS PRN - x 1 4/22, 4/23  polyethylene glycol, 17 g, Oral, Daily PRN  prochlorperazine, 10 mg, Oral, Q6H PRN  simethicone, 80 mg, Oral, 4x Daily PRN      ED Triage Vitals [04/22/25 1245]   Temperature Pulse Respirations Blood Pressure SpO2 Pain Score   98.1 °F (36.7 °C) 68 20 162/92 100 % No Pain     Weight (last 2 days)       Date/Time Weight    04/24/25 0537 69.8 (153.88)    04/23/25 0600 69.9 (154.1)    04/22/25 1704 72.5 (159.83)    04/22/25 1245 74.6 (164.46)            Vital Signs (last 3 days)       Date/Time Temp Pulse Resp BP MAP (mmHg) SpO2 Calculated FIO2 (%) - Nasal Cannula Nasal Cannula O2 Flow Rate (L/min) O2 Device Patient Position - Orthostatic VS [place] Coma Scale Score Pain    04/24/25 1341 -- -- -- 140/94 -- -- -- -- -- -- -- --    04/24/25 1046 97.6 °F (36.4 °C) 58 18 135/77 98 98 % 28 2 L/min Nasal cannula Lying -- --    04/24/25 0800 -- -- -- -- -- -- -- -- None (Room air) -- 15 No Pain    04/24/25 0731 98.2 °F (36.8 °C) 63 20 119/77 95 99 % 28 2 L/min Nasal cannula Lying -- --    04/24/25 0508 97.9 °F (36.6 °C) -- 20 167/112 124 98 % 28 2 L/min Nasal cannula Lying -- --    04/23/25 2255 98 °F (36.7 °C) 60 20 157/88 100 99 % 28 2 L/min Nasal cannula Lying -- --    04/23/25 2140 -- -- -- 166/89 -- -- -- -- -- -- -- --    04/23/25 1921 97.9 °F (36.6 °C) 63 20 161/95 112 99 % 28 2 L/min Nasal cannula Lying 15 No Pain    04/23/25 1757 -- 70 -- 164/91 -- -- -- -- -- -- -- --    04/23/25 1500 98.6 °F (37 °C) 54 20 125/78 -- 97 % -- -- None (Room air) Sitting -- --    04/23/25 1335 -- -- -- -- -- -- -- -- None (Room air) -- 15 --    04/23/25 1322 -- -- --  117/79 -- -- -- -- -- -- -- --    04/23/25 1156 97.5 °F (36.4 °C) 59 20 127/81 100 98 % 28 2 L/min Nasal cannula Sitting -- --    04/23/25 0751 98 °F (36.7 °C) 66 18 151/100 120 99 % 28 2 L/min Nasal cannula Sitting -- --    04/23/25 0730 -- -- -- -- -- -- -- -- None (Room air) -- 15 No Pain    04/23/25 0629 -- -- -- 169/101 -- -- -- -- -- Lying -- --    04/23/25 0303 97.9 °F (36.6 °C) 73 18 163/103 125 95 % -- -- -- Lying -- --    04/22/25 2347 98.6 °F (37 °C) 65 18 160/105 127 100 % -- -- -- Lying -- --    04/22/25 1938 98.5 °F (36.9 °C) 56 20 161/97 -- 100 % -- -- None (Room air) Lying -- --    04/22/25 1930 -- -- -- -- -- -- -- -- None (Room air) -- 15 No Pain    04/22/25 1730 -- -- -- -- -- -- -- -- -- -- -- No Pain    04/22/25 1727 -- -- -- -- -- -- -- -- -- -- -- No Pain    04/22/25 1704 97.5 °F (36.4 °C) 61 20 182/90 -- 100 % -- -- None (Room air) Lying -- --    04/22/25 1600 -- 74 18 173/106 132 99 % -- -- None (Room air) Lying -- --    04/22/25 1245 98.1 °F (36.7 °C) 68 20 162/92 118 100 % -- -- None (Room air) Sitting -- No Pain              Pertinent Labs/Diagnostic Test Results:   Radiology:  XR chest 2 views   ED Interpretation by Mai Pereira PA-C (04/22 1406)   Cardiomegaly, bilateral effusions appear unchanged from last week.  No other acute cardiopulmonary abnormalities when interpreted by me.      Final Interpretation by [place] MD (04/23 7919)      Persistent vascular congestion with small pleural effusions.            Workstation performed: [id number]           Cardiology:  ECG 12 lead   Final Result by Srikanth Mcmillan DO (04/22 2258)   Atrial fibrillation   Left axis deviation   Lateral infarct (cited on or before 22-Apr-2025)   ST & T wave abnormality, consider anterior ischemia   Abnormal ECG   When compared with ECG of 22-Apr-2025 15:45, (unconfirmed)   Questionable change in QRS axis   T wave inversion no longer evident in Inferior leads   Confirmed by Srikanth Mcmillan ([id number]) on  4/22/2025 4:38:15 PM      ECG 12 lead   Final Result by Srikanth Mcmillan DO (04/22 1637)   Suspect arm lead reversal, interpretation assumes no reversal   Atrial fibrillation   Lateral infarct , age undetermined   ST & T wave abnormality, consider inferior ischemia   Abnormal ECG   When compared with ECG of 22-Apr-2025 12:57,   No significant change was found   Confirmed by Srikanth Mcmillan ([id number]) on 4/22/2025 4:37:52 PM      ECG 12 lead   Final Result by Brigido Colin DO (04/22 1545)   Atrial fibrillation   Right axis deviation   ST & T wave abnormality, consider inferior ischemia   ST & T wave abnormality, consider anterolateral ischemia   Prolonged QT   Abnormal ECG   When compared with ECG of 13-Apr-2025 16:11,   Significant changes have occurred   Confirmed by Brigido Colin ([id number]) on 4/22/2025 3:45:08 PM        GI:  No orders to display           Results from last 7 days   Lab Units 04/24/25  0500 04/23/25  0542 04/22/25  1318   WBC Thousand/uL 5.20 5.32 4.83   HEMOGLOBIN g/dL 9.6* 10.1* 9.6*   HEMATOCRIT % 30.8* 33.0* 30.5*   PLATELETS Thousands/uL 220 224 225   TOTAL NEUT ABS Thousands/µL  --  3.55 3.20         Results from last 7 days   Lab Units 04/24/25  0500 04/23/25  0542 04/22/25  1318   SODIUM mmol/L 146 144 141   POTASSIUM mmol/L 3.7 3.6 3.7   CHLORIDE mmol/L 106 106 104   CO2 mmol/L 31 30 28   ANION GAP mmol/L 9 8 9   BUN mg/dL 52* 51* 50*   CREATININE mg/dL 2.59* 2.53* 2.77*   EGFR ml/min/1.73sq m 25 25 23   CALCIUM mg/dL 8.9 9.1 9.1   MAGNESIUM mg/dL  --  2.1  --      Results from last 7 days   Lab Units 04/22/25  1318   AST U/L 19   ALT U/L 16   ALK PHOS U/L 56   TOTAL PROTEIN g/dL 6.6   ALBUMIN g/dL 4.1   TOTAL BILIRUBIN mg/dL 1.04*         Results from last 7 days   Lab Units 04/24/25  0500 04/23/25  0542 04/22/25  1318   GLUCOSE RANDOM mg/dL 86 102 101               Results from last 7 days   Lab Units 04/22/25  1543 04/22/25  1318   HS TNI 0HR ng/L  --  192*   HS TNI 2HR ng/L 184*  --     HSTNI D2 ng/L -8  --          Results from last 7 days   Lab Units 04/24/25  0500 04/23/25  0542 04/22/25  1318   PROTIME seconds 33.6* 32.3* 30.4*   INR  3.40* 3.22* 2.98*   PTT seconds  --   --  37*     Results from last 7 days   Lab Units 04/22/25  1318   TSH 3RD GENERATON uIU/mL 1.882           Results from last 7 days   Lab Units 04/22/25  1318   BNP pg/mL 1,099*         Past Medical History:   Diagnosis Date    A-fib (HCC)     CHF (congestive heart failure) (HCC)     CKD (chronic kidney disease), stage IV (HCC)     Hypertension     Multiple myeloma not having achieved remission (HCC)      Present on Admission:   Primary hypertension   Hypokalemia   Chronic kidney disease (CKD), stage IV (severe) (HCC)   AL amyloidosis (HCC)   Pulmonary hypertension (HCC)   Chronic atrial fibrillation (HCC)   HFpEF, 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      Admitting Diagnosis: Shortness of breath [R06.02]  SOB (shortness of breath) [R06.02]  Elevated troponin [R79.89]  Acute exacerbation of CHF (congestive heart failure) (HCC) [I50.9]  Age/Sex: 65 y.o. male    Network Utilization Review Department  ATTENTION: Please call with any questions or concerns to [phone] and carefully listen to the prompts so that you are directed to the right person. All voicemails are confidential.   For Discharge needs, contact Care Management DC Support Team at [phone] opt. [phone]  Send all requests for admission clinical reviews, approved or denied determinations and any other requests to dedicated fax number below belonging to the campus where the patient is receiving treatment. List of dedicated fax numbers for the Facilities:  FACILITY NAME UR FAX NUMBER   ADMISSION DENIALS (Administrative/Medical Necessity) [phone]   DISCHARGE SUPPORT TEAM (NETWORK) [phone]   PARENT CHILD HEALTH (Maternity/NICU/Pediatrics) [phone]   [facility] [phone]   [facility] [phone]   [facility]  [facility] [phone]   [facility] [phone]   [facility] [phone]   [facility] [phone]   [facility] [phone]   [facility] [phone]   [facility] [phone]   [facility] [phone]   [facility] [phone]   [facility] [phone]

## 2025-04-23 NOTE — PLAN OF CARE
Problem: Potential for Falls  Goal: Patient will remain free of falls  Description: INTERVENTIONS:- Educate patient/family on patient safety including physical limitations- Instruct patient to call for assistance with activity - Consult OT/PT to assist with strengthening/mobility - Keep Call bell within reach- Keep bed low and locked with side rails adjusted as appropriate- Keep care items and personal belongings within reach- Initiate and maintain comfort rounds-   Outcome: Progressing     Problem: DISCHARGE PLANNING  Goal: Discharge to home or other facility with appropriate resources  Description: INTERVENTIONS:- Identify barriers to discharge w/patient and caregiver- Arrange for needed discharge resources and transportation as appropriate- Identify discharge learning needs (meds, wound care, etc.)- Arrange for interpretive services to assist at discharge as needed- Refer to Case Management Department for coordinating discharge planning if the patient needs post-hospital services based on physician/advanced practitioner order or complex needs related to functional status, cognitive ability, or social support system  Outcome: Progressing     Problem: Knowledge Deficit  Goal: Patient/family/caregiver demonstrates understanding of disease process, treatment plan, medications, and discharge instructions  Description: Complete learning assessment and assess knowledge base.Interventions:- Provide teaching at level of understanding- Provide teaching via preferred learning methods  Outcome: Progressing     Problem: CARDIOVASCULAR - ADULT  Goal: Maintains optimal cardiac output and hemodynamic stability  Description: INTERVENTIONS:- Monitor I/O, vital signs and rhythm- Monitor for S/S and trends of decreased cardiac output- Administer and titrate ordered vasoactive medications to optimize hemodynamic stability- Assess quality of pulses, skin color and temperature- Assess for signs of decreased coronary artery perfusion-  Instruct patient to report change in severity of symptoms  Outcome: Progressing     Problem: RESPIRATORY - ADULT  Goal: Achieves optimal ventilation and oxygenation  Description: INTERVENTIONS:- Assess for changes in respiratory status- Assess for changes in mentation and behavior- Position to facilitate oxygenation and minimize respiratory effort- Oxygen administered by appropriate delivery if ordered- Initiate smoking cessation education as indicated- Encourage broncho-pulmonary hygiene including cough, deep breathe, Incentive Spirometry- Assess the need for suctioning and aspirate as needed- Assess and instruct to report SOB or any respiratory difficulty- Respiratory Therapy support as indicated  Outcome: Progressing     Problem: Prexisting or High Potential for Compromised Skin Integrity  Goal: Skin integrity is maintained or improved  Description: INTERVENTIONS:- Identify patients at risk for skin breakdown- Assess and monitor skin integrity- Assess and monitor nutrition and hydration status- Monitor labs - Assess for incontinence - Turn and reposition patient- Assist with mobility/ambulation- Relieve pressure over bony prominences- Avoid friction and shearing- Provide appropriate hygiene as needed including keeping skin clean and dry- Evaluate need for skin moisturizer/barrier cream- Collaborate with interdisciplinary team - Patient/family teaching- Consider wound care consult   Outcome: Progressing

## 2025-04-23 NOTE — CONSULTS
"NEPHROLOGY HOSPITAL CONSULTATION   Domingo Trevino 65 y.o. male MRN: [id number]  Unit/Bed#: E4 -01 Encounter: [id number]    Brief History of Admission -chronic kidney disease    Assessment & Plan  Chronic kidney disease (CKD), stage IV (severe) (HCC)  Chronic kidney disease likely multifactorial given underlying multiple myeloma with concerns for AL amyloidosis.  Baseline creatinine reported at 2.3-2.7 since October 2024.  Current creatinine remains at baseline.  Hypokalemia  Current potassium 3.6  Recommend close observation with ongoing diuretic therapy.  SOB (shortness of breath)  Chest x-ray consistent with persistent vascular congestion with small bilateral pleural effusions  Recommend adjusting Lasix to 80 twice daily.  Myeloma (HCC)  Been as per hematology oncology  Patient unfortunately still not decided on whether he wants to proceed with treatment  He is interested in possible second opinion  Discussed with primary service.  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  Noted IVC dilatation.  Digestion of cardiac amyloidosis.  Primary hypertension  Previous renal Doppler showing no evidence of occlusive disease although left main renal artery not visualized.  Noted atrophic left kidney measuring 7.6 cm, right kidney 11.8 cm  Continue with losartan, hydralazine and carvedilol.  Pulmonary hypertension (HCC)  Management as per cardiology.    Summary:  Overall renal function remains within baseline with a creatinine 2.53  Adjust Lasix to 80 twice daily  Patient interested in possible second opinion, discussed with primary service  Monitor electrolytes closely.    Portions of the record may have been created with voice recognition software. Occasional wrong word or \"sound a like\" substitutions may have occurred due to the inherent limitations of voice recognition software. Read the chart carefully and " recognize, using context, where substitutions have occurred.If you have any questions, please contact the dictating provider.    HISTORY OF PRESENT ILLNESS:  Requesting Physician: Srikanth Kaufman MD  Reason for Consult: Chronic kidney disease    Domingo Trevino is a 65 y.o. male who was admitted to [facility] after presenting with dyspnea. A renal consultation is requested today for assistance in the management of chronic kidney disease.    Patient is a 65-year-old male who presents with worsening shortness of breath.  Recently discharged with a diagnosis of volume overload.  Taking torsemide 40 in the morning 20 in the p.m.  Patient was noticing increasing weight gain, abdominal bloating and dyspnea.      Of note patient did recently leave AGAINST MEDICAL ADVICE    Patient also recently had a follow-up with his hematologist oncologist at [facility].  Unfortunately still not decided on whether he wants to start with myeloma treatment.  Interested in possible second opinion.      PAST MEDICAL HISTORY:  Past Medical History:   Diagnosis Date    A-fib (HCC)     CHF (congestive heart failure) (HCC)     CKD (chronic kidney disease), stage IV (HCC)     Hypertension     Multiple myeloma not having achieved remission (HCC)        PAST SURGICAL HISTORY:  History reviewed. No pertinent surgical history.    ALLERGIES:  No Known Allergies    SOCIAL HISTORY:  Social History     Substance and Sexual Activity   Alcohol Use Never     Social History     Substance and Sexual Activity   Drug Use Never     Social History     Tobacco Use   Smoking Status Former    Current packs/day: 0.50    Types: Cigarettes   Smokeless Tobacco Never       FAMILY HISTORY:  History reviewed. No pertinent family history.    MEDICATIONS:    Current Facility-Administered Medications:     acetaminophen (TYLENOL) tablet 650 mg, 650 mg, Oral, Q6H PRN, NICHOLAS BridgesNP    carvedilol (COREG) tablet 3.125 mg, 3.125 mg, Oral, BID With Meals, Sammy Adams,  CRNP, 3.125 mg at 04/23/25 0807    fluticasone-vilanterol 200-25 mcg/actuation 1 puff, 1 puff, Inhalation, Daily, 1 puff at 04/23/25 0806 **AND** umeclidinium 62.5 mcg/actuation inhaler AEPB 1 puff, 1 puff, Inhalation, Daily, Sammy Adams, CRNP, 1 puff at 04/23/25 0806    furosemide (LASIX) injection 40 mg, 40 mg, Intravenous, BID (diuretic), Sammy Adams, CRNP, 40 mg at 04/23/25 0806    hydrALAZINE (APRESOLINE) tablet 25 mg, 25 mg, Oral, Q8H LOTTIE, Sammy Adams, CRNP, 25 mg at 04/23/25 0629    labetalol (NORMODYNE) injection 10 mg, 10 mg, Intravenous, Q4H PRN, Ryan Perez DO    LORazepam (ATIVAN) tablet 0.5 mg, 0.5 mg, Oral, HS PRN, Sammy Adams, CRNP, 0.5 mg at 04/22/25 2344    losartan (COZAAR) tablet 50 mg, 50 mg, Oral, Daily, Sammy Adams, CRNP, 50 mg at 04/23/25 0807    melatonin tablet 3 mg, 3 mg, Oral, HS PRN, Sammy Adams, CRNP, 3 mg at 04/22/25 2344    polyethylene glycol (MIRALAX) packet 17 g, 17 g, Oral, Daily PRN, Sammy Adams, CRNP    potassium chloride (Klor-Con M20) CR tablet 20 mEq, 20 mEq, Oral, Daily, Sammy Adams, CRNP, 20 mEq at 04/23/25 0807    prochlorperazine (COMPAZINE) tablet 10 mg, 10 mg, Oral, Q6H PRN, Sammy Adams, CRNP    simethicone (MYLICON) chewable tablet 80 mg, 80 mg, Oral, 4x Daily PRN, Sammy Adams, CRNP    warfarin (COUMADIN) tablet 7.5 mg, 7.5 mg, Oral, Once per day on Sunday Monday Tuesday Wednesday Friday Saturday, 7.5 mg at 04/22/25 1715 **AND** [START ON 4/24/2025] warfarin (COUMADIN) tablet 5 mg, 5 mg, Oral, Every Thursday, VEENA Bridges    Review of Systems   Constitutional:  Positive for fatigue.   Respiratory:  Positive for shortness of breath. Negative for chest tightness.    Cardiovascular:  Positive for leg swelling. Negative for chest pain.   Gastrointestinal:  Positive for abdominal distention. Negative for diarrhea, nausea and vomiting.   Neurological:  Negative for dizziness, syncope and light-headedness.       PHYSICAL EXAM:  Current Weight: Weight -  Scale: 69.9 kg (154 lb 1.6 oz)  First Weight: Weight - Scale: 74.6 kg (164 lb 7.4 oz)  Vitals:    04/23/25 0303 04/23/25 0600 04/23/25 0629 04/23/25 0751   BP: (!) 163/103  (!) 169/101 151/100   BP Location: Right arm  Right arm Right arm   Pulse: 73   66   Resp: 18   18   Temp: 97.9 °F (36.6 °C)   98 °F (36.7 °C)   TempSrc: Temporal   Temporal   SpO2: 95%   99%   Weight:  69.9 kg (154 lb 1.6 oz)     Height:           Intake/Output Summary (Last 24 hours) at 4/23/2025 1101  Last data filed at 4/23/2025 0900  Gross per 24 hour   Intake 900 ml   Output 1920 ml   Net -1020 ml     Physical Exam  Constitutional:       Appearance: He is not ill-appearing.   Eyes:      General: No scleral icterus.  Cardiovascular:      Rate and Rhythm: Normal rate and regular rhythm.   Pulmonary:      Effort: Pulmonary effort is normal.      Breath sounds: Rales present.   Abdominal:      General: There is distension.      Palpations: Abdomen is soft.      Tenderness: There is no abdominal tenderness. There is no guarding.   Musculoskeletal:         General: No deformity.      Right lower leg: No edema.      Left lower leg: No edema.   Lymphadenopathy:      Cervical: No cervical adenopathy.   Skin:     General: Skin is warm and dry.      Coloration: Skin is not jaundiced.      Findings: No rash.   Neurological:      Mental Status: He is alert and oriented to person, place, and time.           Invasive Devices:      Lab Results:   Results from last 7 days   Lab Units 04/23/25  0542 04/22/25  1318 04/17/25  0508   WBC Thousand/uL 5.32 4.83  --    HEMOGLOBIN g/dL 10.1* 9.6*  --    HEMATOCRIT % 33.0* 30.5*  --    PLATELETS Thousands/uL 224 225  --    POTASSIUM mmol/L 3.6 3.7 3.7   CHLORIDE mmol/L 106 104 104   CO2 mmol/L 30 28 29   BUN mg/dL 51* 50* 53*   CREATININE mg/dL 2.53* 2.77* 2.20*   CALCIUM mg/dL 9.1 9.1 8.8   MAGNESIUM mg/dL 2.1  --   --    PHOSPHORUS mg/dL  --   --  4.5*   ALK PHOS U/L  --  56  --    ALT U/L  --  16  --    AST U/L   --  19  --

## 2025-04-23 NOTE — ASSESSMENT & PLAN NOTE
Potassium 3.7 on admission  Increase potassium to 20 meq twice daily while on IV diuretics  Monitor daily

## 2025-04-23 NOTE — ASSESSMENT & PLAN NOTE
Chronic kidney disease likely multifactorial given underlying multiple myeloma with concerns for AL amyloidosis.  Baseline creatinine reported at 2.3-2.7 since October 2024.  Current creatinine remains at baseline.

## 2025-04-23 NOTE — PLAN OF CARE
Problem: Potential for Falls  Goal: Patient will remain free of falls  Description: INTERVENTIONS:- Educate patient/family on patient safety including physical limitations- Instruct patient to call for assistance with activity - Consult OT/PT to assist with strengthening/mobility - Keep Call bell within reach- Keep bed low and locked with side rails adjusted as appropriate- Keep care items and personal belongings within reach- Initiate and maintain comfort rounds-   Outcome: Progressing     Problem: DISCHARGE PLANNING  Goal: Discharge to home or other facility with appropriate resources  Description: INTERVENTIONS:- Identify barriers to discharge w/patient and caregiver- Arrange for needed discharge resources and transportation as appropriate- Identify discharge learning needs (meds, wound care, etc.)- Arrange for interpretive services to assist at discharge as needed- Refer to Case Management Department for coordinating discharge planning if the patient needs post-hospital services based on physician/advanced practitioner order or complex needs related to functional status, cognitive ability, or social support system  Outcome: Progressing     Problem: Knowledge Deficit  Goal: Patient/family/caregiver demonstrates understanding of disease process, treatment plan, medications, and discharge instructions  Description: Complete learning assessment and assess knowledge base.Interventions:- Provide teaching at level of understanding- Provide teaching via preferred learning methods  Outcome: Progressing     Problem: CARDIOVASCULAR - ADULT  Goal: Maintains optimal cardiac output and hemodynamic stability  Description: INTERVENTIONS:- Monitor I/O, vital signs and rhythm- Monitor for S/S and trends of decreased cardiac output- Administer and titrate ordered vasoactive medications to optimize hemodynamic stability- Assess quality of pulses, skin color and temperature- Assess for signs of decreased coronary artery perfusion-  Instruct patient to report change in severity of symptoms  Outcome: Progressing     Problem: RESPIRATORY - ADULT  Goal: Achieves optimal ventilation and oxygenation  Description: INTERVENTIONS:- Assess for changes in respiratory status- Assess for changes in mentation and behavior- Position to facilitate oxygenation and minimize respiratory effort- Oxygen administered by appropriate delivery if ordered- Initiate smoking cessation education as indicated- Encourage broncho-pulmonary hygiene including cough, deep breathe, Incentive Spirometry- Assess the need for suctioning and aspirate as needed- Assess and instruct to report SOB or any respiratory difficulty- Respiratory Therapy support as indicated  Outcome: Progressing     Problem: Prexisting or High Potential for Compromised Skin Integrity  Goal: Skin integrity is maintained or improved  Description: INTERVENTIONS:- Identify patients at risk for skin breakdown- Assess and monitor skin integrity- Assess and monitor nutrition and hydration status- Monitor labs - Assess for incontinence - Turn and reposition patient- Assist with mobility/ambulation- Relieve pressure over bony prominences- Avoid friction and shearing- Provide appropriate hygiene as needed including keeping skin clean and dry- Evaluate need for skin moisturizer/barrier cream- Collaborate with interdisciplinary team - Patient/family teaching- Consider wound care consult   Outcome: Progressing     Problem: Decreased Cardiac Output  Goal: Cardiac output adequate for individual needs  Description: INTERVENTIONS: Monitor for signs and symptoms of decreased cardiac output - Monitor for dyspnea with exertion and at rest- Monitor for orthopnea- Monitor for signs of tachycardia. Place patient on telemetry monitoring.- Assess patient for jugular vein distention- Assess patient for lower extremity edema and poor peripheral perfusion - Auscultate lung sound for Fine bibasilar crackles - Monitor for cardiac  arrythmias - Administer beta blockers, antiarrhythmic, and blood pressure medications as ordered  Outcome: Progressing     Problem: Impaired Gas Exchange  Goal: Optimize oxygenation and ensure adequate ventilation  Description: INTERVENTIONS: Monitor for signs and symptoms of respiratory distress              - Elevate HOB or use high fowlers to promote lung expansion              - Administer oxygen as ordered to maintain adequate oxygenation              - Encourage use of IS to promote lung expansion and prevent PN              - Monitor ABGs to assess oxygenation status              - Monitor blood oxygen level to maintain adequate oxygenation              - Encourage cough and deep breathing exercises to promote lung expansion              - Monitor patient's mental status for increased confusion  Outcome: Progressing     Problem: Knowledge Deficit  Goal: Patient is able to verbalize understanding of Heart Failure after education  Description: INTERVENTIONS:- Educate the patient and family on signs and symptoms of HF- Provide the patient with HF education and HF zone tool- Educate on the importance of daily weight in the AM and reporting a weight gain             of 3 or more pounds to their primary care physician- Monitor for SOB- Maintain and sodium and fluid restriction- Educate the patient on the importance of medications such as: diuretics, betablockers,             antiarrhythmics and their purpose, dose, route, side effects and labs             if they are needed  Outcome: Progressing

## 2025-04-23 NOTE — ASSESSMENT & PLAN NOTE
Previous renal Doppler showing no evidence of occlusive disease although left main renal artery not visualized.  Noted atrophic left kidney measuring 7.6 cm, right kidney 11.8 cm  Continue with losartan, hydralazine and carvedilol.

## 2025-04-23 NOTE — ASSESSMENT & PLAN NOTE
Admitted with shortness of breath, Weight gain from 153 lbs to 164 lbs also noted from 4/17 to 4/22  he was just admitted to the hospital last week for HFpEF however signed out AMA because he needed to take his daughter to an appointment   Start of IV Lasix 40 mg twice daily, will further increase to 80 mg twice daily today  Intermittently using 2 L nasal cannula for comfort, SpO2 is remained stable on room air  Continue monitoring intake and output with daily weights  Appears close to euvolemic, hopeful for transition to p.o. diuretics in the next 24 to 48 hours

## 2025-04-23 NOTE — ASSESSMENT & PLAN NOTE
On admission EKG consistent with A-fib  Continue home  Coumadin 7.5 mg on S,M,T,W,F,S and followed by 5mg on Thursday  INR elevated today, will hold today's dose

## 2025-04-23 NOTE — ASSESSMENT & PLAN NOTE
Following outpatient with [facility] hematology oncology for multiple myeloma with probable AL amyloidosis  Was sent to begin chemotherapy this week but opted to postpone treatment  Asking for second opinion, will place ambulatory referral to [facility] hematology oncology at discharge

## 2025-04-23 NOTE — ASSESSMENT & PLAN NOTE
Chest x-ray consistent with persistent vascular congestion with small bilateral pleural effusions  Recommend adjusting Lasix to 80 twice daily.

## 2025-04-23 NOTE — PROGRESS NOTES
Progress Note - Hospitalist   Name: Domingo Trevino 65 y.o. male I MRN: [id number]  Unit/Bed#: E4 -01 I Date of Admission: 4/22/2025   Date of Service: 4/23/2025 I Hospital Day: 1    Assessment & Plan  SOB (shortness of breath)  Admitted with shortness of breath, Weight gain from 153 lbs to 164 lbs also noted from 4/17 to 4/22  he was just admitted to the hospital last week for HFpEF however signed out AMA because he needed to take his daughter to an appointment   Start of IV Lasix 40 mg twice daily, will further increase to 80 mg twice daily today  Intermittently using 2 L nasal cannula for comfort, SpO2 is remained stable on room air  Continue monitoring intake and output with daily weights  Appears close to euvolemic, hopeful for transition to p.o. diuretics in the next 24 to 48 hours  HFpEF, 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  Wt Readings from Last 3 Encounters:   04/23/25 69.9 kg (154 lb 1.6 oz)   04/22/25 74.6 kg (164 lb 8 oz)   04/17/25 69.7 kg (153 lb 10.6 oz)     Weight gain from 153 lbs to 164 lbs also noted from 4/17 to 4/22  Acutely short of breath at time of admission, improved today  Increase IV Lasix to 80 mg twice daily  1800ml FR  Cardiology and nephrology following    Primary hypertension  Continue hydralazine, losartan, Coreg  Started on Imdur today, monitor response  Hypokalemia  Potassium 3.7 on admission  Increase potassium to 20 meq twice daily while on IV diuretics  Monitor daily  Chronic kidney disease (CKD), stage IV (severe) (HCC)  Lab Results   Component Value Date    EGFR 25 04/23/2025    EGFR 23 04/22/2025    EGFR 30 04/17/2025    CREATININE 2.53 (H) 04/23/2025    CREATININE 2.77 (H) 04/22/2025    CREATININE 2.20 (H) 04/17/2025   At risk for LUDWIG most likely secondary to ongoing diuresis along  with underlying multiple myeloma and concerns for AL amyloidosis   Nephrology following  Monitor with diuresis, remains within baseline  AL amyloidosis (HCC)  Follows with heme-onc  outpatient  Previously declined cardiac biopsy for amyloid work up   Chronic atrial fibrillation (HCC)  On admission EKG consistent with A-fib  Continue home  Coumadin 7.5 mg on S,M,T,W,F,S and followed by 5mg on Thursday  INR elevated today, will hold today's dose  Pulmonary hypertension (HCC)  Following with outpatient pulmonology  Continue hydralazine, losartan, Coreg  Will start him on IV lasix 40 mg BID  Monitor vital signs  Strict I/O and daily weights   Myeloma (HCC)  Following outpatient with [facility] hematology oncology for multiple myeloma with probable AL amyloidosis  Was sent to begin chemotherapy this week but opted to postpone treatment  Asking for second opinion, will place ambulatory referral to [facility] hematology oncology at discharge    VTE Pharmacologic Prophylaxis:     High Risk (Score >/= 5) - Pharmacological DVT Prophylaxis Ordered: warfarin (Coumadin). Sequential Compression Devices Ordered.  On hold for elevated INR  Mobility:   Basic Mobility Inpatient Raw Score: 23  JH-HLM Goal: 7: Walk 25 feet or more  JH-HLM Achieved: 7: Walk 25 feet or more  JH-HLM Goal achieved. Continue to encourage appropriate mobility.    Patient Centered Rounds: I performed bedside rounds with nursing staff today.   Discussions with Specialists or Other Care Team Provider: Nephrology, cardiology    Current Length of Stay: 1 day(s)  Current Patient Status: Inpatient   Certification Statement: The patient will continue to require additional inpatient hospital stay due to shortness of breath, CHF exacerbation requiring IV diuretics  Discharge Plan: Anticipate discharge in 48 hrs to home.    Code Status: Level 1 - Full Code    Subjective   Patient seen and examined at bedside.  Notes he is feeling better than he was at time of admission but still with some shortness of breath.  Feels better on supplemental oxygen.  Does report he has been urinating appropriately on IV diuretics.    Objective :  Temp:  [96.5 °F  (35.8 °C)-98.6 °F (37 °C)] 98 °F (36.7 °C)  HR:  [56-95] 66  BP: (138-182)/() 151/100  Resp:  [18-20] 18  SpO2:  [95 %-100 %] 99 %  O2 Device: Nasal cannula  Nasal Cannula O2 Flow Rate (L/min):  [2 L/min] 2 L/min    Body mass index is 19.79 kg/m².     Input and Output Summary (last 24 hours):     Intake/Output Summary (Last 24 hours) at 4/23/2025 0958  Last data filed at 4/23/2025 0752  Gross per 24 hour   Intake 660 ml   Output 1920 ml   Net -1260 ml       Physical Exam  Vitals reviewed.   Constitutional:       General: He is not in acute distress.  HENT:      Head: Normocephalic and atraumatic.   Eyes:      General: No scleral icterus.     Conjunctiva/sclera: Conjunctivae normal.   Cardiovascular:      Rate and Rhythm: Normal rate and regular rhythm.      Heart sounds: No murmur heard.  Pulmonary:      Effort: Pulmonary effort is normal. No respiratory distress.      Breath sounds: Normal breath sounds. No wheezing.   Abdominal:      General: Bowel sounds are normal. There is no distension.      Palpations: Abdomen is soft.      Tenderness: There is no abdominal tenderness.   Musculoskeletal:      Cervical back: Neck supple.      Right lower leg: No edema.      Left lower leg: No edema.   Skin:     General: Skin is warm and dry.   Neurological:      Mental Status: He is alert and oriented to person, place, and time.   Psychiatric:         Mood and Affect: Mood normal.         Behavior: Behavior normal.           Lines/Drains:        Telemetry:  Telemetry Orders (From admission, onward)               24 Hour Telemetry Monitoring  Continuous x 24 Hours (Telem)        Expiring   Question:  Reason for 24 Hour Telemetry  Answer:  Decompensated CHF- and any one of the following: continuous diuretic infusion or total diuretic dose >200 mg daily, associated electrolyte derangement (I.e. K < 3.0), inotropic drip (continuous infusion), hx of ventricular arrhythmia, or new EF < 35%                     Telemetry  Reviewed: Normal Sinus Rhythm  Indication for Continued Telemetry Use: Acute CHF on >200 mg lasix/day or equivalent dose or with new reduced EF.                Lab Results: I have reviewed the following results:   Results from last 7 days   Lab Units 04/23/25  0542   WBC Thousand/uL 5.32   HEMOGLOBIN g/dL 10.1*   HEMATOCRIT % 33.0*   PLATELETS Thousands/uL 224   SEGS PCT % 66   LYMPHO PCT % 22   MONO PCT % 7   EOS PCT % 4     Results from last 7 days   Lab Units 04/23/25  0542 04/22/25  1318   SODIUM mmol/L 144 141   POTASSIUM mmol/L 3.6 3.7   CHLORIDE mmol/L 106 104   CO2 mmol/L 30 28   BUN mg/dL 51* 50*   CREATININE mg/dL 2.53* 2.77*   ANION GAP mmol/L 8 9   CALCIUM mg/dL 9.1 9.1   ALBUMIN g/dL  --  4.1   TOTAL BILIRUBIN mg/dL  --  1.04*   ALK PHOS U/L  --  56   ALT U/L  --  16   AST U/L  --  19   GLUCOSE RANDOM mg/dL 102 101     Results from last 7 days   Lab Units 04/23/25  0542   INR  3.22*                   Recent Cultures (last 7 days):               Last 24 Hours Medication List:     Current Facility-Administered Medications:     acetaminophen (TYLENOL) tablet 650 mg, Q6H PRN    carvedilol (COREG) tablet 3.125 mg, BID With Meals    fluticasone-vilanterol 200-25 mcg/actuation 1 puff, Daily **AND** umeclidinium 62.5 mcg/actuation inhaler AEPB 1 puff, Daily    furosemide (LASIX) injection 40 mg, BID (diuretic)    hydrALAZINE (APRESOLINE) tablet 25 mg, Q8H LOTTIE    labetalol (NORMODYNE) injection 10 mg, Q4H PRN    LORazepam (ATIVAN) tablet 0.5 mg, HS PRN    losartan (COZAAR) tablet 50 mg, Daily    melatonin tablet 3 mg, HS PRN    polyethylene glycol (MIRALAX) packet 17 g, Daily PRN    potassium chloride (Klor-Con M20) CR tablet 20 mEq, Daily    prochlorperazine (COMPAZINE) tablet 10 mg, Q6H PRN    simethicone (MYLICON) chewable tablet 80 mg, 4x Daily PRN    warfarin (COUMADIN) tablet 7.5 mg, Once per day on Sunday Monday Tuesday Wednesday Friday Saturday **AND** [START ON 4/24/2025] warfarin (COUMADIN) tablet 5  mg, Every Thursday    Administrative Statements   Today, Patient Was Seen By: Concepcion Almeida PA-C      **Please Note: This note may have been constructed using a voice recognition system.**

## 2025-04-23 NOTE — ASSESSMENT & PLAN NOTE
Wt Readings from Last 3 Encounters:   04/23/25 69.9 kg (154 lb 1.6 oz)   04/22/25 74.6 kg (164 lb 8 oz)   04/17/25 69.7 kg (153 lb 10.6 oz)

## 2025-04-23 NOTE — ASSESSMENT & PLAN NOTE
Recent echocardiogram showed ejection fraction 40% with elevated right ventricular systolic pressures.  Noted IVC dilatation.  Digestion of cardiac amyloidosis.

## 2025-04-24 PROBLEM — I47.29 NSVT (NONSUSTAINED VENTRICULAR TACHYCARDIA) (HCC): Status: ACTIVE | Noted: 2025-04-24

## 2025-04-24 LAB
ANION GAP SERPL CALCULATED.3IONS-SCNC: 9 MMOL/L (ref 4–13)
BUN SERPL-MCNC: 52 MG/DL (ref 5–25)
CALCIUM SERPL-MCNC: 8.9 MG/DL (ref 8.4–10.2)
CHLORIDE SERPL-SCNC: 106 MMOL/L (ref 96–108)
CO2 SERPL-SCNC: 31 MMOL/L (ref 21–32)
CREAT SERPL-MCNC: 2.59 MG/DL (ref 0.6–1.3)
ERYTHROCYTE [DISTWIDTH] IN BLOOD BY AUTOMATED COUNT: 17.2 % (ref 11.6–15.1)
GFR SERPL CREATININE-BSD FRML MDRD: 25 ML/MIN/1.73SQ M
GLUCOSE SERPL-MCNC: 86 MG/DL (ref 65–140)
HCT VFR BLD AUTO: 30.8 % (ref 36.5–49.3)
HGB BLD-MCNC: 9.6 G/DL (ref 12–17)
INR PPP: 3.4 (ref 0.85–1.19)
MCH RBC QN AUTO: 21.7 PG (ref 26.8–34.3)
MCHC RBC AUTO-ENTMCNC: 31.2 G/DL (ref 31.4–37.4)
MCV RBC AUTO: 70 FL (ref 82–98)
PLATELET # BLD AUTO: 220 THOUSANDS/UL (ref 149–390)
POTASSIUM SERPL-SCNC: 3.7 MMOL/L (ref 3.5–5.3)
PROTHROMBIN TIME: 33.6 SECONDS (ref 12.3–15)
RBC # BLD AUTO: 4.43 MILLION/UL (ref 3.88–5.62)
SODIUM SERPL-SCNC: 146 MMOL/L (ref 135–147)
WBC # BLD AUTO: 5.2 THOUSAND/UL (ref 4.31–10.16)

## 2025-04-24 PROCEDURE — 80048 BASIC METABOLIC PNL TOTAL CA: CPT | Performed by: PHYSICIAN ASSISTANT

## 2025-04-24 PROCEDURE — 99232 SBSQ HOSP IP/OBS MODERATE 35: CPT | Performed by: INTERNAL MEDICINE

## 2025-04-24 PROCEDURE — 85610 PROTHROMBIN TIME: CPT | Performed by: PHYSICIAN ASSISTANT

## 2025-04-24 PROCEDURE — 85027 COMPLETE CBC AUTOMATED: CPT | Performed by: PHYSICIAN ASSISTANT

## 2025-04-24 PROCEDURE — 99232 SBSQ HOSP IP/OBS MODERATE 35: CPT | Performed by: STUDENT IN AN ORGANIZED HEALTH CARE EDUCATION/TRAINING PROGRAM

## 2025-04-24 PROCEDURE — 99232 SBSQ HOSP IP/OBS MODERATE 35: CPT

## 2025-04-24 RX ORDER — AMIODARONE HYDROCHLORIDE 200 MG/1
400 TABLET ORAL 2 TIMES DAILY WITH MEALS
Status: DISCONTINUED | OUTPATIENT
Start: 2025-04-24 | End: 2025-04-30 | Stop reason: HOSPADM

## 2025-04-24 RX ORDER — ISOSORBIDE DINITRATE 10 MG/1
10 TABLET ORAL
Status: DISCONTINUED | OUTPATIENT
Start: 2025-04-24 | End: 2025-04-26

## 2025-04-24 RX ORDER — AMIODARONE HYDROCHLORIDE 200 MG/1
200 TABLET ORAL
Status: DISCONTINUED | OUTPATIENT
Start: 2025-05-01 | End: 2025-04-30 | Stop reason: HOSPADM

## 2025-04-24 RX ORDER — ISOSORBIDE DINITRATE 10 MG/1
20 TABLET ORAL
Status: DISCONTINUED | OUTPATIENT
Start: 2025-04-24 | End: 2025-04-24

## 2025-04-24 RX ADMIN — ISOSORBIDE DINITRATE 10 MG: 10 TABLET ORAL at 13:41

## 2025-04-24 RX ADMIN — POTASSIUM CHLORIDE 20 MEQ: 1500 TABLET, EXTENDED RELEASE ORAL at 18:16

## 2025-04-24 RX ADMIN — LOSARTAN POTASSIUM 50 MG: 50 TABLET, FILM COATED ORAL at 08:14

## 2025-04-24 RX ADMIN — FLUTICASONE FUROATE AND VILANTEROL TRIFENATATE 1 PUFF: 200; 25 POWDER RESPIRATORY (INHALATION) at 08:13

## 2025-04-24 RX ADMIN — AMIODARONE HYDROCHLORIDE 400 MG: 200 TABLET ORAL at 10:55

## 2025-04-24 RX ADMIN — CARVEDILOL 3.12 MG: 3.12 TABLET, FILM COATED ORAL at 08:14

## 2025-04-24 RX ADMIN — HYDRALAZINE HYDROCHLORIDE 25 MG: 25 TABLET ORAL at 13:41

## 2025-04-24 RX ADMIN — HYDRALAZINE HYDROCHLORIDE 25 MG: 25 TABLET ORAL at 05:14

## 2025-04-24 RX ADMIN — LORAZEPAM 0.5 MG: 0.5 TABLET ORAL at 23:02

## 2025-04-24 RX ADMIN — FUROSEMIDE 80 MG: 10 INJECTION, SOLUTION INTRAVENOUS at 08:14

## 2025-04-24 RX ADMIN — HYDRALAZINE HYDROCHLORIDE 25 MG: 25 TABLET ORAL at 21:57

## 2025-04-24 RX ADMIN — MELATONIN 3 MG: 3 TAB ORAL at 23:02

## 2025-04-24 RX ADMIN — UMECLIDINIUM 1 PUFF: 62.5 AEROSOL, POWDER ORAL at 08:14

## 2025-04-24 RX ADMIN — POTASSIUM CHLORIDE 20 MEQ: 1500 TABLET, EXTENDED RELEASE ORAL at 08:14

## 2025-04-24 NOTE — ASSESSMENT & PLAN NOTE
Had 1 episode of NSVT overnight  Likely scar mediated in the setting of infiltrative cardiomyopathy  Discussed initiating amiodarone, patient is agreeable

## 2025-04-24 NOTE — ASSESSMENT & PLAN NOTE
Continue hydralazine 25 mg 3 times daily, losartan 50 mg daily, Coreg 3.125 mg twice daily  Blood pressure control, monitor for hypotension in setting of diuresis  Cardiology added on Isordil 10 mg 3 times daily - refused AM dose. Discussed medication with patient who is agreeable to trying this.

## 2025-04-24 NOTE — ASSESSMENT & PLAN NOTE
Presents with shortness of breath after being hospitalized for HFpEF, signed out AMA due to family concerns  Continues on IV Lasix 80 mg twice daily  Intermittently using 2 L nasal cannula for comfort, no noted hypoxia on room air  Of note patient was recently seen by pulmonology with elevated RVSP, patient was recommended for volume management and diuresis  Notes this is improving, will plan for home oxygen evaluation prior to discharge

## 2025-04-24 NOTE — PROGRESS NOTES
NEPHROLOGY HOSPITAL PROGRESS NOTE   Domingo Trevino 65 y.o. male MRN: [id number]  Unit/Bed#: E4 -01 Encounter: [id number]  Reason for Consult: CKD    Assessment & Plan  Chronic kidney disease (CKD), stage IV (severe) (HCC)  Chronic kidney disease likely multifactorial given underlying multiple myeloma with concerns for AL amyloidosis.  Baseline creatinine reported at 2.3-2.7 since October 2024.  Current creatinine remains at baseline at 2.59  Hypokalemia  Current potassium 3.7  Recommend close observation with ongoing diuretic therapy.  SOB (shortness of breath)  Chest x-ray consistent with persistent vascular congestion with small bilateral pleural effusions  Continue with Lasix 80 twice daily for another 24 hours and then likely can switch to oral  Myeloma (HCC)  Been as per hematology oncology  Patient unfortunately still not decided on whether he wants to proceed with treatment  He is interested in possible second opinion  Discussed with primary service.  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  Noted IVC dilatation.  Digestion of cardiac amyloidosis.  Primary hypertension  Previous renal Doppler showing no evidence of occlusive disease although left main renal artery not visualized.  Noted atrophic left kidney measuring 7.6 cm, right kidney 11.8 cm  Continue with losartan, hydralazine and carvedilol.  Pulmonary hypertension (HCC)  Management as per cardiology.    Summary:  Renal function overall stable with creatinine 2.59  Would continue with IV Lasix for another 24 hours then likely switch to oral diuretic therapy  Discussed with cardiology  Awaiting potential second opinion.    SUBJECTIVE / 24H INTERVAL HISTORY:  Seen and examined.  Patient awake and alert.  States that his shortness of breath has improved.  No reported chest pain or pressure.  No abdominal pain.    OBJECTIVE:  Current Weight: Weight -  Scale: 69.8 kg (153 lb 14.1 oz)  Vitals:    04/24/25 0508 04/24/25 0537 04/24/25 0731 04/24/25 1046   BP: (!) 167/112  119/77 135/77   BP Location: Left arm  Left arm Left arm   Pulse:   63 58   Resp: 20  20 18   Temp: 97.9 °F (36.6 °C)  98.2 °F (36.8 °C) 97.6 °F (36.4 °C)   TempSrc: Temporal  Temporal Temporal   SpO2: 98%  99% 98%   Weight:  69.8 kg (153 lb 14.1 oz)     Height:           Intake/Output Summary (Last 24 hours) at 4/24/2025 1057  Last data filed at 4/24/2025 1035  Gross per 24 hour   Intake 1180 ml   Output 3820 ml   Net -2640 ml       Physical Exam  Constitutional:       Appearance: He is not ill-appearing.   Cardiovascular:      Rate and Rhythm: Normal rate and regular rhythm.   Pulmonary:      Effort: Pulmonary effort is normal.      Breath sounds: Examination of the right-lower field reveals rales. Examination of the left-lower field reveals rales. Rales present.   Abdominal:      General: There is no distension.      Palpations: Abdomen is soft.   Musculoskeletal:      Right lower leg: No edema.      Left lower leg: No edema.   Skin:     General: Skin is warm and dry.   Neurological:      Mental Status: He is alert and oriented to person, place, and time.         Medications:    Current Facility-Administered Medications:     acetaminophen (TYLENOL) tablet 650 mg, 650 mg, Oral, Q6H PRN, VEENA Bridges    [START ON 5/1/2025] amiodarone tablet 200 mg, 200 mg, Oral, Daily With Breakfast, Gelacio Mckeon MD    amiodarone tablet 400 mg, 400 mg, Oral, BID With Meals, Gelacio Mckeon MD, 400 mg at 04/24/25 1055    carvedilol (COREG) tablet 3.125 mg, 3.125 mg, Oral, BID With Meals, VEENA Bridges, 3.125 mg at 04/24/25 0814    fluticasone-vilanterol 200-25 mcg/actuation 1 puff, 1 puff, Inhalation, Daily, 1 puff at 04/24/25 0813 **AND** umeclidinium 62.5 mcg/actuation inhaler AEPB 1 puff, 1 puff, Inhalation, Daily, VEENA Bridges, 1 puff at 04/24/25 0814    furosemide (LASIX)  "injection 80 mg, 80 mg, Intravenous, BID (diuretic), Concepcion Almeida PA-C, 80 mg at 04/24/25 0814    hydrALAZINE (APRESOLINE) tablet 25 mg, 25 mg, Oral, Q8H LOTTIE, Sammy Paulur, CRNP, 25 mg at 04/24/25 0514    isosorbide dinitrate (ISORDIL) tablet 10 mg, 10 mg, Oral, TID after meals, Gelacio Mckeon MD    labetalol (NORMODYNE) injection 10 mg, 10 mg, Intravenous, Q4H PRN, Ryan Perez DO    LORazepam (ATIVAN) tablet 0.5 mg, 0.5 mg, Oral, HS PRN, Sammy Paulur, CRNP, 0.5 mg at 04/23/25 2305    losartan (COZAAR) tablet 50 mg, 50 mg, Oral, Daily, Sammy Adams, CRNP, 50 mg at 04/24/25 0814    melatonin tablet 3 mg, 3 mg, Oral, HS PRN, Sammy Adams, CRNP, 3 mg at 04/23/25 2305    polyethylene glycol (MIRALAX) packet 17 g, 17 g, Oral, Daily PRN, Sammy Adams, CRNP    potassium chloride (Klor-Con M20) CR tablet 20 mEq, 20 mEq, Oral, BID With Meals, Concepcion Almeida PA-C, 20 mEq at 04/24/25 0814    prochlorperazine (COMPAZINE) tablet 10 mg, 10 mg, Oral, Q6H PRN, Sammy Paulur, CRNP    simethicone (MYLICON) chewable tablet 80 mg, 80 mg, Oral, 4x Daily PRN, Sammy Adams, CRNP    [Held by provider] warfarin (COUMADIN) tablet 7.5 mg, 7.5 mg, Oral, Once per day on Sunday Monday Tuesday Wednesday Friday Saturday, 7.5 mg at 04/22/25 1715 **AND** [Held by provider] warfarin (COUMADIN) tablet 5 mg, 5 mg, Oral, Every Thursday, Sammy Adams, CRNP    Laboratory Results:  Results from last 7 days   Lab Units 04/24/25  0500 04/23/25  0542 04/22/25  1318   WBC Thousand/uL 5.20 5.32 4.83   HEMOGLOBIN g/dL 9.6* 10.1* 9.6*   HEMATOCRIT % 30.8* 33.0* 30.5*   PLATELETS Thousands/uL 220 224 225   POTASSIUM mmol/L 3.7 3.6 3.7   CHLORIDE mmol/L 106 106 104   CO2 mmol/L 31 30 28   BUN mg/dL 52* 51* 50*   CREATININE mg/dL 2.59* 2.53* 2.77*   CALCIUM mg/dL 8.9 9.1 9.1   MAGNESIUM mg/dL  --  2.1  --        Portions of the record may have been created with voice recognition software. Occasional wrong word or \"sound a like\" substitutions " may have occurred due to the inherent limitations of voice recognition software. Read the chart carefully and recognize, using context, where substitutions have occurred.If you have any questions, please contact the dictating provider.

## 2025-04-24 NOTE — ASSESSMENT & PLAN NOTE
Wt Readings from Last 3 Encounters:   04/24/25 69.8 kg (153 lb 14.1 oz)   04/22/25 74.6 kg (164 lb 8 oz)   04/17/25 69.7 kg (153 lb 10.6 oz)     164 pounds on admission, dry weight around 150  Chest x-ray with persistent vascular congestion and small pleural effusions  Echocardiogram April 25 with EF 40%, high suggestive of amyloid cardiomyopathy  PTA torsemide 40 mg every morning and 20 mg every afternoon with diuretic noncompliance  Currently on IV Lasix 80 mg twice daily  Monitor daily weights, output, fluid restricted diet

## 2025-04-24 NOTE — ASSESSMENT & PLAN NOTE
Recent TTE ED with RVSP elevated at 69  Continue diuretic therapy and oxygen as needed  Cardiology following  Reviewed outpatient pulmonology note with Dr. Haque

## 2025-04-24 NOTE — ASSESSMENT & PLAN NOTE
Potassium stable at 3.7, continue increased potassium supplementation 20 mEq twice daily while on IV diuretics

## 2025-04-24 NOTE — PROGRESS NOTES
Progress Note - Cardiology   Name: Domingo Trevino 65 y.o. male I MRN: [id number]  Unit/Bed#: E4 -01 I Date of Admission: 4/22/2025   Date of Service: 4/24/2025 I Hospital Day: 2    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  Etiology: Likely amyloid, last LVEF from 2024 was 55%  EKG: Rate controlled atrial fibrillation, low voltage limb leads     PTA cardiac medications: Coreg 3.25 mg twice daily, losartan 50 mg daily, torsemide 40 mg a.m./torsemide 20 mg p.m. daily, hydralazine 25 mg every 8 hours     Current cardiac medications: Coreg 3.25 mg twice daily, IV Lasix 40 mg twice daily , losartan 50 mg daily, hydralazine 25 mg every every 8 hours        Patient's dry weight is 155 LBS, current standing scale weight is 153 LBS  Patient put out 3.2 L overnight.  Is net -2.3 L.     Plan:      Patient had significant output with stable kidney function, continue IV diuresis  Transition to home dosing of torsemide 40 mg a.m. and 20 mg p.m. once euvolemic  Renal function seems to be improving after diuresis, monitor  Can hold beta-blocker to allow for adequate diuresis   Patient okay to take Isordil along with his hydralazine today.  Will uptitrate accordingly  Continue losartan 50 mg daily  Primary hypertension  Continue Cozaar and hydralazine  Continue Isordil 10 mg 3 times daily  NSVT (nonsustained ventricular tachycardia) ([facility])  Had 1 episode of NSVT overnight  Likely scar mediated in the setting of infiltrative cardiomyopathy  Discussed initiating amiodarone, patient is agreeable  Hypokalemia    Chronic kidney disease (CKD), stage IV (severe) ([facility])  Lab Results   Component Value Date    EGFR 25 04/24/2025    EGFR 25 04/23/2025    EGFR 23 04/22/2025    CREATININE 2.59 (H) 04/24/2025    CREATININE 2.53 (H) 04/23/2025    CREATININE 2.77 (H) 04/22/2025     AL amyloidosis ([facility])  No definitive diagnosis.  Patient does not have a cardiac MRI, and has declined endomyocardial biopsy  Bone marrow biopsy with  "[place] red negative  PYP scan negative for ATTR amyloid     Echocardiogram is highly suspicious of infiltrative cardiomyopathy with apical sparing decreased strain.     Will likely need cardiac MRI to definitively diagnose amyloidosis, and this may need to be facilitated with anesthesia.  At this point he would benefit from obtaining Chacha-CyBorD for multiple myeloma  Chronic atrial fibrillation (HCC)  Continue warfarin, INR 3.2  Pulmonary hypertension (HCC)  Likely group 2  Myeloma (HCC)      Subjective     Patient had significant episode of NSVT overnight.  He was asymptomatic  No other acute overnight events.  States he feels better today.    Objective :  Temp:  [97.6 °F (36.4 °C)-98.6 °F (37 °C)] 97.6 °F (36.4 °C)  HR:  [54-70] 58  BP: (119-167)/() 135/77  Resp:  [18-20] 18  SpO2:  [97 %-99 %] 98 %  O2 Device: Nasal cannula  Nasal Cannula O2 Flow Rate (L/min):  [2 L/min] 2 L/min  Orthostatic Blood Pressures      Flowsheet Row Most Recent Value   Blood Pressure 135/77 filed at 04/24/2025 1046   Patient Position - Orthostatic VS Lying filed at 04/24/2025 1046          First Weight: Weight - Scale: 74.6 kg (164 lb 7.4 oz) (04/22/25 1245)  Vitals:    04/23/25 0600 04/24/25 0537   Weight: 69.9 kg (154 lb 1.6 oz) 69.8 kg (153 lb 14.1 oz)     Physical Exam      Lab Results: I have reviewed the following results:CBC/BMP:   .     04/24/25  0500   WBC 5.20   HGB 9.6*   HCT 30.8*      SODIUM 146   K 3.7      CO2 31   BUN 52*   CREATININE 2.59*   GLUC 86    , Creatinine Clearance: Estimated Creatinine Clearance: 28.8 mL/min (A) (by C-G formula based on SCr of 2.59 mg/dL (H))., Troponin,BNP:No new results in last 24 hours. , Procalcitonin: No results found for: \"PROCALCITONI\"  Results from last 7 days   Lab Units 04/24/25  0500 04/23/25  0542 04/22/25  1318   WBC Thousand/uL 5.20 5.32 4.83   HEMOGLOBIN g/dL 9.6* 10.1* 9.6*   HEMATOCRIT % 30.8* 33.0* 30.5*   PLATELETS Thousands/uL 220 224 225     Results from " "last 7 days   Lab Units 04/24/25  0500 04/23/25  0542 04/22/25  1318   POTASSIUM mmol/L 3.7 3.6 3.7   CHLORIDE mmol/L 106 106 104   CO2 mmol/L 31 30 28   BUN mg/dL 52* 51* 50*   CREATININE mg/dL 2.59* 2.53* 2.77*   CALCIUM mg/dL 8.9 9.1 9.1     Results from last 7 days   Lab Units 04/24/25  0500 04/23/25  0542 04/22/25  1318   INR  3.40* 3.22* 2.98*   PTT seconds  --   --  37*     No results found for: \"HGBA1C\"  Lab Results   Component Value Date    TROPONINI 1.01 () 06/17/2021       Imaging Results Review: No pertinent imaging studies reviewed.  Other Study Results Review: EKG was reviewed.     VTE Pharmacologic Prophylaxis: VTE covered by:  [Held by provider] warfarin, Oral, 7.5 mg at 04/22/25 1715   And  [Held by provider] warfarin, Oral     VTE Mechanical Prophylaxis: sequential compression device      "

## 2025-04-24 NOTE — ASSESSMENT & PLAN NOTE
Rate controlled with Coreg 3.25 mg twice daily, AC with Coumadin  Confirmed with patient Coumadin 5 mg Tuesday Thursday Sunday and 7.5 mg all other days  INR continues to be supratherapeutic at 3.4, continue to hold Coumadin

## 2025-04-24 NOTE — ASSESSMENT & PLAN NOTE
Patient's echo was suggestive of cardiac amyloid and he has declined endomyocardial biopsy  He is following hematology oncology, reviewed recent outpatient visit 4/21/2025

## 2025-04-24 NOTE — ASSESSMENT & PLAN NOTE
Etiology: Likely amyloid, last LVEF from 2024 was 55%  EKG: Rate controlled atrial fibrillation, low voltage limb leads     PTA cardiac medications: Coreg 3.25 mg twice daily, losartan 50 mg daily, torsemide 40 mg a.m./torsemide 20 mg p.m. daily, hydralazine 25 mg every 8 hours     Current cardiac medications: Coreg 3.25 mg twice daily, IV Lasix 40 mg twice daily , losartan 50 mg daily, hydralazine 25 mg every every 8 hours        Patient's dry weight is 155 LBS, current standing scale weight is 153 LBS  Patient put out 3.2 L overnight.  Is net -2.3 L.     Plan:      Patient had significant output with stable kidney function, continue IV diuresis  Transition to home dosing of torsemide 40 mg a.m. and 20 mg p.m. once euvolemic  Renal function seems to be improving after diuresis, monitor  Can hold beta-blocker to allow for adequate diuresis   Patient okay to take Isordil along with his hydralazine today.  Will uptitrate accordingly  Continue losartan 50 mg daily

## 2025-04-24 NOTE — ASSESSMENT & PLAN NOTE
Chronic kidney disease likely multifactorial given underlying multiple myeloma with concerns for AL amyloidosis.  Baseline creatinine reported at 2.3-2.7 since October 2024.  Current creatinine remains at baseline at 2.59

## 2025-04-24 NOTE — ASSESSMENT & PLAN NOTE
Multiple myeloma not having achieved admission, prior bone marrow biopsy October 2020  Following outpatient [facility] hematology oncology, reviewed recent visit 4/21/2025  Patient has close follow-up scheduled 4/28 with Dr. Livingston to discuss potentially starting cycle 1 of treatment

## 2025-04-24 NOTE — CASE MANAGEMENT
Case Management Discharge Planning Note    Patient name Domingo Trevino  [facility] /[facility] -* MRN [id number]  : 1960 Date 2025       Current Admission Date: 2025  Current Admission Diagnosis:SOB (shortness of breath)   Patient Active Problem List    Diagnosis Date Noted Date Diagnosed    Myeloma (HCC) 2025     SOB (shortness of breath) 2025     Pulmonary hypertension (HCC) 04/15/2025     Former smoker 04/15/2025     Persistent proteinuria 04/15/2025     Anemia, chronic disease 2025     Electrolyte abnormality 2025     Renal infarction (HCC) 2025     Chronic kidney disease (CKD), stage IV (severe) (HCC) 2025     AL amyloidosis (HCC) 2025     Multiple myeloma not having achieved remission (HCC) 2025     Chronic atrial fibrillation (HCC) 2025     HFpEF, 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 2022     Primary hypertension 2022     Elevated troponin 2022     Hypokalemia 2022       LOS (days): 2  Geometric Mean LOS (GMLOS) (days):   Days to GMLOS:     OBJECTIVE:  Risk of Unplanned Readmission Score: 38.01         Current admission status: Inpatient   [facility] Pharmacy:   [id number] - [place] - [address] [facility]  [address] [place] [id number]  Phone: [phone] Fax: [phone]    Primary Care Provider: Maricruz Peres    Primary Insurance: [facility]  Secondary Insurance:     DISCHARGE DETAILS:                                                                                                 Additional Comments: CM had received an email from [employer] that the pt's [facility] insurance has been active since 25.  CM informed the pt that his insurance is active.  He did confirm that he has not taken his diuretic consistently becuase it was difficult to use the BR since he has to share it with other residents in the boarding room.  We discussed the importance of taking the  diuretics to prevent sx of CHF.  We reviewed the importance of daily weights and also signs and sx of CHF.  He verbalized understanding.  Pt waws david provided lsit for affordable hotel rooms that would provide private BR/BA.  He was also given resources for shelter and informed him of the 211 process.   He verbalized understanding, and accepted the resources.  JUAN observed pt not using the O2 via NC this am.  CM to follow

## 2025-04-24 NOTE — PROGRESS NOTES
Progress Note - Hospitalist   Name: Domingo Trevino 65 y.o. male I MRN: [id number]  Unit/Bed#: E4 -01 I Date of Admission: 4/22/2025   Date of Service: 4/24/2025 I Hospital Day: 2    Assessment & Plan  SOB (shortness of breath)  Presents with shortness of breath after being hospitalized for HFpEF, signed out AMA due to family concerns  Continues on IV Lasix 80 mg twice daily  Intermittently using 2 L nasal cannula for comfort, no noted hypoxia on room air  Of note patient was recently seen by pulmonology with elevated RVSP, patient was recommended for volume management and diuresis  Notes this is improving, will plan for home oxygen evaluation prior to discharge  HFpEF, 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  Wt Readings from Last 3 Encounters:   04/24/25 69.8 kg (153 lb 14.1 oz)   04/22/25 74.6 kg (164 lb 8 oz)   04/17/25 69.7 kg (153 lb 10.6 oz)     164 pounds on admission, dry weight around 150  Chest x-ray with persistent vascular congestion and small pleural effusions  Echocardiogram April 25 with EF 40%, high suggestive of amyloid cardiomyopathy  PTA torsemide 40 mg every morning and 20 mg every afternoon with diuretic noncompliance  Currently on IV Lasix 80 mg twice daily  Monitor daily weights, output, fluid restricted diet  Myeloma (HCC)  Multiple myeloma not having achieved admission, prior bone marrow biopsy October 2020  Following outpatient [facility] hematology oncology, reviewed recent visit 4/21/2025  Patient has close follow-up scheduled 4/28 with Dr. Livingston to discuss potentially starting cycle 1 of treatment  AL amyloidosis (HCC)  Patient's echo was suggestive of cardiac amyloid and he has declined endomyocardial biopsy  He is following hematology oncology, reviewed recent outpatient visit 4/21/2025  Primary hypertension  Continue hydralazine 25 mg 3 times daily, losartan 50 mg daily, Coreg 3.125 mg twice daily  Blood pressure control, monitor for hypotension in setting of diuresis  Cardiology added on  Isordil 10 mg 3 times daily - refused AM dose. Discussed medication with patient who is agreeable to trying this.  Hypokalemia  Potassium stable at 3.7, continue increased potassium supplementation 20 mEq twice daily while on IV diuretics  Chronic kidney disease (CKD), stage IV (severe) (HCC)  Lab Results   Component Value Date    EGFR 25 04/24/2025    EGFR 25 04/23/2025    EGFR 23 04/22/2025    CREATININE 2.59 (H) 04/24/2025    CREATININE 2.53 (H) 04/23/2025    CREATININE 2.77 (H) 04/22/2025   Baseline creatinine 2.3-2.7  Patient at risk for LUDWIG in setting multiple myeloma with amyloidosis  Recent renal Doppler without evidence of renal artery stenosis  Nephrology following  Chronic atrial fibrillation (HCC)  Rate controlled with Coreg 3.25 mg twice daily, AC with Coumadin  Confirmed with patient Coumadin 5 mg Tuesday Thursday Sunday and 7.5 mg all other days  INR continues to be supratherapeutic at 3.4, continue to hold Coumadin  Pulmonary hypertension (HCC)  Recent TTE ED with RVSP elevated at 69  Continue diuretic therapy and oxygen as needed  Cardiology following  Reviewed outpatient pulmonology note with Dr. Haque    VTE Pharmacologic Prophylaxis:   coumadin on hold    Mobility:   Basic Mobility Inpatient Raw Score: 23  JH-HLM Goal: 7: Walk 25 feet or more  JH-HLM Achieved: 7: Walk 25 feet or more  JH-HLM Goal achieved. Continue to encourage appropriate mobility.    Patient Centered Rounds: I performed bedside rounds with nursing staff today.   Discussions with Specialists or Other Care Team Provider: Nephrology, CM    Education and Discussions with Family / Patient: Patient declined call to .     Current Length of Stay: 2 day(s)  Current Patient Status: Inpatient   Certification Statement: The patient will continue to require additional inpatient hospital stay due to diuresis  Discharge Plan: Anticipate discharge in 24-48 hrs to home.    Code Status: Level 1 - Full Code    Subjective   Patient  seen and examined.  Feels his shortness of breath is better but still intermittently has shortness of breath and will use the oxygen.  Cannot contribute it to anything specifically just notes he will get short of breath.  Currently denies any chest pain or difficulty urinating. He states he did not want to take the new medication the cardiologist added on but if we are recommending it he is open to it.  We also discussed holding his Coumadin and he expressed understanding.    Objective :  Temp:  [97.6 °F (36.4 °C)-98.6 °F (37 °C)] 97.6 °F (36.4 °C)  HR:  [54-70] 58  BP: (117-167)/() 135/77  Resp:  [18-20] 18  SpO2:  [97 %-99 %] 98 %  O2 Device: Nasal cannula  Nasal Cannula O2 Flow Rate (L/min):  [2 L/min] 2 L/min    Body mass index is 19.76 kg/m².     Input and Output Summary (last 24 hours):     Intake/Output Summary (Last 24 hours) at 4/24/2025 1202  Last data filed at 4/24/2025 1035  Gross per 24 hour   Intake 1180 ml   Output 3470 ml   Net -2290 ml       Physical Exam  Vitals and nursing note reviewed.   Constitutional:       General: He is not in acute distress.     Appearance: He is not toxic-appearing.      Comments: Chronically ill appearing   Cardiovascular:      Rate and Rhythm: Normal rate.   Pulmonary:      Effort: Pulmonary effort is normal. No respiratory distress.      Breath sounds: Rales (bilateral lower lungs) present.   Abdominal:      General: Bowel sounds are normal.      Palpations: Abdomen is soft.      Tenderness: There is no abdominal tenderness.   Musculoskeletal:      Right lower leg: No edema.      Left lower leg: No edema.   Skin:     General: Skin is warm and dry.   Neurological:      Mental Status: He is alert. Mental status is at baseline.   Psychiatric:      Comments: Flat affect         Lab Results: I have reviewed the following results:   Results from last 7 days   Lab Units 04/24/25  0500 04/23/25  0542   WBC Thousand/uL 5.20 5.32   HEMOGLOBIN g/dL 9.6* 10.1*   HEMATOCRIT %  30.8* 33.0*   PLATELETS Thousands/uL 220 224   SEGS PCT %  --  66   LYMPHO PCT %  --  22   MONO PCT %  --  7   EOS PCT %  --  4     Results from last 7 days   Lab Units 04/24/25  0500 04/23/25  0542 04/22/25  1318   SODIUM mmol/L 146   < > 141   POTASSIUM mmol/L 3.7   < > 3.7   CHLORIDE mmol/L 106   < > 104   CO2 mmol/L 31   < > 28   BUN mg/dL 52*   < > 50*   CREATININE mg/dL 2.59*   < > 2.77*   ANION GAP mmol/L 9   < > 9   CALCIUM mg/dL 8.9   < > 9.1   ALBUMIN g/dL  --   --  4.1   TOTAL BILIRUBIN mg/dL  --   --  1.04*   ALK PHOS U/L  --   --  56   ALT U/L  --   --  16   AST U/L  --   --  19   GLUCOSE RANDOM mg/dL 86   < > 101    < > = values in this interval not displayed.     Results from last 7 days   Lab Units 04/24/25  0500   INR  3.40*     Last 24 Hours Medication List:     Current Facility-Administered Medications:     acetaminophen (TYLENOL) tablet 650 mg, Q6H PRN    [START ON 5/1/2025] amiodarone tablet 200 mg, Daily With Breakfast    amiodarone tablet 400 mg, BID With Meals    carvedilol (COREG) tablet 3.125 mg, BID With Meals    fluticasone-vilanterol 200-25 mcg/actuation 1 puff, Daily **AND** umeclidinium 62.5 mcg/actuation inhaler AEPB 1 puff, Daily    furosemide (LASIX) injection 80 mg, BID (diuretic)    hydrALAZINE (APRESOLINE) tablet 25 mg, Q8H LOTTIE    isosorbide dinitrate (ISORDIL) tablet 10 mg, TID after meals    labetalol (NORMODYNE) injection 10 mg, Q4H PRN    LORazepam (ATIVAN) tablet 0.5 mg, HS PRN    losartan (COZAAR) tablet 50 mg, Daily    melatonin tablet 3 mg, HS PRN    polyethylene glycol (MIRALAX) packet 17 g, Daily PRN    potassium chloride (Klor-Con M20) CR tablet 20 mEq, BID With Meals    prochlorperazine (COMPAZINE) tablet 10 mg, Q6H PRN    simethicone (MYLICON) chewable tablet 80 mg, 4x Daily PRN    [Held by provider] warfarin (COUMADIN) tablet 7.5 mg, Once per day on Sunday Monday Tuesday Wednesday Friday Saturday **AND** [Held by provider] warfarin (COUMADIN) tablet 5 mg, Every  Thursday    Administrative Statements   Today, Patient Was Seen By: Gabriela Salazar PA-C    **Please Note: This note may have been constructed using a voice recognition system.**

## 2025-04-24 NOTE — ASSESSMENT & PLAN NOTE
Lab Results   Component Value Date    EGFR 25 04/24/2025    EGFR 25 04/23/2025    EGFR 23 04/22/2025    CREATININE 2.59 (H) 04/24/2025    CREATININE 2.53 (H) 04/23/2025    CREATININE 2.77 (H) 04/22/2025

## 2025-04-24 NOTE — ASSESSMENT & PLAN NOTE
Chest x-ray consistent with persistent vascular congestion with small bilateral pleural effusions  Continue with Lasix 80 twice daily for another 24 hours and then likely can switch to oral

## 2025-04-24 NOTE — ASSESSMENT & PLAN NOTE
Lab Results   Component Value Date    EGFR 25 04/24/2025    EGFR 25 04/23/2025    EGFR 23 04/22/2025    CREATININE 2.59 (H) 04/24/2025    CREATININE 2.53 (H) 04/23/2025    CREATININE 2.77 (H) 04/22/2025   Baseline creatinine 2.3-2.7  Patient at risk for LUDWIG in setting multiple myeloma with amyloidosis  Recent renal Doppler without evidence of renal artery stenosis  Nephrology following

## 2025-04-25 LAB
ANION GAP SERPL CALCULATED.3IONS-SCNC: 8 MMOL/L (ref 4–13)
BUN SERPL-MCNC: 55 MG/DL (ref 5–25)
CALCIUM SERPL-MCNC: 9 MG/DL (ref 8.4–10.2)
CHLORIDE SERPL-SCNC: 105 MMOL/L (ref 96–108)
CO2 SERPL-SCNC: 28 MMOL/L (ref 21–32)
CREAT SERPL-MCNC: 2.5 MG/DL (ref 0.6–1.3)
ERYTHROCYTE [DISTWIDTH] IN BLOOD BY AUTOMATED COUNT: 17.2 % (ref 11.6–15.1)
GFR SERPL CREATININE-BSD FRML MDRD: 26 ML/MIN/1.73SQ M
GLUCOSE SERPL-MCNC: 94 MG/DL (ref 65–140)
HCT VFR BLD AUTO: 29.9 % (ref 36.5–49.3)
HGB BLD-MCNC: 9.3 G/DL (ref 12–17)
INR PPP: 2.44 (ref 0.85–1.19)
MAGNESIUM SERPL-MCNC: 2.1 MG/DL (ref 1.9–2.7)
MCH RBC QN AUTO: 21.3 PG (ref 26.8–34.3)
MCHC RBC AUTO-ENTMCNC: 31.1 G/DL (ref 31.4–37.4)
MCV RBC AUTO: 69 FL (ref 82–98)
PLATELET # BLD AUTO: 215 THOUSANDS/UL (ref 149–390)
PMV BLD AUTO: 10.8 FL (ref 8.9–12.7)
POTASSIUM SERPL-SCNC: 3.9 MMOL/L (ref 3.5–5.3)
PROTHROMBIN TIME: 26.2 SECONDS (ref 12.3–15)
RBC # BLD AUTO: 4.36 MILLION/UL (ref 3.88–5.62)
SODIUM SERPL-SCNC: 141 MMOL/L (ref 135–147)
WBC # BLD AUTO: 5 THOUSAND/UL (ref 4.31–10.16)

## 2025-04-25 PROCEDURE — 83735 ASSAY OF MAGNESIUM: CPT

## 2025-04-25 PROCEDURE — 99232 SBSQ HOSP IP/OBS MODERATE 35: CPT | Performed by: STUDENT IN AN ORGANIZED HEALTH CARE EDUCATION/TRAINING PROGRAM

## 2025-04-25 PROCEDURE — 99232 SBSQ HOSP IP/OBS MODERATE 35: CPT | Performed by: INTERNAL MEDICINE

## 2025-04-25 PROCEDURE — 85027 COMPLETE CBC AUTOMATED: CPT

## 2025-04-25 PROCEDURE — 99232 SBSQ HOSP IP/OBS MODERATE 35: CPT

## 2025-04-25 PROCEDURE — 85610 PROTHROMBIN TIME: CPT

## 2025-04-25 PROCEDURE — 80048 BASIC METABOLIC PNL TOTAL CA: CPT

## 2025-04-25 RX ORDER — BUMETANIDE 0.25 MG/ML
2 INJECTION, SOLUTION INTRAMUSCULAR; INTRAVENOUS EVERY 8 HOURS
Status: DISCONTINUED | OUTPATIENT
Start: 2025-04-25 | End: 2025-04-26

## 2025-04-25 RX ORDER — WARFARIN SODIUM 5 MG/1
5 TABLET ORAL
Status: DISCONTINUED | OUTPATIENT
Start: 2025-04-25 | End: 2025-04-27

## 2025-04-25 RX ADMIN — HYDRALAZINE HYDROCHLORIDE 25 MG: 25 TABLET ORAL at 17:48

## 2025-04-25 RX ADMIN — POTASSIUM CHLORIDE 20 MEQ: 1500 TABLET, EXTENDED RELEASE ORAL at 08:04

## 2025-04-25 RX ADMIN — CARVEDILOL 3.12 MG: 3.12 TABLET, FILM COATED ORAL at 08:04

## 2025-04-25 RX ADMIN — FLUTICASONE FUROATE AND VILANTEROL TRIFENATATE 1 PUFF: 200; 25 POWDER RESPIRATORY (INHALATION) at 08:04

## 2025-04-25 RX ADMIN — HYDRALAZINE HYDROCHLORIDE 25 MG: 25 TABLET ORAL at 06:13

## 2025-04-25 RX ADMIN — LORAZEPAM 0.5 MG: 0.5 TABLET ORAL at 23:05

## 2025-04-25 RX ADMIN — FUROSEMIDE 80 MG: 10 INJECTION, SOLUTION INTRAVENOUS at 08:04

## 2025-04-25 RX ADMIN — BUMETANIDE 2 MG: 0.25 INJECTION INTRAMUSCULAR; INTRAVENOUS at 21:02

## 2025-04-25 RX ADMIN — HYDRALAZINE HYDROCHLORIDE 25 MG: 25 TABLET ORAL at 22:11

## 2025-04-25 RX ADMIN — WARFARIN SODIUM 5 MG: 5 TABLET ORAL at 17:49

## 2025-04-25 RX ADMIN — BUMETANIDE 2 MG: 0.25 INJECTION INTRAMUSCULAR; INTRAVENOUS at 12:35

## 2025-04-25 RX ADMIN — AMIODARONE HYDROCHLORIDE 400 MG: 200 TABLET ORAL at 12:47

## 2025-04-25 RX ADMIN — POTASSIUM CHLORIDE 20 MEQ: 1500 TABLET, EXTENDED RELEASE ORAL at 17:52

## 2025-04-25 RX ADMIN — UMECLIDINIUM 1 PUFF: 62.5 AEROSOL, POWDER ORAL at 08:05

## 2025-04-25 RX ADMIN — LOSARTAN POTASSIUM 50 MG: 50 TABLET, FILM COATED ORAL at 08:04

## 2025-04-25 RX ADMIN — MELATONIN 3 MG: 3 TAB ORAL at 23:07

## 2025-04-25 RX ADMIN — CARVEDILOL 3.12 MG: 3.12 TABLET, FILM COATED ORAL at 17:48

## 2025-04-25 NOTE — ASSESSMENT & PLAN NOTE
Multiple myeloma not having achieved admission, prior bone marrow biopsy October 2020  Following outpatient [facility] hematology oncology, reviewed recent visit 4/21/2025  Patient has close follow-up scheduled 4/28 with Dr. Livingston to discuss potentially starting cycle 1 of treatment

## 2025-04-25 NOTE — ASSESSMENT & PLAN NOTE
Etiology: Likely amyloid, last LVEF from 2024 was 55%  EKG: Rate controlled atrial fibrillation, low voltage limb leads     PTA cardiac medications: Coreg 3.25 mg twice daily, losartan 50 mg daily, torsemide 40 mg a.m./torsemide 20 mg p.m. daily, hydralazine 25 mg every 8 hours     Current cardiac medications: Coreg 3.25 mg twice daily, IV Lasix 40 mg twice daily , losartan 50 mg daily, hydralazine 25 mg every every 8 hours        Patient's dry weight is 155 LBS, current standing scale weight is 153 LBS  Patient put out 1.6 L overnight.  Is net - 615 ml.  Is net -5 L since admission     Plan:      Patient had significant output with stable kidney function  Positive JVD this morning, unclear if weights are accurate.  Renal function seems to be improving after diuresis, continue diuresis.  Patient transitioned to Bumex  Patient refusing his Isordil for right now  Continue losartan 50 mg daily

## 2025-04-25 NOTE — ASSESSMENT & PLAN NOTE
Continue hydralazine 25 mg 3 times daily, losartan 50 mg daily, Coreg 3.125 mg twice daily  Blood pressure control, monitor for hypotension in setting of diuresis  Cardiology added on Isordil 10 mg 3 times daily, patient has been intermittently refusing which has been discussed between nursing and cardiology.  Discussed the importance of medication compliance.

## 2025-04-25 NOTE — ASSESSMENT & PLAN NOTE
Rate controlled with Coreg 3.25 mg twice daily, AC with Coumadin  Confirmed with patient Coumadin 5 mg Tuesday Thursday Sunday and 7.5 mg all other days  INR supratherapeutic and Coumadin was held, now therapeutic at 2.4  Resume home Coumadin dose 5 mg today with goal INR 2-3

## 2025-04-25 NOTE — ASSESSMENT & PLAN NOTE
Chest x-ray consistent with persistent vascular congestion with small bilateral pleural effusions  Suboptimal response to Lasix, switch to Bumex 2 mg every 8.

## 2025-04-25 NOTE — ASSESSMENT & PLAN NOTE
Had 1 episode of NSVT overnight  Likely scar mediated in the setting of infiltrative cardiomyopathy  Patient initially refused amiodarone, currently okay with it

## 2025-04-25 NOTE — ASSESSMENT & PLAN NOTE
Patient noted with NSVT on telemetry 4/23 and 4/24  Cardiology started patient on amiodarone -  per chart he has been intermittently refusing.  Discussed with nursing and cardiology.  Educated patient bedside on medication and the importance of compliance  Maintain telemetry

## 2025-04-25 NOTE — PROGRESS NOTES
Progress Note - Hospitalist   Name: Domingo Trevino 65 y.o. male I MRN: [id number]  Unit/Bed#: E4 -01 I Date of Admission: 4/22/2025   Date of Service: 4/25/2025 I Hospital Day: 3    Assessment & Plan  SOB (shortness of breath)  Presents with shortness of breath after being hospitalized for HFpEF, signed out AMA due to family concerns  Continues on IV diuresis as stated below  Intermittently using 2 L nasal cannula for comfort, no noted hypoxia on room air  Of note patient was recently seen by pulmonology with elevated RVSP, patient was recommended for volume management and diuresis  Notes this is improving, will plan for home oxygen evaluation prior to discharge  Hopeful transition over to oral diuretics in 24 to 48 hours  HFpEF, 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  Wt Readings from Last 3 Encounters:   04/25/25 69.9 kg (154 lb 1.6 oz)   04/22/25 74.6 kg (164 lb 8 oz)   04/17/25 69.7 kg (153 lb 10.6 oz)     164 pounds on admission, dry weight around 150  Chest x-ray with persistent vascular congestion and small pleural effusions  Echocardiogram April 25 with EF 40%, high suggestive of amyloid cardiomyopathy  PTA torsemide 40 mg every morning and 20 mg every afternoon with diuretic noncompliance  Patient reports limited output, weight has plateaued.  Transition IV Lasix 80 mg twice daily over to IV Bumex 2 mg every 8 hours.  Discussed with cardiology and nephrology  Will ask for repeat chest x-ray to reassess volume status tomorrow morning  Monitor daily weights, output, fluid restricted diet  Myeloma (HCC)  Multiple myeloma not having achieved admission, prior bone marrow biopsy October 2020  Following outpatient [facility] hematology oncology, reviewed recent visit 4/21/2025  Patient has close follow-up scheduled 4/28 with Dr. Livingston to discuss potentially starting cycle 1 of treatment  AL amyloidosis (HCC)  Patient's echo was suggestive of cardiac amyloid and he has declined endomyocardial biopsy  He is following  hematology oncology, reviewed recent outpatient visit 4/21/2025  Primary hypertension  Continue hydralazine 25 mg 3 times daily, losartan 50 mg daily, Coreg 3.125 mg twice daily  Blood pressure control, monitor for hypotension in setting of diuresis  Cardiology added on Isordil 10 mg 3 times daily, patient has been intermittently refusing which has been discussed between nursing and cardiology.  Discussed the importance of medication compliance.   Hypokalemia  Potassium stable at 3.7, continue increased potassium supplementation 20 mEq twice daily while on IV diuretics  Chronic kidney disease (CKD), stage IV (severe) ([facility])  Lab Results   Component Value Date    EGFR 26 04/25/2025    EGFR 25 04/24/2025    EGFR 25 04/23/2025    CREATININE 2.50 (H) 04/25/2025    CREATININE 2.59 (H) 04/24/2025    CREATININE 2.53 (H) 04/23/2025   Baseline creatinine 2.3-2.7  Patient at risk for LUDWIG in setting multiple myeloma with amyloidosis  Recent renal Doppler without evidence of renal artery stenosis  Nephrology following  Chronic atrial fibrillation (HCC)  Rate controlled with Coreg 3.25 mg twice daily, AC with Coumadin  Confirmed with patient Coumadin 5 mg Tuesday Thursday Sunday and 7.5 mg all other days  INR supratherapeutic and Coumadin was held, now therapeutic at 2.4  Resume home Coumadin dose 5 mg today with goal INR 2-3  Pulmonary hypertension ([facility])  Recent TTE ED with RVSP elevated at 69  Continue diuretic therapy and oxygen as needed  Cardiology following  Reviewed outpatient pulmonology note with Dr. Haque  NSVT (nonsustained ventricular tachycardia) ([facility])  Patient noted with NSVT on telemetry 4/23 and 4/24  Cardiology started patient on amiodarone -  per chart he has been intermittently refusing.  Discussed with nursing and cardiology.  Educated patient bedside on medication and the importance of compliance  Maintain telemetry    VTE Pharmacologic Prophylaxis:   Coumadin    Mobility:   Basic Mobility Inpatient Raw Score:  24  JH-HLM Goal: 8: Walk 250 feet or more  JH-HLM Achieved: 8: Walk 250 feet ot more  JH-HLM Goal NOT achieved. Continue with multidisciplinary rounding and encourage appropriate mobility to improve upon JH-HLM goals.    Patient Centered Rounds: I performed bedside rounds with nursing staff today.   Discussions with Specialists or Other Care Team Provider: Nephrology, Cardiology, CM    Education and Discussions with Family / Patient: Patient.    Current Length of Stay: 3 day(s)  Current Patient Status: Inpatient   Certification Statement: The patient will continue to require additional inpatient hospital stay due to diuresis  Discharge Plan: Anticipate discharge in 24-48 hrs to home.    Code Status: Level 1 - Full Code    Subjective   Patient seen and examined.  Patient is lying in bed.  Notes he still intermittently short of breath but started to feel better from when he came in.  He denies any current chest pain or abdominal pain.  Wondering if he is going to get his warfarin today. States he will take his heart medications when he is told to. Wants to know when he is going to go home.    Objective :  Temp:  [97.5 °F (36.4 °C)-98.2 °F (36.8 °C)] 97.8 °F (36.6 °C)  HR:  [47-64] 52  BP: (122-169)/(73-98) 136/90  Resp:  [18] 18  SpO2:  [95 %-100 %] 99 %  O2 Device: Nasal cannula  Nasal Cannula O2 Flow Rate (L/min):  [2 L/min] 2 L/min    Body mass index is 19.79 kg/m².     Input and Output Summary (last 24 hours):     Intake/Output Summary (Last 24 hours) at 4/25/2025 1223  Last data filed at 4/25/2025 0945  Gross per 24 hour   Intake 390 ml   Output 1725 ml   Net -1335 ml       Physical Exam  Vitals and nursing note reviewed.   Constitutional:       Appearance: He is not ill-appearing or diaphoretic.   Cardiovascular:      Rate and Rhythm: Normal rate and regular rhythm.   Pulmonary:      Effort: Pulmonary effort is normal.      Breath sounds: Rales (bilateral bases) present.   Abdominal:      General: Bowel sounds  are normal.      Palpations: Abdomen is soft.      Tenderness: There is no abdominal tenderness.   Musculoskeletal:      Right lower leg: No edema.      Left lower leg: No edema.   Skin:     General: Skin is warm and dry.   Neurological:      Mental Status: He is alert and oriented to person, place, and time. Mental status is at baseline.   Psychiatric:      Comments: Flat affect       Indication for Continued Telemetry Use: Arrthymias requiring medical therapy      Lab Results: I have reviewed the following results:   Results from last 7 days   Lab Units 04/25/25  0610 04/24/25  0500 04/23/25  0542   WBC Thousand/uL 5.00   < > 5.32   HEMOGLOBIN g/dL 9.3*   < > 10.1*   HEMATOCRIT % 29.9*   < > 33.0*   PLATELETS Thousands/uL 215   < > 224   SEGS PCT %  --   --  66   LYMPHO PCT %  --   --  22   MONO PCT %  --   --  7   EOS PCT %  --   --  4    < > = values in this interval not displayed.     Results from last 7 days   Lab Units 04/25/25  0610 04/23/25  0542 04/22/25  1318   SODIUM mmol/L 141   < > 141   POTASSIUM mmol/L 3.9   < > 3.7   CHLORIDE mmol/L 105   < > 104   CO2 mmol/L 28   < > 28   BUN mg/dL 55*   < > 50*   CREATININE mg/dL 2.50*   < > 2.77*   ANION GAP mmol/L 8   < > 9   CALCIUM mg/dL 9.0   < > 9.1   ALBUMIN g/dL  --   --  4.1   TOTAL BILIRUBIN mg/dL  --   --  1.04*   ALK PHOS U/L  --   --  56   ALT U/L  --   --  16   AST U/L  --   --  19   GLUCOSE RANDOM mg/dL 94   < > 101    < > = values in this interval not displayed.     Results from last 7 days   Lab Units 04/25/25  0610   INR  2.44*     Last 24 Hours Medication List:     Current Facility-Administered Medications:     acetaminophen (TYLENOL) tablet 650 mg, Q6H PRN    [START ON 5/1/2025] amiodarone tablet 200 mg, Daily With Breakfast    amiodarone tablet 400 mg, BID With Meals    carvedilol (COREG) tablet 3.125 mg, BID With Meals    fluticasone-vilanterol 200-25 mcg/actuation 1 puff, Daily **AND** umeclidinium 62.5 mcg/actuation inhaler AEPB 1 puff,  Daily    [Held by provider] furosemide (LASIX) injection 80 mg, BID (diuretic)    hydrALAZINE (APRESOLINE) tablet 25 mg, Q8H LOTTIE    isosorbide dinitrate (ISORDIL) tablet 10 mg, TID after meals    labetalol (NORMODYNE) injection 10 mg, Q4H PRN    LORazepam (ATIVAN) tablet 0.5 mg, HS PRN    losartan (COZAAR) tablet 50 mg, Daily    melatonin tablet 3 mg, HS PRN    polyethylene glycol (MIRALAX) packet 17 g, Daily PRN    potassium chloride (Klor-Con M20) CR tablet 20 mEq, BID With Meals    prochlorperazine (COMPAZINE) tablet 10 mg, Q6H PRN    simethicone (MYLICON) chewable tablet 80 mg, 4x Daily PRN    [Held by provider] warfarin (COUMADIN) tablet 7.5 mg, Once per day on Sunday Monday Tuesday Wednesday Friday Saturday **AND** [Held by provider] warfarin (COUMADIN) tablet 5 mg, Every Thursday    Administrative Statements   Today, Patient Was Seen By: Gabriela Salazar PA-C    **Please Note: This note may have been constructed using a voice recognition system.**

## 2025-04-25 NOTE — ASSESSMENT & PLAN NOTE
Lab Results   Component Value Date    EGFR 26 04/25/2025    EGFR 25 04/24/2025    EGFR 25 04/23/2025    CREATININE 2.50 (H) 04/25/2025    CREATININE 2.59 (H) 04/24/2025    CREATININE 2.53 (H) 04/23/2025

## 2025-04-25 NOTE — PROGRESS NOTES
Progress Note - Cardiology   Name: Domingo Trevino 65 y.o. male I MRN: [id number]  Unit/Bed#: E4 -01 I Date of Admission: 4/22/2025   Date of Service: 4/25/2025 I Hospital Day: 3    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  Etiology: Likely amyloid, last LVEF from 2024 was 55%  EKG: Rate controlled atrial fibrillation, low voltage limb leads     PTA cardiac medications: Coreg 3.25 mg twice daily, losartan 50 mg daily, torsemide 40 mg a.m./torsemide 20 mg p.m. daily, hydralazine 25 mg every 8 hours     Current cardiac medications: Coreg 3.25 mg twice daily, IV Lasix 40 mg twice daily , losartan 50 mg daily, hydralazine 25 mg every every 8 hours        Patient's dry weight is 155 LBS, current standing scale weight is 153 LBS  Patient put out 1.6 L overnight.  Is net - 615 ml.  Is net -5 L since admission     Plan:      Patient had significant output with stable kidney function  Positive JVD this morning, unclear if weights are accurate.  Renal function seems to be improving after diuresis, continue diuresis.  Patient transitioned to Bumex  Patient refusing his Isordil for right now  Continue losartan 50 mg daily  Primary hypertension  Continue Cozaar and hydralazine  Continue Isordil 10 mg 3 times daily  NSVT (nonsustained ventricular tachycardia) ([facility])  Had 1 episode of NSVT overnight  Likely scar mediated in the setting of infiltrative cardiomyopathy  Patient initially refused amiodarone, currently okay with it  Hypokalemia    Chronic kidney disease (CKD), stage IV (severe) ([facility])  Lab Results   Component Value Date    EGFR 26 04/25/2025    EGFR 25 04/24/2025    EGFR 25 04/23/2025    CREATININE 2.50 (H) 04/25/2025    CREATININE 2.59 (H) 04/24/2025    CREATININE 2.53 (H) 04/23/2025     AL amyloidosis (HCC)  No definitive diagnosis.  Patient does not have a cardiac MRI, and has declined endomyocardial biopsy  Bone marrow biopsy with [place] red negative  PYP scan negative for ATTR amyloid      Echocardiogram is highly suspicious of infiltrative cardiomyopathy with apical sparing decreased strain.     Will likely need cardiac MRI to definitively diagnose amyloidosis, and this may need to be facilitated with anesthesia.  At this point he would benefit from obtaining Chacha-CyBorD for multiple myeloma  Chronic atrial fibrillation (HCC)  Continue warfarin, INR 3.2  Pulmonary hypertension (HCC)  Likely group 2  Myeloma (HCC)      Subjective     No acute overnight events.  Is having episodes of NSVT.  Patient states he feels better today.    Objective :  Temp:  [97.5 °F (36.4 °C)-98.2 °F (36.8 °C)] 97.6 °F (36.4 °C)  HR:  [47-64] 58  BP: (122-169)/(73-98) 122/73  Resp:  [18] 18  SpO2:  [95 %-100 %] 97 %  O2 Device: None (Room air)  Nasal Cannula O2 Flow Rate (L/min):  [2 L/min] 2 L/min  Orthostatic Blood Pressures      Flowsheet Row Most Recent Value   Blood Pressure 122/73 filed at 04/25/2025 0742   Patient Position - Orthostatic VS Lying filed at 04/25/2025 0742          First Weight: Weight - Scale: 74.6 kg (164 lb 7.4 oz) (04/22/25 1245)  Vitals:    04/24/25 0537 04/25/25 0600   Weight: 69.8 kg (153 lb 14.1 oz) 69.9 kg (154 lb 1.6 oz)     Physical Exam  Constitutional:       Appearance: He is ill-appearing.   Neck:      Vascular: JVD present.   Pulmonary:      Breath sounds: No rales.   Musculoskeletal:      Right lower leg: No edema.      Left lower leg: No edema.           Lab Results: I have reviewed the following results:CBC/BMP:   .     04/25/25  0610   WBC 5.00   HGB 9.3*   HCT 29.9*      SODIUM 141   K 3.9      CO2 28   BUN 55*   CREATININE 2.50*   GLUC 94   MG 2.1    , Creatinine Clearance: Estimated Creatinine Clearance: 29.9 mL/min (A) (by C-G formula based on SCr of 2.5 mg/dL (H))., Lactic Acid: No new results in last 24 hours.   Results from last 7 days   Lab Units 04/25/25  0610 04/24/25  0500 04/23/25  0542   WBC Thousand/uL 5.00 5.20 5.32   HEMOGLOBIN g/dL 9.3* 9.6* 10.1*  "  HEMATOCRIT % 29.9* 30.8* 33.0*   PLATELETS Thousands/uL 215 220 224     Results from last 7 days   Lab Units 04/25/25  0610 04/24/25  0500 04/23/25  0542   POTASSIUM mmol/L 3.9 3.7 3.6   CHLORIDE mmol/L 105 106 106   CO2 mmol/L 28 31 30   BUN mg/dL 55* 52* 51*   CREATININE mg/dL 2.50* 2.59* 2.53*   CALCIUM mg/dL 9.0 8.9 9.1     Results from last 7 days   Lab Units 04/25/25  0610 04/24/25  0500 04/23/25  0542 04/22/25  1318   INR  2.44* 3.40* 3.22* 2.98*   PTT seconds  --   --   --  37*     No results found for: \"HGBA1C\"  Lab Results   Component Value Date    TROPONINI 1.01 () 06/17/2021       Imaging Results Review: No pertinent imaging studies reviewed.  Other Study Results Review: EKG was reviewed.     VTE Pharmacologic Prophylaxis: VTE covered by:  [Held by provider] warfarin, Oral, 7.5 mg at 04/22/25 1715   And  warfarin, Oral     VTE Mechanical Prophylaxis: sequential compression device      "

## 2025-04-25 NOTE — ASSESSMENT & PLAN NOTE
Wt Readings from Last 3 Encounters:   04/25/25 69.9 kg (154 lb 1.6 oz)   04/22/25 74.6 kg (164 lb 8 oz)   04/17/25 69.7 kg (153 lb 10.6 oz)     164 pounds on admission, dry weight around 150  Chest x-ray with persistent vascular congestion and small pleural effusions  Echocardiogram April 25 with EF 40%, high suggestive of amyloid cardiomyopathy  PTA torsemide 40 mg every morning and 20 mg every afternoon with diuretic noncompliance  Patient reports limited output, weight has plateaued.  Transition IV Lasix 80 mg twice daily over to IV Bumex 2 mg every 8 hours.  Discussed with cardiology and nephrology  Will ask for repeat chest x-ray to reassess volume status tomorrow morning  Monitor daily weights, output, fluid restricted diet

## 2025-04-25 NOTE — PROGRESS NOTES
NEPHROLOGY HOSPITAL PROGRESS NOTE   Domingo Trevino 65 y.o. male MRN: [id number]  Unit/Bed#: E4 -01 Encounter: [id number]  Reason for Consult: CKD    Assessment & Plan  Chronic kidney disease (CKD), stage IV (severe) (HCC)  Chronic kidney disease likely multifactorial given underlying multiple myeloma with concerns for AL amyloidosis.  Baseline creatinine reported at 2.3-2.7 since October 2024.  Current creatinine remains at baseline at 2.5  Hypokalemia  Current potassium 3.9  Recommend close observation with ongoing diuretic therapy.  SOB (shortness of breath)  Chest x-ray consistent with persistent vascular congestion with small bilateral pleural effusions  Suboptimal response to Lasix, switch to Bumex 2 mg every 8.  Myeloma (HCC)  As per hematology oncology, following with [facility]  Patient unfortunately still not decided on whether he wants to proceed with treatment  He is interested in possible second opinion  Discussed with primary service.  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  Noted IVC dilatation.  Suggestion of cardiac amyloidosis.  Primary hypertension  Previous renal Doppler showing no evidence of occlusive disease although left main renal artery not visualized.  Noted atrophic left kidney measuring 7.6 cm, right kidney 11.8 cm  Continue with losartan, hydralazine and carvedilol.  Pulmonary hypertension (HCC)  Management as per cardiology.  NSVT (nonsustained ventricular tachycardia) ([facility])  Initiated on amiodarone as per cardiology.    Summary:  Renal function overall fairly stable to creatinine 2.5  Unfortunate patient with suboptimal diuresis, switch to Bumex 2 mg every 8  Continue with current antihypertensive regimen  No changes from nephrology standpoint      SUBJECTIVE / 24H INTERVAL HISTORY:  Seen and examined.  Patient complaining of persistent dyspnea currently on nasal  cannula.  No active chest pain or pressure.  Denies abdominal pain.    OBJECTIVE:  Current Weight: Weight - Scale: 69.9 kg (154 lb 1.6 oz)  Vitals:    04/25/25 0600 04/25/25 0613 04/25/25 0742 04/25/25 1146   BP:  169/95 122/73 136/90   BP Location:  Right arm Left arm Right arm   Pulse:   58 (!) 52   Resp:   18 18   Temp:    97.8 °F (36.6 °C)   TempSrc:    Temporal   SpO2:   97% 99%   Weight: 69.9 kg (154 lb 1.6 oz)      Height:           Intake/Output Summary (Last 24 hours) at 4/25/2025 1404  Last data filed at 4/25/2025 0945  Gross per 24 hour   Intake 390 ml   Output 1725 ml   Net -1335 ml       Physical Exam  Constitutional:       Appearance: He is not ill-appearing.   Cardiovascular:      Rate and Rhythm: Normal rate and regular rhythm.   Pulmonary:      Effort: Pulmonary effort is normal.      Breath sounds: Rales present.   Abdominal:      General: There is distension.      Palpations: Abdomen is soft.      Tenderness: There is no abdominal tenderness.   Musculoskeletal:      Right lower leg: No edema.      Left lower leg: No edema.   Skin:     General: Skin is warm and dry.      Findings: No rash.   Neurological:      Mental Status: He is alert and oriented to person, place, and time.         Medications:    Current Facility-Administered Medications:     acetaminophen (TYLENOL) tablet 650 mg, 650 mg, Oral, Q6H PRN, VEENA Bridges    [START ON 5/1/2025] amiodarone tablet 200 mg, 200 mg, Oral, Daily With Breakfast, Gelacio Mkceon MD    amiodarone tablet 400 mg, 400 mg, Oral, BID With Meals, Gelacio Mckeon MD, 400 mg at 04/25/25 1247    bumetanide (BUMEX) injection 2 mg, 2 mg, Intravenous, Q8H, Gabriela Salazar PA-C, 2 mg at 04/25/25 1235    carvedilol (COREG) tablet 3.125 mg, 3.125 mg, Oral, BID With Meals, VEENA Bridges, 3.125 mg at 04/25/25 0804    fluticasone-vilanterol 200-25 mcg/actuation 1 puff, 1 puff, Inhalation, Daily, 1 puff at 04/25/25 0804 **AND** umeclidinium 62.5  mcg/actuation inhaler AEPB 1 puff, 1 puff, Inhalation, Daily, Sammy Adams, CRNP, 1 puff at 04/25/25 0805    hydrALAZINE (APRESOLINE) tablet 25 mg, 25 mg, Oral, Q8H LOTTIE, Sammy Adams, CRNP, 25 mg at 04/25/25 0613    isosorbide dinitrate (ISORDIL) tablet 10 mg, 10 mg, Oral, TID after meals, Gelacio Mckeon MD, 10 mg at 04/24/25 1341    labetalol (NORMODYNE) injection 10 mg, 10 mg, Intravenous, Q4H PRN, Ryan Perez DO    LORazepam (ATIVAN) tablet 0.5 mg, 0.5 mg, Oral, HS PRN, Sammy Adams, CRNP, 0.5 mg at 04/24/25 2302    losartan (COZAAR) tablet 50 mg, 50 mg, Oral, Daily, Sammy Adams, CRNP, 50 mg at 04/25/25 0804    melatonin tablet 3 mg, 3 mg, Oral, HS PRN, Sammy Adams, CRNP, 3 mg at 04/24/25 2302    polyethylene glycol (MIRALAX) packet 17 g, 17 g, Oral, Daily PRN, Sammy Adams, CRNP    potassium chloride (Klor-Con M20) CR tablet 20 mEq, 20 mEq, Oral, BID With Meals, Concepcion Almeida PA-C, 20 mEq at 04/25/25 0804    prochlorperazine (COMPAZINE) tablet 10 mg, 10 mg, Oral, Q6H PRN, Sammy Adams, CRNP    simethicone (MYLICON) chewable tablet 80 mg, 80 mg, Oral, 4x Daily PRN, Sammy Adams, CRNP    [Held by provider] warfarin (COUMADIN) tablet 7.5 mg, 7.5 mg, Oral, Once per day on Sunday Monday Tuesday Wednesday Friday Saturday, 7.5 mg at 04/22/25 1715 **AND** warfarin (COUMADIN) tablet 5 mg, 5 mg, Oral, Daily (warfarin), Gabriela Salazar PA-C    Laboratory Results:  Results from last 7 days   Lab Units 04/25/25  0610 04/24/25  0500 04/23/25  0542 04/22/25  1318   WBC Thousand/uL 5.00 5.20 5.32 4.83   HEMOGLOBIN g/dL 9.3* 9.6* 10.1* 9.6*   HEMATOCRIT % 29.9* 30.8* 33.0* 30.5*   PLATELETS Thousands/uL 215 220 224 225   POTASSIUM mmol/L 3.9 3.7 3.6 3.7   CHLORIDE mmol/L 105 106 106 104   CO2 mmol/L 28 31 30 28   BUN mg/dL 55* 52* 51* 50*   CREATININE mg/dL 2.50* 2.59* 2.53* 2.77*   CALCIUM mg/dL 9.0 8.9 9.1 9.1   MAGNESIUM mg/dL 2.1  --  2.1  --        Portions of the record may have been created with voice  "recognition software. Occasional wrong word or \"sound a like\" substitutions may have occurred due to the inherent limitations of voice recognition software. Read the chart carefully and recognize, using context, where substitutions have occurred.If you have any questions, please contact the dictating provider.  "

## 2025-04-25 NOTE — ASSESSMENT & PLAN NOTE
Recent echocardiogram showed ejection fraction 40% with elevated right ventricular systolic pressures.  Noted IVC dilatation.  Suggestion of cardiac amyloidosis.

## 2025-04-25 NOTE — ASSESSMENT & PLAN NOTE
Chronic kidney disease likely multifactorial given underlying multiple myeloma with concerns for AL amyloidosis.  Baseline creatinine reported at 2.3-2.7 since October 2024.  Current creatinine remains at baseline at 2.5

## 2025-04-25 NOTE — CASE MANAGEMENT
Case Management Discharge Planning Note    Patient name Domingo Trevino  [facility] /[facility] -* MRN [id number]  : 1960 Date 2025       Current Admission Date: 2025  Current Admission Diagnosis:SOB (shortness of breath)   Patient Active Problem List    Diagnosis Date Noted Date Diagnosed    NSVT (nonsustained ventricular tachycardia) (HCC) 2025     Myeloma (HCC) 2025     SOB (shortness of breath) 2025     Pulmonary hypertension (HCC) 04/15/2025     Former smoker 04/15/2025     Persistent proteinuria 04/15/2025     Anemia, chronic disease 2025     Electrolyte abnormality 2025     Renal infarction (HCC) 2025     Chronic kidney disease (CKD), stage IV (severe) (HCC) 2025     AL amyloidosis (HCC) 2025     Multiple myeloma not having achieved remission (HCC) 2025     Chronic atrial fibrillation (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  [address] [place] [id number]  Phone: [phone] Fax: [phone]    Primary Care Provider: Maricruz Peres    Primary Insurance: [facility]  Secondary Insurance:     DISCHARGE DETAILS:                                                                                                 Additional Comments: CM met with  ept and discussed importance of daily wts due to CHF.  He does not have a scale and he said he would weight daily if one was provided.  CM provided a scale.  He also still planning on returning to the [facility] on [place].  He said that if he started on chemo meds he gets bad diarrhea and he has to void  shruti.  At the boarding house he shares a BR.  CM offered to get a commode chair and he declined.   He was encouraged to use the resource list to get a room with a private bath.  JUAN offered to call family and he declined.

## 2025-04-25 NOTE — ASSESSMENT & PLAN NOTE
As per hematology oncology, following with [facility]  Patient unfortunately still not decided on whether he wants to proceed with treatment  He is interested in possible second opinion  Discussed with primary service.

## 2025-04-25 NOTE — ASSESSMENT & PLAN NOTE
Lab Results   Component Value Date    EGFR 26 04/25/2025    EGFR 25 04/24/2025    EGFR 25 04/23/2025    CREATININE 2.50 (H) 04/25/2025    CREATININE 2.59 (H) 04/24/2025    CREATININE 2.53 (H) 04/23/2025   Baseline creatinine 2.3-2.7  Patient at risk for LUDWIG in setting multiple myeloma with amyloidosis  Recent renal Doppler without evidence of renal artery stenosis  Nephrology following

## 2025-04-25 NOTE — ASSESSMENT & PLAN NOTE
Presents with shortness of breath after being hospitalized for HFpEF, signed out AMA due to family concerns  Continues on IV diuresis as stated below  Intermittently using 2 L nasal cannula for comfort, no noted hypoxia on room air  Of note patient was recently seen by pulmonology with elevated RVSP, patient was recommended for volume management and diuresis  Notes this is improving, will plan for home oxygen evaluation prior to discharge  Hopeful transition over to oral diuretics in 24 to 48 hours

## 2025-04-26 ENCOUNTER — APPOINTMENT (INPATIENT)
Dept: RADIOLOGY | Facility: HOSPITAL | Age: 65
DRG: 346 | End: 2025-04-26
Payer: COMMERCIAL

## 2025-04-26 LAB
ANION GAP SERPL CALCULATED.3IONS-SCNC: 9 MMOL/L (ref 4–13)
BUN SERPL-MCNC: 50 MG/DL (ref 5–25)
CALCIUM SERPL-MCNC: 8.9 MG/DL (ref 8.4–10.2)
CHLORIDE SERPL-SCNC: 104 MMOL/L (ref 96–108)
CO2 SERPL-SCNC: 29 MMOL/L (ref 21–32)
CREAT SERPL-MCNC: 2.84 MG/DL (ref 0.6–1.3)
GFR SERPL CREATININE-BSD FRML MDRD: 22 ML/MIN/1.73SQ M
GLUCOSE SERPL-MCNC: 96 MG/DL (ref 65–140)
INR PPP: 1.92 (ref 0.85–1.19)
MAGNESIUM SERPL-MCNC: 2.1 MG/DL (ref 1.9–2.7)
POTASSIUM SERPL-SCNC: 4 MMOL/L (ref 3.5–5.3)
PROTHROMBIN TIME: 21.8 SECONDS (ref 12.3–15)
SODIUM SERPL-SCNC: 142 MMOL/L (ref 135–147)

## 2025-04-26 PROCEDURE — 83735 ASSAY OF MAGNESIUM: CPT

## 2025-04-26 PROCEDURE — 99232 SBSQ HOSP IP/OBS MODERATE 35: CPT | Performed by: STUDENT IN AN ORGANIZED HEALTH CARE EDUCATION/TRAINING PROGRAM

## 2025-04-26 PROCEDURE — 80048 BASIC METABOLIC PNL TOTAL CA: CPT

## 2025-04-26 PROCEDURE — 71046 X-RAY EXAM CHEST 2 VIEWS: CPT

## 2025-04-26 PROCEDURE — 99232 SBSQ HOSP IP/OBS MODERATE 35: CPT | Performed by: INTERNAL MEDICINE

## 2025-04-26 PROCEDURE — 99232 SBSQ HOSP IP/OBS MODERATE 35: CPT

## 2025-04-26 PROCEDURE — 85610 PROTHROMBIN TIME: CPT

## 2025-04-26 RX ORDER — POTASSIUM CHLORIDE 1500 MG/1
20 TABLET, EXTENDED RELEASE ORAL DAILY
Status: DISCONTINUED | OUTPATIENT
Start: 2025-04-27 | End: 2025-04-30 | Stop reason: HOSPADM

## 2025-04-26 RX ORDER — HYDRALAZINE HYDROCHLORIDE 50 MG/1
50 TABLET, FILM COATED ORAL EVERY 8 HOURS SCHEDULED
Status: DISCONTINUED | OUTPATIENT
Start: 2025-04-26 | End: 2025-04-30 | Stop reason: HOSPADM

## 2025-04-26 RX ORDER — BUMETANIDE 0.25 MG/ML
2 INJECTION, SOLUTION INTRAMUSCULAR; INTRAVENOUS DAILY
Status: DISCONTINUED | OUTPATIENT
Start: 2025-04-27 | End: 2025-04-27

## 2025-04-26 RX ADMIN — BUMETANIDE 2 MG: 0.25 INJECTION INTRAMUSCULAR; INTRAVENOUS at 04:28

## 2025-04-26 RX ADMIN — FLUTICASONE FUROATE AND VILANTEROL TRIFENATATE 1 PUFF: 200; 25 POWDER RESPIRATORY (INHALATION) at 09:21

## 2025-04-26 RX ADMIN — WARFARIN SODIUM 7.5 MG: 5 TABLET ORAL at 13:05

## 2025-04-26 RX ADMIN — HYDRALAZINE HYDROCHLORIDE 25 MG: 25 TABLET ORAL at 06:17

## 2025-04-26 RX ADMIN — AMIODARONE HYDROCHLORIDE 400 MG: 200 TABLET ORAL at 16:58

## 2025-04-26 RX ADMIN — HYDRALAZINE HYDROCHLORIDE 50 MG: 50 TABLET ORAL at 21:40

## 2025-04-26 RX ADMIN — AMIODARONE HYDROCHLORIDE 400 MG: 200 TABLET ORAL at 09:21

## 2025-04-26 RX ADMIN — HYDRALAZINE HYDROCHLORIDE 50 MG: 50 TABLET ORAL at 13:47

## 2025-04-26 RX ADMIN — UMECLIDINIUM 1 PUFF: 62.5 AEROSOL, POWDER ORAL at 09:21

## 2025-04-26 RX ADMIN — CARVEDILOL 3.12 MG: 3.12 TABLET, FILM COATED ORAL at 09:21

## 2025-04-26 RX ADMIN — POTASSIUM CHLORIDE 20 MEQ: 1500 TABLET, EXTENDED RELEASE ORAL at 09:21

## 2025-04-26 NOTE — ASSESSMENT & PLAN NOTE
Multiple myeloma not having achieved admission, prior bone marrow biopsy October 2020  Following outpatient [facility] hematology oncology, reviewed recent visit 4/21/2025  Patient has close follow-up scheduled 4/28 with Dr. Livingston to discuss potentially starting cycle 1 of treatment

## 2025-04-26 NOTE — PROGRESS NOTES
Cardiology Progress Note - Domingo Trevino 65 y.o. male MRN: [id number]    Unit/Bed#: E4 -01 Encounter: [id number]      Assessment & Plan:    HFpEF, 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  - TTE 4/14/2025 showed EF 40%, severe LA, dilated RA, mild to moderate MR, mild to moderate TR, estimated PA pressure 69 mmHg, echo findings highly suggestive of cardiac amyloidosis  -TTE 10/21/2024 at [facility] showed EF 55 to 60%, severe LA, moderate RA, mild MR, mild AI, mild TR   - BNP 1099 on admission  - Chest x-ray on admission showed persistent vascular congestion with small pleural effusions  Neurohormonal Blockade:  -- Beta-Blocker: Coreg 3.125 mg twice daily  -- ACEi, ARB or ARNi: Losartan 50 mg daily    (or SVR reduction) hydralazine 50 mg 3 times daily, patient refusing Isordil  -- Outpatient diuretic: Torsemide 40 mg in the a.m. and 20 mg in the p.m.  -- Current diuretic: Bumex 2 mg IV every 8 hours-> changed to Bumex 2 mg IV daily today    Nonsustained ventricular tachycardia  - Patient had a 21-second run of NSVT at around 2:30 PM on 4/23 and then a 23-second run of NSVT at around 5 AM on 4/24  - Likely due to his underlying cardiomyopathy, and CHF exacerbation  - Continue with Coreg 3.125 mg twice daily  - Also start amiodarone to suppress VT, plan to load with 400 mg twice daily for 7 days and then changed to 200 mg daily on 5/1    Chronic atrial fibrillation (HCC)  - Anticoagulated on warfarin  - Rate control with Coreg 3.125 mg twice daily    Pulmonary hypertension (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     Primary hypertension  - Outpatient Rx Coreg 3.125 Mg twice daily, losartan 50 mg daily and hydralazine 25 mg 3 times daily  - Started Isordil 10 mg 3 times daily this admission    AL amyloidosis (HCC)  -TTE 10/21/24 ([facility]): LVEF 55-60%,  moderate concentric hypertrophy, sparing of apex suggestive of cardiac amyloid  - NM PYP scan 10/22/24: negative for TTR amyloid   - cardiac MRI 10/24/24: incomplete study due to claustrophobia,  "low normal LV function, LVEF 50-55%, RVEF 45%, mild biatrial enlargement, mild MR   - NM PYP scan 10/22/24: negative for TTR amyloid  - previously declined further workup including cardiac biopsy for amyloid castro    Myeloma (HCC)  - elevated kappa and lamda free light chains noted on 10/22/2024  - [facility] hematology 10/30/24: confirmed diagnosis of multiple myeloma with presumed AL cardiac amyloid, declined cardiac biopsy and further treatment for his MM at that time    Hypokalemia    Chronic kidney disease (CKD), stage IV (severe) (HCC)      Summary:  - Patient's weight trended down 3 pounds today  - Bumex changed from 2 mg 3 times daily to daily by nephrology  - Hopefully transition back to oral diuretics tomorrow  - Patient refusing Isordil, increased hydralazine to 50 mg 3 times daily  - Continue with amiodarone load  - Continue monitor on telemetry  - Check daily standing weights  - Continue to monitor creatinine and electrolytes closely    Subjective:   No significant events overnight.  Reports feeling okay this morning.  Still has some shortness of breath.  Denies chest pain, abdominal pain, nausea, vomiting, fever, chills, headache, dizziness or palpitations.    Objective:     Vitals: Blood pressure 130/92, pulse 59, temperature 97.6 °F (36.4 °C), temperature source Temporal, resp. rate 18, height 6' 2\" (1.88 m), weight 68.6 kg (151 lb 3.8 oz), SpO2 100%., Body mass index is 19.42 kg/m².,   Orthostatic Blood Pressures      Flowsheet Row Most Recent Value   Blood Pressure 130/92 filed at 04/26/2025 0747   Patient Position - Orthostatic VS Lying filed at 04/26/2025 0747              Intake/Output Summary (Last 24 hours) at 4/26/2025 1009  Last data filed at 4/26/2025 0924  Gross per 24 hour   Intake 480 ml   Output 1715 ml   Net -1235 ml           Physical Exam:    GEN: Domingo Trevino appears well, alert and oriented x 3, pleasant and cooperative   HEENT: Mucous membranes moist, no scleral icterus, no " conjunctival pallor  NECK: + Trace JVD  HEART: Regular rate and rhythm, normal S1 and S2, no murmurs or rubs   LUNGS: Trace crackles at bases bilaterally  ABDOMEN: normal bowel sounds, soft, no tenderness, no distention  EXTREMITIES: peripheral pulses normal; no lower extremity edema   NEURO: no focal findings   SKIN: No lesions or rashes on exposed skin         Current Facility-Administered Medications:     acetaminophen (TYLENOL) tablet 650 mg, 650 mg, Oral, Q6H PRN, NICHOLAS BridgesNP    [START ON 5/1/2025] amiodarone tablet 200 mg, 200 mg, Oral, Daily With Breakfast, Gelacio Mckeon MD    amiodarone tablet 400 mg, 400 mg, Oral, BID With Meals, Gelacio Mckeon MD, 400 mg at 04/26/25 0921    [START ON 4/27/2025] bumetanide (BUMEX) injection 2 mg, 2 mg, Intravenous, Daily, Néstor Finley DO    carvedilol (COREG) tablet 3.125 mg, 3.125 mg, Oral, BID With Meals, Sammy Adams, NICHOLASNP, 3.125 mg at 04/26/25 0921    fluticasone-vilanterol 200-25 mcg/actuation 1 puff, 1 puff, Inhalation, Daily, 1 puff at 04/26/25 0921 **AND** umeclidinium 62.5 mcg/actuation inhaler AEPB 1 puff, 1 puff, Inhalation, Daily, Sammy Adams, NICHOLASNP, 1 puff at 04/26/25 0921    hydrALAZINE (APRESOLINE) tablet 50 mg, 50 mg, Oral, Q8H [facility], Gabriela Salazar PA-C    labetalol (NORMODYNE) injection 10 mg, 10 mg, Intravenous, Q4H PRN, Ryan Perez DO    LORazepam (ATIVAN) tablet 0.5 mg, 0.5 mg, Oral, HS PRN, Sammy Adams, CRNP, 0.5 mg at 04/25/25 2305    melatonin tablet 3 mg, 3 mg, Oral, HS PRN, Sammy Adams, CRNP, 3 mg at 04/25/25 2307    polyethylene glycol (MIRALAX) packet 17 g, 17 g, Oral, Daily PRN, Sammy Adams, CRNP    potassium chloride (Klor-Con M20) CR tablet 20 mEq, 20 mEq, Oral, BID With Meals, Concepcion Almeida PA-C, 20 mEq at 04/26/25 0921    prochlorperazine (COMPAZINE) tablet 10 mg, 10 mg, Oral, Q6H PRN, Sammy Adams, CRNP    simethicone (MYLICON) chewable tablet 80 mg, 80 mg, Oral, 4x Daily PRN, Sammy Adams,  "CRNP    warfarin (COUMADIN) tablet 7.5 mg, 7.5 mg, Oral, Once (warfarin) **AND** [Held by provider] warfarin (COUMADIN) tablet 5 mg, 5 mg, Oral, Daily (warfarin), Gabriela Salazar PA-C, 5 mg at 04/25/25 1749    Labs & Results:    Lab Results   Component Value Date    TROPONINI 1.01 () 06/17/2021    TROPONINI 1.12 () 06/17/2021       Lab Results   Component Value Date    CALCIUM 8.9 04/26/2025    K 4.0 04/26/2025    CO2 29 04/26/2025     04/26/2025    BUN 50 (H) 04/26/2025    CREATININE 2.84 (H) 04/26/2025       Lab Results   Component Value Date    WBC 5.00 04/25/2025    HGB 9.3 (L) 04/25/2025    HCT 29.9 (L) 04/25/2025    MCV 69 (L) 04/25/2025     04/25/2025     Results from last 7 days   Lab Units 04/26/25  0614   INR  1.92*       No results found for: \"CHOL\"  Lab Results   Component Value Date    HDL 28 (L) 04/19/2022     Lab Results   Component Value Date    LDLCALC 83 04/19/2022     Lab Results   Component Value Date    TRIG 59 04/19/2022       Lab Results   Component Value Date    ALT 16 04/22/2025    AST 19 04/22/2025    ALKPHOS 56 04/22/2025         EKG personally reviewed by )Gelacio Mckeon MD. No acute changes   TELE: No significant arrhythmias seen on telemetry review.      "

## 2025-04-26 NOTE — ASSESSMENT & PLAN NOTE
Chest x-ray consistent with persistent vascular congestion with small bilateral pleural effusions  Better response with IV Bumex previous 24 urine 2.5 L, weight has declined to 68.6 kg

## 2025-04-26 NOTE — ASSESSMENT & PLAN NOTE
Wt Readings from Last 3 Encounters:   04/26/25 68.6 kg (151 lb 3.8 oz)   04/22/25 74.6 kg (164 lb 8 oz)   04/17/25 69.7 kg (153 lb 10.6 oz)     164 pounds on admission, dry weight around 150  Chest x-ray with persistent vascular congestion and small pleural effusions  Echocardiogram April 25 with EF 40%, high suggestive of amyloid cardiomyopathy  PTA torsemide 40 mg every morning and 20 mg every afternoon with diuretic noncompliance  Transition from IV Lasix to IV Bumex with improvement in weight and output  Bumex transition down to 2 mg daily today  Anticipate transition to diuretic tomorrow  Follow up repeat chest x-ray to reassess volume status tomorrow morning  Monitor daily weights, output, fluid restricted diet

## 2025-04-26 NOTE — CASE MANAGEMENT
Case Management Discharge Planning Note    Patient name Domingo Trevino  [facility] /[facility] -* MRN [id number]  : 1960 Date 2025       Current Admission Date: 2025  Current Admission Diagnosis:SOB (shortness of breath)   Patient Active Problem List    Diagnosis Date Noted Date Diagnosed    NSVT (nonsustained ventricular tachycardia) (HCC) 2025     Myeloma (HCC) 2025     SOB (shortness of breath) 2025     Pulmonary hypertension (HCC) 04/15/2025     Former smoker 04/15/2025     Persistent proteinuria 04/15/2025     Anemia, chronic disease 2025     Electrolyte abnormality 2025     Renal infarction (HCC) 2025     Chronic kidney disease (CKD), stage IV (severe) (HCC) 2025     AL amyloidosis (HCC) 2025     Multiple myeloma not having achieved remission (HCC) 2025     Chronic atrial fibrillation ([facility]) 2025     HFpEF, 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 2022     Primary hypertension 2022     Elevated troponin 2022     Hypokalemia 2022       LOS (days): 4  Geometric Mean LOS (GMLOS) (days):   Days to GMLOS:     OBJECTIVE:  Risk of Unplanned Readmission Score: 38.91         Current admission status: Inpatient   [facility] Pharmacy:   [id number] [address] [facility]  [address] [place] [id number]  Phone: [phone] Fax: [phone]    Primary Care Provider: Maricruz Peres    Primary Insurance: [facility]  Secondary Insurance:     DISCHARGE DETAILS:                                                                                                 Additional Comments: Pt walked with nursing to determine need for home O2. Patient remained above 94% at rest and ambulation. Overnight study will be completed to ensure no need for O2 overnight. Handoff left for CM covering tomorrow.

## 2025-04-26 NOTE — ASSESSMENT & PLAN NOTE
Recent TTE ED with RVSP elevated at 69  Continue diuretic therapy and oxygen as needed  Cardiology following  Reviewed outpatient pulmonology note with Dr. Haque  Will evaluate for home oxygen needs, discussed with JUAN

## 2025-04-26 NOTE — ASSESSMENT & PLAN NOTE
Patient's echo was suggestive of cardiac amyloid and he has previously declined endomyocardial biopsy  He is following hematology oncology, reviewed recent outpatient visit 4/21/2025

## 2025-04-26 NOTE — PROGRESS NOTES
NEPHROLOGY HOSPITAL PROGRESS NOTE   Domingo Trevino 65 y.o. male MRN: [id number]  Unit/Bed#: E4 -01 Encounter: [id number]  Reason for Consult: CKD    Assessment & Plan  Chronic kidney disease (CKD), stage IV (severe) (HCC)  Chronic kidney disease likely multifactorial given underlying multiple myeloma with concerns for AL amyloidosis.  Baseline creatinine reported at 2.3-2.7 since October 2024.      Patient with good diuresis yesterday creatinine is slightly elevated at 2.8 although it could be expected given ongoing diuretic management  Recommend adjusting Bumex to once daily.  Hypokalemia  Current potassium 4.0  Recommend close observation with ongoing diuretic therapy.  SOB (shortness of breath)  Chest x-ray consistent with persistent vascular congestion with small bilateral pleural effusions  Better response with IV Bumex previous 24 urine 2.5 L, weight has declined to 68.6 kg  Myeloma (HCC)  As per hematology oncology, following with [facility]  Patient unfortunately still not decided on whether he wants to proceed with treatment  He is interested in possible second opinion  Discussed with primary service.  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  Noted IVC dilatation.  Suggestion of cardiac amyloidosis.  Primary hypertension  Previous renal Doppler showing no evidence of occlusive disease although left main renal artery not visualized.  Noted atrophic left kidney measuring 7.6 cm, right kidney 11.8 cm  Recommend holding angiotensin receptor blocker, continue with hydralazine and carvedilol.  Pulmonary hypertension (HCC)  Management as per cardiology.  NSVT (nonsustained ventricular tachycardia) ([facility])  Initiated on amiodarone as per cardiology.    Summary:  Renal function is slightly worse with a creatinine of 2.8 although to be expected given ongoing diuresis  Adjust Bumex to 2 mg daily  For  now will hold further losartan.  Continue to monitor renal function closely    SUBJECTIVE / 24H INTERVAL HISTORY:  Seen and examined.  Patient still complaining of dyspnea feels that he needs the oxygen most of the time.  No reports of chest pain or pressure.  Denies abdominal pain.  Appetite is stable.    OBJECTIVE:  Current Weight: Weight - Scale: 68.6 kg (151 lb 3.8 oz)  Vitals:    04/26/25 0248 04/26/25 0428 04/26/25 0600 04/26/25 0747   BP: 149/86 160/83  130/92   BP Location: Right arm Right arm  Left arm   Pulse: 60   59   Resp: 19   18   Temp: 97.7 °F (36.5 °C)   97.6 °F (36.4 °C)   TempSrc: Temporal   Temporal   SpO2: 95%   100%   Weight:   68.6 kg (151 lb 3.8 oz)    Height:           Intake/Output Summary (Last 24 hours) at 4/26/2025 0835  Last data filed at 4/26/2025 0618  Gross per 24 hour   Intake 480 ml   Output 2265 ml   Net -1785 ml       Physical Exam  Constitutional:       Appearance: He is not ill-appearing.   Cardiovascular:      Rate and Rhythm: Normal rate and regular rhythm.   Pulmonary:      Effort: Pulmonary effort is normal.      Breath sounds: Examination of the left-lower field reveals rales. Decreased breath sounds and rales present.   Abdominal:      General: There is no distension.      Palpations: Abdomen is soft.      Tenderness: There is no abdominal tenderness.   Musculoskeletal:      Right lower leg: No edema.      Left lower leg: No edema.   Skin:     General: Skin is warm and dry.      Findings: No rash.   Neurological:      Mental Status: He is alert and oriented to person, place, and time.         Medications:    Current Facility-Administered Medications:     acetaminophen (TYLENOL) tablet 650 mg, 650 mg, Oral, Q6H PRN, VEENA Bridges    [START ON 5/1/2025] amiodarone tablet 200 mg, 200 mg, Oral, Daily With Breakfast, Gelacio Mckeon MD    amiodarone tablet 400 mg, 400 mg, Oral, BID With Meals, Gelacio Mckeon MD, 400 mg at 04/25/25 1247    [START ON  4/27/2025] bumetanide (BUMEX) injection 2 mg, 2 mg, Intravenous, Daily, Néstor Finley,     carvedilol (COREG) tablet 3.125 mg, 3.125 mg, Oral, BID With Meals, Sammy Paulur, CRNP, 3.125 mg at 04/25/25 1748    fluticasone-vilanterol 200-25 mcg/actuation 1 puff, 1 puff, Inhalation, Daily, 1 puff at 04/25/25 0804 **AND** umeclidinium 62.5 mcg/actuation inhaler AEPB 1 puff, 1 puff, Inhalation, Daily, Sammy Paulur, CRNP, 1 puff at 04/25/25 0805    hydrALAZINE (APRESOLINE) tablet 25 mg, 25 mg, Oral, Q8H LOTTIE, Sammy Paulur, CRNP, 25 mg at 04/26/25 0617    isosorbide dinitrate (ISORDIL) tablet 10 mg, 10 mg, Oral, TID after meals, Gelacio Mckeon MD, 10 mg at 04/24/25 1341    labetalol (NORMODYNE) injection 10 mg, 10 mg, Intravenous, Q4H PRN, Ryan Perez DO    LORazepam (ATIVAN) tablet 0.5 mg, 0.5 mg, Oral, HS PRN, Sammy Adams, CRNP, 0.5 mg at 04/25/25 2305    losartan (COZAAR) tablet 50 mg, 50 mg, Oral, Daily, Sammy Adams, CRNP, 50 mg at 04/25/25 0804    melatonin tablet 3 mg, 3 mg, Oral, HS PRN, Sammy Adams, CRNP, 3 mg at 04/25/25 2307    polyethylene glycol (MIRALAX) packet 17 g, 17 g, Oral, Daily PRN, Sammy Adams, CRNP    potassium chloride (Klor-Con M20) CR tablet 20 mEq, 20 mEq, Oral, BID With Meals, Concepcion Almeida PA-C, 20 mEq at 04/25/25 1752    prochlorperazine (COMPAZINE) tablet 10 mg, 10 mg, Oral, Q6H PRN, Sammy Adams, CRNP    simethicone (MYLICON) chewable tablet 80 mg, 80 mg, Oral, 4x Daily PRN, VEENA Bridges    warfarin (COUMADIN) tablet 7.5 mg, 7.5 mg, Oral, Once (warfarin) **AND** [Held by provider] warfarin (COUMADIN) tablet 5 mg, 5 mg, Oral, Daily (warfarin), Gabriela Salazar PA-C, 5 mg at 04/25/25 3076    Laboratory Results:  Results from last 7 days   Lab Units 04/26/25  0614 04/25/25  0610 04/24/25  0500 04/23/25  0542 04/22/25  1318   WBC Thousand/uL  --  5.00 5.20 5.32 4.83   HEMOGLOBIN g/dL  --  9.3* 9.6* 10.1* 9.6*   HEMATOCRIT %  --  29.9* 30.8* 33.0* 30.5*  "  PLATELETS Thousands/uL  --  215 220 224 225   POTASSIUM mmol/L 4.0 3.9 3.7 3.6 3.7   CHLORIDE mmol/L 104 105 106 106 104   CO2 mmol/L 29 28 31 30 28   BUN mg/dL 50* 55* 52* 51* 50*   CREATININE mg/dL 2.84* 2.50* 2.59* 2.53* 2.77*   CALCIUM mg/dL 8.9 9.0 8.9 9.1 9.1   MAGNESIUM mg/dL 2.1 2.1  --  2.1  --        Portions of the record may have been created with voice recognition software. Occasional wrong word or \"sound a like\" substitutions may have occurred due to the inherent limitations of voice recognition software. Read the chart carefully and recognize, using context, where substitutions have occurred.If you have any questions, please contact the dictating provider.  "

## 2025-04-26 NOTE — ASSESSMENT & PLAN NOTE
Lab Results   Component Value Date    EGFR 22 04/26/2025    EGFR 26 04/25/2025    EGFR 25 04/24/2025    CREATININE 2.84 (H) 04/26/2025    CREATININE 2.50 (H) 04/25/2025    CREATININE 2.59 (H) 04/24/2025   Baseline creatinine 2.3-2.7  Patient at risk for LUDWIG in setting multiple myeloma with amyloidosis  Recent renal Doppler without evidence of renal artery stenosis  Nephrology following  Creatinine with slight elevation, decreasing dose of Bumex to once daily

## 2025-04-26 NOTE — ASSESSMENT & PLAN NOTE
As per hematology oncology, following with [facility]  Patient unfortunately still not decided on whether he wants to proceed with treatment  He is interested in possible second opinion  Discussed with primary service.

## 2025-04-26 NOTE — ASSESSMENT & PLAN NOTE
Presents with shortness of breath after being hospitalized for HFpEF, signed out AMA due to family concerns  Continue diuresis as stated below  Multifactorial in setting of multiple comorbidities as stated below which was discussed with patient  Intermittently using 2 L nasal cannula for comfort, no noted hypoxia on room air  Plan for ambulatory pulse oximetry testing today  Patient is requesting oxygen to have at home as needed, will discuss with case management  Hopeful transition over to oral diuretics in 24 hours

## 2025-04-26 NOTE — PROGRESS NOTES
Progress Note - Hospitalist   Name: Domingo Trevino 65 y.o. male I MRN: [id number]  Unit/Bed#: E4 -01 I Date of Admission: 4/22/2025   Date of Service: 4/26/2025 I Hospital Day: 4    Assessment & Plan  SOB (shortness of breath)  Presents with shortness of breath after being hospitalized for HFpEF, signed out AMA due to family concerns  Continue diuresis as stated below  Multifactorial in setting of multiple comorbidities as stated below which was discussed with patient  Intermittently using 2 L nasal cannula for comfort, no noted hypoxia on room air  Plan for ambulatory pulse oximetry testing today  Patient is requesting oxygen to have at home as needed, will discuss with case management  Hopeful transition over to oral diuretics in 24 hours  HFpEF, 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  Wt Readings from Last 3 Encounters:   04/26/25 68.6 kg (151 lb 3.8 oz)   04/22/25 74.6 kg (164 lb 8 oz)   04/17/25 69.7 kg (153 lb 10.6 oz)     164 pounds on admission, dry weight around 150  Chest x-ray with persistent vascular congestion and small pleural effusions  Echocardiogram April 25 with EF 40%, high suggestive of amyloid cardiomyopathy  PTA torsemide 40 mg every morning and 20 mg every afternoon with diuretic noncompliance  Transition from IV Lasix to IV Bumex with improvement in weight and output  Bumex transition down to 2 mg daily today  Anticipate transition to diuretic tomorrow  Follow up repeat chest x-ray to reassess volume status tomorrow morning  Monitor daily weights, output, fluid restricted diet  Myeloma (HCC)  Multiple myeloma not having achieved admission, prior bone marrow biopsy October 2020  Following outpatient [facility] hematology oncology, reviewed recent visit 4/21/2025  Patient has close follow-up scheduled 4/28 with Dr. Livingston to discuss potentially starting cycle 1 of treatment  AL amyloidosis (HCC)  Patient's echo was suggestive of cardiac amyloid and he has previously declined endomyocardial biopsy  He is  following hematology oncology, reviewed recent outpatient visit 4/21/2025  Primary hypertension  Elevated pressures this a.m.  Will remove Isordil due to unfavorable side effects per patient. Discussed with cardiology  Home hydralazine increased from 25 mg 3 times daily to 50 mg 3 times daily  Losartan on hold while diuresing with elevated creatinine  Continue Coreg 0.125 mg twice daily  Hypokalemia  Potassium 4.0, decrease potassium 20 mEq twice daily to once daily with decrease in Bumex  Chronic kidney disease (CKD), stage IV (severe) (HCC)  Lab Results   Component Value Date    EGFR 22 04/26/2025    EGFR 26 04/25/2025    EGFR 25 04/24/2025    CREATININE 2.84 (H) 04/26/2025    CREATININE 2.50 (H) 04/25/2025    CREATININE 2.59 (H) 04/24/2025   Baseline creatinine 2.3-2.7  Patient at risk for LUDWIG in setting multiple myeloma with amyloidosis  Recent renal Doppler without evidence of renal artery stenosis  Nephrology following  Creatinine with slight elevation, decreasing dose of Bumex to once daily  Chronic atrial fibrillation (HCC)  Rate controlled with Coreg 3.25 mg twice daily, AC with Coumadin  Confirmed with patient Coumadin 5 mg Tuesday Thursday Sunday and 7.5 mg all other days  INR supratherapeutic and Coumadin was held  Now INR subtherapeutic at 1.92, continue Coumadin 7.5 mg tonight  Pulmonary hypertension (HCC)  Recent TTE ED with RVSP elevated at 69  Continue diuretic therapy and oxygen as needed  Cardiology following  Reviewed outpatient pulmonology note with Dr. Haque  Will evaluate for home oxygen needs, discussed with CM  NSVT (nonsustained ventricular tachycardia) (HCC)  Patient noted with NSVT on telemetry 4/23 and 4/24  Cardiology started patient on amiodarone. Educated patient bedside on medication and the importance of compliance  Maintain telemetry -no significant events    VTE Pharmacologic Prophylaxis:   Coumadin    Mobility:   Basic Mobility Inpatient Raw Score: 24  -[facility] Goal: 8: Walk 250  feet or more  JH-HLM Achieved: 7: Walk 25 feet or more  JH-HLM Goal achieved. Continue to encourage appropriate mobility.    Patient Centered Rounds: I performed bedside rounds with nursing staff today.   Discussions with Specialists or Other Care Team Provider: Cardiology, nephrology    Education and Discussions with Family / Patient: Patient    Current Length of Stay: 4 day(s)  Current Patient Status: Inpatient   Certification Statement: The patient will continue to require additional inpatient hospital stay due to diuresis, monitoring kidney function, evaluating for oxygen needs at home  Discharge Plan: Anticipate discharge in 24-48 hrs to home.    Code Status: Level 1 - Full Code    Subjective   Patient seen sitting up on the side of the bed eating breakfast.  Notes he still feels like he needs oxygen intermittently.  He feels more short of breath when he is lying down in bed.  Reports he had a funny feeling with ice Toradol and amiodarone and felt that these were decreasing his urine output which is why he originally did not want to take them.  He is asking to go home with oxygen as he feels that this will help him.    Long discussion ensued bedside about patient's conditions and his symptoms.  He was reencouraged to follow-up with his oncologist to discuss further treatment and expectations.     Objective :  Temp:  [97.5 °F (36.4 °C)-97.8 °F (36.6 °C)] 97.6 °F (36.4 °C)  HR:  [52-64] 59  BP: (114-160)/(61-99) 130/92  Resp:  [16-19] 18  SpO2:  [95 %-100 %] 100 %  O2 Device: Nasal cannula  Nasal Cannula O2 Flow Rate (L/min):  [2 L/min] 2 L/min    Body mass index is 19.42 kg/m².     Input and Output Summary (last 24 hours):     Intake/Output Summary (Last 24 hours) at 4/26/2025 0925  Last data filed at 4/26/2025 0618  Gross per 24 hour   Intake 480 ml   Output 2265 ml   Net -1785 ml       Physical Exam  Vitals and nursing note reviewed.   Constitutional:       Appearance: He is ill-appearing (chronically).    Cardiovascular:      Rate and Rhythm: Normal rate. Rhythm irregularly irregular.   Pulmonary:      Effort: Pulmonary effort is normal. No respiratory distress.      Breath sounds: Rales present.      Comments: Decrease BS bases, room air  Abdominal:      General: Bowel sounds are normal.      Palpations: Abdomen is soft.      Tenderness: There is no abdominal tenderness.   Musculoskeletal:      Right lower leg: No edema.      Left lower leg: No edema.   Skin:     General: Skin is warm and dry.   Neurological:      Mental Status: He is alert and oriented to person, place, and time. Mental status is at baseline.         Lab Results: I have reviewed the following results:   Results from last 7 days   Lab Units 04/25/25  0610 04/24/25  0500 04/23/25  0542   WBC Thousand/uL 5.00   < > 5.32   HEMOGLOBIN g/dL 9.3*   < > 10.1*   HEMATOCRIT % 29.9*   < > 33.0*   PLATELETS Thousands/uL 215   < > 224   SEGS PCT %  --   --  66   LYMPHO PCT %  --   --  22   MONO PCT %  --   --  7   EOS PCT %  --   --  4    < > = values in this interval not displayed.     Results from last 7 days   Lab Units 04/26/25  0614 04/23/25  0542 04/22/25  1318   SODIUM mmol/L 142   < > 141   POTASSIUM mmol/L 4.0   < > 3.7   CHLORIDE mmol/L 104   < > 104   CO2 mmol/L 29   < > 28   BUN mg/dL 50*   < > 50*   CREATININE mg/dL 2.84*   < > 2.77*   ANION GAP mmol/L 9   < > 9   CALCIUM mg/dL 8.9   < > 9.1   ALBUMIN g/dL  --   --  4.1   TOTAL BILIRUBIN mg/dL  --   --  1.04*   ALK PHOS U/L  --   --  56   ALT U/L  --   --  16   AST U/L  --   --  19   GLUCOSE RANDOM mg/dL 96   < > 101    < > = values in this interval not displayed.     Results from last 7 days   Lab Units 04/26/25  0614   INR  1.92*     Last 24 Hours Medication List:     Current Facility-Administered Medications:     acetaminophen (TYLENOL) tablet 650 mg, Q6H PRN    [START ON 5/1/2025] amiodarone tablet 200 mg, Daily With Breakfast    amiodarone tablet 400 mg, BID With Meals    [START ON  4/27/2025] bumetanide (BUMEX) injection 2 mg, Daily    carvedilol (COREG) tablet 3.125 mg, BID With Meals    fluticasone-vilanterol 200-25 mcg/actuation 1 puff, Daily **AND** umeclidinium 62.5 mcg/actuation inhaler AEPB 1 puff, Daily    hydrALAZINE (APRESOLINE) tablet 25 mg, Q8H LOTTIE    isosorbide dinitrate (ISORDIL) tablet 10 mg, TID after meals    labetalol (NORMODYNE) injection 10 mg, Q4H PRN    LORazepam (ATIVAN) tablet 0.5 mg, HS PRN    melatonin tablet 3 mg, HS PRN    polyethylene glycol (MIRALAX) packet 17 g, Daily PRN    potassium chloride (Klor-Con M20) CR tablet 20 mEq, BID With Meals    prochlorperazine (COMPAZINE) tablet 10 mg, Q6H PRN    simethicone (MYLICON) chewable tablet 80 mg, 4x Daily PRN    warfarin (COUMADIN) tablet 7.5 mg, Once (warfarin) **AND** [Held by provider] warfarin (COUMADIN) tablet 5 mg, Daily (warfarin)    Administrative Statements   Today, Patient Was Seen By: Gabriela Salazar PA-C    **Please Note: This note may have been constructed using a voice recognition system.**

## 2025-04-26 NOTE — ASSESSMENT & PLAN NOTE
Previous renal Doppler showing no evidence of occlusive disease although left main renal artery not visualized.  Noted atrophic left kidney measuring 7.6 cm, right kidney 11.8 cm  Recommend holding angiotensin receptor blocker, continue with hydralazine and carvedilol.

## 2025-04-26 NOTE — ASSESSMENT & PLAN NOTE
Rate controlled with Coreg 3.25 mg twice daily, AC with Coumadin  Confirmed with patient Coumadin 5 mg Tuesday Thursday Sunday and 7.5 mg all other days  INR supratherapeutic and Coumadin was held  Now INR subtherapeutic at 1.92, continue Coumadin 7.5 mg tonight

## 2025-04-26 NOTE — ASSESSMENT & PLAN NOTE
Chronic kidney disease likely multifactorial given underlying multiple myeloma with concerns for AL amyloidosis.  Baseline creatinine reported at 2.3-2.7 since October 2024.      Patient with good diuresis yesterday creatinine is slightly elevated at 2.8 although it could be expected given ongoing diuretic management  Recommend adjusting Bumex to once daily.

## 2025-04-26 NOTE — ASSESSMENT & PLAN NOTE
Elevated pressures this a.m.  Will remove Isordil due to unfavorable side effects per patient. Discussed with cardiology  Home hydralazine increased from 25 mg 3 times daily to 50 mg 3 times daily  Losartan on hold while diuresing with elevated creatinine  Continue Coreg 0.125 mg twice daily

## 2025-04-26 NOTE — ASSESSMENT & PLAN NOTE
Patient noted with NSVT on telemetry 4/23 and 4/24  Cardiology started patient on amiodarone. Educated patient bedside on medication and the importance of compliance  Maintain telemetry -no significant events

## 2025-04-27 LAB
ANION GAP SERPL CALCULATED.3IONS-SCNC: 10 MMOL/L (ref 4–13)
BUN SERPL-MCNC: 57 MG/DL (ref 5–25)
CALCIUM SERPL-MCNC: 9.1 MG/DL (ref 8.4–10.2)
CHLORIDE SERPL-SCNC: 104 MMOL/L (ref 96–108)
CO2 SERPL-SCNC: 28 MMOL/L (ref 21–32)
CREAT SERPL-MCNC: 3.18 MG/DL (ref 0.6–1.3)
GFR SERPL CREATININE-BSD FRML MDRD: 19 ML/MIN/1.73SQ M
GLUCOSE SERPL-MCNC: 90 MG/DL (ref 65–140)
INR PPP: 2.15 (ref 0.85–1.19)
POTASSIUM SERPL-SCNC: 3.8 MMOL/L (ref 3.5–5.3)
PROTHROMBIN TIME: 23.8 SECONDS (ref 12.3–15)
SODIUM SERPL-SCNC: 142 MMOL/L (ref 135–147)

## 2025-04-27 PROCEDURE — 94762 N-INVAS EAR/PLS OXIMTRY CONT: CPT

## 2025-04-27 PROCEDURE — 99232 SBSQ HOSP IP/OBS MODERATE 35: CPT | Performed by: INTERNAL MEDICINE

## 2025-04-27 PROCEDURE — 85610 PROTHROMBIN TIME: CPT

## 2025-04-27 PROCEDURE — 99232 SBSQ HOSP IP/OBS MODERATE 35: CPT | Performed by: STUDENT IN AN ORGANIZED HEALTH CARE EDUCATION/TRAINING PROGRAM

## 2025-04-27 PROCEDURE — 80048 BASIC METABOLIC PNL TOTAL CA: CPT

## 2025-04-27 PROCEDURE — 99232 SBSQ HOSP IP/OBS MODERATE 35: CPT

## 2025-04-27 RX ORDER — WARFARIN SODIUM 5 MG/1
5 TABLET ORAL
Status: DISCONTINUED | OUTPATIENT
Start: 2025-04-27 | End: 2025-04-27

## 2025-04-27 RX ORDER — WARFARIN SODIUM 5 MG/1
5 TABLET ORAL
Status: COMPLETED | OUTPATIENT
Start: 2025-04-27 | End: 2025-04-27

## 2025-04-27 RX ORDER — ALBUMIN (HUMAN) 12.5 G/50ML
12.5 SOLUTION INTRAVENOUS EVERY 12 HOURS
Status: COMPLETED | OUTPATIENT
Start: 2025-04-27 | End: 2025-04-27

## 2025-04-27 RX ORDER — BUMETANIDE 0.25 MG/ML
2 INJECTION, SOLUTION INTRAMUSCULAR; INTRAVENOUS 2 TIMES DAILY
Status: DISCONTINUED | OUTPATIENT
Start: 2025-04-27 | End: 2025-04-29

## 2025-04-27 RX ADMIN — LORAZEPAM 0.5 MG: 0.5 TABLET ORAL at 00:31

## 2025-04-27 RX ADMIN — MELATONIN 3 MG: 3 TAB ORAL at 00:31

## 2025-04-27 RX ADMIN — HYDRALAZINE HYDROCHLORIDE 50 MG: 50 TABLET ORAL at 14:46

## 2025-04-27 RX ADMIN — CARVEDILOL 3.12 MG: 3.12 TABLET, FILM COATED ORAL at 17:27

## 2025-04-27 RX ADMIN — BUMETANIDE 2 MG: 0.25 INJECTION INTRAMUSCULAR; INTRAVENOUS at 17:27

## 2025-04-27 RX ADMIN — POTASSIUM CHLORIDE 20 MEQ: 1500 TABLET, EXTENDED RELEASE ORAL at 09:03

## 2025-04-27 RX ADMIN — ALBUMIN (HUMAN) 12.5 G: 0.25 INJECTION, SOLUTION INTRAVENOUS at 09:51

## 2025-04-27 RX ADMIN — HYDRALAZINE HYDROCHLORIDE 50 MG: 50 TABLET ORAL at 21:45

## 2025-04-27 RX ADMIN — AMIODARONE HYDROCHLORIDE 400 MG: 200 TABLET ORAL at 09:03

## 2025-04-27 RX ADMIN — ALBUMIN (HUMAN) 12.5 G: 0.25 INJECTION, SOLUTION INTRAVENOUS at 21:45

## 2025-04-27 RX ADMIN — AMIODARONE HYDROCHLORIDE 400 MG: 200 TABLET ORAL at 17:27

## 2025-04-27 RX ADMIN — HYDRALAZINE HYDROCHLORIDE 50 MG: 50 TABLET ORAL at 06:29

## 2025-04-27 RX ADMIN — WARFARIN SODIUM 5 MG: 5 TABLET ORAL at 14:46

## 2025-04-27 NOTE — ASSESSMENT & PLAN NOTE
Wt Readings from Last 3 Encounters:   04/27/25 68.5 kg (151 lb 0.2 oz)   04/22/25 74.6 kg (164 lb 8 oz)   04/17/25 69.7 kg (153 lb 10.6 oz)     164 pounds on admission, dry weight around 150  Chest x-ray with persistent vascular congestion and small pleural effusions  Echocardiogram April 25 with EF 40%, high suggestive of amyloid cardiomyopathy  PTA torsemide 40 mg morning and 20 mg afternoon with diuretic noncompliance  Transitioned from IV Lasix to IV Bumex with improvement in weight and output  Cr rise and Bumex transitioned down to once daily. Still hypervolemic with moderate bilateral effusions and JVD  Nephrology increasing Bumex to 2 mg IV twice daily  Discussed with teams and IR Dr. Lentz, place IR consult for possible thoracentesis tomorrow to help facilitate volume removal  Monitor daily weights, output, fluid restricted diet

## 2025-04-27 NOTE — PROGRESS NOTES
Progress Note - Hospitalist   Name: Domingo Trevino 65 y.o. male I MRN: [id number]  Unit/Bed#: E4 -01 I Date of Admission: 4/22/2025   Date of Service: 4/27/2025 I Hospital Day: 5    Assessment & Plan  SOB (shortness of breath)  Presents with shortness of breath after being hospitalized for HFpEF, signed out AMA due to family concerns  Continue diuresis as stated below  Multifactorial in setting of multiple comorbidities as stated below  Intermittently using 2 L nasal cannula for comfort, no noted hypoxia on room air  Underwent ambulatory pulse oximetry without desaturation  Overnight pulse oximetry test noted desaturation <88%, lowest O2 83%.  Recommend oxygen supplementation at nighttime on DC, relayed information to case management  HFpEF, 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  Wt Readings from Last 3 Encounters:   04/27/25 68.5 kg (151 lb 0.2 oz)   04/22/25 74.6 kg (164 lb 8 oz)   04/17/25 69.7 kg (153 lb 10.6 oz)     164 pounds on admission, dry weight around 150  Chest x-ray with persistent vascular congestion and small pleural effusions  Echocardiogram April 25 with EF 40%, high suggestive of amyloid cardiomyopathy  PTA torsemide 40 mg morning and 20 mg afternoon with diuretic noncompliance  Transitioned from IV Lasix to IV Bumex with improvement in weight and output  Cr rise and Bumex transitioned down to once daily. Still hypervolemic with moderate bilateral effusions and JVD  Nephrology increasing Bumex to 2 mg IV twice daily  Discussed with teams and IR Dr. Lentz, place IR consult for possible thoracentesis tomorrow to help facilitate volume removal  Monitor daily weights, output, fluid restricted diet  Myeloma (HCC)  Multiple myeloma not having achieved admission, prior bone marrow biopsy October 2020  Following outpatient [facility] hematology oncology, reviewed recent visit 4/21/2025  Patient has not started treatment for this and was wanting a second opinion.  Follow up scheduled 4/28 with Dr. Livingston to  discuss potentially starting cycle 1 of treatment  Goals of care discussion has occurred. Patient is open to ongoing treatment. Not interested in palliative care discussion here at this time. Discussions of the future need for dialysis have been started.   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 cardiac amyloid and he has previously declined endomyocardial biopsy  Primary hypertension  BP with suboptimal control - increasing Bumex today  Continue increased dose hydralazine 50 mg 3 times daily, on Coreg 3.25 mg twice daily  Losartan currently on hold while diuresing with elevated creatinine  Isordil was added but removed due to unfavorable side effects per patient  Hypokalemia  Potassium normal at 3.8, continue home potassium 20 mEq daily  Monitor for increased needs with diuresis  Chronic kidney disease (CKD), stage IV (severe) ([facility])  Lab Results   Component Value Date    EGFR 19 04/27/2025    EGFR 22 04/26/2025    EGFR 26 04/25/2025    CREATININE 3.18 (H) 04/27/2025    CREATININE 2.84 (H) 04/26/2025    CREATININE 2.50 (H) 04/25/2025   Baseline creatinine 2.3-2.7  Patient at risk for LUDWIG in setting multiple myeloma with amyloidosis  Kidney function worsening today to 3.18  Recent renal Doppler without evidence of renal artery stenosis  Nephrology following  Patient's still remains hypervolemic  Increasing Bumex dosing today  Chronic atrial fibrillation (HCC)  Rate controlled with Coreg 3.25 mg twice daily, AC with Coumadin  PTA on Coumadin 5 mg Tuesday Thursday Sunday and 7.5 mg all other days  INR supratherapeutic and Coumadin was held  Now therapeutic at 2.15, continue 5 mg tonight  No need for AC hold with thoracentesis tomorrow  Monitor INR daily  Pulmonary hypertension (HCC)  Recent TTE ED with RVSP elevated at 69  Continue diuretic therapy and oxygen as needed  Cardiology following  Reviewed outpatient pulmonology note with Dr. Haque  Planning for home O2 at  night  NSVT (nonsustained ventricular tachycardia) (HCC)  Patient noted with NSVT on telemetry 4/23 and 4/24  Cardiology started patient on amiodarone. Educated patient bedside on medication and the importance of compliance  Maintain telemetry -no significant events    VTE Pharmacologic Prophylaxis:   coumadin    Mobility:   Basic Mobility Inpatient Raw Score: 24  JH-HLM Goal: 8: Walk 250 feet or more  JH-HLM Achieved: 7: Walk 25 feet or more  JH-HLM Goal achieved. Continue to encourage appropriate mobility.    Patient Centered Rounds: I performed bedside rounds with nursing staff today.   Discussions with Specialists or Other Care Team Provider: Dr. Finley, Dr. Mckeon, CM    Education and Discussions with Family / Patient: Patient.     Current Length of Stay: 5 day(s)  Current Patient Status: Inpatient   Certification Statement: The patient will continue to require additional inpatient hospital stay due to diuresis, monitoring kidney function , thoracentesis  Discharge Plan: TBD, ~48 hours    Code Status: Level 1 - Full Code    Subjective   Patient seen and examined lying in bed.  Evaluated bedside with cardiology.  Still admits to intermittent shortness of breath, worse when lying flat.  He is open to thoracentesis tomorrow. Not open to discussing with palliative care. He is aware of the potential need for upcoming dialysis.    Objective :  Temp:  [97.3 °F (36.3 °C)-98.8 °F (37.1 °C)] 97.5 °F (36.4 °C)  HR:  [45-57] 54  BP: (125-151)/(57-97) 141/96  Resp:  [18] 18  SpO2:  [93 %-98 %] 97 %  O2 Device: None (Room air)  Nasal Cannula O2 Flow Rate (L/min):  [2 L/min] 2 L/min    Body mass index is 19.39 kg/m².     Input and Output Summary (last 24 hours):     Intake/Output Summary (Last 24 hours) at 4/27/2025 1039  Last data filed at 4/27/2025 0630  Gross per 24 hour   Intake 720 ml   Output 975 ml   Net -255 ml       Physical Exam  Constitutional:       Appearance: He is not diaphoretic.      Comments: Chronically ill  appearing   Neck:      Comments: Mild JVD  Cardiovascular:      Rate and Rhythm: Normal rate. Rhythm irregularly irregular.   Pulmonary:      Effort: Pulmonary effort is normal. No respiratory distress.      Breath sounds: Rales (decreased breath sounds bases) present.      Comments: 2 L NC lying flat  Abdominal:      General: Bowel sounds are normal.      Palpations: Abdomen is soft.      Tenderness: There is no abdominal tenderness.   Musculoskeletal:      Right lower leg: No edema.      Left lower leg: No edema.      Comments: Lower extremities warm to touch   Skin:     General: Skin is warm and dry.   Neurological:      Mental Status: He is alert and oriented to person, place, and time. Mental status is at baseline.       Telemetry:  Telemetry Orders (From admission, onward)               24 Hour Telemetry Monitoring  Continuous x 24 Hours (Telem)        Expiring   Question:  Reason for 24 Hour Telemetry  Answer:  Decompensated CHF- and any one of the following: continuous diuretic infusion or total diuretic dose >200 mg daily, associated electrolyte derangement (I.e. K < 3.0), inotropic drip (continuous infusion), hx of ventricular arrhythmia, or new EF < 35%                     Telemetry Reviewed: atrial fibrillation  Indication for Continued Telemetry Use: Arrthymias requiring medical therapy      Lab Results: I have reviewed the following results:   Results from last 7 days   Lab Units 04/25/25  0610 04/24/25  0500 04/23/25  0542   WBC Thousand/uL 5.00   < > 5.32   HEMOGLOBIN g/dL 9.3*   < > 10.1*   HEMATOCRIT % 29.9*   < > 33.0*   PLATELETS Thousands/uL 215   < > 224   SEGS PCT %  --   --  66   LYMPHO PCT %  --   --  22   MONO PCT %  --   --  7   EOS PCT %  --   --  4    < > = values in this interval not displayed.     Results from last 7 days   Lab Units 04/27/25  0514 04/23/25  0542 04/22/25  1318   SODIUM mmol/L 142   < > 141   POTASSIUM mmol/L 3.8   < > 3.7   CHLORIDE mmol/L 104   < > 104   CO2 mmol/L  28   < > 28   BUN mg/dL 57*   < > 50*   CREATININE mg/dL 3.18*   < > 2.77*   ANION GAP mmol/L 10   < > 9   CALCIUM mg/dL 9.1   < > 9.1   ALBUMIN g/dL  --   --  4.1   TOTAL BILIRUBIN mg/dL  --   --  1.04*   ALK PHOS U/L  --   --  56   ALT U/L  --   --  16   AST U/L  --   --  19   GLUCOSE RANDOM mg/dL 90   < > 101    < > = values in this interval not displayed.     Results from last 7 days   Lab Units 04/27/25  0514   INR  2.15*     Last 24 Hours Medication List:     Current Facility-Administered Medications:     acetaminophen (TYLENOL) tablet 650 mg, Q6H PRN    albumin human (FLEXBUMIN) 25 % injection 12.5 g, Q12H    [START ON 5/1/2025] amiodarone tablet 200 mg, Daily With Breakfast    amiodarone tablet 400 mg, BID With Meals    bumetanide (BUMEX) injection 2 mg, BID    carvedilol (COREG) tablet 3.125 mg, BID With Meals    fluticasone-vilanterol 200-25 mcg/actuation 1 puff, Daily **AND** umeclidinium 62.5 mcg/actuation inhaler AEPB 1 puff, Daily    hydrALAZINE (APRESOLINE) tablet 50 mg, Q8H LOTTIE    labetalol (NORMODYNE) injection 10 mg, Q4H PRN    LORazepam (ATIVAN) tablet 0.5 mg, HS PRN    melatonin tablet 3 mg, HS PRN    polyethylene glycol (MIRALAX) packet 17 g, Daily PRN    potassium chloride (Klor-Con M20) CR tablet 20 mEq, Daily    prochlorperazine (COMPAZINE) tablet 10 mg, Q6H PRN    simethicone (MYLICON) chewable tablet 80 mg, 4x Daily PRN    [DISCONTINUED] warfarin (COUMADIN) tablet 7.5 mg, Once (warfarin) **AND** [Held by provider] warfarin (COUMADIN) tablet 5 mg, Daily (warfarin)    Administrative Statements   Today, Patient Was Seen By: Gabriela Salazar PA-C    **Please Note: This note may have been constructed using a voice recognition system.**

## 2025-04-27 NOTE — PROGRESS NOTES
Cardiology Progress Note - Domingo Trevino 65 y.o. male MRN: [id number]    Unit/Bed#: E4 -01 Encounter: [id number]      Assessment & Plan:    HFpEF, 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  - TTE 4/14/2025 showed EF 40%, severe LA, dilated RA, mild to moderate MR, mild to moderate TR, estimated PA pressure 69 mmHg, echo findings highly suggestive of cardiac amyloidosis  -TTE 10/21/2024 at [facility] showed EF 55 to 60%, severe LA, moderate RA, mild MR, mild AI, mild TR   - BNP 1099 on admission  - Chest x-ray on admission showed persistent vascular congestion with small pleural effusions  Neurohormonal Blockade:  -- Beta-Blocker: Coreg 3.125 mg twice daily  -- ACEi, ARB or ARNi: Losartan 50 mg daily    (or SVR reduction) hydralazine 50 mg 3 times daily, patient refusing Isordil  -- Outpatient diuretic: Torsemide 40 mg in the a.m. and 20 mg in the p.m.  -- Current diuretic: Bumex 2 mg IV daily-> increased to 2 mg IV twice daily today    Nonsustained ventricular tachycardia  - Patient had a 21-second run of NSVT at around 2:30 PM on 4/23 and then a 23-second run of NSVT at around 5 AM on 4/24  - Likely due to his underlying cardiomyopathy, and CHF exacerbation  - Continue with Coreg 3.125 mg twice daily  - Also start amiodarone to suppress VT, plan to load with 400 mg twice daily for 7 days and then changed to 200 mg daily on 5/1    Chronic atrial fibrillation (HCC)  - Anticoagulated on warfarin  - Rate control with Coreg 3.125 mg twice daily    Pulmonary hypertension (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     Primary hypertension  - Outpatient Rx Coreg 3.125 Mg twice daily, losartan 50 mg daily and hydralazine 25 mg 3 times daily  - Started Isordil 10 mg 3 times daily this admission    AL amyloidosis (HCC)  -TTE 10/21/24 ([facility]): LVEF 55-60%,  moderate concentric hypertrophy, sparing of apex suggestive of cardiac amyloid  - [facility] scan 10/22/24: negative for TTR amyloid   - cardiac MRI 10/24/24: incomplete study due to claustrophobia, low  "normal LV function, LVEF 50-55%, RVEF 45%, mild biatrial enlargement, mild MR   - [facility] scan 10/22/24: negative for TTR amyloid  - previously declined further workup including cardiac biopsy for amyloid castro    Myeloma (HCC)  - elevated kappa and lamda free light chains noted on 10/22/2024  - [facility] hematology 10/30/24: confirmed diagnosis of multiple myeloma with presumed AL cardiac amyloid, declined cardiac biopsy and further treatment for his MM at that time    Hypokalemia    Chronic kidney disease (CKD), stage IV (severe) (HCC)      Summary:  - Patient's weight remains relatively stable today  - Chest x-ray shows moderate bilateral pleural effusions with extensive bibasilar atelectasis, mild pulmonary vascular congestion, no significant change compared to prior  - BUN/creatinine worsening today, however given persistent pulmonary edema on chest x-ray, nephrology plans to increase Bumex to 2 mg IV twice daily  - Appreciate nephrology's recommendations  - Consult IR for possible thoracentesis tomorrow  - Continue with amiodarone load  - Continue monitor on telemetry  - Check daily standing weights  - Continue to monitor creatinine and electrolytes closely    Subjective:   No significant events overnight.  He reports feeling okay this morning.  Continues to have some shortness of breath at rest.  Denies chest pain, abdominal pain, nausea, vomiting, fever, chills, headache, dizziness or palpitations.    Objective:     Vitals: Blood pressure 141/96, pulse (!) 54, temperature 97.5 °F (36.4 °C), temperature source Temporal, resp. rate 18, height 6' 2\" (1.88 m), weight 68.5 kg (151 lb 0.2 oz), SpO2 97%., Body mass index is 19.39 kg/m².,   Orthostatic Blood Pressures      Flowsheet Row Most Recent Value   Blood Pressure 141/96 filed at 04/27/2025 0755   Patient Position - Orthostatic VS Sitting filed at 04/27/2025 0755              Intake/Output Summary (Last 24 hours) at 4/27/2025 1024  Last data filed at 4/27/2025 " 0630  Gross per 24 hour   Intake 720 ml   Output 975 ml   Net -255 ml           Physical Exam:    GEN: Domingo Trevino appears well, alert and oriented x 3, pleasant and cooperative   HEENT: Mucous membranes moist, no scleral icterus, no conjunctival pallor  NECK: + Trace JVD  HEART: Regular rate and rhythm, normal S1 and S2, no murmurs or rubs   LUNGS: Trace crackles at bases bilaterally right greater than left  ABDOMEN: normal bowel sounds, soft, no tenderness, no distention  EXTREMITIES: peripheral pulses normal; no lower extremity edema   NEURO: no focal findings   SKIN: No lesions or rashes on exposed skin         Current Facility-Administered Medications:     acetaminophen (TYLENOL) tablet 650 mg, 650 mg, Oral, Q6H PRN, VEENA Bridges    albumin human (FLEXBUMIN) 25 % injection 12.5 g, 12.5 g, Intravenous, Q12H, Néstor Finley DO, 12.5 g at 04/27/25 0951    [START ON 5/1/2025] amiodarone tablet 200 mg, 200 mg, Oral, Daily With Breakfast, Gelacio Mckeon MD    amiodarone tablet 400 mg, 400 mg, Oral, BID With Meals, Gelacio Mckeon MD, 400 mg at 04/27/25 0903    bumetanide (BUMEX) injection 2 mg, 2 mg, Intravenous, BID, Néstor Finley DO    carvedilol (COREG) tablet 3.125 mg, 3.125 mg, Oral, BID With Meals, VEENA Bridges, 3.125 mg at 04/26/25 0921    fluticasone-vilanterol 200-25 mcg/actuation 1 puff, 1 puff, Inhalation, Daily, 1 puff at 04/26/25 0921 **AND** umeclidinium 62.5 mcg/actuation inhaler AEPB 1 puff, 1 puff, Inhalation, Daily, VEENA Bridges, 1 puff at 04/26/25 0921    hydrALAZINE (APRESOLINE) tablet 50 mg, 50 mg, Oral, Q8H LOTTIE, Gabriela Salazar PA-C, 50 mg at 04/27/25 0629    labetalol (NORMODYNE) injection 10 mg, 10 mg, Intravenous, Q4H PRN, Ryan Perez,     LORazepam (ATIVAN) tablet 0.5 mg, 0.5 mg, Oral, HS PRN, VEENA Bridges, 0.5 mg at 04/27/25 0031    melatonin tablet 3 mg, 3 mg, Oral, HS PRN, VEENA Bridges, 3 mg at 04/27/25 0031     "polyethylene glycol (MIRALAX) packet 17 g, 17 g, Oral, Daily PRN, Sammy Adams, CRNP    potassium chloride (Klor-Con M20) CR tablet 20 mEq, 20 mEq, Oral, Daily, Gabriela Salazar PA-C, 20 mEq at 04/27/25 0903    prochlorperazine (COMPAZINE) tablet 10 mg, 10 mg, Oral, Q6H PRN, NICHOLAS BridgesNP    simethicone (MYLICON) chewable tablet 80 mg, 80 mg, Oral, 4x Daily PRN, Sammy Adams CRNP    [DISCONTINUED] warfarin (COUMADIN) tablet 7.5 mg, 7.5 mg, Oral, Once (warfarin) **AND** [Held by provider] warfarin (COUMADIN) tablet 5 mg, 5 mg, Oral, Daily (warfarin), Gabriela Salazar PA-C, 5 mg at 04/25/25 1749    Labs & Results:    Lab Results   Component Value Date    TROPONINI 1.01 () 06/17/2021    TROPONINI 1.12 () 06/17/2021       Lab Results   Component Value Date    CALCIUM 9.1 04/27/2025    K 3.8 04/27/2025    CO2 28 04/27/2025     04/27/2025    BUN 57 (H) 04/27/2025    CREATININE 3.18 (H) 04/27/2025       Lab Results   Component Value Date    WBC 5.00 04/25/2025    HGB 9.3 (L) 04/25/2025    HCT 29.9 (L) 04/25/2025    MCV 69 (L) 04/25/2025     04/25/2025     Results from last 7 days   Lab Units 04/27/25  0514   INR  2.15*       No results found for: \"CHOL\"  Lab Results   Component Value Date    HDL 28 (L) 04/19/2022     Lab Results   Component Value Date    LDLCALC 83 04/19/2022     Lab Results   Component Value Date    TRIG 59 04/19/2022       Lab Results   Component Value Date    ALT 16 04/22/2025    AST 19 04/22/2025    ALKPHOS 56 04/22/2025         EKG personally reviewed by )Gelacio Mckeon MD. No acute changes   TELE: Patient remains in atrial fibrillation, otherwise no significant arrhythmias seen on telemetry review.      "

## 2025-04-27 NOTE — ASSESSMENT & PLAN NOTE
BP with suboptimal control - increasing Bumex today  Continue increased dose hydralazine 50 mg 3 times daily, on Coreg 3.25 mg twice daily  Losartan currently on hold while diuresing with elevated creatinine  Isordil was added but removed due to unfavorable side effects per patient

## 2025-04-27 NOTE — ASSESSMENT & PLAN NOTE
Potassium normal at 3.8, continue home potassium 20 mEq daily  Monitor for increased needs with diuresis

## 2025-04-27 NOTE — ASSESSMENT & PLAN NOTE
Recent TTE ED with RVSP elevated at 69  Continue diuretic therapy and oxygen as needed  Cardiology following  Reviewed outpatient pulmonology note with Dr. Haque  Planning for home O2 at night

## 2025-04-27 NOTE — ASSESSMENT & PLAN NOTE
Rate controlled with Coreg 3.25 mg twice daily, AC with Coumadin  PTA on Coumadin 5 mg Tuesday Thursday Sunday and 7.5 mg all other days  INR supratherapeutic and Coumadin was held  Now therapeutic at 2.15, continue 5 mg tonight  No need for AC hold with thoracentesis tomorrow  Monitor INR daily

## 2025-04-27 NOTE — ASSESSMENT & PLAN NOTE
Repeat chest x-ray showing moderate pleural effusions with persistent pulmonary vascular congestion/edema.  No significant change from prior x-ray  Recommend continuation of IV diuresis, increase Bumex to 2 mg twice daily  Hopeful plateau/stability in renal function  Consideration for thoracentesis as per primary service.

## 2025-04-27 NOTE — ASSESSMENT & PLAN NOTE
Chronic kidney disease likely multifactorial given underlying multiple myeloma with concerns for AL amyloidosis.  Baseline creatinine reported at 2.3-2.7 since October 2024.      Unfortunately renal function has continued to worsen now with a creatinine 3.18 current EGFR of 19  Suspect worsening of CKD due to known history of multiple myeloma and amyloid.  Also additional component due to cardiorenal syndrome/diuresis.  Discussed the potential need for dialysis therapy if his renal function continues to worsen.  Unfortunately still has not started treatment for myeloma.

## 2025-04-27 NOTE — PROGRESS NOTES
NEPHROLOGY HOSPITAL PROGRESS NOTE   Domingo Trevino 65 y.o. male MRN: [id number]  Unit/Bed#: E4 -01 Encounter: [id number]  Reason for Consult: LUDWIG / CKD    Assessment & Plan  Chronic kidney disease (CKD), stage IV (severe) (HCC)  Chronic kidney disease likely multifactorial given underlying multiple myeloma with concerns for AL amyloidosis.  Baseline creatinine reported at 2.3-2.7 since October 2024.      Unfortunately renal function has continued to worsen now with a creatinine 3.18 current EGFR of 19  Suspect worsening of CKD due to known history of multiple myeloma and amyloid.  Also additional component due to cardiorenal syndrome/diuresis.  Discussed the potential need for dialysis therapy if his renal function continues to worsen.  Unfortunately still has not started treatment for myeloma.    Hypokalemia  Current potassium 3.8  Recommend close observation with ongoing diuretic therapy.  SOB (shortness of breath)  Repeat chest x-ray showing moderate pleural effusions with persistent pulmonary vascular congestion/edema.  No significant change from prior x-ray  Recommend continuation of IV diuresis, increase Bumex to 2 mg twice daily  Hopeful plateau/stability in renal function  Consideration for thoracentesis as per primary service.  Myeloma (HCC)  As per hematology oncology, following with [facility]  Patient unfortunately still not decided on whether he wants to proceed with treatment  Discussed with primary service.  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  Noted IVC dilatation.  Suggestion of cardiac amyloidosis.  Primary hypertension  Previous renal Doppler showing no evidence of occlusive disease although left main renal artery not visualized.  Noted atrophic left kidney measuring 7.6 cm, right kidney 11.8 cm  Recommend holding angiotensin receptor blocker, continue with hydralazine and  carvedilol.  Pulmonary hypertension (HCC)  Management as per cardiology.  NSVT (nonsustained ventricular tachycardia) ([facility])  Initiated on amiodarone as per cardiology.    Summary:.  Unfortunately renal function has continued to worsen with a creatinine 3.18  However patient with persistent volume overload given chest x-ray findings  For now we will continue with diuresis, Bumex 2 mg IV twice daily.  Empirically add albumin x 2 doses  Hopeful stability in renal function  Did discuss the possibility of requiring dialysis therapy.  Discussed with primary service.    SUBJECTIVE / 24H INTERVAL HISTORY:  Seen and examined.  Patient unfortunately still with some shortness of breath especially with exertion.  Intermittently uses supplemental oxygen.  No active chest pain or pressure.  No abdominal pain.    OBJECTIVE:  Current Weight: Weight - Scale: 68.5 kg (151 lb 0.2 oz)  Vitals:    04/27/25 0330 04/27/25 0600 04/27/25 0622 04/27/25 0755   BP: 149/95  151/57 141/96   BP Location: Right arm  Left arm Right arm   Pulse: 57  (!) 52 (!) 54   Resp: 18  18 18   Temp: (!) 97.3 °F (36.3 °C)   97.5 °F (36.4 °C)   TempSrc: Temporal   Temporal   SpO2: 97%  93% 97%   Weight:  68.5 kg (151 lb 0.2 oz)     Height:           Intake/Output Summary (Last 24 hours) at 4/27/2025 0935  Last data filed at 4/27/2025 0630  Gross per 24 hour   Intake 720 ml   Output 975 ml   Net -255 ml       Physical Exam  Constitutional:       Appearance: He is not ill-appearing.   Eyes:      General: No scleral icterus.  Cardiovascular:      Rate and Rhythm: Normal rate and regular rhythm.   Pulmonary:      Effort: Pulmonary effort is normal.      Breath sounds: Examination of the right-lower field reveals decreased breath sounds. Examination of the left-lower field reveals decreased breath sounds. Decreased breath sounds and rales present.   Abdominal:      General: There is no distension.      Palpations: Abdomen is soft.      Tenderness: There is no  abdominal tenderness.   Musculoskeletal:      Right lower leg: No edema.      Left lower leg: No edema.   Skin:     General: Skin is warm and dry.      Findings: No rash.   Neurological:      Mental Status: He is alert and oriented to person, place, and time.         Medications:    Current Facility-Administered Medications:     acetaminophen (TYLENOL) tablet 650 mg, 650 mg, Oral, Q6H PRN, Sammy Paulur, CRNP    [START ON 5/1/2025] amiodarone tablet 200 mg, 200 mg, Oral, Daily With Breakfast, Gelacio Mckeon MD    amiodarone tablet 400 mg, 400 mg, Oral, BID With Meals, Gelacio Mckeon MD, 400 mg at 04/27/25 0903    [Held by provider] bumetanide (BUMEX) injection 2 mg, 2 mg, Intravenous, Daily, Néstor Finley DO    carvedilol (COREG) tablet 3.125 mg, 3.125 mg, Oral, BID With Meals, Sammy Adams, CRNP, 3.125 mg at 04/26/25 0921    fluticasone-vilanterol 200-25 mcg/actuation 1 puff, 1 puff, Inhalation, Daily, 1 puff at 04/26/25 0921 **AND** umeclidinium 62.5 mcg/actuation inhaler AEPB 1 puff, 1 puff, Inhalation, Daily, Sammy Paulur, CRNP, 1 puff at 04/26/25 0921    hydrALAZINE (APRESOLINE) tablet 50 mg, 50 mg, Oral, Q8H [facility], Gabriela Salazar PA-C, 50 mg at 04/27/25 0629    labetalol (NORMODYNE) injection 10 mg, 10 mg, Intravenous, Q4H PRN, Ryan Perez DO    LORazepam (ATIVAN) tablet 0.5 mg, 0.5 mg, Oral, HS PRN, Sammy Adams, CRNP, 0.5 mg at 04/27/25 0031    melatonin tablet 3 mg, 3 mg, Oral, HS PRN, Sammy Adams, CRNP, 3 mg at 04/27/25 0031    polyethylene glycol (MIRALAX) packet 17 g, 17 g, Oral, Daily PRN, Sammy Adams, CRNP    potassium chloride (Klor-Con M20) CR tablet 20 mEq, 20 mEq, Oral, Daily, Gabriela Salazar PA-C, 20 mEq at 04/27/25 0903    prochlorperazine (COMPAZINE) tablet 10 mg, 10 mg, Oral, Q6H PRN, VEENA Bridges    simethicone (MYLICON) chewable tablet 80 mg, 80 mg, Oral, 4x Daily PRN, VEENA Bridges    [DISCONTINUED] warfarin (COUMADIN) tablet 7.5 mg, 7.5 mg, Oral, Once  "(warfarin) **AND** [Held by provider] warfarin (COUMADIN) tablet 5 mg, 5 mg, Oral, Daily (warfarin), Gabriela Salazar PA-C, 5 mg at 04/25/25 1749    Laboratory Results:  Results from last 7 days   Lab Units 04/27/25  0514 04/26/25  0614 04/25/25  0610 04/24/25  0500 04/23/25  0542 04/22/25  1318   WBC Thousand/uL  --   --  5.00 5.20 5.32 4.83   HEMOGLOBIN g/dL  --   --  9.3* 9.6* 10.1* 9.6*   HEMATOCRIT %  --   --  29.9* 30.8* 33.0* 30.5*   PLATELETS Thousands/uL  --   --  215 220 224 225   POTASSIUM mmol/L 3.8 4.0 3.9 3.7 3.6 3.7   CHLORIDE mmol/L 104 104 105 106 106 104   CO2 mmol/L 28 29 28 31 30 28   BUN mg/dL 57* 50* 55* 52* 51* 50*   CREATININE mg/dL 3.18* 2.84* 2.50* 2.59* 2.53* 2.77*   CALCIUM mg/dL 9.1 8.9 9.0 8.9 9.1 9.1   MAGNESIUM mg/dL  --  2.1 2.1  --  2.1  --        Portions of the record may have been created with voice recognition software. Occasional wrong word or \"sound a like\" substitutions may have occurred due to the inherent limitations of voice recognition software. Read the chart carefully and recognize, using context, where substitutions have occurred.If you have any questions, please contact the dictating provider.  "

## 2025-04-27 NOTE — ASSESSMENT & PLAN NOTE
Presents with shortness of breath after being hospitalized for HFpEF, signed out AMA due to family concerns  Continue diuresis as stated below  Multifactorial in setting of multiple comorbidities as stated below  Intermittently using 2 L nasal cannula for comfort, no noted hypoxia on room air  Underwent ambulatory pulse oximetry without desaturation  Overnight pulse oximetry test noted desaturation <88%, lowest O2 83%.  Recommend oxygen supplementation at nighttime on DC, relayed information to case management

## 2025-04-27 NOTE — RESPIRATORY THERAPY NOTE
04/26/25 2320   Respiratory Assessment   Resp Comments started pt's overnight pulse ox study on RA

## 2025-04-27 NOTE — ASSESSMENT & PLAN NOTE
Multiple myeloma not having achieved admission, prior bone marrow biopsy October 2020  Following outpatient [facility] hematology oncology, reviewed recent visit 4/21/2025  Patient has not started treatment for this and was wanting a second opinion.  Follow up scheduled 4/28 with Dr. Livingston to discuss potentially starting cycle 1 of treatment  Goals of care discussion has occurred. Patient is open to ongoing treatment. Not interested in palliative care discussion here at this time. Discussions of the future need for dialysis have been started.   He needs his oncology apt rescheduled

## 2025-04-27 NOTE — ASSESSMENT & PLAN NOTE
Lab Results   Component Value Date    EGFR 19 04/27/2025    EGFR 22 04/26/2025    EGFR 26 04/25/2025    CREATININE 3.18 (H) 04/27/2025    CREATININE 2.84 (H) 04/26/2025    CREATININE 2.50 (H) 04/25/2025   Baseline creatinine 2.3-2.7  Patient at risk for LUDWIG in setting multiple myeloma with amyloidosis  Kidney function worsening today to 3.18  Recent renal Doppler without evidence of renal artery stenosis  Nephrology following  Patient's still remains hypervolemic  Increasing Bumex dosing today

## 2025-04-27 NOTE — ASSESSMENT & PLAN NOTE
As per hematology oncology, following with [facility]  Patient unfortunately still not decided on whether he wants to proceed with treatment  Discussed with primary service.

## 2025-04-27 NOTE — ASSESSMENT & PLAN NOTE
Patient's echo was suggestive of cardiac amyloid and he has previously declined endomyocardial biopsy

## 2025-04-28 ENCOUNTER — APPOINTMENT (INPATIENT)
Dept: RADIOLOGY | Facility: HOSPITAL | Age: 65
DRG: 346 | End: 2025-04-28
Payer: COMMERCIAL

## 2025-04-28 LAB
ANION GAP SERPL CALCULATED.3IONS-SCNC: 8 MMOL/L (ref 4–13)
APPEARANCE FLD: CLEAR
APPEARANCE FLD: NORMAL
BUN SERPL-MCNC: 56 MG/DL (ref 5–25)
CALCIUM SERPL-MCNC: 9.4 MG/DL (ref 8.4–10.2)
CHLORIDE SERPL-SCNC: 104 MMOL/L (ref 96–108)
CO2 SERPL-SCNC: 30 MMOL/L (ref 21–32)
COLOR FLD: NORMAL
COLOR FLD: YELLOW
CREAT SERPL-MCNC: 3.15 MG/DL (ref 0.6–1.3)
ERYTHROCYTE [DISTWIDTH] IN BLOOD BY AUTOMATED COUNT: 17.5 % (ref 11.6–15.1)
GFR SERPL CREATININE-BSD FRML MDRD: 19 ML/MIN/1.73SQ M
GLUCOSE FLD-MCNC: 149 MG/DL
GLUCOSE FLD-MCNC: 161 MG/DL
GLUCOSE SERPL-MCNC: 87 MG/DL (ref 65–140)
HCT VFR BLD AUTO: 33.8 % (ref 36.5–49.3)
HGB BLD-MCNC: 10.3 G/DL (ref 12–17)
HISTIOCYTES NFR FLD: 11 %
HISTIOCYTES NFR FLD: 74 %
INR PPP: 2.55 (ref 0.85–1.19)
LDH FLD L TO P-CCNC: 51 U/L
LDH FLD L TO P-CCNC: 64 U/L
LYMPHOCYTES NFR BLD AUTO: 10 %
LYMPHOCYTES NFR BLD AUTO: 64 %
MCH RBC QN AUTO: 21.4 PG (ref 26.8–34.3)
MCHC RBC AUTO-ENTMCNC: 30.5 G/DL (ref 31.4–37.4)
MCV RBC AUTO: 70 FL (ref 82–98)
MONO+MESO NFR FLD MANUAL: 11 %
MONO+MESO NFR FLD MANUAL: 2 %
MONOCYTES NFR BLD AUTO: 2 %
MONOCYTES NFR BLD AUTO: 7 %
NEUTS SEG NFR BLD AUTO: 16 %
NEUTS SEG NFR BLD AUTO: 3 %
PH BODY FLUID: 7.5
PH BODY FLUID: 7.7
PLATELET # BLD AUTO: 226 THOUSANDS/UL (ref 149–390)
POTASSIUM SERPL-SCNC: 3.8 MMOL/L (ref 3.5–5.3)
PROT FLD-MCNC: <3 G/DL
PROT FLD-MCNC: <3 G/DL
PROTHROMBIN TIME: 27.1 SECONDS (ref 12.3–15)
RBC # BLD AUTO: 4.81 MILLION/UL (ref 3.88–5.62)
SITE: NORMAL
SITE: NORMAL
SODIUM SERPL-SCNC: 142 MMOL/L (ref 135–147)
TOTAL CELLS COUNTED SPEC: 100
TOTAL CELLS COUNTED SPEC: 100
TOTAL NUCLEATED CELL COUNT: 837 /UL
TOTAL PROTEIN FLUID: 0.8 G/DL
TOTAL PROTEIN FLUID: 0.9 G/DL
WBC # BLD AUTO: 5.43 THOUSAND/UL (ref 4.31–10.16)
WBC # FLD MANUAL: 317 /UL

## 2025-04-28 PROCEDURE — 32555 ASPIRATE PLEURA W/ IMAGING: CPT

## 2025-04-28 PROCEDURE — 85610 PROTHROMBIN TIME: CPT

## 2025-04-28 PROCEDURE — 0W993ZZ DRAINAGE OF RIGHT PLEURAL CAVITY, PERCUTANEOUS APPROACH: ICD-10-PCS | Performed by: STUDENT IN AN ORGANIZED HEALTH CARE EDUCATION/TRAINING PROGRAM

## 2025-04-28 PROCEDURE — 0W9B3ZZ DRAINAGE OF LEFT PLEURAL CAVITY, PERCUTANEOUS APPROACH: ICD-10-PCS | Performed by: STUDENT IN AN ORGANIZED HEALTH CARE EDUCATION/TRAINING PROGRAM

## 2025-04-28 PROCEDURE — 83615 LACTATE (LD) (LDH) ENZYME: CPT

## 2025-04-28 PROCEDURE — 83986 ASSAY PH BODY FLUID NOS: CPT

## 2025-04-28 PROCEDURE — 88305 TISSUE EXAM BY PATHOLOGIST: CPT | Performed by: PATHOLOGY

## 2025-04-28 PROCEDURE — 99232 SBSQ HOSP IP/OBS MODERATE 35: CPT | Performed by: STUDENT IN AN ORGANIZED HEALTH CARE EDUCATION/TRAINING PROGRAM

## 2025-04-28 PROCEDURE — 84157 ASSAY OF PROTEIN OTHER: CPT

## 2025-04-28 PROCEDURE — 87070 CULTURE OTHR SPECIMN AEROBIC: CPT

## 2025-04-28 PROCEDURE — NC001 PR NO CHARGE: Performed by: STUDENT IN AN ORGANIZED HEALTH CARE EDUCATION/TRAINING PROGRAM

## 2025-04-28 PROCEDURE — 82945 GLUCOSE OTHER FLUID: CPT

## 2025-04-28 PROCEDURE — 99232 SBSQ HOSP IP/OBS MODERATE 35: CPT | Performed by: INTERNAL MEDICINE

## 2025-04-28 PROCEDURE — 89051 BODY FLUID CELL COUNT: CPT

## 2025-04-28 PROCEDURE — 88112 CYTOPATH CELL ENHANCE TECH: CPT | Performed by: PATHOLOGY

## 2025-04-28 PROCEDURE — 80048 BASIC METABOLIC PNL TOTAL CA: CPT

## 2025-04-28 PROCEDURE — 85027 COMPLETE CBC AUTOMATED: CPT

## 2025-04-28 PROCEDURE — 87205 SMEAR GRAM STAIN: CPT

## 2025-04-28 PROCEDURE — 99233 SBSQ HOSP IP/OBS HIGH 50: CPT | Performed by: INTERNAL MEDICINE

## 2025-04-28 RX ORDER — LIDOCAINE WITH 8.4% SOD BICARB 0.9%(10ML)
SYRINGE (ML) INJECTION AS NEEDED
Status: COMPLETED | OUTPATIENT
Start: 2025-04-28 | End: 2025-04-28

## 2025-04-28 RX ORDER — WARFARIN SODIUM 5 MG/1
5 TABLET ORAL 3 TIMES WEEKLY
Status: DISCONTINUED | OUTPATIENT
Start: 2025-04-29 | End: 2025-04-30 | Stop reason: HOSPADM

## 2025-04-28 RX ADMIN — WARFARIN SODIUM 7.5 MG: 5 TABLET ORAL at 22:13

## 2025-04-28 RX ADMIN — FLUTICASONE FUROATE AND VILANTEROL TRIFENATATE 1 PUFF: 200; 25 POWDER RESPIRATORY (INHALATION) at 10:18

## 2025-04-28 RX ADMIN — MELATONIN 3 MG: 3 TAB ORAL at 00:05

## 2025-04-28 RX ADMIN — Medication 10 ML: at 15:35

## 2025-04-28 RX ADMIN — AMIODARONE HYDROCHLORIDE 400 MG: 200 TABLET ORAL at 16:53

## 2025-04-28 RX ADMIN — PROCHLORPERAZINE MALEATE 10 MG: 10 TABLET ORAL at 23:37

## 2025-04-28 RX ADMIN — UMECLIDINIUM 1 PUFF: 62.5 AEROSOL, POWDER ORAL at 10:18

## 2025-04-28 RX ADMIN — Medication 10 ML: at 15:47

## 2025-04-28 RX ADMIN — HYDRALAZINE HYDROCHLORIDE 50 MG: 50 TABLET ORAL at 06:23

## 2025-04-28 RX ADMIN — HYDRALAZINE HYDROCHLORIDE 50 MG: 50 TABLET ORAL at 21:33

## 2025-04-28 RX ADMIN — AMIODARONE HYDROCHLORIDE 400 MG: 200 TABLET ORAL at 10:16

## 2025-04-28 RX ADMIN — LORAZEPAM 0.5 MG: 0.5 TABLET ORAL at 23:37

## 2025-04-28 RX ADMIN — CARVEDILOL 3.12 MG: 3.12 TABLET, FILM COATED ORAL at 16:54

## 2025-04-28 RX ADMIN — BUMETANIDE 2 MG: 0.25 INJECTION INTRAMUSCULAR; INTRAVENOUS at 10:17

## 2025-04-28 RX ADMIN — BUMETANIDE 2 MG: 0.25 INJECTION INTRAMUSCULAR; INTRAVENOUS at 16:58

## 2025-04-28 RX ADMIN — MELATONIN 3 MG: 3 TAB ORAL at 23:37

## 2025-04-28 RX ADMIN — POTASSIUM CHLORIDE 20 MEQ: 1500 TABLET, EXTENDED RELEASE ORAL at 10:16

## 2025-04-28 RX ADMIN — LORAZEPAM 0.5 MG: 0.5 TABLET ORAL at 00:05

## 2025-04-28 NOTE — ASSESSMENT & PLAN NOTE
As per hematology oncology, following with [facility]  Patient unfortunately still not decided on whether he wants to proceed with treatment  Discussed with primary service.

## 2025-04-28 NOTE — ASSESSMENT & PLAN NOTE
Lab Results   Component Value Date    EGFR 19 04/28/2025    EGFR 19 04/27/2025    EGFR 22 04/26/2025    CREATININE 3.15 (H) 04/28/2025    CREATININE 3.18 (H) 04/27/2025    CREATININE 2.84 (H) 04/26/2025   Baseline creatinine 2.3-2.7  Nephrology evaluation and recommendations reviewed.    He has rising creatinine due to worsening CKD as well as cardiorenal syndrome.  Diuretic management per nephrology

## 2025-04-28 NOTE — ASSESSMENT & PLAN NOTE
Patient noted with NSVT on telemetry 4/23 and 4/24  Cardiology started patient on amiodarone. Educated patient bedside on medication and the importance of compliance

## 2025-04-28 NOTE — ASSESSMENT & PLAN NOTE
Potassium normal at 3.8, continue home potassium 20 mEq daily  Monitor for increased needs with diuresis   unknown

## 2025-04-28 NOTE — ASSESSMENT & PLAN NOTE
Chronic kidney disease likely multifactorial given underlying multiple myeloma with concerns for AL amyloidosis.  Also possible component of cardiorenal disease  Baseline creatinine reported at 2.3-2.7 since October 2024.    Unfortunately renal function has worsened this admission but today is relatively stable at 3.1  S/p bumex and albumin yesterday   Previously discussed the potential need for dialysis therapy if his renal function continues to worsen.  Unfortunately still has not started treatment for myeloma.

## 2025-04-28 NOTE — ASSESSMENT & PLAN NOTE
Wt Readings from Last 3 Encounters:   04/28/25 69.2 kg (152 lb 8.9 oz)   04/22/25 74.6 kg (164 lb 8 oz)   04/17/25 69.7 kg (153 lb 10.6 oz)     164 pounds on admission, dry weight around 150  Chest x-ray with persistent vascular congestion and small pleural effusions  Echocardiogram April 25 with EF 40%, high suggestive of amyloid cardiomyopathy  PTA torsemide 40 mg morning and 20 mg afternoon with diuretic noncompliance  Currently on IV Bumex 2 mg twice daily with nephrology follow-up  IR consulted for thoracentesis

## 2025-04-28 NOTE — PROGRESS NOTES
NEPHROLOGY PROGRESS NOTE   Domingo Trevino 65 y.o. male MRN: [id number]  Unit/Bed#: E4 -01 Encounter: [id number]      Assessment & Plan  Chronic kidney disease (CKD), stage IV (severe) (HCC)  Chronic kidney disease likely multifactorial given underlying multiple myeloma with concerns for AL amyloidosis.  Also possible component of cardiorenal disease  Baseline creatinine reported at 2.3-2.7 since October 2024.    Unfortunately renal function has worsened this admission but today is relatively stable at 3.1  S/p bumex and albumin yesterday   Previously discussed the potential need for dialysis therapy if his renal function continues to worsen.  Unfortunately still has not started treatment for myeloma.  SOB (shortness of breath)  Repeat chest x-ray showing moderate pleural effusions with persistent pulmonary vascular congestion/edema.  No significant change from prior x-ray  Yesterday Bumex was increased to 2 mg twice daily but only received 1 dose  Will monitor today on twice daily dosing  IR consulted for thoracentesis  Myeloma (HCC)  As per hematology oncology, following with [facility]  Patient unfortunately still not decided on whether he wants to proceed with treatment  Discussed with primary service.  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  Noted IVC dilatation.  Suggestion of cardiac amyloidosis.  Diuretics as above   Primary hypertension  Previous renal Doppler showing no evidence of occlusive disease although left main renal artery not visualized.  Noted atrophic left kidney measuring 7.6 cm, right kidney 11.8 cm  Recommend holding angiotensin receptor blocker, continue with hydralazine and carvedilol.  Pulmonary hypertension (HCC)  Management as per cardiology.  NSVT (nonsustained ventricular tachycardia) (HCC)  Initiated on amiodarone as per cardiology.      Plan Summary:   Continue  bumex 2mg IV bid   Check am BMP   Strict I/O and daily weights     SUBJECTIVE:  Reports his breathing is a little better today.  Denies current chest pain or lower extremity edema.  Feeling tired.    OBJECTIVE:  Current Weight: Weight - Scale: 69.2 kg (152 lb 8.9 oz)  Vitals:    04/28/25 0825   BP: 139/72   Pulse: (!) 52   Resp: 18   Temp: 98.7 °F (37.1 °C)   SpO2: 98%       Intake/Output Summary (Last 24 hours) at 4/28/2025 1146  Last data filed at 4/28/2025 0601  Gross per 24 hour   Intake 840 ml   Output 1350 ml   Net -510 ml       General:  appears comfortable and in no acute distress   Skin:  No rash  Eyes:  Sclerae anicteric, no periorbital edema   ENT:  Moist mucous membranes  Neck:  Trachea midline, symmetric   Chest: Decreased breath sounds at bases  CVS:  Regular rate and rhythm  Abdomen:  Soft, nontender, nondistended  Neuro:  Awake and alert  Psych:  Appropriate affect  Extremities: no lower extremity edema         Medications:  Scheduled Meds:  Current Facility-Administered Medications   Medication Dose Route Frequency Provider Last Rate    acetaminophen  650 mg Oral Q6H PRN Sammy Adams, CRNP      [START ON 5/1/2025] amiodarone  200 mg Oral Daily With Breakfast Gelacio Mckeon MD      amiodarone  400 mg Oral BID With Meals Gelacio Mckeon MD      bumetanide  2 mg Intravenous BID Néstor Finley, DO      carvedilol  3.125 mg Oral BID With Meals Sammy Adams, CRNP      fluticasone-vilanterol  1 puff Inhalation Daily Sammy Adams, CRNP      And    umeclidinium  1 puff Inhalation Daily Sammy Adams, CRNP      hydrALAZINE  50 mg Oral Q8H [facility] Gabriela Salazar PA-C      labetalol  10 mg Intravenous Q4H PRN Ryan Perez, DO      LORazepam  0.5 mg Oral HS PRN Sammy Adams, CRNP      melatonin  3 mg Oral HS PRN Sammy Adams, CRNP      polyethylene glycol  17 g Oral Daily PRN Sammy Adams, CRNP      potassium chloride  20 mEq Oral Daily Gabriela Salazar PA-C      prochlorperazine  10 mg Oral  Q6H PRN VEENA Bridges      simethicone  80 mg Oral 4x Daily PRN VEENA Bridges         PRN Meds:.  acetaminophen    labetalol    LORazepam    melatonin    polyethylene glycol    prochlorperazine    simethicone    Continuous Infusions:     Laboratory Results:  Results from last 7 days   Lab Units 04/28/25  0555 04/27/25  0514 04/26/25  0614 04/25/25  0610 04/24/25  0500 04/23/25  0542 04/22/25  1318   WBC Thousand/uL 5.43  --   --  5.00 5.20 5.32 4.83   HEMOGLOBIN g/dL 10.3*  --   --  9.3* 9.6* 10.1* 9.6*   HEMATOCRIT % 33.8*  --   --  29.9* 30.8* 33.0* 30.5*   PLATELETS Thousands/uL 226  --   --  215 220 224 225   SODIUM mmol/L 142 142 142 141 146 144 141   POTASSIUM mmol/L 3.8 3.8 4.0 3.9 3.7 3.6 3.7   CHLORIDE mmol/L 104 104 104 105 106 106 104   CO2 mmol/L 30 28 29 28 31 30 28   BUN mg/dL 56* 57* 50* 55* 52* 51* 50*   CREATININE mg/dL 3.15* 3.18* 2.84* 2.50* 2.59* 2.53* 2.77*   CALCIUM mg/dL 9.4 9.1 8.9 9.0 8.9 9.1 9.1   MAGNESIUM mg/dL  --   --  2.1 2.1  --  2.1  --

## 2025-04-28 NOTE — ASSESSMENT & PLAN NOTE
Rate controlled with Coreg 3.25 mg twice daily, AC with Coumadin  PTA on Coumadin 5 mg Tuesday Thursday Sunday and 7.5 mg all other days  INR therapeutic   no need for AC hold with thoracentesis  Monitor INR daily

## 2025-04-28 NOTE — ASSESSMENT & PLAN NOTE
Recent echocardiogram showed ejection fraction 40% with elevated right ventricular systolic pressures.  Noted IVC dilatation.  Suggestion of cardiac amyloidosis.  Diuretics as above

## 2025-04-28 NOTE — DISCHARGE INSTRUCTIONS
Thoracentesis   WHAT YOU NEED TO KNOW:   A thoracentesis is a procedure to remove extra fluid or air from between your lungs and your inner chest wall. Air or fluid buildup may make it hard for you to breathe. A thoracentesis allows your lungs to expand fully so you can breathe more easily.  DISCHARGE INSTRUCTIONS:   Medicines:   Pain medicine:  You may be given a prescription medicine to decrease pain. Do not wait until the pain is severe before you take your medicine.    Antibiotics:  This medicine helps fight or prevent an infection.    Take your medicine as directed.  Call your healthcare provider if you think your medicine is not helping or if you have side effects. Tell him if you are allergic to any medicine. Keep a list of the medicines, vitamins, and herbs you take. Include the amounts, and when and why you take them. Bring the list or the pill bottles to follow-up visits. Carry your medicine list with you in case of an emergency.  Follow up with your healthcare provider as directed:  Write down your questions so you remember to ask them during your visits.   Rest:  Rest when you feel it is needed. Slowly start to do more each day. Return to your daily activities as directed.   Do not smoke:  If you smoke, it is never too late to quit. Ask for information about how to stop smoking if you need help.  Contact your healthcare provider if:   You have a fever.    Your puncture site is red, warm, swollen, or draining pus.    You have questions or concerns about your procedure, medicine, or care.    If you have any questions regarding, call the IR department @ [phone].     Seek care immediately or call 911 if:   Blood soaks through your bandage.    There is blood in your spit.

## 2025-04-28 NOTE — ASSESSMENT & PLAN NOTE
Repeat chest x-ray showing moderate pleural effusions with persistent pulmonary vascular congestion/edema.  No significant change from prior x-ray  Yesterday Bumex was increased to 2 mg twice daily but only received 1 dose  Will monitor today on twice daily dosing  IR consulted for thoracentesis

## 2025-04-28 NOTE — PROGRESS NOTES
Cardiology Progress Note - Domingo Trevino 65 y.o. 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  - TTE 4/14/2025 showed EF 40%, severe LA, dilated RA, mild to moderate MR, mild to moderate TR, estimated PA pressure 69 mmHg, echo findings highly suggestive of cardiac amyloidosis  -TTE 10/21/2024 at [facility] showed EF 55 to 60%, severe LA, moderate RA, mild MR, mild AI, mild TR   - BNP 1099 on admission  - Chest x-ray on admission showed persistent vascular congestion with small pleural effusions  Neurohormonal Blockade:  -- Beta-Blocker: Coreg 3.125 mg twice daily  -- ACEi, ARB or ARNi: Losartan 50 mg daily    (or SVR reduction) hydralazine 50 mg 3 times daily, patient refusing Isordil  -- Outpatient diuretic: Torsemide 40 mg in the a.m. and 20 mg in the p.m.  -- Current diuretic: Bumex 2 mg IV daily twice daily today    Nonsustained ventricular tachycardia  - Patient had a 21-second run of NSVT at around 2:30 PM on 4/23 and then a 23-second run of NSVT at around 5 AM on 4/24  - Likely due to his underlying cardiomyopathy, and CHF exacerbation  - Continue with Coreg 3.125 mg twice daily  - Also start amiodarone to suppress VT, plan to load with 400 mg twice daily for 7 days and then changed to 200 mg daily on 5/1    Chronic atrial fibrillation (HCC)  - Anticoagulated on warfarin  - Rate control with Coreg 3.125 mg twice daily    Pulmonary hypertension (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     Primary hypertension  - Outpatient Rx Coreg 3.125 Mg twice daily, losartan 50 mg daily and hydralazine 25 mg 3 times daily  - Increase the Dralzine to 50 mg 3 times daily    AL amyloidosis (HCC)  -TTE 10/21/24 ([facility]): LVEF 55-60%,  moderate concentric hypertrophy, sparing of apex suggestive of cardiac amyloid  - [facility] scan 10/22/24: negative for TTR amyloid   - cardiac MRI 10/24/24: incomplete study due to claustrophobia, low normal LV function, LVEF  "50-55%, RVEF 45%, mild biatrial enlargement, mild MR   - [facility] scan 10/22/24: negative for TTR amyloid  - previously declined further workup including cardiac biopsy for amyloid castro    Myeloma (HCC)  - elevated kappa and lamda free light chains noted on 10/22/2024  - [facility] hematology 10/30/24: confirmed diagnosis of multiple myeloma with presumed AL cardiac amyloid, declined cardiac biopsy and further treatment for his MM at that time    Hypokalemia    Chronic kidney disease (CKD), stage IV (severe) (HCC)      Summary:  - Patient's weight trended up 1 pound today  - BUN/creatinine remain relatively stable, but above his baseline  - Patient had better diuresis on Bumex 2 mg 3 times daily, but this caused worsening of his renal function  - Currently on Bumex 2 mg IV twice daily and scheduled for IR thoracentesis  - Will defer to nephrology whether to increase Bumex back to 3 times daily or watch 1 more day on twice daily  - Patient in A-fib with slow ventricular response with heart rates in the 40s this morning, asymptomatic and blood pressures are stable, if he becomes symptomatic or heart rate worsens, can consider stopping carvedilol  - Continue monitor on telemetry  - Check daily standing weights  - Continue to monitor creatinine and electrolytes closely    Subjective:   No significant events overnight.  He reports feeling okay this morning.  Continues to have some shortness of breath at rest.  Denies chest pain, abdominal pain, nausea, vomiting, fever, chills, headache, dizziness or palpitations.    Objective:     Vitals: Blood pressure 139/72, pulse (!) 52, temperature 98.7 °F (37.1 °C), temperature source Temporal, resp. rate 18, height 6' 2\" (1.88 m), weight 69.2 kg (152 lb 8.9 oz), SpO2 98%., Body mass index is 19.59 kg/m².,   Orthostatic Blood Pressures      Flowsheet Row Most Recent Value   Blood Pressure 139/72 filed at 04/28/2025 0825   Patient Position - Orthostatic VS Lying filed at 04/28/2025 0825 "              Intake/Output Summary (Last 24 hours) at 4/28/2025 1124  Last data filed at 4/28/2025 0601  Gross per 24 hour   Intake 840 ml   Output 1350 ml   Net -510 ml           Physical Exam:    GEN: Domingo Trevino appears well, alert and oriented x 3, pleasant and cooperative   HEENT: Mucous membranes moist, no scleral icterus, no conjunctival pallor  NECK: + Trace JVD  HEART: Bradycardic, irregular rhythm, normal S1 and S2, 3/6 systolic murmur  LUNGS: Bilateral crackles at bases right greater than left  ABDOMEN: normal bowel sounds, soft, no tenderness, no distention  EXTREMITIES: peripheral pulses normal; no lower extremity edema   NEURO: no focal findings   SKIN: No lesions or rashes on exposed skin         Current Facility-Administered Medications:     acetaminophen (TYLENOL) tablet 650 mg, 650 mg, Oral, Q6H PRN, VEENA Bridges    [START ON 5/1/2025] amiodarone tablet 200 mg, 200 mg, Oral, Daily With Breakfast, Gelacio Mckeon MD    amiodarone tablet 400 mg, 400 mg, Oral, BID With Meals, Gelacio Mckeon MD, 400 mg at 04/28/25 1016    bumetanide (BUMEX) injection 2 mg, 2 mg, Intravenous, BID, Néstor Finley DO, 2 mg at 04/28/25 1017    carvedilol (COREG) tablet 3.125 mg, 3.125 mg, Oral, BID With Meals, VEENA Bridges, 3.125 mg at 04/27/25 1727    fluticasone-vilanterol 200-25 mcg/actuation 1 puff, 1 puff, Inhalation, Daily, 1 puff at 04/28/25 1018 **AND** umeclidinium 62.5 mcg/actuation inhaler AEPB 1 puff, 1 puff, Inhalation, Daily, VEENA Bridges, 1 puff at 04/28/25 1018    hydrALAZINE (APRESOLINE) tablet 50 mg, 50 mg, Oral, Q8H LOTTIE, Gabriela Salazar PA-C, 50 mg at 04/28/25 0623    labetalol (NORMODYNE) injection 10 mg, 10 mg, Intravenous, Q4H PRN, Ryan Perez,     LORazepam (ATIVAN) tablet 0.5 mg, 0.5 mg, Oral, HS PRN, VEENA Bridges, 0.5 mg at 04/28/25 0005    melatonin tablet 3 mg, 3 mg, Oral, HS PRN, VEENA Bridges, 3 mg at 04/28/25 0005    polyethylene  "glycol (MIRALAX) packet 17 g, 17 g, Oral, Daily PRN, Sammy Adams, CRNP    potassium chloride (Klor-Con M20) CR tablet 20 mEq, 20 mEq, Oral, Daily, Gabriela Salazar PA-C, 20 mEq at 04/28/25 1016    prochlorperazine (COMPAZINE) tablet 10 mg, 10 mg, Oral, Q6H PRN, Sammy Adams, CRNP    simethicone (MYLICON) chewable tablet 80 mg, 80 mg, Oral, 4x Daily PRN, Sammy Adams, CRNP    Labs & Results:    Lab Results   Component Value Date    TROPONINI 1.01 (HH) 06/17/2021    TROPONINI 1.12 (HH) 06/17/2021       Lab Results   Component Value Date    CALCIUM 9.4 04/28/2025    K 3.8 04/28/2025    CO2 30 04/28/2025     04/28/2025    BUN 56 (H) 04/28/2025    CREATININE 3.15 (H) 04/28/2025       Lab Results   Component Value Date    WBC 5.43 04/28/2025    HGB 10.3 (L) 04/28/2025    HCT 33.8 (L) 04/28/2025    MCV 70 (L) 04/28/2025     04/28/2025     Results from last 7 days   Lab Units 04/28/25  0555   INR  2.55*       No results found for: \"CHOL\"  Lab Results   Component Value Date    HDL 28 (L) 04/19/2022     Lab Results   Component Value Date    LDLCALC 83 04/19/2022     Lab Results   Component Value Date    TRIG 59 04/19/2022       Lab Results   Component Value Date    ALT 16 04/22/2025    AST 19 04/22/2025    ALKPHOS 56 04/22/2025         EKG personally reviewed by )Gelacio Mckeon MD. No acute changes   TELE: Patient remains in atrial fibrillation, otherwise no significant arrhythmias seen on telemetry review.      "

## 2025-04-28 NOTE — PROGRESS NOTES
Progress Note - Hospitalist   Name: Domingo Trevino 65 y.o. male I MRN: [id number]  Unit/Bed#: E4 -01 I Date of Admission: 4/22/2025   Date of Service: 4/28/2025 I Hospital Day: 6    Assessment & Plan  SOB (shortness of breath)  Multifactorial due to congestive heart failure, pulmonary hypertension, chronic atrial fibrillation, persistent pleural effusion  See plan for individual problems below  HFpEF, 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  Wt Readings from Last 3 Encounters:   04/28/25 69.2 kg (152 lb 8.9 oz)   04/22/25 74.6 kg (164 lb 8 oz)   04/17/25 69.7 kg (153 lb 10.6 oz)     164 pounds on admission, dry weight around 150  Chest x-ray with persistent vascular congestion and small pleural effusions  Echocardiogram April 25 with EF 40%, high suggestive of amyloid cardiomyopathy  PTA torsemide 40 mg morning and 20 mg afternoon with diuretic noncompliance  Currently on IV Bumex 2 mg twice daily with nephrology follow-up  IR consulted for thoracentesis  Chronic kidney disease (CKD), stage IV (severe) ([facility])  Lab Results   Component Value Date    EGFR 19 04/28/2025    EGFR 19 04/27/2025    EGFR 22 04/26/2025    CREATININE 3.15 (H) 04/28/2025    CREATININE 3.18 (H) 04/27/2025    CREATININE 2.84 (H) 04/26/2025   Baseline creatinine 2.3-2.7  Nephrology evaluation and recommendations reviewed.    He has rising creatinine due to worsening CKD as well as cardiorenal syndrome.  Diuretic management per nephrology  Myeloma ([facility])  Multiple myeloma not having achieved admission, prior bone marrow biopsy October 2020  Following outpatient [facility] hematology oncology, reviewed recent visit 4/21/2025  Patient has not started treatment for this and was wanting a second opinion.  Follow up scheduled 4/28 with Dr. Livingston to discuss potentially starting cycle 1 of treatment  Goals of care discussion has occurred. Patient is open to ongoing treatment. Not interested in palliative care discussion here at this time. Discussions of the future  need for dialysis have been started.   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 cardiac amyloid and he has previously declined endomyocardial biopsy  Chronic atrial fibrillation (HCC)  Rate controlled with Coreg 3.25 mg twice daily, AC with Coumadin  PTA on Coumadin 5 mg Tuesday Thursday Sunday and 7.5 mg all other days  INR therapeutic   no need for AC hold with thoracentesis  Monitor INR daily  Primary hypertension  Continue coreg  3.125 mg BID, hydralazine 50 mg Q8 with hold parameters  Pulmonary hypertension (HCC)  Continue diuresis.  Follow-up nocturnal pulse oximetry  NSVT (nonsustained ventricular tachycardia) (HCC)  Patient noted with NSVT on telemetry 4/23 and 4/24  Cardiology started patient on amiodarone. Educated patient bedside on medication and the importance of compliance    Hypokalemia  Potassium normal at 3.8, continue home potassium 20 mEq daily  Monitor for increased needs with diuresis    VTE Pharmacologic Prophylaxis:   Warfarin    Patient Centered Rounds: Discussed with his nurse  Discussions with Specialists or Other Care Team Provider: Chart reviewed in detail  Education and Discussions with Family / Patient: Patient declined call to .     Current Length of Stay: 6 day(s)  Current Patient Status: Inpatient   Certification Statement: The patient will continue to require additional inpatient hospital stay due to shortness of breath  Discharge Plan: Anticipate discharge in 48-72 hrs to home.    Code Status: Level 1 - Full Code    Subjective   Reports feeling better.  Feels he needs oxygen   Agreeable to get the thoracentesis today  Denies leg swelling    Objective :  Temp:  [97.2 °F (36.2 °C)-98.7 °F (37.1 °C)] 98.7 °F (37.1 °C)  HR:  [52-62] 52  BP: (108-171)/(65-92) 139/72  Resp:  [18-20] 18  SpO2:  [94 %-99 %] 98 %  O2 Device: None (Room air)    Body mass index is 19.59 kg/m².     Input and Output Summary (last 24 hours):      Intake/Output Summary (Last 24 hours) at 4/28/2025 0922  Last data filed at 4/28/2025 0601  Gross per 24 hour   Intake 840 ml   Output 1350 ml   Net -510 ml       Physical Exam  Vitals reviewed.   HENT:      Head: Normocephalic and atraumatic.      Nose: No congestion or rhinorrhea.   Eyes:      General: No scleral icterus.  Cardiovascular:      Rate and Rhythm: Normal rate. Rhythm irregular.   Pulmonary:      Comments: Faint crackles right base  Abdominal:      Palpations: Abdomen is soft.      Tenderness: There is no abdominal tenderness. There is no guarding.   Musculoskeletal:      Right lower leg: No edema.      Left lower leg: No edema.   Skin:     General: Skin is warm and dry.   Neurological:      Mental Status: He is oriented to person, place, and time.   Psychiatric:         Mood and Affect: Mood normal.         Behavior: Behavior normal.         Lab Results: I have reviewed the following results:   Results from last 7 days   Lab Units 04/28/25  0555 04/24/25  0500 04/23/25  0542   WBC Thousand/uL 5.43   < > 5.32   HEMOGLOBIN g/dL 10.3*   < > 10.1*   HEMATOCRIT % 33.8*   < > 33.0*   PLATELETS Thousands/uL 226   < > 224   SEGS PCT %  --   --  66   LYMPHO PCT %  --   --  22   MONO PCT %  --   --  7   EOS PCT %  --   --  4    < > = values in this interval not displayed.     Results from last 7 days   Lab Units 04/28/25  0555 04/23/25  0542 04/22/25  1318   SODIUM mmol/L 142   < > 141   POTASSIUM mmol/L 3.8   < > 3.7   CHLORIDE mmol/L 104   < > 104   CO2 mmol/L 30   < > 28   BUN mg/dL 56*   < > 50*   CREATININE mg/dL 3.15*   < > 2.77*   ANION GAP mmol/L 8   < > 9   CALCIUM mg/dL 9.4   < > 9.1   ALBUMIN g/dL  --   --  4.1   TOTAL BILIRUBIN mg/dL  --   --  1.04*   ALK PHOS U/L  --   --  56   ALT U/L  --   --  16   AST U/L  --   --  19   GLUCOSE RANDOM mg/dL 87   < > 101    < > = values in this interval not displayed.     Results from last 7 days   Lab Units 04/28/25  0555   INR  2.55*                    Recent Cultures (last 7 days):         Imaging Results Review: I reviewed radiology reports from this admission including: chest xray.      Last 24 Hours Medication List:     Current Facility-Administered Medications:     acetaminophen (TYLENOL) tablet 650 mg, Q6H PRN    [START ON 5/1/2025] amiodarone tablet 200 mg, Daily With Breakfast    amiodarone tablet 400 mg, BID With Meals    bumetanide (BUMEX) injection 2 mg, BID    carvedilol (COREG) tablet 3.125 mg, BID With Meals    fluticasone-vilanterol 200-25 mcg/actuation 1 puff, Daily **AND** umeclidinium 62.5 mcg/actuation inhaler AEPB 1 puff, Daily    hydrALAZINE (APRESOLINE) tablet 50 mg, Q8H LOTTIE    labetalol (NORMODYNE) injection 10 mg, Q4H PRN    LORazepam (ATIVAN) tablet 0.5 mg, HS PRN    melatonin tablet 3 mg, HS PRN    polyethylene glycol (MIRALAX) packet 17 g, Daily PRN    potassium chloride (Klor-Con M20) CR tablet 20 mEq, Daily    prochlorperazine (COMPAZINE) tablet 10 mg, Q6H PRN    simethicone (MYLICON) chewable tablet 80 mg, 4x Daily PRN    Administrative Statements   Today, Patient Was Seen By: Jean Marie Schmitz MD      **Please Note: This note may have been constructed using a voice recognition system.**

## 2025-04-28 NOTE — ASSESSMENT & PLAN NOTE
Multiple myeloma not having achieved admission, prior bone marrow biopsy October 2020  Following outpatient [facility] hematology oncology, reviewed recent visit 4/21/2025  Patient has not started treatment for this and was wanting a second opinion.  Follow up scheduled 4/28 with Dr. Livingston to discuss potentially starting cycle 1 of treatment  Goals of care discussion has occurred. Patient is open to ongoing treatment. Not interested in palliative care discussion here at this time. Discussions of the future need for dialysis have been started.   He needs his oncology apt rescheduled

## 2025-04-28 NOTE — BRIEF OP NOTE (RAD/CATH)
IR Bilateral THORACENTESIS Procedure Note    PATIENT NAME: Domingo Trevino  : 1960  MRN: [id number]    Pre-op Diagnosis:   1. Shortness of breath    2. Acute exacerbation of CHF (congestive heart failure) (HCC)    3. Elevated troponin    4. Pulmonary hypertension (HCC)    5. AL amyloidosis (HCC)    6. Multiple myeloma not having achieved remission (HCC)    7. Pleural effusion due to congestive heart failure (HCC)      Post-op Diagnosis:   1. Shortness of breath    2. Acute exacerbation of CHF (congestive heart failure) (HCC)    3. Elevated troponin    4. Pulmonary hypertension (HCC)    5. AL amyloidosis (HCC)    6. Multiple myeloma not having achieved remission (HCC)    7. Pleural effusion due to congestive heart failure (HCC)        Provider:  Bri Rand PA-C    No qualified resident was available, Resident is only observing    Estimated Blood Loss: minimal    Findings: bilateral thoracentesis. Right side 700cc clear yellow fluid removed. Left side 200cc clear yaima fluid removd    Specimens: sent    Complications:  none immediate    Anesthesia: local    Bri Rand PA-C     Date: 2025  Time: 3:58 PM

## 2025-04-28 NOTE — ASSESSMENT & PLAN NOTE
Multifactorial due to congestive heart failure, pulmonary hypertension, chronic atrial fibrillation, persistent pleural effusion  See plan for individual problems below

## 2025-04-29 PROBLEM — E87.6 HYPOKALEMIA: Status: RESOLVED | Noted: 2022-04-19 | Resolved: 2025-04-29

## 2025-04-29 LAB
ANION GAP SERPL CALCULATED.3IONS-SCNC: 11 MMOL/L (ref 4–13)
BUN SERPL-MCNC: 59 MG/DL (ref 5–25)
CALCIUM SERPL-MCNC: 9.5 MG/DL (ref 8.4–10.2)
CHLORIDE SERPL-SCNC: 101 MMOL/L (ref 96–108)
CO2 SERPL-SCNC: 28 MMOL/L (ref 21–32)
CREAT SERPL-MCNC: 3.25 MG/DL (ref 0.6–1.3)
GFR SERPL CREATININE-BSD FRML MDRD: 19 ML/MIN/1.73SQ M
GLUCOSE SERPL-MCNC: 96 MG/DL (ref 65–140)
INR PPP: 2.91 (ref 0.85–1.19)
POTASSIUM SERPL-SCNC: 3.5 MMOL/L (ref 3.5–5.3)
PROTHROMBIN TIME: 29.9 SECONDS (ref 12.3–15)
SODIUM SERPL-SCNC: 140 MMOL/L (ref 135–147)

## 2025-04-29 PROCEDURE — 99232 SBSQ HOSP IP/OBS MODERATE 35: CPT | Performed by: INTERNAL MEDICINE

## 2025-04-29 PROCEDURE — 85610 PROTHROMBIN TIME: CPT | Performed by: NURSE PRACTITIONER

## 2025-04-29 PROCEDURE — 99232 SBSQ HOSP IP/OBS MODERATE 35: CPT | Performed by: STUDENT IN AN ORGANIZED HEALTH CARE EDUCATION/TRAINING PROGRAM

## 2025-04-29 PROCEDURE — 80048 BASIC METABOLIC PNL TOTAL CA: CPT | Performed by: PHYSICIAN ASSISTANT

## 2025-04-29 PROCEDURE — 99233 SBSQ HOSP IP/OBS HIGH 50: CPT | Performed by: INTERNAL MEDICINE

## 2025-04-29 RX ORDER — BUMETANIDE 1 MG/1
2 TABLET ORAL 2 TIMES DAILY
Status: DISCONTINUED | OUTPATIENT
Start: 2025-04-29 | End: 2025-04-30 | Stop reason: HOSPADM

## 2025-04-29 RX ORDER — POTASSIUM CHLORIDE 1500 MG/1
40 TABLET, EXTENDED RELEASE ORAL ONCE
Status: COMPLETED | OUTPATIENT
Start: 2025-04-29 | End: 2025-04-29

## 2025-04-29 RX ADMIN — AMIODARONE HYDROCHLORIDE 400 MG: 200 TABLET ORAL at 09:02

## 2025-04-29 RX ADMIN — WARFARIN SODIUM 5 MG: 5 TABLET ORAL at 17:44

## 2025-04-29 RX ADMIN — CARVEDILOL 3.12 MG: 3.12 TABLET, FILM COATED ORAL at 09:02

## 2025-04-29 RX ADMIN — HYDRALAZINE HYDROCHLORIDE 50 MG: 50 TABLET ORAL at 05:23

## 2025-04-29 RX ADMIN — HYDRALAZINE HYDROCHLORIDE 50 MG: 50 TABLET ORAL at 21:01

## 2025-04-29 RX ADMIN — AMIODARONE HYDROCHLORIDE 400 MG: 200 TABLET ORAL at 16:02

## 2025-04-29 RX ADMIN — POTASSIUM CHLORIDE 40 MEQ: 1500 TABLET, EXTENDED RELEASE ORAL at 12:59

## 2025-04-29 RX ADMIN — BUMETANIDE 2 MG: 1 TABLET ORAL at 16:03

## 2025-04-29 RX ADMIN — POTASSIUM CHLORIDE 20 MEQ: 1500 TABLET, EXTENDED RELEASE ORAL at 09:02

## 2025-04-29 RX ADMIN — BUMETANIDE 2 MG: 0.25 INJECTION INTRAMUSCULAR; INTRAVENOUS at 09:02

## 2025-04-29 RX ADMIN — HYDRALAZINE HYDROCHLORIDE 50 MG: 50 TABLET ORAL at 16:02

## 2025-04-29 NOTE — PROGRESS NOTES
NEPHROLOGY PROGRESS NOTE   Domingo Trevino 65 y.o. male MRN: [id number]  Unit/Bed#: E4 -01 Encounter: [id number]      Assessment & Plan  Chronic kidney disease (CKD), stage IV (severe) (HCC)  Chronic kidney disease likely multifactorial given underlying multiple myeloma with concerns for AL amyloidosis.  Also possible component of cardiorenal disease  Baseline creatinine reported at 2.3-2.7 since October 2024.    Unfortunately renal function has worsened this admission up to 3.25 today   Currently on bumex 2mg IV bid   Weight pending  Previously discussed the potential need for dialysis therapy if his renal function continues to worsen.  Unfortunately still has not started treatment for myeloma.  SOB (shortness of breath)  Repeat chest x-ray showing moderate pleural effusions with persistent pulmonary vascular congestion/edema.  No significant change from prior x-ray  S/p bilateral thoracentesis yesterday   Continue  bumex 2mg IV bid   Myeloma (HCC)  As per hematology oncology, following with [facility]  Patient unfortunately still not decided on whether he wants to proceed with treatment  HFpEF, 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  Recent echocardiogram showed ejection fraction 40% with elevated right ventricular systolic pressures.  Noted IVC dilatation.  Suggestion of cardiac amyloidosis.  Diuretics as above   Primary hypertension  Previous renal Doppler showing no evidence of occlusive disease although left main renal artery not visualized.  Noted atrophic left kidney measuring 7.6 cm, right kidney 11.8 cm  Recommend holding angiotensin receptor blocker, continue with hydralazine and carvedilol.  Pulmonary hypertension (HCC)  Management as per cardiology.  NSVT (nonsustained ventricular tachycardia) (HCC)  Initiated on amiodarone as per cardiology.      Plan Summary:   Consider trial of po diuretics -- will defer to cardiology   Replace K  Check am BMP     SUBJECTIVE:  Feeling tired but otherwise ok.  Breathing better after thoracentesis.    OBJECTIVE:  Current Weight: Weight - Scale: 69.2 kg (152 lb 8.9 oz)  Vitals:    04/29/25 0819   BP: 144/85   Pulse: (!) 53   Resp: 18   Temp: 98.8 °F (37.1 °C)   SpO2: 96%       Intake/Output Summary (Last 24 hours) at 4/29/2025 1025  Last data filed at 4/29/2025 0928  Gross per 24 hour   Intake 360 ml   Output 2325 ml   Net -1965 ml       General:  appears comfortable and in no acute distress   Skin:  No rash  Eyes:  Sclerae anicteric, no periorbital edema   ENT:  Moist mucous membranes  Neck:  Trachea midline, symmetric   Chest:  Clear to auscultation bilaterally with no wheezes, rales or rhonchi  CVS:  Regular rate and rhythm  Abdomen:  Soft, nontender, nondistended  Neuro:  Awake and alert  Psych:  Appropriate affect  Extremities: no lower extremity edema       Medications:  Scheduled Meds:  Current Facility-Administered Medications   Medication Dose Route Frequency Provider Last Rate    acetaminophen  650 mg Oral Q6H PRN Sammy Adams, CRNP      [START ON 5/1/2025] amiodarone  200 mg Oral Daily With Breakfast Gelacio Mckeon MD      amiodarone  400 mg Oral BID With Meals Gelacio Mckeon MD      bumetanide  2 mg Intravenous BID Néstor Finley, DO      carvedilol  3.125 mg Oral BID With Meals Sammy Angie, CRNP      fluticasone-vilanterol  1 puff Inhalation Daily Sammy Angie, NICHOLASNP      And    umeclidinium  1 puff Inhalation Daily Sammy Adams, CRNP      hydrALAZINE  50 mg Oral Q8H [facility] Gabriela Salazar PA-C      labetalol  10 mg Intravenous Q4H PRN Ryan Perez, DO      LORazepam  0.5 mg Oral HS PRN Sammy Adams, CRNP      melatonin  3 mg Oral HS PRN Sammy Adams, CRNP      polyethylene glycol  17 g Oral Daily PRN Sammy Adams, CRNP      potassium chloride  20 mEq Oral Daily Gabriela Salazar PA-C      prochlorperazine  10 mg Oral Q6H PRN Sammy Adams, CRNP      simethicone  80 mg Oral 4x Daily PRN Sammy Adams, CRNP      warfarin  5 mg Oral Once per day  on Tuesday Thursday Saturday Tanisha OROSCO VEENA Roberts      [START ON 4/30/2025] warfarin  7.5 mg Oral Once per day on Sunday Monday Wednesday Friday Tanisha OROSCO VEENA Roberts         PRN Meds:.  acetaminophen    labetalol    LORazepam    melatonin    polyethylene glycol    prochlorperazine    simethicone    Continuous Infusions:     Laboratory Results:  Results from last 7 days   Lab Units 04/29/25  0542 04/28/25  0555 04/27/25  0514 04/26/25  0614 04/25/25  0610 04/24/25  0500 04/23/25  0542 04/22/25  1318   WBC Thousand/uL  --  5.43  --   --  5.00 5.20 5.32 4.83   HEMOGLOBIN g/dL  --  10.3*  --   --  9.3* 9.6* 10.1* 9.6*   HEMATOCRIT %  --  33.8*  --   --  29.9* 30.8* 33.0* 30.5*   PLATELETS Thousands/uL  --  226  --   --  215 220 224 225   SODIUM mmol/L 140 142 142 142 141 146 144 141   POTASSIUM mmol/L 3.5 3.8 3.8 4.0 3.9 3.7 3.6 3.7   CHLORIDE mmol/L 101 104 104 104 105 106 106 104   CO2 mmol/L 28 30 28 29 28 31 30 28   BUN mg/dL 59* 56* 57* 50* 55* 52* 51* 50*   CREATININE mg/dL 3.25* 3.15* 3.18* 2.84* 2.50* 2.59* 2.53* 2.77*   CALCIUM mg/dL 9.5 9.4 9.1 8.9 9.0 8.9 9.1 9.1   MAGNESIUM mg/dL  --   --   --  2.1 2.1  --  2.1  --

## 2025-04-29 NOTE — PROGRESS NOTES
Progress Note - Hospitalist   Name: Domingo Trevino 65 y.o. 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  Wt Readings from Last 3 Encounters:   04/28/25 69.2 kg (152 lb 8.9 oz)   04/22/25 74.6 kg (164 lb 8 oz)   04/17/25 69.7 kg (153 lb 10.6 oz)     164 pounds on admission, dry weight around 150  Chest x-ray with persistent vascular congestion and small pleural effusions  Echocardiogram April 25 with EF 40%, highly suggestive of amyloid cardiomyopathy  PTA torsemide 40 mg morning and 20 mg afternoon with diuretic noncompliance  Currently on IV Bumex 2 mg twice daily with nephrology follow-up  S/p thoracentesis on 4/28/25 (700 ml removed from the right and 200 ml from the left)  Chronic kidney disease (CKD), stage IV (severe) ([facility])  Lab Results   Component Value Date    EGFR 19 04/29/2025    EGFR 19 04/28/2025    EGFR 19 04/27/2025    CREATININE 3.25 (H) 04/29/2025    CREATININE 3.15 (H) 04/28/2025    CREATININE 3.18 (H) 04/27/2025   Baseline creatinine 2.3-2.7  Current creatinine @ 3.25  Nephrology evaluation and recommendations reviewed.    He has rising creatinine due to worsening CKD as well as cardiorenal syndrome.  Diuretic management per nephrology  Myeloma (HCC)  Multiple myeloma not having achieved admission  He has not started treatment yet  He saw oncology on 4/21/25 and decided to delay treatment for another week  AL amyloidosis (HCC)  Patient's echo was suggestive of cardiac amyloid and he has previously declined endomyocardial biopsy  Chronic atrial fibrillation (HCC)  Rate controlled with Coreg 3.25 mg twice daily, AC with Coumadin  PTA on Coumadin 5 mg  "Tuesday Thursday Sunday and 7.5 mg all other days  INR therapeutic   Primary hypertension  Continue coreg  3.125 mg BID, hydralazine 50 mg Q8 with hold parameters  Pulmonary hypertension (HCC)  Continue diuresis.  Nocturnal pulse oximetry done 2 days ago showed only 1 desaturation event which lasted for 37 seconds (not qualified for home o2)  NSVT (nonsustained ventricular tachycardia) (HCC)  Patient noted with NSVT on telemetry 4/23 and 4/24  Cardiology started patient on amiodarone. Educated patient bedside on medication and the importance of compliance    Hypokalemia (Resolved: 4/29/2025)  Potassium normal at 3.8, continue home potassium 20 mEq daily  Monitor for increased needs with diuresis    VTE Pharmacologic Prophylaxis:   Warfarin      Patient Centered Rounds: I performed bedside rounds with nursing staff today.   Discussions with Specialists or Other Care Team Provider: Chart reviewed  Education and Discussions with Family / Patient: Patient declined call to .     Current Length of Stay: 7 day(s)  Current Patient Status: Inpatient   Certification Statement: The patient will continue to require additional inpatient hospital stay due to chf  Discharge Plan: Anticipate discharge later today or tomorrow to home.    Code Status: Level 1 - Full Code    Subjective   \"I'm going home today\"  I asked him if someone told him that he is going home today.  He said no and he just felt much better and he would like to go home today.  He has not been seen by cardiology or nephrology yet.  We went over his overall condition including the high suspicion for myeloma affecting his heart  Also talked about his elevated creatinine still.  Told him we have to wait for nephrology and cardiology to see him today to determine if he is stable for discharge or not.    Objective :  Temp:  [97.6 °F (36.4 °C)-98.8 °F (37.1 °C)] 98.8 °F (37.1 °C)  HR:  [50-60] 53  BP: (120-152)/(72-97) 144/85  Resp:  [14-19] 18  SpO2:  [96 " %-98 %] 96 %  O2 Device: None (Room air)  Nasal Cannula O2 Flow Rate (L/min):  [1 L/min] 1 L/min    Body mass index is 19.59 kg/m².     Input and Output Summary (last 24 hours):     Intake/Output Summary (Last 24 hours) at 4/29/2025 0940  Last data filed at 4/29/2025 0928  Gross per 24 hour   Intake 360 ml   Output 2325 ml   Net -1965 ml       Physical Exam  Vitals reviewed.   HENT:      Head: Normocephalic and atraumatic.      Nose: No congestion or rhinorrhea.   Eyes:      General: No scleral icterus.  Cardiovascular:      Rate and Rhythm: Normal rate and regular rhythm.   Pulmonary:      Breath sounds: No stridor. No wheezing, rhonchi or rales.   Abdominal:      Palpations: Abdomen is soft.      Tenderness: There is no abdominal tenderness. There is no guarding.   Musculoskeletal:      Right lower leg: No edema.      Left lower leg: No edema.   Skin:     General: Skin is warm and dry.   Neurological:      Mental Status: He is oriented to person, place, and time.   Psychiatric:         Mood and Affect: Mood normal.         Behavior: Behavior normal.                    Lab Results: I have reviewed the following results:   Results from last 7 days   Lab Units 04/28/25  0555 04/24/25  0500 04/23/25  0542   WBC Thousand/uL 5.43   < > 5.32   HEMOGLOBIN g/dL 10.3*   < > 10.1*   HEMATOCRIT % 33.8*   < > 33.0*   PLATELETS Thousands/uL 226   < > 224   SEGS PCT %  --   --  66   LYMPHO PCT %  --   --  22   MONO PCT %  --   --  7   EOS PCT %  --   --  4    < > = values in this interval not displayed.     Results from last 7 days   Lab Units 04/29/25  0542 04/23/25  0542 04/22/25  1318   SODIUM mmol/L 140   < > 141   POTASSIUM mmol/L 3.5   < > 3.7   CHLORIDE mmol/L 101   < > 104   CO2 mmol/L 28   < > 28   BUN mg/dL 59*   < > 50*   CREATININE mg/dL 3.25*   < > 2.77*   ANION GAP mmol/L 11   < > 9   CALCIUM mg/dL 9.5   < > 9.1   ALBUMIN g/dL  --   --  4.1   TOTAL BILIRUBIN mg/dL  --   --  1.04*   ALK PHOS U/L  --   --  56   ALT  U/L  --   --  16   AST U/L  --   --  19   GLUCOSE RANDOM mg/dL 96   < > 101    < > = values in this interval not displayed.     Results from last 7 days   Lab Units 04/29/25  0543   INR  2.91*                   Recent Cultures (last 7 days):   Results from last 7 days   Lab Units 04/28/25  1554 04/28/25  1544   GRAM STAIN RESULT  No Polys or Bacteria seen No Polys or Bacteria seen       Imaging Results Review: I reviewed radiology reports from this admission including: procedure reports.      Last 24 Hours Medication List:     Current Facility-Administered Medications:     acetaminophen (TYLENOL) tablet 650 mg, Q6H PRN    [START ON 5/1/2025] amiodarone tablet 200 mg, Daily With Breakfast    amiodarone tablet 400 mg, BID With Meals    bumetanide (BUMEX) injection 2 mg, BID    carvedilol (COREG) tablet 3.125 mg, BID With Meals    fluticasone-vilanterol 200-25 mcg/actuation 1 puff, Daily **AND** umeclidinium 62.5 mcg/actuation inhaler AEPB 1 puff, Daily    hydrALAZINE (APRESOLINE) tablet 50 mg, Q8H LOTTIE    labetalol (NORMODYNE) injection 10 mg, Q4H PRN    LORazepam (ATIVAN) tablet 0.5 mg, HS PRN    melatonin tablet 3 mg, HS PRN    polyethylene glycol (MIRALAX) packet 17 g, Daily PRN    potassium chloride (Klor-Con M20) CR tablet 20 mEq, Daily    prochlorperazine (COMPAZINE) tablet 10 mg, Q6H PRN    simethicone (MYLICON) chewable tablet 80 mg, 4x Daily PRN    warfarin (COUMADIN) tablet 5 mg, Once per day on Tuesday Thursday Saturday    [START ON 4/30/2025] warfarin (COUMADIN) tablet 7.5 mg, Once per day on Sunday Monday Wednesday Friday    Administrative Statements   Today, Patient Was Seen By: Jean Marie Schmitz MD      **Please Note: This note may have been constructed using a voice recognition system.**

## 2025-04-29 NOTE — ASSESSMENT & PLAN NOTE
Multifactorial due to congestive heart failure, pulmonary hypertension, chronic atrial fibrillation, persistent pleural effusion  Much Improved   See plan for individual problems below

## 2025-04-29 NOTE — ASSESSMENT & PLAN NOTE
Wt Readings from Last 3 Encounters:   04/28/25 69.2 kg (152 lb 8.9 oz)   04/22/25 74.6 kg (164 lb 8 oz)   04/17/25 69.7 kg (153 lb 10.6 oz)     164 pounds on admission, dry weight around 150  Chest x-ray with persistent vascular congestion and small pleural effusions  Echocardiogram April 25 with EF 40%, highly suggestive of amyloid cardiomyopathy  PTA torsemide 40 mg morning and 20 mg afternoon with diuretic noncompliance  Currently on IV Bumex 2 mg twice daily with nephrology follow-up  S/p thoracentesis on 4/28/25 (700 ml removed from the right and 200 ml from the left)

## 2025-04-29 NOTE — ASSESSMENT & PLAN NOTE
Multiple myeloma not having achieved admission  He has not started treatment yet  He saw oncology on 4/21/25 and decided to delay treatment for another week

## 2025-04-29 NOTE — PROGRESS NOTES
Cardiology Progress Note - Domingo Trevino 65 y.o. 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  - TTE 4/14/2025 showed EF 40%, severe LA, dilated RA, mild to moderate MR, mild to moderate TR, estimated PA pressure 69 mmHg, echo findings highly suggestive of cardiac amyloidosis  -TTE 10/21/2024 at [facility] showed EF 55 to 60%, severe LA, moderate RA, mild MR, mild AI, mild TR   - BNP 1099 on admission  - Chest x-ray on admission showed persistent vascular congestion with small pleural effusions  Neurohormonal Blockade:  -- Beta-Blocker: Coreg 3.125 mg twice daily  -- ACEi, ARB or ARNi: Losartan 50 mg daily    (or SVR reduction) hydralazine 50 mg 3 times daily, patient refusing Isordil  -- Outpatient diuretic: Torsemide 40 mg in the a.m. and 20 mg in the p.m.  -- Current diuretic: Bumex 2 mg IV daily twice daily today    Nonsustained ventricular tachycardia  - Patient had a 21-second run of NSVT at around 2:30 PM on 4/23 and then a 23-second run of NSVT at around 5 AM on 4/24  - Likely due to his underlying cardiomyopathy, and CHF exacerbation  - Continue with Coreg 3.125 mg twice daily  - Also start amiodarone to suppress VT, plan to load with 400 mg twice daily for 7 days and then changed to 200 mg daily on 5/1    Chronic atrial fibrillation (HCC)  - Anticoagulated on warfarin  - Rate control with Coreg 3.125 mg twice daily    Pulmonary hypertension (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     Primary hypertension  - Outpatient Rx Coreg 3.125 Mg twice daily, losartan 50 mg daily and hydralazine 25 mg 3 times daily  - Increase the hydralazine to 50 mg 3 times daily    Pleural effusions  - Underwent IR thoracentesis 4/28 with 700 cc from right side and 200 cc from left side    AL amyloidosis (HCC)  -TTE 10/21/24 ([facility]): LVEF 55-60%,  moderate concentric hypertrophy, sparing of apex suggestive of cardiac amyloid  - [facility] scan 10/22/24: negative for  "TTR amyloid   - cardiac MRI 10/24/24: incomplete study due to claustrophobia, low normal LV function, LVEF 50-55%, RVEF 45%, mild biatrial enlargement, mild MR   - [facility] scan 10/22/24: negative for TTR amyloid  - previously declined further workup including cardiac biopsy for amyloid castro    Myeloma (HCC)  - elevated kappa and lamda free light chains noted on 10/22/2024  - [facility] hematology 10/30/24: confirmed diagnosis of multiple myeloma with presumed AL cardiac amyloid, declined cardiac biopsy and further treatment for his MM at that time    Hypokalemia    Chronic kidney disease (CKD), stage IV (severe) (HCC)      Summary:  - Patient's weight pending  - Underwent thoracentesis yesterday with 700 cc from right side and 200 cc from left side  - Patient reports improvement in his breathing after the thoracentesis  -Will change to Bumex 2 mg p.o. twice daily, tentatively plan to discharge tomorrow if labs and weights remain stable  - Patient in A-fib with slow ventricular response with heart rates in the 40s this morning, asymptomatic and blood pressures are stable, if he becomes symptomatic or heart rate worsens, can consider stopping carvedilol  - Continue monitor on telemetry while inpatient  - Check daily standing weights  - Continue to monitor creatinine and electrolytes closely    Subjective:   No significant events overnight.  Patient reports slight improvement in his breathing, but still occasionally feels short of breath and wears his nasal cannula.  He reports wanting to go home today.  Denies chest pain, abdominal pain, nausea, vomiting, fever, chills, headache, dizziness or palpitations.    Objective:     Vitals: Blood pressure 103/60, pulse (!) 51, temperature (!) 97 °F (36.1 °C), temperature source Temporal, resp. rate 18, height 6' 2\" (1.88 m), weight 69.2 kg (152 lb 8.9 oz), SpO2 95%., Body mass index is 19.59 kg/m².,   Orthostatic Blood Pressures      Flowsheet Row Most Recent Value   Blood Pressure " 103/60 filed at 04/29/2025 1149   Patient Position - Orthostatic VS Lying filed at 04/29/2025 1149              Intake/Output Summary (Last 24 hours) at 4/29/2025 1209  Last data filed at 4/29/2025 1149  Gross per 24 hour   Intake 360 ml   Output 2525 ml   Net -2165 ml           Physical Exam:    GEN: Domingo Trevino appears well, alert and oriented x 3, pleasant and cooperative   HEENT: Mucous membranes moist, no scleral icterus, no conjunctival pallor  NECK: + Trace JVD  HEART: Bradycardic, irregular rhythm, normal S1 and S2, 3/6 systolic murmur  LUNGS: Bilateral crackles at bases right greater than left  ABDOMEN: normal bowel sounds, soft, no tenderness, no distention  EXTREMITIES: peripheral pulses normal; no lower extremity edema   NEURO: no focal findings   SKIN: No lesions or rashes on exposed skin         Current Facility-Administered Medications:     acetaminophen (TYLENOL) tablet 650 mg, 650 mg, Oral, Q6H PRN, VEENA Bridges    [START ON 5/1/2025] amiodarone tablet 200 mg, 200 mg, Oral, Daily With Breakfast, Gelacio Mckeon MD    amiodarone tablet 400 mg, 400 mg, Oral, BID With Meals, Gelacio Mckeon MD, 400 mg at 04/29/25 0902    bumetanide (BUMEX) injection 2 mg, 2 mg, Intravenous, BID, Néstor Finley DO, 2 mg at 04/29/25 0902    carvedilol (COREG) tablet 3.125 mg, 3.125 mg, Oral, BID With Meals, VEENA Bridges, 3.125 mg at 04/29/25 0902    fluticasone-vilanterol 200-25 mcg/actuation 1 puff, 1 puff, Inhalation, Daily, 1 puff at 04/28/25 1018 **AND** umeclidinium 62.5 mcg/actuation inhaler AEPB 1 puff, 1 puff, Inhalation, Daily, VEENA Bridges, 1 puff at 04/28/25 1018    hydrALAZINE (APRESOLINE) tablet 50 mg, 50 mg, Oral, Q8H LOTTIE, Gabriela Salazar PA-C, 50 mg at 04/29/25 0523    labetalol (NORMODYNE) injection 10 mg, 10 mg, Intravenous, Q4H PRN, Ryan Perez DO    LORazepam (ATIVAN) tablet 0.5 mg, 0.5 mg, Oral, HS PRN, VEENA Bridges, 0.5 mg at 04/28/25 3983     "melatonin tablet 3 mg, 3 mg, Oral, HS PRN, Sammy Paulur, CRNP, 3 mg at 04/28/25 2337    polyethylene glycol (MIRALAX) packet 17 g, 17 g, Oral, Daily PRN, Sammy Paulur, CRNP    potassium chloride (Klor-Con M20) CR tablet 20 mEq, 20 mEq, Oral, Daily, Gabriela Salazar PA-C, 20 mEq at 04/29/25 0902    potassium chloride (Klor-Con M20) CR tablet 40 mEq, 40 mEq, Oral, Once, Jacque Georges PA-C    prochlorperazine (COMPAZINE) tablet 10 mg, 10 mg, Oral, Q6H PRN, Sammy Adams, CRNP, 10 mg at 04/28/25 2337    simethicone (MYLICON) chewable tablet 80 mg, 80 mg, Oral, 4x Daily PRN, Sammy Adams, CRNP    warfarin (COUMADIN) tablet 5 mg, 5 mg, Oral, Once per day on Tuesday Thursday Saturday, VEENA Lundy    [START ON 4/30/2025] warfarin (COUMADIN) tablet 7.5 mg, 7.5 mg, Oral, Once per day on Sunday Monday Wednesday Friday, VEENA Lundy    Labs & Results:    Lab Results   Component Value Date    TROPONINI 1.01 () 06/17/2021    TROPONINI 1.12 () 06/17/2021       Lab Results   Component Value Date    CALCIUM 9.5 04/29/2025    K 3.5 04/29/2025    CO2 28 04/29/2025     04/29/2025    BUN 59 (H) 04/29/2025    CREATININE 3.25 (H) 04/29/2025       Lab Results   Component Value Date    WBC 5.43 04/28/2025    HGB 10.3 (L) 04/28/2025    HCT 33.8 (L) 04/28/2025    MCV 70 (L) 04/28/2025     04/28/2025     Results from last 7 days   Lab Units 04/29/25  0543   INR  2.91*       No results found for: \"CHOL\"  Lab Results   Component Value Date    HDL 28 (L) 04/19/2022     Lab Results   Component Value Date    LDLCALC 83 04/19/2022     Lab Results   Component Value Date    TRIG 59 04/19/2022       Lab Results   Component Value Date    ALT 16 04/22/2025    AST 19 04/22/2025    ALKPHOS 56 04/22/2025         EKG personally reviewed by )Gelacio Mckeon MD. No acute changes   TELE: Patient remains in atrial fibrillation, otherwise no significant arrhythmias seen on telemetry review.      "

## 2025-04-29 NOTE — ASSESSMENT & PLAN NOTE
Repeat chest x-ray showing moderate pleural effusions with persistent pulmonary vascular congestion/edema.  No significant change from prior x-ray  S/p bilateral thoracentesis yesterday   Continue  bumex 2mg IV bid

## 2025-04-29 NOTE — ASSESSMENT & PLAN NOTE
Rate controlled with Coreg 3.25 mg twice daily, AC with Coumadin  PTA on Coumadin 5 mg Tuesday Thursday Sunday and 7.5 mg all other days  INR therapeutic

## 2025-04-29 NOTE — ASSESSMENT & PLAN NOTE
As per hematology oncology, following with [facility]  Patient unfortunately still not decided on whether he wants to proceed with treatment

## 2025-04-29 NOTE — ASSESSMENT & PLAN NOTE
Chronic kidney disease likely multifactorial given underlying multiple myeloma with concerns for AL amyloidosis.  Also possible component of cardiorenal disease  Baseline creatinine reported at 2.3-2.7 since October 2024.    Unfortunately renal function has worsened this admission up to 3.25 today   Currently on bumex 2mg IV bid   Weight pending  Previously discussed the potential need for dialysis therapy if his renal function continues to worsen.  Unfortunately still has not started treatment for myeloma.

## 2025-04-29 NOTE — ASSESSMENT & PLAN NOTE
Lab Results   Component Value Date    EGFR 19 04/29/2025    EGFR 19 04/28/2025    EGFR 19 04/27/2025    CREATININE 3.25 (H) 04/29/2025    CREATININE 3.15 (H) 04/28/2025    CREATININE 3.18 (H) 04/27/2025   Baseline creatinine 2.3-2.7  Current creatinine @ 3.25  Nephrology evaluation and recommendations reviewed.    He has rising creatinine due to worsening CKD as well as cardiorenal syndrome.  Diuretic management per nephrology

## 2025-04-29 NOTE — ASSESSMENT & PLAN NOTE
Continue diuresis.  Nocturnal pulse oximetry done 2 days ago showed only 1 desaturation event which lasted for 37 seconds (not qualified for home o2)

## 2025-04-30 VITALS
OXYGEN SATURATION: 100 % | WEIGHT: 151.01 LBS | DIASTOLIC BLOOD PRESSURE: 84 MMHG | HEIGHT: 74 IN | RESPIRATION RATE: 18 BRPM | TEMPERATURE: 97.3 F | BODY MASS INDEX: 19.38 KG/M2 | HEART RATE: 73 BPM | SYSTOLIC BLOOD PRESSURE: 127 MMHG

## 2025-04-30 DIAGNOSIS — N18.4 CHRONIC KIDNEY DISEASE (CKD), STAGE IV (SEVERE) (HCC): Primary | ICD-10-CM

## 2025-04-30 LAB
ANION GAP SERPL CALCULATED.3IONS-SCNC: 10 MMOL/L (ref 4–13)
BUN SERPL-MCNC: 61 MG/DL (ref 5–25)
CALCIUM SERPL-MCNC: 9 MG/DL (ref 8.4–10.2)
CHLORIDE SERPL-SCNC: 101 MMOL/L (ref 96–108)
CO2 SERPL-SCNC: 28 MMOL/L (ref 21–32)
CREAT SERPL-MCNC: 3.21 MG/DL (ref 0.6–1.3)
GFR SERPL CREATININE-BSD FRML MDRD: 19 ML/MIN/1.73SQ M
GLUCOSE SERPL-MCNC: 97 MG/DL (ref 65–140)
INR PPP: 3.84 (ref 0.85–1.19)
POTASSIUM SERPL-SCNC: 3.5 MMOL/L (ref 3.5–5.3)
PROTHROMBIN TIME: 36.8 SECONDS (ref 12.3–15)
SODIUM SERPL-SCNC: 139 MMOL/L (ref 135–147)

## 2025-04-30 PROCEDURE — 88305 TISSUE EXAM BY PATHOLOGIST: CPT | Performed by: PATHOLOGY

## 2025-04-30 PROCEDURE — 99232 SBSQ HOSP IP/OBS MODERATE 35: CPT | Performed by: STUDENT IN AN ORGANIZED HEALTH CARE EDUCATION/TRAINING PROGRAM

## 2025-04-30 PROCEDURE — 99239 HOSP IP/OBS DSCHRG MGMT >30: CPT | Performed by: INTERNAL MEDICINE

## 2025-04-30 PROCEDURE — 88112 CYTOPATH CELL ENHANCE TECH: CPT | Performed by: PATHOLOGY

## 2025-04-30 PROCEDURE — 80048 BASIC METABOLIC PNL TOTAL CA: CPT | Performed by: INTERNAL MEDICINE

## 2025-04-30 PROCEDURE — 85610 PROTHROMBIN TIME: CPT | Performed by: NURSE PRACTITIONER

## 2025-04-30 RX ORDER — BUMETANIDE 2 MG/1
2 TABLET ORAL 2 TIMES DAILY
Qty: 60 TABLET | Refills: 0 | Status: SHIPPED | OUTPATIENT
Start: 2025-04-30

## 2025-04-30 RX ORDER — AMIODARONE HYDROCHLORIDE 200 MG/1
TABLET ORAL
Qty: 32 TABLET | Refills: 0 | Status: SHIPPED | OUTPATIENT
Start: 2025-05-01

## 2025-04-30 RX ADMIN — LORAZEPAM 0.5 MG: 0.5 TABLET ORAL at 00:29

## 2025-04-30 RX ADMIN — BUMETANIDE 2 MG: 1 TABLET ORAL at 09:01

## 2025-04-30 RX ADMIN — UMECLIDINIUM 1 PUFF: 62.5 AEROSOL, POWDER ORAL at 09:01

## 2025-04-30 RX ADMIN — FLUTICASONE FUROATE AND VILANTEROL TRIFENATATE 1 PUFF: 200; 25 POWDER RESPIRATORY (INHALATION) at 09:01

## 2025-04-30 RX ADMIN — AMIODARONE HYDROCHLORIDE 400 MG: 200 TABLET ORAL at 09:01

## 2025-04-30 RX ADMIN — MELATONIN 3 MG: 3 TAB ORAL at 00:29

## 2025-04-30 RX ADMIN — HYDRALAZINE HYDROCHLORIDE 50 MG: 50 TABLET ORAL at 04:55

## 2025-04-30 RX ADMIN — POTASSIUM CHLORIDE 20 MEQ: 1500 TABLET, EXTENDED RELEASE ORAL at 09:01

## 2025-04-30 NOTE — DISCHARGE INSTR - AVS FIRST PAGE
You were admitted for heart failure and kidney disease  You were started on a new water pill called bumex, 2 mg twice daily  Stop torsemide since you are on the new water pill now    Please have an INR check on 5/2/25 through   Your INR today is high at 3.84. Please do not take your coumadin until 5/2/25  Follow up with your PCP and specialists     You were also started on a medication called amiodarone due to a heart condition called nonsustained ventricular tachycardia  Take amiodarone 400 mg today and starting tomorrow 5/1/2025 you are to take amiodarone 200 mg daily  Please follow-up with your cardiologist at [facility]

## 2025-04-30 NOTE — ASSESSMENT & PLAN NOTE
Patient noted with NSVT on telemetry 4/23 and 4/24  Cardiology started patient on amiodarone 400 mg twice daily  His last dose of amiodarone 400 mg is this afternoon and starting tomorrow he will take 200 mg daily

## 2025-04-30 NOTE — PROGRESS NOTES
Cardiology Progress Note - Domingo Trevino 65 y.o. male MRN: [id number]    Unit/Bed#: E4 -01 Encounter: [id number]      Assessment & Plan:    HFpEF, 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  - TTE 4/14/2025 showed EF 40%, severe LA, dilated RA, mild to moderate MR, mild to moderate TR, estimated PA pressure 69 mmHg, echo findings highly suggestive of cardiac amyloidosis  -TTE 10/21/2024 at [facility] showed EF 55 to 60%, severe LA, moderate RA, mild MR, mild AI, mild TR   - BNP 1099 on admission  - Chest x-ray on admission showed persistent vascular congestion with small pleural effusions  Neurohormonal Blockade:  -- Beta-Blocker: Coreg 3.125 mg twice daily  -- ACEi, ARB or ARNi: Losartan 50 mg daily    (or SVR reduction) hydralazine 50 mg 3 times daily, patient refusing Isordil  -- Outpatient diuretic: Torsemide 40 mg in the a.m. and 20 mg in the p.m.  -- Current diuretic: Bumex 2 mg p.o. twice daily    Nonsustained ventricular tachycardia  - Patient had a 21-second run of NSVT at around 2:30 PM on 4/23 and then a 23-second run of NSVT at around 5 AM on 4/24  - Likely due to his underlying cardiomyopathy, and CHF exacerbation  - Continue with Coreg 3.125 mg twice daily  - Also start amiodarone to suppress VT, plan to load with 400 mg twice daily for 7 days and then changed to 200 mg daily on 5/1    Chronic atrial fibrillation (HCC)  - Anticoagulated on warfarin  - Rate control with Coreg 3.125 mg twice daily    Pulmonary hypertension (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     Primary hypertension  - Outpatient Rx Coreg 3.125 Mg twice daily, losartan 50 mg daily and hydralazine 25 mg 3 times daily  - Increase the hydralazine to 50 mg 3 times daily    Pleural effusions  - Underwent IR thoracentesis 4/28 with 700 cc from right side and 200 cc from left side    AL amyloidosis (HCC)  -TTE 10/21/24 ([facility]): LVEF 55-60%,  moderate concentric hypertrophy, sparing of apex suggestive of cardiac amyloid  - [facility] scan 10/22/24: negative for TTR  "amyloid   - cardiac MRI 10/24/24: incomplete study due to claustrophobia, low normal LV function, LVEF 50-55%, RVEF 45%, mild biatrial enlargement, mild MR   - [facility] scan 10/22/24: negative for TTR amyloid  - previously declined further workup including cardiac biopsy for amyloid castro    Myeloma (HCC)  - elevated kappa and lamda free light chains noted on 10/22/2024  - [facility] hematology 10/30/24: confirmed diagnosis of multiple myeloma with presumed AL cardiac amyloid, declined cardiac biopsy and further treatment for his MM at that time    Hypokalemia    Chronic kidney disease (CKD), stage IV (severe) (HCC)      Summary:  - Patient's weight trended down 1 pound today and overall appears stable  - Patient's labs also remained relatively stable  - Will continue with Bumex 2 mg p.o. twice daily  - Plan for final 400 mg dose of amiodarone load this afternoon and then changed to 200 mg daily tomorrow  - He is stable from cardiac standpoint for discharge home today  - He has a follow-up appointment with his primary cardiologist at [facility] next month, will also have my office reach out to him to check-in    Subjective:   No significant events overnight.  He reports feeling okay this morning.  Still having intermittent episodes of shortness of breath.  Otherwise denies any other cardiac complaints today.  Denies chest pain, abdominal pain, nausea, vomiting, fever, chills, headache, dizziness or palpitations.    Objective:     Vitals: Blood pressure 127/84, pulse 73, temperature (!) 97.3 °F (36.3 °C), temperature source Temporal, resp. rate 18, height 6' 2\" (1.88 m), weight 68.5 kg (151 lb 0.2 oz), SpO2 100%., Body mass index is 19.39 kg/m².,   Orthostatic Blood Pressures      Flowsheet Row Most Recent Value   Blood Pressure 127/84 filed at 04/30/2025 1053   Patient Position - Orthostatic VS Lying filed at 04/30/2025 1053              Intake/Output Summary (Last 24 hours) at 4/30/2025 1150  Last data filed at 4/30/2025 " 0932  Gross per 24 hour   Intake 1350 ml   Output 650 ml   Net 700 ml           Physical Exam:    GEN: Domingo Trevino appears well, alert and oriented x 3, pleasant and cooperative   HEENT: Mucous membranes moist, no scleral icterus, no conjunctival pallor  NECK: + Trace JVD  HEART: Bradycardic, irregular rhythm, normal S1 and S2, 3/6 systolic murmur  LUNGS: Bilateral crackles at bases right greater than left  ABDOMEN: normal bowel sounds, soft, no tenderness, no distention  EXTREMITIES: peripheral pulses normal; no lower extremity edema   NEURO: no focal findings   SKIN: No lesions or rashes on exposed skin         Current Facility-Administered Medications:     acetaminophen (TYLENOL) tablet 650 mg, 650 mg, Oral, Q6H PRN, VEENA Bridges    [START ON 5/1/2025] amiodarone tablet 200 mg, 200 mg, Oral, Daily With Breakfast, Gelacio Mckeon MD    amiodarone tablet 400 mg, 400 mg, Oral, BID With Meals, Gelacio Mckeon MD, 400 mg at 04/30/25 0901    bumetanide (BUMEX) tablet 2 mg, 2 mg, Oral, BID, Jean Marie Schmitz MD, 2 mg at 04/30/25 0901    carvedilol (COREG) tablet 3.125 mg, 3.125 mg, Oral, BID With Meals, VEENA Bridges, 3.125 mg at 04/29/25 0902    fluticasone-vilanterol 200-25 mcg/actuation 1 puff, 1 puff, Inhalation, Daily, 1 puff at 04/30/25 0901 **AND** umeclidinium 62.5 mcg/actuation inhaler AEPB 1 puff, 1 puff, Inhalation, Daily, VEENA Bridges, 1 puff at 04/30/25 0901    hydrALAZINE (APRESOLINE) tablet 50 mg, 50 mg, Oral, Q8H [facility], Gabriela Salazar PA-C, 50 mg at 04/30/25 0455    labetalol (NORMODYNE) injection 10 mg, 10 mg, Intravenous, Q4H PRN, Ryan Perez DO    LORazepam (ATIVAN) tablet 0.5 mg, 0.5 mg, Oral, HS PRN, Sammy Adams, CRNP, 0.5 mg at 04/30/25 0029    melatonin tablet 3 mg, 3 mg, Oral, HS PRN, Sammy Adams, CRNP, 3 mg at 04/30/25 0029    polyethylene glycol (MIRALAX) packet 17 g, 17 g, Oral, Daily PRN, Sammy Adams, CRNP    potassium chloride (Klor-Con  "M20) CR tablet 20 mEq, 20 mEq, Oral, Daily, Gabriela Salazar PA-C, 20 mEq at 04/30/25 0901    prochlorperazine (COMPAZINE) tablet 10 mg, 10 mg, Oral, Q6H PRN, Sammy Paulur, CRNP, 10 mg at 04/28/25 2337    simethicone (MYLICON) chewable tablet 80 mg, 80 mg, Oral, 4x Daily PRN, Sammy Adams, CRNP    warfarin (COUMADIN) tablet 5 mg, 5 mg, Oral, Once per day on Tuesday Thursday Saturday, Tanisha S Alejandra, CRNP, 5 mg at 04/29/25 1744    warfarin (COUMADIN) tablet 7.5 mg, 7.5 mg, Oral, Once per day on Sunday Monday Wednesday Friday, Tanisha OROSCO Alejandra, CRNP    Labs & Results:    Lab Results   Component Value Date    TROPONINI 1.01 () 06/17/2021    TROPONINI 1.12 () 06/17/2021       Lab Results   Component Value Date    CALCIUM 9.0 04/30/2025    K 3.5 04/30/2025    CO2 28 04/30/2025     04/30/2025    BUN 61 (H) 04/30/2025    CREATININE 3.21 (H) 04/30/2025       Lab Results   Component Value Date    WBC 5.43 04/28/2025    HGB 10.3 (L) 04/28/2025    HCT 33.8 (L) 04/28/2025    MCV 70 (L) 04/28/2025     04/28/2025     Results from last 7 days   Lab Units 04/30/25  0443   INR  3.84*       No results found for: \"CHOL\"  Lab Results   Component Value Date    HDL 28 (L) 04/19/2022     Lab Results   Component Value Date    LDLCALC 83 04/19/2022     Lab Results   Component Value Date    TRIG 59 04/19/2022       Lab Results   Component Value Date    ALT 16 04/22/2025    AST 19 04/22/2025    ALKPHOS 56 04/22/2025         EKG personally reviewed by )Gelacio Mckeon MD. No acute changes   TELE: Patient remains in atrial fibrillation with episodes of slow ventricular response, otherwise no significant arrhythmias seen on telemetry review.      "

## 2025-04-30 NOTE — NURSING NOTE
Discharge instructions reviewed with pt, pt verbalized understanding. IV removed, pt has all belongings. Pt transported to [facility] by floor staff for d/c home

## 2025-04-30 NOTE — ASSESSMENT & PLAN NOTE
Lab Results   Component Value Date    EGFR 19 04/30/2025    EGFR 19 04/29/2025    EGFR 19 04/28/2025    CREATININE 3.21 (H) 04/30/2025    CREATININE 3.25 (H) 04/29/2025    CREATININE 3.15 (H) 04/28/2025   Baseline creatinine 2.3-2.7  Current creatinine @ 3.21  Nephrology evaluation and recommendations reviewed.    He has rising creatinine due to worsening CKD as well as cardiorenal syndrome.  Discussed with Dr. Welch, he stable for discharge today on Bumex 2 mg twice daily   He has a scheduled appointment with [facility] nephrology tomorrow at 8:30 AM

## 2025-04-30 NOTE — ASSESSMENT & PLAN NOTE
Rate controlled with Coreg 3.25 mg twice daily, AC with Coumadin  PTA on Coumadin 5 mg Tuesday Thursday Sunday and 7.5 mg all other days  INR today supratherapeutic at 3.84  Plan to hold Coumadin tonight and tomorrow and resume on 5-25

## 2025-04-30 NOTE — ASSESSMENT & PLAN NOTE
Multifactorial due to congestive heart failure, pulmonary hypertension, chronic atrial fibrillation, persistent pleural effusion  Resolved  See plan for individual problems below

## 2025-04-30 NOTE — ASSESSMENT & PLAN NOTE
Wt Readings from Last 3 Encounters:   04/30/25 68.5 kg (151 lb 0.2 oz)   04/22/25 74.6 kg (164 lb 8 oz)   04/17/25 69.7 kg (153 lb 10.6 oz)     164 pounds on admission, dry weight around 150  Chest x-ray with persistent vascular congestion and small pleural effusions  Echocardiogram April 25 with EF 40%, highly suggestive of amyloid cardiomyopathy  S/p thoracentesis on 4/28/25 (700 ml removed from the right and 200 ml from the left)  Patient will be discharged on Bumex 2 mg twice daily instead of his previous torsemide

## 2025-04-30 NOTE — ASSESSMENT & PLAN NOTE
Multiple myeloma not having achieved admission  He has not started treatment yet  He saw oncology on 4/21/25 and decided to delay treatment for another week   upon review of his chart he is scheduled for an infusion on 5/5/2025 through [facility]

## 2025-04-30 NOTE — DISCHARGE SUMMARY
Discharge Summary - Hospitalist   Name: Domingo Trevino 65 y.o. 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  Wt Readings from Last 3 Encounters:   04/30/25 68.5 kg (151 lb 0.2 oz)   04/22/25 74.6 kg (164 lb 8 oz)   04/17/25 69.7 kg (153 lb 10.6 oz)     164 pounds on admission, dry weight around 150  Chest x-ray with persistent vascular congestion and small pleural effusions  Echocardiogram April 25 with EF 40%, highly suggestive of amyloid cardiomyopathy  S/p thoracentesis on 4/28/25 (700 ml removed from the right and 200 ml from the left)  Patient will be discharged on Bumex 2 mg twice daily instead of his previous torsemide  Chronic kidney disease (CKD), stage IV (severe) (HCC)  Lab Results   Component Value Date    EGFR 19 04/30/2025    EGFR 19 04/29/2025    EGFR 19 04/28/2025    CREATININE 3.21 (H) 04/30/2025    CREATININE 3.25 (H) 04/29/2025    CREATININE 3.15 (H) 04/28/2025   Baseline creatinine 2.3-2.7  Current creatinine @ 3.21  Nephrology evaluation and recommendations reviewed.    He has rising creatinine due to worsening CKD as well as cardiorenal syndrome.  Discussed with Dr. Welch, he stable for discharge today on Bumex 2 mg twice daily   He has a scheduled appointment with [facility]'s nephrology tomorrow at 8:30 AM  Myeloma (HCC)  Multiple myeloma not having achieved admission  He has not started treatment yet  He saw oncology on 4/21/25 and decided to delay treatment for another week   upon review of his chart he is scheduled for an infusion on 5/5/2025 through [facility]  AL amyloidosis (HCC)  Patient's echo was suggestive of cardiac amyloid  and he has previously declined endomyocardial biopsy  Chronic atrial fibrillation (HCC)  Rate controlled with Coreg 3.25 mg twice daily, AC with Coumadin  PTA on Coumadin 5 mg Tuesday Thursday Sunday and 7.5 mg all other days  INR today supratherapeutic at 3.84  Plan to hold Coumadin tonight and tomorrow and resume on 5-25  Primary hypertension  Continue coreg  3.125 mg BID, hydralazine 50 mg Q8 with hold parameters  Pulmonary hypertension (HCC)  Continue diuresis.  Nocturnal pulse oximetry done 2 days ago showed only 1 desaturation event which lasted for 37 seconds (not qualified for home o2)  NSVT (nonsustained ventricular tachycardia) (HCC)  Patient noted with NSVT on telemetry 4/23 and 4/24  Cardiology started patient on amiodarone 400 mg twice daily  His last dose of amiodarone 400 mg is this afternoon and starting tomorrow he will take 200 mg daily         Medical Problems       Resolved Problems  Date Reviewed: 4/22/2025          Resolved    Hypokalemia 4/29/2025     Resolved by  Jean Marie Schmitz MD        Discharging Physician / Practitioner: Jean Marie Schmitz MD  PCP: Maricruz Peres  Admission Date:   Admission Orders (From admission, onward)       Ordered        04/22/25 1603  INPATIENT ADMISSION  Once                          Discharge Date: 04/30/25    Consultations During Hospital Stay:  Cardiology  Nephrology  IR    Procedures Performed:   Bilateral Thoracentesis, 4/28/2025    Significant Findings / Test Results:   XR chest pa and lateral  Result Date: 4/26/2025  Impression: Overall findings are not significantly changed compared to the prior study as detailed above.     XR chest 2 views  Result Date: 4/23/2025  Impression: Persistent vascular congestion with small pleural effusions.     Incidental Findings:   See above    Test Results Pending at Discharge (will require follow up):   None     Outpatient Tests Requested:  INR on 5/2/25    Complications:  none    Reason for  "Admission: Shortness of breath    Hospital Course:   Domingo Trevino is a 65 y.o. male patient who originally presented to the hospital on 4/22/2025 due to shortness of breath due to acute on chronic congestive heart failure.  Of note he was here for the same problem a week prior to this admission but signed out AGAINST MEDICAL ADVICE.    His chest x-ray revealed pulmonary vascular congestion and effusion.  He was  seen by cardiology, nephrology and IR.  He was diuresed with intravenous Bumex and underwent bilateral thoracentesis with good response.  He will be discharged on Bumex 2 mg twice daily instead of his previous torsemide.    His stay was noted to have NSVT on 4/23 and 4/24/2025.  He was started on amiodarone 400 mg twice daily.  He will continue amiodarone 400 mg tonight and then tomorrow he will be on a 200 mg daily dose.  He follows up with [facility] cardiology.   [facility] cardiology group will follow-up with him via phone to guide him in his care.    He knows to follow-up with his hematologist regarding treatment of his multiple myeloma.    Please see above list of diagnoses and related plan for additional information.     Condition at Discharge: good    Discharge Day Visit / Exam:   Subjective: Ready to go home today.  Vitals: Blood Pressure: 127/84 (04/30/25 1053)  Pulse: 73 (04/30/25 0733)  Temperature: (!) 97.3 °F (36.3 °C) (04/30/25 1053)  Temp Source: Temporal (04/30/25 1053)  Respirations: 18 (04/30/25 1053)  Height: 6' 2\" (188 cm) (04/22/25 1704)  Weight - Scale: 68.5 kg (151 lb 0.2 oz) (04/30/25 0600)  SpO2: 100 % (04/30/25 1053)  Physical Exam  Vitals reviewed.   HENT:      Head: Normocephalic and atraumatic.      Nose: No congestion or rhinorrhea.   Eyes:      General: No scleral icterus.  Cardiovascular:      Rate and Rhythm: Normal rate.   Pulmonary:      Breath sounds: No wheezing or rhonchi.   Abdominal:      Palpations: Abdomen is soft.      Tenderness: There is no abdominal " tenderness. There is no guarding.   Musculoskeletal:      Right lower leg: No edema.      Left lower leg: No edema.   Skin:     General: Skin is warm and dry.   Neurological:      Mental Status: He is oriented to person, place, and time.   Psychiatric:         Mood and Affect: Mood normal.         Behavior: Behavior normal.          Discussion with Family: Patient declined call to .     Discharge instructions/Information to patient and family:   See after visit summary for information provided to patient and family.      Provisions for Follow-Up Care:  See after visit summary for information related to follow-up care and any pertinent home health orders.       Disposition:   Home    Planned Readmission: no    Discharge Medications:  See after visit summary for reconciled discharge medications provided to patient and/or family.      Administrative Statements   Discharge Statement:  I have spent a total time of >30 minutes in caring for this patient on the day of the visit/encounter. .    **Please Note: This note may have been constructed using a voice recognition system**

## 2025-04-30 NOTE — CASE MANAGEMENT
Case Management Discharge Planning Note    Patient name Domingo Trevino  Location [facility] 4 /E4 -* MRN [id number]  : 1960 Date 2025       Current Admission Date: 2025  Current Admission Diagnosis:SOB (shortness of breath)   Patient Active Problem List    Diagnosis Date Noted Date Diagnosed    NSVT (nonsustained ventricular tachycardia) (HCC) 2025     Myeloma (HCC) 2025     SOB (shortness of breath) 2025     Pulmonary hypertension (HCC) 04/15/2025     Former smoker 04/15/2025     Persistent proteinuria 04/15/2025     Anemia, chronic disease 2025     Electrolyte abnormality 2025     Renal infarction (HCC) 2025     Chronic kidney disease (CKD), stage IV (severe) (HCC) 2025     AL amyloidosis (HCC) 2025     Multiple myeloma not having achieved remission (HCC) 2025     Chronic atrial fibrillation (HCC) 2025     HFpEF, 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 2022     Primary hypertension 2022     Elevated troponin 2022       LOS (days): 8  Geometric Mean LOS (GMLOS) (days):   Days to GMLOS:     OBJECTIVE:  Risk of Unplanned Readmission Score: 40.81         Current admission status: Inpatient   [facility] Pharmacy:   [id number] - [place], [place] - [address] [place] [address]  Phone: [phone] Fax: [phone]    Primary Care Provider: Maricruz Peres    Primary Insurance: [facility]  Secondary Insurance:     DISCHARGE DETAILS:                                                                                                 Additional Comments: CM was informed that the plan will be to dc pt today.  Price check done for Bumex at pt's pharmacy and cost will be $0.  Provider was made aware.  CM met with the pt and he was made aware of the cost of Bumex and he said he was expecting to dc today.  CM asked him if he had utilized any of the housing resources that were provided.  He said  that he will go back to boarding room and he still has the resources if he needs them.  He was asked if if his wife or any S.O. should be notified and he declined to notify his wife or any other person.  CM asked the pt if he would like a commode chair since he has to share a BR and he c/o loose stools at times.  He declined.  Pt has sclae that was priced to him and CM reviewed importance of daily wts and parameters and sx that he should be aware of to contact his PCP when these occur.  He verbalized understanding.  Pt stated he has his car here and he can drive himself to his home when discharged.  Discharge today.,

## 2025-05-01 ENCOUNTER — TELEPHONE (OUTPATIENT)
Dept: CARDIOLOGY CLINIC | Facility: CLINIC | Age: 65
End: 2025-05-01

## 2025-05-01 PROBLEM — N18.4 BENIGN HYPERTENSION WITH CHRONIC KIDNEY DISEASE, STAGE IV (HCC): Status: ACTIVE | Noted: 2025-05-01

## 2025-05-01 PROBLEM — I12.9 BENIGN HYPERTENSION WITH CHRONIC KIDNEY DISEASE, STAGE IV (HCC): Status: ACTIVE | Noted: 2025-05-01

## 2025-05-01 LAB
BACTERIA SPEC BFLD CULT: NO GROWTH
BACTERIA SPEC BFLD CULT: NO GROWTH
GRAM STN SPEC: NORMAL
GRAM STN SPEC: NORMAL

## 2025-05-01 NOTE — TELEPHONE ENCOUNTER
Initial hospital follow up:  Spoke with patient      Self-management/education and teach back:  Primary learner: self  Following low sodium diet: states he is following  Following fluid restriction: states he is following  Hospital discharge weight: 151 lbs  Weighing daily:  yes            Recordinst home weight  151.5 lbs       Weight today: 151.5 lbs  Monitoring symptoms: yes  Any current symptoms: no, was having some SOB yesterday  Knows when to call provider: yes, reviewed, advised to call [facility] cardiology  Medication reviewed and taking all as prescribed (bring medications to follow up visits): amiodarone 200 mg daily, bumex 2 mg twice daily, carvedilol 3.125 mg twice daily, hydralazine 25 mg every 8 hours, potassium 20 mEq daily  Knows name of diuretic: yes  Any labs/studies needed for follow up: NA  Escalation plan: yes  HF education reviewed/reinforced including low sodium diet, 64 oz fluid restriction, activity, symptoms of decompensation and when and who to call.     Care Coordination:  Aware of cardiology follow up appointment: 25 [facility] cardiology  Aware of PCP follow up appointment:   Additional comments: patient states he is getting INR tomorrow. Advised to inform [facility] anticoag clinic that he was started on amiodarone, that it can really affect INR. Patient states he will.

## 2025-05-01 NOTE — UTILIZATION REVIEW
NOTIFICATION OF ADMISSION DISCHARGE   This is a Notification of Discharge from [facility]. Please be advised that this patient has been discharge from our facility. Below you will find the admission and discharge date and time including the patient’s disposition.   UTILIZATION REVIEW CONTACT:  Utilization Review Assistants  Network Utilization Review Department  Phone: [phone] x carefully listen to the prompts. All voicemails are confidential.  Email: [email or web address]     ADMISSION INFORMATION  PRESENTATION DATE: 4/22/2025 12:48 PM  OBERVATION ADMISSION DATE: N/A  INPATIENT ADMISSION DATE: 4/22/25  4:03 PM   DISCHARGE DATE: 4/30/2025  1:33 PM   DISPOSITION:Home/Self Care    Network Utilization Review Department  ATTENTION: Please call with any questions or concerns to [phone] and carefully listen to the prompts so that you are directed to the right person. All voicemails are confidential.   For Discharge needs, contact Care Management DC Support Team at [phone] opt. 2  Send all requests for admission clinical reviews, approved or denied determinations and any other requests to dedicated fax number below belonging to the campus where the patient is receiving treatment. List of dedicated fax numbers for the Facilities:  FACILITY NAME UR FAX NUMBER   ADMISSION DENIALS (Administrative/Medical Necessity) [phone]   DISCHARGE SUPPORT TEAM ([facility]) [phone]   PARENT CHILD HEALTH (Maternity/NICU/Pediatrics) [phone]   [facility] [phone]   [facility] [phone]   [facility] [phone]   [facility] [phone]   [facility] [phone]   [facility] [phone]   [facility] [phone]   [facility] [phone]   [facility]  [facility] [phone]   [facility] [phone]   [facility] [phone]   [facility] [phone]

## 2025-05-05 ENCOUNTER — TELEPHONE (OUTPATIENT)
Dept: NEPHROLOGY | Facility: CLINIC | Age: 65
End: 2025-05-05

## 2025-05-05 RX ORDER — ALBUTEROL SULFATE 0.83 MG/ML
2.5 SOLUTION RESPIRATORY (INHALATION) ONCE
Status: COMPLETED | OUTPATIENT
Start: 2025-05-05 | End: 2025-05-06

## 2025-05-05 NOTE — TELEPHONE ENCOUNTER
was a no show 5/1/25 with Dr. Reyes. Patient referred to Nephrology for Stage 4 CKD by UBALDO MARIANO MD.         Called and spoke with Domingo. At first he said he doctor said his Kidneys are good and then he said he is sleeping right now and will call back later to make an appointment.     I will mail a letter/ MyChart as a reminder.

## 2025-05-06 ENCOUNTER — HOSPITAL ENCOUNTER (OUTPATIENT)
Dept: PULMONOLOGY | Facility: HOSPITAL | Age: 65
Discharge: HOME | End: 2025-05-06
Attending: STUDENT IN AN ORGANIZED HEALTH CARE EDUCATION/TRAINING PROGRAM
Payer: COMMERCIAL

## 2025-05-06 DIAGNOSIS — J43.1 PANLOBULAR EMPHYSEMA (HCC): ICD-10-CM

## 2025-05-06 PROCEDURE — 94726 PLETHYSMOGRAPHY LUNG VOLUMES: CPT

## 2025-05-06 PROCEDURE — 94729 DIFFUSING CAPACITY: CPT | Performed by: INTERNAL MEDICINE

## 2025-05-06 PROCEDURE — 94060 EVALUATION OF WHEEZING: CPT | Performed by: INTERNAL MEDICINE

## 2025-05-06 PROCEDURE — 94060 EVALUATION OF WHEEZING: CPT

## 2025-05-06 PROCEDURE — 94726 PLETHYSMOGRAPHY LUNG VOLUMES: CPT | Performed by: INTERNAL MEDICINE

## 2025-05-06 PROCEDURE — 94729 DIFFUSING CAPACITY: CPT

## 2025-05-06 PROCEDURE — 94760 N-INVAS EAR/PLS OXIMETRY 1: CPT

## 2025-05-06 RX ADMIN — ALBUTEROL SULFATE 2.5 MG: 2.5 SOLUTION RESPIRATORY (INHALATION) at 16:57

## 2025-05-07 ENCOUNTER — RESULTS FOLLOW-UP (OUTPATIENT)
Dept: PULMONOLOGY | Facility: CLINIC | Age: 65
End: 2025-05-07

## 2025-05-07 ENCOUNTER — TELEPHONE (OUTPATIENT)
Age: 65
End: 2025-05-07

## 2025-05-07 NOTE — TELEPHONE ENCOUNTER
Patient received a referral for Med/Onc, as per review completed on 04/23/2025 the patient should be seen within 7 days.     Patient is scheduled for 05/12/2025 @11:20AM with Dr. Baptiste in [place]. Per the patient's preference.    Please contact the patient is we are able to get him in sooner.

## 2025-05-07 NOTE — TELEPHONE ENCOUNTER
1 week follow up:  Spoke with patient, states he is doing ok. Is having some HERBERT that started yesterday. States his weight today was 155 lbs.  Advised that he did gain about 4 lbs since discharge, he should reach out to [facility] cardiology to let them know.  Patient continues Bumex 2 mg twice daily with good urine output.     Patient does have follow up with  cardiology 5/19/25

## 2025-05-12 ENCOUNTER — TELEPHONE (OUTPATIENT)
Dept: HEMATOLOGY ONCOLOGY | Facility: CLINIC | Age: 65
End: 2025-05-12

## 2025-05-12 NOTE — TELEPHONE ENCOUNTER
Called patient in regards to their missed appointment on 5/12/2025 for 11:20am with Dr. Batpiste. Was unable to make contact, left patient with [facility] # [phone].

## 2025-05-13 ENCOUNTER — TELEPHONE (OUTPATIENT)
Dept: NEPHROLOGY | Facility: CLINIC | Age: 65
End: 2025-05-13

## 2025-05-13 ENCOUNTER — DOCUMENTATION (OUTPATIENT)
Dept: NEPHROLOGY | Facility: CLINIC | Age: 65
End: 2025-05-13

## 2025-05-13 DIAGNOSIS — I10 PRIMARY HYPERTENSION: ICD-10-CM

## 2025-05-13 NOTE — TELEPHONE ENCOUNTER
Spoke ot the patient about recent lab results show potassium is low and to take potassium supplementation to 30meq 1.5 pills daily. No other changes.               ----- Message from Lorraine Elliott PA-C sent at 5/13/2025 10:46 AM EDT -----  Please advise patient to increase potassium supplementation to 30meq 1.5 pills daily as his potassium is slightly low.  Thank you.

## 2025-05-28 NOTE — UTILIZATION REVIEW
URGENT/EMERGENT  INPATIENT/SPU AUTHORIZATION REQUEST    Date: 05/28/25            # Pages in this Request:     x New Request   Additional Information for PA#:     Office Contact Name:  Sheri LangleyAminaMann Title: Utilization Review, Registed Nurse     Phone: [phone]  Ext.     Availability (Date/Time): M-F 8-4    Type of Review:    Inpatient Review    Late Pick-Up    For Late Pick-up Cases:  How your facility was first notified of the Late Pick-up: PATHS  When your facility was first notified of the Late Pick-up (date): 04/07/2025    Originally faxed on 4/8, re-faxed 5/28 and now re-faxed on 6/24 w/ screenshot of fax confirmation          Was the recipient a prisoner at the time of admission? no         RECIPIENT INFORMATION    Recipient ID#: [id number]  Recipient Name: Domingo Trevino    YOB: 1960  65 y.o. Recipient Alias:     Gender:  x Male  Female Medicaid Eligibility (HCB Package):          INSURANCE INFORMATION    (All other private or governmental health insurance benefits must be utilized prior to billing the [place] Program)    Was this admission the result of an MVA, other accident, assault, injury, fall, gunshot, bite etc.?  no   If yes, provide a brief description of the incident.      Does the recipient have other insurance coverage?  no  Insurance Company Name:    Policy #:  Did that insurance pay on this claim?    Yes  No     Did that insurance deny this claim?   Yes  No     If yes, reason for denial:      Does the recipient have Medicare?  no  Did Medicare exhaust prior to this admission?    Yes  No     Did Medicare partially pay this claim?   Yes  No     Did Medicare deny this claim?   Yes  No   If yes, reason for denial:      Was the recipient a prisoner at the time of admission?  no        PROVIDER INFORMATION    Hospital Name: [facility]   [facility] Provider ID#: [id number]     Admitting Physician: [facility]                          [employer] Provider ID#:  [id number]        ADMISSION INFORMATION      Type of Admission: (please choose one)  ED    Admission Floor or Unit Type: Med/Surg    Dates/Times:        ED Date/Time: 3/18/2025 12:29 AM        Observation Date/Time: 03/18/2025  0255         IP Admission Date/Time: 3/19/25  4:12 PM        Discharge or Transfer Date/Time: 3/20/2025  1:58 PM        DIAGNOSIS/PROCEDURE CODES    Primary Diagnosis Code/Primary Diagnosis Code description:  I13.0 - Hypertensive heart and chronic kidney disease with heart failure and stage 1 through stage 4 chronic kidney disease, or unspecified chronic kidney disease   I50.33 - Acute on chronic diastolic (congestive) heart failure   C90.00 - Multiple myeloma not having achieved remission   N18.4 - Chronic kidney disease, stage 4 (severe)     Additional Diagnosis Code(s) and Description(s)-(up to 3 additional codes):    Procedure Code (1) and description:        CLINICAL INFORMATION - PRIOR ADMISSION ONLY    Is there a prior admission with a discharge date within 30 days of the date of this admission?    x Yes (Provide the following information)     Prior admission dates:  2/27/2025 - 3/2/2025 (3 days)  [facility] Prior Authorization Number:  [id number]    Review Outcome:   To Be Determined  Initial clinical Review was just faxed (04/08/2025) via EPIC.        CLINICAL INFORMATION - GENERAL REVIEW CHECKLIST    EMERGENCY DEPARTMENT: (Proceed to \"ADMISSION\" if Direct Admission)    Presenting Signs/Symptoms:  Chief Complaint   Patient presents with    Shortness of Breath     Pt reports SOB since last year. Seen yesterday for same CC. States  \"I went to the hospital they did blood work, told me I'm fine and I went home\". -CP, -dizziness   Patient is a 65-year-old male with a significant past medical history of atrial fibrillation, anticoagulated on warfarin, CHF, hypertension, presenting for evaluation of shortness of breath. Patient reports that he has had some " difficulty catching his breath that has been a longstanding issue, for at least a year now. He said admission secondary to CHF exacerbation as recently as approximately 3 weeks ago. Patient was seen and evaluated with similar complaint at an outside emergency department yesterday where he had lab workup as well as chest x-ray and was ultimately discharged to follow-up with his cardiologist. Patient reports continued shortness of breath that seems to be worsened with exertion. He has no pain with breathing. He denies any coughing or fevers. He denies any chest pain. He is unsure with regards to weight gain. He denies any lower extremity swelling. He has been compliant with his torsemide as well as his warfarin. He is otherwise without acute complaint.     Medication/treatment prior to arrival in the ED:  NA    Past Medical History:   A-fib (HCC), CHF (congestive heart failure) (HCC), CKD (chronic kidney disease), stage IV (HCC), and Hypertension.     Clinical Exam:  Constitutional:       Appearance: Normal appearance. He is ill-appearing (chronically).   HENT:      Head: Normocephalic and atraumatic.      Right Ear: External ear normal.      Left Ear: External ear normal.      Nose: Nose normal.   Eyes:      General: No scleral icterus.        Right eye: No discharge.         Left eye: No discharge.      Extraocular Movements: Extraocular movements intact.      Conjunctiva/sclera: Conjunctivae normal.   Cardiovascular:      Rate and Rhythm: Normal rate.      Heart sounds: Normal heart sounds. No murmur heard.     No friction rub. No gallop.   Pulmonary:      Effort: Pulmonary effort is normal. No respiratory distress.      Breath sounds: Rales present.   Abdominal:      General: Abdomen is flat. There is no distension.      Palpations: Abdomen is soft. There is no mass.      Tenderness: There is no abdominal tenderness.   Genitourinary:     Comments: Deferred  Musculoskeletal:      Right lower leg: No tenderness. No  edema.      Left lower leg: No tenderness. No edema.   Skin:     General: Skin is warm and dry.   Neurological:      General: No focal deficit present.      Mental Status: He is alert.      Initial Vital Signs: (Temp, Pulse, Resp, and BP)   ED Triage Vitals   Temperature Pulse Respirations Blood Pressure SpO2   03/18/25 0028 03/18/25 0028 03/18/25 0028 03/18/25 0028 03/18/25 0028   98.1 °F (36.7 °C) 60 18 (S) (!) 184/97 98 %      Temp Source Heart Rate Source Patient Position - Orthostatic VS BP Location FiO2 (%)   03/18/25 0028 03/18/25 0100 03/18/25 0028 03/18/25 0028 --   Oral Monitor Sitting Right arm       Pain Score       03/18/25 0028       No Pain         Pertinent Repeat Vital Signs: (include times they were obtained)    Date/Time Temp Pulse Resp BP MAP (mmHg) SpO2 O2 Device Patient Position - Orthostatic VS Madisyn Coma Scale Score Pain      03/19/25 07:35:27 97.8 °F (36.6 °C) 64 18 149/96 114 97 % None (Room air) Lying -- --     03/19/25 0723 -- -- -- -- -- -- -- -- -- No Pain     03/19/25 05:29:17 -- 61 -- 146/97 113 99 % -- -- -- --     03/19/25 0529 -- -- -- 146/97 -- -- -- -- -- --     03/18/25 23:10:38 98 °F (36.7 °C) 63 18 166/98 121 97 % None (Room air) Lying -- --     03/18/25 21:21:54 -- 66 -- 160/92 115 97 % -- -- 15 No Pain     03/18/25 17:23:29 -- 62 18 164/95 118 95 % -- -- -- --     03/18/25 15:27:37 97.6 °F (36.4 °C) 63 18 143/105 118 98 % -- -- -- --     03/18/25 12:24:16 -- 56 -- 156/99 118 95 % -- -- -- --     03/18/25 0900 -- -- -- -- -- -- -- -- -- No Pain     03/18/25 08:03:07 97.2 °F (36.2 °C) 68 16 154/96 115 96 % -- -- -- --     03/18/25 06:47:01 -- 60 -- 161/108 126 98 % -- -- -- --     03/18/25 0636 -- -- -- -- -- -- -- -- -- No Pain     03/18/25 0600 -- 76 20 150/76 106 100 % None (Room air) Lying -- --     03/18/25 0530 -- -- -- -- -- -- -- -- 15 --     03/18/25 0500 -- 70 15 166/91 122 97 % None (Room air) Lying -- --     03/18/25 0430 -- 65 12 156/87 110 96 % None (Room air)  Lying -- --     03/18/25 0400 -- 58 20 179/95 126 96 % None (Room air) Lying -- --     03/18/25 0330 -- 70 17 169/100 128 95 % None (Room air) Sitting -- --     03/18/25 0310 -- -- -- -- -- -- -- -- 15 --     03/18/25 0256 -- 60 18 159/96 118 96 % None (Room air) Lying -- --     03/18/25 0230 -- 56 20 172/104 130 97 % None (Room air) Sitting -- No Pain     03/18/25 0100 -- 80 -- 158/101 124 98 % None (Room air) Sitting -- --     03/18/25 0028 98.1 °F (36.7 °C) 60 18 184/97  -- 98 % None (Room air) Sitting -- No Pain     Pertinent Sustained Findings: (include times they were obtained)    Weight in Kilograms:   Wt Readings from Last 1 Encounters:   04/30/25 68.5 kg (151 lb 0.2 oz)     Pertinent Labs (results):  Results from last 7 days   Lab Units 03/19/25  0528 03/18/25  0536 03/18/25  0136   WBC Thousand/uL 5.68 5.13 5.22   HEMOGLOBIN g/dL 8.9* 8.5* 8.7*   HEMATOCRIT % 28.7* 28.0* 28.2*   PLATELETS Thousands/uL 232 226 231   TOTAL NEUT ABS Thousands/µL  --   --  3.36             Results from last 7 days   Lab Units 03/19/25  0532 03/18/25  0136 03/16/25  1253   SODIUM mmol/L 142 142 141   POTASSIUM mmol/L 5.2 4.0 4.1   CHLORIDE mmol/L 106 106 106   CO2 mmol/L 27 27 26   ANION GAP mmol/L 9 9 9   BUN mg/dL 51* 56* 51*   CREATININE mg/dL 2.57* 2.70* 2.6*   EGFR ml/min/1.73sq m 25 23 27*   CALCIUM mg/dL 8.9 8.8 9.3   MAGNESIUM mg/dL 2.0  --   --             Results from last 7 days   Lab Units 03/18/25  0136 03/16/25  1253   AST U/L 18 14   ALT U/L 15 13   ALK PHOS U/L 62 62   TOTAL PROTEIN g/dL 6.7 6.8   ALBUMIN g/dL 4.0 4   TOTAL BILIRUBIN mg/dL 0.68 0.8             Results from last 7 days   Lab Units 03/19/25  0532 03/18/25  0136 03/16/25  1253   GLUCOSE RANDOM mg/dL 79 95 88             Results from last 7 days   Lab Units 03/18/25  0536 03/18/25  0337 03/18/25  0136   HS TNI 0HR ng/L  --   --  169*   HS TNI 2HR ng/L  --  192*  --    HSTNI D2 ng/L  --  23*  --    HS TNI 4HR ng/L 194*  --   --    HSTNI D4 ng/L 25*   "--   --              Results from last 7 days   Lab Units 03/19/25  0528 03/18/25  0536 03/18/25  0136   PROTIME seconds 30.3* 30.1* 29.7*   INR   2.96* 2.93* 2.88*           Results from last 7 days   Lab Units 03/18/25  0136   BNP pg/mL 1,144*     Radiology (results):  XR chest 2 views   ED Interpretation by Russell Younger DO (03/18 0204)   Abnormal   Pulmonary vascular congestion       Final Interpretation by Lillie Gamez MD (03/18 0749)   Mild pulmonary edema with small pleural effusions.         EKG (results):   Atrial fibrillation  (A 357) with slow ventricular (V 55)  response with premature ventricular or aberrantly conducted complexes  Septal infarct  Nonspecific ST-t wave changes  Abnormal ECG  Other tests (results):    Tests pending final results:    Treatment in the ED:   Medication Administration from 03/18/2025 0022 to 03/18/2025 0622         Date/Time Order Dose Route Action     03/18/2025 0259 EDT furosemide (LASIX) injection 50 mg 50 mg Intravenous Given     03/18/2025 0525 EDT hydrALAZINE (APRESOLINE) tablet 25 mg 25 mg Oral Given     Other treatments:     Change in condition while in the ED:    Response to ED Treatment:   Admit to OBSERVATION          OBSERVATION: (Proceed to \"ADMISSION\" if Direct Admission)  Initial Presentation: 65 y.o. male presents to ED from home with worsening shortness of breath, no inciting event.   Has  been dealing with symptoms for awhile, now worse with walking.  Recently admitted for same problem. PMH  is  HTN,  HFpEF, AF,  CKD  nicotine dependence, and  amyloidosis, compliant with all home meds.  CXR shows  mild vascular congestion,  CM.  Labs  reveal creatinine  2.7,  troponin  169>192, BNP  1144.  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   Orders written during the observation period  Meds "   furosemide, 80 mg, Intravenous, BID  hydrALAZINE, 25 mg, Oral, Q8H LOTTIE  losartan, 50 mg, Oral, Daily  [Held by provider] warfarin, 7.5 mg, Oral, Once per day on Sunday Monday Tuesday Wednesday Friday Saturday   And  [START ON 3/20/2025] warfarin, 5 mg, Oral, Weekly  acetaminophen, 975 mg, Oral, Q8H PRN  aluminum-magnesium hydroxide-simethicone, 30 mL, Oral, Q6H PRN  LORazepam, 0.5 mg, Oral, HS PRN  melatonin, 3 mg, Oral, HS PRN  ondansetron, 4 mg, Intravenous, Q6H PRN  polyethylene glycol, 17 g, Oral, Daily PRN    Labs, imaging, other:  See Above Under Emergency Department    Consults and findings:  03/18/2025  H&P:  HFpEF, 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 Decompensated heart failure  Labs & Imaging independently reviewed: Cr 2.7, Trop 169>192. BNP 1144. CXR mild vascular congestion, cardiomegaly   Outpatient regimen: torsemide 20mg daily, losartan 50mg daily, hydralazine 25mg q8h     Plan:  Diuresis: IV lasix 50mg twice daily   Continue outpatient regimen  Cardiac diet, daily weights, I&O, monitor and replenish electrolytes   Elevated troponin  No chest pain but elevated in setting of CHF, see plan above  Chronic kidney disease (CKD), stage IV (severe) (HCC)        Lab Results   Component Value Date     EGFR 23 03/18/2025     EGFR 27 (L) 03/16/2025     EGFR 26 (L) 03/11/2025     CREATININE 2.70 (H) 03/18/2025     CREATININE 2.6 (H) 03/16/2025     CREATININE 2.65 (H) 03/11/2025      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  Code Status: Prior   Discussion with family:   Anticipated Length of Stay: Patient will be admitted on an observation basis with an anticipated length of stay of less than 2 midnights secondary to chf.    Test Results during the observation period  Pertinent Lab tests (dates/results):  Culture results (blood, urine, spinal, wound, respiratory, etc.):  Imaging tests (dates/results):  EKG (dates/results):  Other test (dates/results):  Tests pending (dates/results):    Response to Treatment, Major Change in Condition, Major Charge in Treatment during the observation period  OBSERVATION 3/18 CHANGED TO IP ADMISSION 3/19 @ 1612 Continue IV diuresis.          ALL Admissions:  3/19  IP ADMISSION  Remains on  IV  lasix.    Urinating briskly.   States orthopnea  and HERBERT improved.   Continue daily weight  and current meds.  O2  sats  94  % RA.  Admission Orders and Other Orders written within the first 24 hrs after admission  Meds   furosemide, 80 mg, Intravenous, BID  hydrALAZINE, 25 mg, Oral, Q8H LOTTIE  losartan, 50 mg, Oral, Daily  [Held by provider] warfarin, 7.5 mg, Oral, Once per day on Sunday Monday Tuesday Wednesday Friday Saturday   And  [START ON 3/20/2025] warfarin, 5 mg, Oral, Weekly  acetaminophen, 975 mg, Oral, Q8H PRN  aluminum-magnesium hydroxide-simethicone, 30 mL, Oral, Q6H PRN  LORazepam, 0.5 mg, Oral, HS PRN  melatonin, 3 mg, Oral, HS PRN  ondansetron, 4 mg, Intravenous, Q6H PRN  polyethylene glycol, 17 g, Oral, Daily PRN  Labs, imaging, other:  See Above Under Emergency Department    Consults and findings:    Test Results after admission  Pertinent Lab tests (dates/results):    Culture results (blood, urine, spinal, wound, respiratory, etc.):  Imaging tests (dates/results):    EKG (dates/results):    Other test (dates/results):  Tests pending  (dates/results):    Surgical or Invasive Procedures  Name of surgery/procedure:  Date & Time:  Patient Response:  Post-operative orders:  Operative Report/Findings:    Response to Treatment, Major Change in Condition, Major Charge in Treatment anytime during admission    Disposition on Discharge  Home, Rehab, SNF, LTC, Shelter, etc.: Home/Self Care    Cease to Breathe (CTB)  If a patient expires during an admission, in addition to the above information, please include:    Summary/timeline of the patient's decline in condition:    Medications and treatment:    Patient response to treatment:    Date and time patient ceased to breathe:        Is there a Readmission that follows this admission? no  If yes, provide dates:        InterQual Review  InterQual Criteria Met: yes    Please include the InterQual Review, iPipeline year/version used, and the criteria selected:   Criteria Review   REVIEW SUMMARY     iPipeline® Review Status: In Primary  Review Type: Admission  Criteria Status: Acute Met  Day of review: Episode Day 1  Condition Specific: Yes        REVIEW DETAILS     Product: LOC:Acute Adult  Subset: Heart Failure            [X] Select Day, One:          [X] Episode Day 1, One:              [X] ACUTE, One:                  [X] Heart failure, acute on chronic and, All:                      [X] Continued symptoms after at least 1 dose of diuretic and >= 2h treatment and, >= One:                          [X] Dyspnea, not returned to baseline                      [X] Finding, >= One:                          [X] Clinical risk factor, >= One:                              [X] Chronic kidney disease (excludes chronic dialysis) and creatinine >= 2.75 mg/dL                      [X] Intervention, >= One:                          [X] Diuretic, >= One:                              [X] Diuretic >= 2 doses        Version: Plum 2024, Dec. 2024 Release         PLEASE SUBMIT THE COMPLETED FORM TO THE DEPARTMENT OF HUMAN  SERVICES - DIVISION OF CLINICAL  REVIEW VIA FAX [phone] or VIA E-MAIL TO [email or web address]    Signature: Sheri Vu RN Date:  05/28/25    Confidentiality Notice: The documents accompanying this telecopy may contain confidential information belonging to the sender.  The information is intended only for the use of the individual named above. If you are not the intended recipient, you are hereby notified  That any disclosure, copying, distribution or taking of any telecopy is strictly prohibited.

## 2025-07-14 NOTE — CASE MANAGEMENT
Case Management Assessment & Discharge Planning Note    Patient name Domingo Trevino  [facility] /[facility] -* MRN [id number]  : 1960 Date 2025       Current Admission Date: 2025  Current Admission Diagnosis:SOB (shortness of breath)   Patient Active Problem List    Diagnosis Date Noted Date Diagnosed    Myeloma (HCC) 2025     SOB (shortness of breath) 2025     Pulmonary hypertension (HCC) 04/15/2025     Former smoker 04/15/2025     Persistent proteinuria 04/15/2025     Anemia, chronic disease 2025     Electrolyte abnormality 2025     Renal infarction (HCC) 2025     Chronic kidney disease (CKD), stage IV (severe) (HCC) 2025     AL amyloidosis (HCC) 2025     Multiple myeloma not having achieved remission (HCC) 2025     Chronic atrial fibrillation (HCC) 2025     HFpEF, 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 2022     Primary hypertension 2022     Elevated troponin 2022     Hypokalemia 2022       LOS (days): 1  Geometric Mean LOS (GMLOS) (days):   Days to GMLOS:     OBJECTIVE:  PATIENT READMITTED TO HOSPITAL  Risk of Unplanned Readmission Score: 38.03         Current admission status: Inpatient       [facility] Pharmacy:   [id number] [address] [place] [address]  Phone: [phone] Fax: [phone]    Primary Care Provider: Maricruz Peres    Primary Insurance: [facility]  Secondary Insurance:     ASSESSMENT:  Active Health Care Proxies    There are no active Health Care Proxies on file.       Advance Directives  Does patient have a Health Care POA?: No  Was patient offered paperwork?: Yes (LAURA Advanced Directive)  Does patient currently have a Health Care decision maker?: No  Does patient have Advance Directives?: No  Was patient offered paperwork?: Yes (LAURA Advanced Directive)  Primary Contact: declined         Readmission Root Cause  30 Day Readmission:  Copied from CRM #[id number]. Yes  During your hospital stay, did someone (provider, nurse, ) explain your care to you in a way you could understand?: Yes  Did you feel medically stable to leave the hospital?: Yes  Were you able to pay for your medication at the pharmacy?: No  Did you have reliable transportation to take you to your appointments?: Yes  During previous admission, was a post-acute recommendation made?: No  Patient was readmitted due to: SOB, CHF  Action Plan: Medically treat, confirm need for home O2, and assure that his insurance is actve so he can pay for his medications    Patient Information  Admitted from:: Other (comment) (he has been living in a boarding room for at least 22 days.)  Mental Status: Alert  During Assessment patient was accompanied by: Not accompanied during assessment  Assessment information provided by:: Patient  Primary Caregiver: Self  Support Systems: Self  County of Residence: [place]  What city do you live in?: [place]  Home entry access options. Select all that apply.: Stairs  Number of steps to enter home.: 4  Do the steps have railings?: Yes  Type of Current Residence: Other (Comment) (boarding room)  Living Arrangements: Lives Alone  Is patient a ?: No    Activities of Daily Living Prior to Admission  Functional Status: Independent  Completes ADLs independently?: Yes  Ambulates independently?: Yes  Does patient use assisted devices?: No  Does patient currently own DME?: No  Does patient have a history of Outpatient Therapy (PT/OT)?: No  Does the patient have a history of Short-Term Rehab?: No  Does patient have a history of HHC?: No  Does patient currently have HHC?: No         Patient Information Continued  Income Source: Pension/intermediate  Does patient have prescription coverage?: No  Can the patient afford their medications and any related supplies (such as glucometers or test strips)?: No  Does patient receive dialysis treatments?: No  Does patient have a history of  "substance abuse?: No  Does patient have a history of Mental Health Diagnosis?: No         Means of Transportation  Means of Transport to Appts:: Drives Self          DISCHARGE DETAILS:    Discharge planning discussed with:: pt  Freedom of Choice: Yes  Comments - Freedom of Choice: home vs home with services  CM contacted family/caregiver?: No- see comments (declined)  Were Treatment Team discharge recommendations reviewed with patient/caregiver?: Yes  Did patient/caregiver verbalize understanding of patient care needs?: Yes  Were patient/caregiver advised of the risks associated with not following Treatment Team discharge recommendations?: Yes    Contacts  Patient Contacts: pt  Relationship to Patient:: Other (Comment) (self)  Contact Method: In Person  Reason/Outcome: Continuity of Care, Emergency Contact, Discharge Planning    Requested Home Health Care         Is the patient interested in HHC at discharge?: No    DME Referral Provided  Referral made for DME?: No         Would you like to participate in our [facility] service program?  : No - Declined    Treatment Team Recommendation: Home  Discharge Destination Plan:: Home  Transport at Discharge : Self                                      Additional Comments: CM met with the pt and he was made aware of the CM role.  Assessment was done.  JUAN informed pt that there was no contact available in his chart.  He said, \"I would prefer it that way.\"  According to the pt he was living with his wife, but has been in a boarding room for 22 days.  Rent for the room is due tomorrow.  He said that he is on the second floor, no elevator and he must share the BR.  He denies having any DME but since he is now using O2 that he may need O2 when he leaves the hospital.  JUAN asked several times if he wanted any people added to his conatct list but he declined.  We discussed that he has been having multiple readmissions to [facility].  Per pt he has been taking his " medications as ordered.  CM told him that per EPIC chart there are medications that appear that he has not picked up from the pharmacy.  He stated it was because he was admitted to the hospital.  Plus, he said that his chemo drug was $92 and that he can afford his medications.  CM told him the system has his insurance listed as [facility] that his drug cost should be significantly lower.  He stated that it had  and it should have been renewed.  He is not sure because any information would have been mailed to his wife and he is not in contact with her.  He also stated it is hard to take the medications if he has to go to the BR a lot and he can't becasue of all the people in the boarding room taht must share the BR.  CM asked what his income is and he said it is about $2000/mo and change.  His next check is due to come on May 1st.  He has no savings.  CM offered to do the following:  Check with insurance verification if his MA is active, and to provide him with a list of lower income housing opportunities where he can have is own room and BR.  Pt agreed with this paln.  Ellen verication sent an email with the pt's information.  CM to follow.

## 2025-08-02 DIAGNOSIS — J43.1 PANLOBULAR EMPHYSEMA (HCC): ICD-10-CM

## 2025-08-04 RX ORDER — FLUTICASONE FUROATE, UMECLIDINIUM BROMIDE AND VILANTEROL TRIFENATATE 100; 62.5; 25 UG/1; UG/1; UG/1
1 POWDER RESPIRATORY (INHALATION) DAILY
Qty: 180 EACH | Refills: 1 | Status: SHIPPED | OUTPATIENT
Start: 2025-08-04 | End: 2025-11-02